# Patient Record
Sex: FEMALE | Race: WHITE | Employment: FULL TIME | URBAN - METROPOLITAN AREA
[De-identification: names, ages, dates, MRNs, and addresses within clinical notes are randomized per-mention and may not be internally consistent; named-entity substitution may affect disease eponyms.]

---

## 2017-03-22 ENCOUNTER — HOSPITAL ENCOUNTER (INPATIENT)
Age: 47
LOS: 7 days | Discharge: HOME | DRG: 761 | End: 2017-03-29
Attending: EMERGENCY MEDICINE | Admitting: INTERNAL MEDICINE
Payer: COMMERCIAL

## 2017-03-22 ENCOUNTER — APPOINTMENT (OUTPATIENT)
Dept: CT IMAGING | Age: 47
DRG: 761 | End: 2017-03-22
Attending: PHYSICIAN ASSISTANT
Payer: COMMERCIAL

## 2017-03-22 DIAGNOSIS — K52.9 ACUTE COLITIS: Primary | ICD-10-CM

## 2017-03-22 PROBLEM — R11.2 NAUSEA AND VOMITING: Status: ACTIVE | Noted: 2017-03-22

## 2017-03-22 LAB
ALBUMIN SERPL BCP-MCNC: 4.2 G/DL (ref 3.5–5)
ALBUMIN/GLOB SERPL: 0.9 {RATIO} (ref 1.1–2.2)
ALP SERPL-CCNC: 113 U/L (ref 45–117)
ALT SERPL-CCNC: 32 U/L (ref 12–78)
ANION GAP BLD CALC-SCNC: 13 MMOL/L (ref 5–15)
AST SERPL W P-5'-P-CCNC: 25 U/L (ref 15–37)
BASOPHILS # BLD AUTO: 0 K/UL (ref 0–0.1)
BASOPHILS # BLD: 0 % (ref 0–1)
BILIRUB SERPL-MCNC: 1.1 MG/DL (ref 0.2–1)
BUN SERPL-MCNC: 14 MG/DL (ref 6–20)
BUN/CREAT SERPL: 13 (ref 12–20)
CALCIUM SERPL-MCNC: 9.8 MG/DL (ref 8.5–10.1)
CHLORIDE SERPL-SCNC: 100 MMOL/L (ref 97–108)
CO2 SERPL-SCNC: 24 MMOL/L (ref 21–32)
CREAT SERPL-MCNC: 1.1 MG/DL (ref 0.55–1.02)
CRP SERPL-MCNC: 1.87 MG/DL
EOSINOPHIL # BLD: 0 K/UL (ref 0–0.4)
EOSINOPHIL NFR BLD: 0 % (ref 0–7)
ERYTHROCYTE [DISTWIDTH] IN BLOOD BY AUTOMATED COUNT: 12.3 % (ref 11.5–14.5)
GLOBULIN SER CALC-MCNC: 4.7 G/DL (ref 2–4)
GLUCOSE SERPL-MCNC: 99 MG/DL (ref 65–100)
HCT VFR BLD AUTO: 46 % (ref 35–47)
HGB BLD-MCNC: 15.6 G/DL (ref 11.5–16)
LIPASE SERPL-CCNC: 83 U/L (ref 73–393)
LYMPHOCYTES # BLD AUTO: 18 % (ref 12–49)
LYMPHOCYTES # BLD: 1.8 K/UL (ref 0.8–3.5)
MCH RBC QN AUTO: 30.4 PG (ref 26–34)
MCHC RBC AUTO-ENTMCNC: 33.9 G/DL (ref 30–36.5)
MCV RBC AUTO: 89.7 FL (ref 80–99)
MONOCYTES # BLD: 0.5 K/UL (ref 0–1)
MONOCYTES NFR BLD AUTO: 5 % (ref 5–13)
NEUTS SEG # BLD: 7.4 K/UL (ref 1.8–8)
NEUTS SEG NFR BLD AUTO: 77 % (ref 32–75)
PLATELET # BLD AUTO: 259 K/UL (ref 150–400)
POTASSIUM SERPL-SCNC: 3.6 MMOL/L (ref 3.5–5.1)
PROT SERPL-MCNC: 8.9 G/DL (ref 6.4–8.2)
RBC # BLD AUTO: 5.13 M/UL (ref 3.8–5.2)
SODIUM SERPL-SCNC: 137 MMOL/L (ref 136–145)
WBC # BLD AUTO: 9.7 K/UL (ref 3.6–11)

## 2017-03-22 PROCEDURE — 85025 COMPLETE CBC W/AUTO DIFF WBC: CPT | Performed by: PHYSICIAN ASSISTANT

## 2017-03-22 PROCEDURE — 71260 CT THORAX DX C+: CPT

## 2017-03-22 PROCEDURE — 74011250637 HC RX REV CODE- 250/637: Performed by: PHYSICIAN ASSISTANT

## 2017-03-22 PROCEDURE — 80053 COMPREHEN METABOLIC PANEL: CPT | Performed by: PHYSICIAN ASSISTANT

## 2017-03-22 PROCEDURE — 74011000250 HC RX REV CODE- 250: Performed by: INTERNAL MEDICINE

## 2017-03-22 PROCEDURE — 86140 C-REACTIVE PROTEIN: CPT | Performed by: PHYSICIAN ASSISTANT

## 2017-03-22 PROCEDURE — 74011000258 HC RX REV CODE- 258: Performed by: INTERNAL MEDICINE

## 2017-03-22 PROCEDURE — 36415 COLL VENOUS BLD VENIPUNCTURE: CPT | Performed by: PHYSICIAN ASSISTANT

## 2017-03-22 PROCEDURE — 96375 TX/PRO/DX INJ NEW DRUG ADDON: CPT

## 2017-03-22 PROCEDURE — 96365 THER/PROPH/DIAG IV INF INIT: CPT

## 2017-03-22 PROCEDURE — 74011250636 HC RX REV CODE- 250/636: Performed by: INTERNAL MEDICINE

## 2017-03-22 PROCEDURE — 81001 URINALYSIS AUTO W/SCOPE: CPT | Performed by: PHYSICIAN ASSISTANT

## 2017-03-22 PROCEDURE — 74011000258 HC RX REV CODE- 258: Performed by: PHYSICIAN ASSISTANT

## 2017-03-22 PROCEDURE — 74011636320 HC RX REV CODE- 636/320: Performed by: PHYSICIAN ASSISTANT

## 2017-03-22 PROCEDURE — 74011250636 HC RX REV CODE- 250/636: Performed by: PHYSICIAN ASSISTANT

## 2017-03-22 PROCEDURE — 65270000029 HC RM PRIVATE

## 2017-03-22 PROCEDURE — 83690 ASSAY OF LIPASE: CPT | Performed by: PHYSICIAN ASSISTANT

## 2017-03-22 PROCEDURE — 74011250637 HC RX REV CODE- 250/637: Performed by: INTERNAL MEDICINE

## 2017-03-22 PROCEDURE — 99283 EMERGENCY DEPT VISIT LOW MDM: CPT

## 2017-03-22 PROCEDURE — 74011636320 HC RX REV CODE- 636/320: Performed by: EMERGENCY MEDICINE

## 2017-03-22 RX ORDER — LAMOTRIGINE 100 MG/1
100 TABLET ORAL DAILY
Status: DISCONTINUED | OUTPATIENT
Start: 2017-03-23 | End: 2017-03-22

## 2017-03-22 RX ORDER — SODIUM CHLORIDE 0.9 % (FLUSH) 0.9 %
5-10 SYRINGE (ML) INJECTION AS NEEDED
Status: DISCONTINUED | OUTPATIENT
Start: 2017-03-22 | End: 2017-03-29 | Stop reason: HOSPADM

## 2017-03-22 RX ORDER — ENOXAPARIN SODIUM 100 MG/ML
40 INJECTION SUBCUTANEOUS EVERY 24 HOURS
Status: DISCONTINUED | OUTPATIENT
Start: 2017-03-22 | End: 2017-03-29 | Stop reason: HOSPADM

## 2017-03-22 RX ORDER — SODIUM CHLORIDE 9 MG/ML
150 INJECTION, SOLUTION INTRAVENOUS CONTINUOUS
Status: DISCONTINUED | OUTPATIENT
Start: 2017-03-22 | End: 2017-03-23

## 2017-03-22 RX ORDER — ALPRAZOLAM 0.5 MG/1
1 TABLET ORAL
Status: DISCONTINUED | OUTPATIENT
Start: 2017-03-22 | End: 2017-03-29 | Stop reason: HOSPADM

## 2017-03-22 RX ORDER — DIPHENHYDRAMINE HYDROCHLORIDE 50 MG/ML
12.5 INJECTION, SOLUTION INTRAMUSCULAR; INTRAVENOUS
Status: DISCONTINUED | OUTPATIENT
Start: 2017-03-22 | End: 2017-03-29 | Stop reason: HOSPADM

## 2017-03-22 RX ORDER — LEVOFLOXACIN 5 MG/ML
500 INJECTION, SOLUTION INTRAVENOUS EVERY 24 HOURS
Status: DISCONTINUED | OUTPATIENT
Start: 2017-03-22 | End: 2017-03-29 | Stop reason: HOSPADM

## 2017-03-22 RX ORDER — HYDROMORPHONE HYDROCHLORIDE 1 MG/ML
1 INJECTION, SOLUTION INTRAMUSCULAR; INTRAVENOUS; SUBCUTANEOUS
Status: COMPLETED | OUTPATIENT
Start: 2017-03-22 | End: 2017-03-22

## 2017-03-22 RX ORDER — LEVOFLOXACIN 5 MG/ML
750 INJECTION, SOLUTION INTRAVENOUS
Status: DISCONTINUED | OUTPATIENT
Start: 2017-03-22 | End: 2017-03-22

## 2017-03-22 RX ORDER — ESTRADIOL 1 MG/1
1 TABLET ORAL
Status: DISCONTINUED | OUTPATIENT
Start: 2017-03-22 | End: 2017-03-25

## 2017-03-22 RX ORDER — THERA TABS 400 MCG
1 TAB ORAL DAILY
Status: DISCONTINUED | OUTPATIENT
Start: 2017-03-23 | End: 2017-03-29 | Stop reason: HOSPADM

## 2017-03-22 RX ORDER — POTASSIUM CHLORIDE 1.5 G/1.77G
20 POWDER, FOR SOLUTION ORAL DAILY
Status: DISCONTINUED | OUTPATIENT
Start: 2017-03-23 | End: 2017-03-29 | Stop reason: HOSPADM

## 2017-03-22 RX ORDER — LAMOTRIGINE 100 MG/1
100 TABLET ORAL 2 TIMES DAILY
COMMUNITY
End: 2018-02-08

## 2017-03-22 RX ORDER — METRONIDAZOLE 500 MG/100ML
500 INJECTION, SOLUTION INTRAVENOUS
Status: DISCONTINUED | OUTPATIENT
Start: 2017-03-22 | End: 2017-03-22

## 2017-03-22 RX ORDER — ONDANSETRON 2 MG/ML
4 INJECTION INTRAMUSCULAR; INTRAVENOUS
Status: COMPLETED | OUTPATIENT
Start: 2017-03-22 | End: 2017-03-23

## 2017-03-22 RX ORDER — ACETAMINOPHEN 325 MG/1
650 TABLET ORAL
Status: DISCONTINUED | OUTPATIENT
Start: 2017-03-22 | End: 2017-03-29 | Stop reason: HOSPADM

## 2017-03-22 RX ORDER — HYDROMORPHONE HYDROCHLORIDE 1 MG/ML
1 INJECTION, SOLUTION INTRAMUSCULAR; INTRAVENOUS; SUBCUTANEOUS
Status: DISCONTINUED | OUTPATIENT
Start: 2017-03-22 | End: 2017-03-27

## 2017-03-22 RX ORDER — ONDANSETRON 4 MG/1
4 TABLET, ORALLY DISINTEGRATING ORAL
Status: COMPLETED | OUTPATIENT
Start: 2017-03-22 | End: 2017-03-22

## 2017-03-22 RX ORDER — SODIUM CHLORIDE 0.9 % (FLUSH) 0.9 %
5-10 SYRINGE (ML) INJECTION EVERY 8 HOURS
Status: DISCONTINUED | OUTPATIENT
Start: 2017-03-22 | End: 2017-03-29 | Stop reason: HOSPADM

## 2017-03-22 RX ORDER — METRONIDAZOLE 500 MG/100ML
500 INJECTION, SOLUTION INTRAVENOUS EVERY 8 HOURS
Status: DISCONTINUED | OUTPATIENT
Start: 2017-03-22 | End: 2017-03-29 | Stop reason: HOSPADM

## 2017-03-22 RX ORDER — LORAZEPAM 2 MG/ML
1 INJECTION INTRAMUSCULAR
Status: COMPLETED | OUTPATIENT
Start: 2017-03-22 | End: 2017-03-22

## 2017-03-22 RX ORDER — ALPRAZOLAM 0.5 MG/1
2 TABLET ORAL
Status: DISCONTINUED | OUTPATIENT
Start: 2017-03-22 | End: 2017-03-25

## 2017-03-22 RX ORDER — PRAVASTATIN SODIUM 40 MG/1
40 TABLET ORAL
COMMUNITY
End: 2022-09-26

## 2017-03-22 RX ORDER — PROCHLORPERAZINE EDISYLATE 5 MG/ML
5 INJECTION INTRAMUSCULAR; INTRAVENOUS ONCE
Status: DISCONTINUED | OUTPATIENT
Start: 2017-03-22 | End: 2017-03-22

## 2017-03-22 RX ORDER — PROCHLORPERAZINE EDISYLATE 5 MG/ML
5 INJECTION INTRAMUSCULAR; INTRAVENOUS
Status: DISCONTINUED | OUTPATIENT
Start: 2017-03-22 | End: 2017-03-22

## 2017-03-22 RX ORDER — LAMOTRIGINE 100 MG/1
100 TABLET ORAL DAILY
Status: DISCONTINUED | OUTPATIENT
Start: 2017-03-23 | End: 2017-03-25

## 2017-03-22 RX ORDER — POTASSIUM CHLORIDE 20MEQ/15ML
20 LIQUID (ML) ORAL DAILY
COMMUNITY
End: 2018-01-14

## 2017-03-22 RX ORDER — UREA 10 %
800 LOTION (ML) TOPICAL DAILY
COMMUNITY
End: 2018-02-08

## 2017-03-22 RX ORDER — POTASSIUM CHLORIDE 40 MEQ/15ML
1 SOLUTION ORAL DAILY
COMMUNITY
End: 2017-03-22

## 2017-03-22 RX ORDER — PROCHLORPERAZINE EDISYLATE 5 MG/ML
5 INJECTION INTRAMUSCULAR; INTRAVENOUS
Status: CANCELLED | OUTPATIENT
Start: 2017-03-22

## 2017-03-22 RX ORDER — HYDROMORPHONE HYDROCHLORIDE 1 MG/ML
1 INJECTION, SOLUTION INTRAMUSCULAR; INTRAVENOUS; SUBCUTANEOUS
Status: DISCONTINUED | OUTPATIENT
Start: 2017-03-22 | End: 2017-03-22

## 2017-03-22 RX ADMIN — ONDANSETRON 4 MG: 4 TABLET, ORALLY DISINTEGRATING ORAL at 15:04

## 2017-03-22 RX ADMIN — LEVOFLOXACIN 500 MG: 5 INJECTION, SOLUTION INTRAVENOUS at 22:34

## 2017-03-22 RX ADMIN — PROMETHAZINE HYDROCHLORIDE 12.5 MG: 25 INJECTION INTRAMUSCULAR; INTRAVENOUS at 20:35

## 2017-03-22 RX ADMIN — IOPAMIDOL 100 ML: 755 INJECTION, SOLUTION INTRAVENOUS at 14:57

## 2017-03-22 RX ADMIN — Medication 10 ML: at 22:35

## 2017-03-22 RX ADMIN — LORAZEPAM 1 MG: 2 INJECTION INTRAMUSCULAR; INTRAVENOUS at 14:42

## 2017-03-22 RX ADMIN — ESTRADIOL 1 MG: 1 TABLET ORAL at 23:03

## 2017-03-22 RX ADMIN — ALPRAZOLAM 2 MG: 0.5 TABLET ORAL at 23:02

## 2017-03-22 RX ADMIN — FAMOTIDINE 20 MG: 10 INJECTION, SOLUTION INTRAVENOUS at 22:34

## 2017-03-22 RX ADMIN — DIATRIZOATE MEGLUMINE AND DIATRIZOATE SODIUM 30 ML: 660; 100 LIQUID ORAL; RECTAL at 15:05

## 2017-03-22 RX ADMIN — PROMETHAZINE HYDROCHLORIDE 25 MG: 25 INJECTION INTRAMUSCULAR; INTRAVENOUS at 16:41

## 2017-03-22 RX ADMIN — SODIUM CHLORIDE 150 ML/HR: 900 INJECTION, SOLUTION INTRAVENOUS at 20:36

## 2017-03-22 RX ADMIN — ENOXAPARIN SODIUM 40 MG: 40 INJECTION SUBCUTANEOUS at 20:22

## 2017-03-22 RX ADMIN — HYDROMORPHONE HYDROCHLORIDE 1 MG: 1 INJECTION, SOLUTION INTRAMUSCULAR; INTRAVENOUS; SUBCUTANEOUS at 16:47

## 2017-03-22 RX ADMIN — METRONIDAZOLE 500 MG: 500 INJECTION, SOLUTION INTRAVENOUS at 20:21

## 2017-03-22 RX ADMIN — HYDROMORPHONE HYDROCHLORIDE 1 MG: 1 INJECTION, SOLUTION INTRAMUSCULAR; INTRAVENOUS; SUBCUTANEOUS at 21:15

## 2017-03-22 NOTE — IP AVS SNAPSHOT
Sin Mcfarland 
 
 
 [address] [facility] 
[phone] Patient: Rose Abad MRN: [id number] :1970 You are allergic to the following Allergen Reactions Sulfa (Sulfonamide Antibiotics) Hives Toradol (Ketorolac Tromethamine) Hives Morphine Hives Nausea and Vomiting Immunizations Administered for This Admission Name Date Influenza Vaccine (Quad) PF  Deferred () Recent Documentation Height Weight BMI OB Status Smoking Status 1.676 m 97.5 kg 34.7 kg/m2 Hysterectomy Never Smoker Emergency Contacts Name Discharge Info Relation Home Work Mobile Carlos Taylor CAREGIVER [3] Mother [14]   [phone] Sravan Pedersen DISCHARGE CAREGIVER [3] Son [22]   [phone] Bae Lee [4]   [phone] Russ Pedersen  Spouse [3] [phone] About your hospitalization You were admitted on:  2017 You last received care in the:  [facility]  MED SURG 2 You were discharged on:  2017 Unit phone number:  [phone] Why you were hospitalized Your primary diagnosis was:  Acute Colitis Your diagnoses also included:  Depression, Pud (Peptic Ulcer Disease), Anxiety, Nausea And Vomiting, Ulcerative Colitis (Hcc), Obesity Providers Seen During Your Hospitalizations Provider Role Specialty Primary office phone Kathy Chavez DO Attending Provider Emergency Medicine [phone] Sean Mccall MD Attending Provider Emergency Medicine [phone] Nola Lowry MD Attending Provider Internal Medicine [phone] Jonn Copeland MD Attending Provider Internal Medicine [phone] Toby Blackwell MD Attending Provider Internal Medicine [phone] Your Primary Care Physician (PCP) Primary Care Physician Office Phone Office Fax Chana Murguia [phone] Follow-up Information Follow up With Details Comments Contact Info Brittaney Kate MD In 1 week  [address] 
[phone] Current Discharge Medication List  
  
START taking these medications Dose & Instructions Dispensing Information Comments Morning Noon Evening Bedtime HYDROmorphone 2 mg tablet Commonly known as:  DILAUDID Your last dose was: Your next dose is:    
   
   
 Dose:  2-4 mg Take 1-2 Tabs by mouth every four (4) hours as needed for Pain. Max Daily Amount: 24 mg. Quantity:  60 Tab Refills:  0  
     
   
   
   
  
 promethazine 12.5 mg tablet Commonly known as:  PHENERGAN Your last dose was: Your next dose is:    
   
   
 Dose:  12.5 mg Take 1 Tab by mouth every six (6) hours as needed for Nausea. Quantity:  60 Tab Refills:  0 CONTINUE these medications which have NOT CHANGED Dose & Instructions Dispensing Information Comments Morning Noon Evening Bedtime * ALPRAZolam 2 mg tablet Commonly known as:  Volanda Pronto Your last dose was: Your next dose is:    
   
   
 Dose:  2 mg Take 2 mg by mouth nightly. Refills:  0  
     
   
   
   
  
 * ALPRAZolam 1 mg tablet Commonly known as:  Volanda Pronto Your last dose was: Your next dose is:    
   
   
 Dose:  1 mg Take 1 mg by mouth two (2) times daily as needed for Anxiety (at breakfast and lunch). Refills:  0  
     
   
   
   
  
 aspirin delayed-release 81 mg tablet Your last dose was: Your next dose is:    
   
   
 Dose:  81 mg Take 81 mg by mouth every evening. Refills:  0  
     
   
   
   
  
 calcium-vitamin D 500 mg(1,250mg) -200 unit per tablet Commonly known as:  OYSTER SHELL Your last dose was: Your next dose is:    
   
   
 Dose:  2 Tab Take 2 Tabs by mouth every evening. Refills:  0 dicyclomine 20 mg tablet Commonly known as:  BENTYL Your last dose was: Your next dose is:    
   
   
 Dose:  20 mg Take 20 mg by mouth every six (6) hours as needed. Refills:  0  
     
   
   
   
  
 estradiol 1 mg tablet Commonly known as:  ESTRACE Your last dose was: Your next dose is:    
   
   
 Dose:  1 mg Take 1 mg by mouth nightly. Refills:  0  
     
   
   
   
  
 folic acid 321 mcg tablet Your last dose was: Your next dose is:    
   
   
 Dose:  800 mcg Take 800 mcg by mouth daily. Refills:  0  
     
   
   
   
  
 lamoTRIgine 100 mg tablet Commonly known as: LaMICtal  
   
Your last dose was: Your next dose is:    
   
   
 Dose:  100 mg Take 100 mg by mouth daily. Refills:  0  
     
   
   
   
  
 ONE-A-DAY ESSENTIAL PO Your last dose was: Your next dose is:    
   
   
 Dose:  1 Tab Take 1 Tab by mouth daily. Refills:  0  
     
   
   
   
  
 potassium chloride 20 mEq/15 mL solution Commonly known as:  KAON 10% Your last dose was: Your next dose is:    
   
   
 Dose:  20 mEq Take 20 mEq by mouth daily. Refills:  0  
     
   
   
   
  
 pravastatin 20 mg tablet Commonly known as:  PRAVACHOL Your last dose was: Your next dose is:    
   
   
 Dose:  20 mg Take 20 mg by mouth nightly. Refills:  0 SUPER B COMPLEX PO Your last dose was: Your next dose is:    
   
   
 Dose:  1 Tab Take 1 Tab by mouth daily. Refills:  0  
     
   
   
   
  
 * Notice: This list has 2 medication(s) that are the same as other medications prescribed for you. Read the directions carefully, and ask your doctor or other care provider to review them with you. Where to Get Your Medications Information on where to get these meds will be given to you by the nurse or doctor. ! Ask your nurse or doctor about these medications HYDROmorphone 2 mg tablet  
 promethazine 12.5 mg tablet Discharge Instructions HOSPITALIST DISCHARGE INSTRUCTIONS 
NAME: Mercy Hamlin :  1970 MRN:  [id number] Date/Time:  3/29/2017 1:04 PM 
 
ADMIT DATE: 3/22/2017 DISCHARGE DATE: 3/29/2017 DISCHARGE DIAGNOSIS: 
Abdominal pain MEDICATIONS: 
· It is important that you take the medication exactly as they are prescribed. · Keep your medication in the bottles provided by the pharmacist and keep a list of the medication names, dosages, and times to be taken in your wallet. · Do not take other medications without consulting your doctor. Pain Management: per above medications What to do at [facility] Recommended diet:  GI lite diet Recommended activity: Activity as tolerated If you experience any of the following symptoms then please call your primary care physician or return to the emergency room if you cannot get hold of your doctor: 
Fever, chills, nausea, vomiting, diarrhea, change in mentation, falling, bleeding, shortness of breath Follow Up: Follow-up Information Follow up With Details Comments Contact Info Claudette Marte MD In 1 week  [address] 
[phone] Information obtained by : 
I understand that if any problems occur once I am at home I am to contact my physician. I understand and acknowledge receipt of the instructions indicated above. Physician's or R.N.'s Signature                                                                  Date/Time Patient or Representative Signature                                                          Date/Time Discharge Orders None [facility] [facility] Announcement We are excited to announce that we are making your provider's discharge notes available to you in [employer]. You will see these notes when they are completed and signed by the physician that discharged you from your recent hospital stay. If you have any questions or concerns about any information you see in [email or web address], please call the Health Information Department where you were seen or reach out to your Primary Care Provider for more information about your plan of care. Introducing [facility]! Dear Ernesto Anthony: Thank you for requesting a [email or web address] account. Our records indicate that you have previously registered for a [email or web address] account but its currently inactive. Please call our [email or web address] support line at [phone]. Additional Information If you have questions, please visit the Frequently Asked Questions section of the [email or web address] website at [email or web address] Remember, [email or web address] is NOT to be used for urgent needs. For medical emergencies, dial 911. Now available from your iPhone and Android! General Information Please provide this summary of care documentation to your next provider. Patient Signature:  ____________________________________________________________ Date:  ____________________________________________________________  
  
Felicia Gould Provider Signature:  ____________________________________________________________ Date:  ____________________________________________________________

## 2017-03-22 NOTE — ED NOTES
Pt using arms to drink oral contrast and use cell phone. Pt demanding Dilaudid and Phenergan be mixed and given, because \"its the only thing that works\". PA [facility] notified.

## 2017-03-22 NOTE — ED PROVIDER NOTES
HPI Comments: 62-year-old female history of cholecystectomy, ileocectomy, LISET, pan colitis, peptic ulcer disease, anxiety presents with severe 10 over 10 abdominal discomfort. Patient notes that she felt \"wheezy\" last night she has not eaten since that time the pain worsened during the course of the day became severe just prior to arrival. Patient denies vomiting or diarrhea she has felt quite nauseous. Abdominal discomfort is central to lower abdomen nonspecifically. No referral to the back. No urinary symptoms. Patient has had similar symptoms in the past and the setting of \"pan colitis\". It is unclear the patient says that she was diagnosed in the past with ulcerative colitis but this diagnosis is not definitive. Patient is a 51 y.o. female presenting with abdominal pain. Abdominal Pain           Past Medical History:   Diagnosis Date    Anxiety     Bowel obstruction ([address]     Fatty liver     Gall stones     GERD (gastroesophageal reflux disease)     Hematoma     abdomen on the right overy    Hernia of unspecified site of abdominal cavity without mention of obstruction or gangrene     Hx of thromboembolism of vein     right upper brachiel vein    Kidney stones     Obesity     REYNA?  PUD (peptic ulcer disease) 2016    stomach and colon ulcers?     Seizures ([address]     me dside effect? last sz 2013       Past Surgical History:   Procedure Laterality Date    ABDOMEN SURGERY PROC UNLISTED  4/2010    ventral hernia repair with biologic mesh    HX APPENDECTOMY  2009    HX ENDOSCOPY  2016    HX HERNIA REPAIR  9/2011    laparoscopic incisional hernia repair    HX HERNIA REPAIR  0607    umbilical hernia repair    HX HYSTERECTOMY  2009    partial hysterectomy (right ovary removed)    HX OOPHORECTOMY  2009    removed post op hematoma and right oopherectomy    HX OOPHORECTOMY  3/2016    mass removed and left oopherectomy    HX OTHER SURGICAL  2009    ileocectomy, bowel resection,     [place] COLONOSCOPY FLX DX W/COLLJ SPEC WHEN PFRMD  1/14/2011              Family History:   Problem Relation Age of Onset    Cancer Maternal Grandmother      colon ca       Social History     Social History    Marital status:      Spouse name: N/A    Number of children: N/A    Years of education: N/A     Occupational History    Not on file. Social History Main Topics    Smoking status: Never Smoker    Smokeless tobacco: Never Used    Alcohol use 0.6 oz/week     1 Glasses of wine per week      Comment: 1 glass every 2 weeks    Drug use: No    Sexual activity: Not on file     Other Topics Concern    Not on file     Social History Narrative         ALLERGIES: Sulfa (sulfonamide antibiotics); Toradol [ketorolac tromethamine]; and Morphine    Review of Systems   Gastrointestinal: Positive for abdominal pain. All other systems reviewed and are negative. Vitals:    03/22/17 1400   BP: 117/72   Pulse: (!) 102   Resp: 18   Temp: 97.4 °F (36.3 °C)   SpO2: 100%   Weight: 97.5 kg (215 lb)   Height: 5' 6\" (1.676 m)            Physical Exam   Constitutional: She is oriented to person, place, and time. HENT:   Head: Normocephalic and atraumatic. Cardiovascular: Normal rate and regular rhythm. Pulmonary/Chest: Effort normal and breath sounds normal.   Abdominal: Soft. She exhibits no distension. There is generalized tenderness. There is guarding. There is no rebound. Neurological: She is alert and oriented to person, place, and time. Skin: Skin is warm and dry. Nursing note and vitals reviewed. MDM  Number of Diagnoses or Management Options  Acute colitis:   Diagnosis management comments: Patient behaving in a bizarre manner. She is hyperventilating complaining severe pain similar to previous pain crises.     Patient vomiting on arrival - zofran - vomiting again    Symptoms well controlled with phenergan/ativan/analgesics    CT reviewed, discussed with patient home v admission, patient does not think that she will tolerate home given discomfort and still not tolerating po     ED Course       Procedures

## 2017-03-22 NOTE — PROGRESS NOTES
1910 Assumed care of pt. Pt. Resting in stretcher with call bell at bedside. Identified self as primary nurse. Answered questions and discussed plan of care at this time. Will continue to monitor.

## 2017-03-22 NOTE — H&P
[address]  [phone]    Admission History and Physical      NAME:              Delfina Chambers   :   1970   MRN:  [id number]     PCP:  Mehdi Dickerson MD     Date:     3/22/2017     Chief  Complaint: Nausea, vomiting and abdominal pain x 1 day    History Of Presenting Illness:       Ms. Kelyl Veras is a 51 y.o. female who is being admitted for acute colitis. Ms. Kelly Veras presented to our Emergency Department today complaining of a generalized aching and cramping abdominal pain. Associated with nausea and billous vomiting. She has had subjective fever and chills. No diarrhea. Pain was better after IV dilaudid. She says she has a hx of ulcerative colitis and has had multiple abdominal surgeries in the past. An abdominal CT scan done showed acute colitis. She will be admitted for further management. Allergies   Allergen Reactions    Sulfa (Sulfonamide Antibiotics) Hives    Toradol [Ketorolac Tromethamine] Hives    Morphine Hives       Prior to Admission medications    Medication Sig Start Date End Date Taking? Authorizing Provider   pravastatin (PRAVACHOL) 20 mg tablet Take 20 mg by mouth nightly. Yes Phys Other, MD   dicyclomine (BENTYL) 20 mg tablet Take 20 mg by mouth every six (6) hours as needed. Historical Provider   ferrous sulfate 325 mg (65 mg iron) tablet Take 65 mg by mouth Daily (before breakfast). Historical Provider   b-complex with vitamin c tablet Take 1 Tab by mouth daily. Historical Provider   folic acid 217 mcg tablet Take 400 mcg by mouth daily. Historical Provider   ALPRAZolam Geoffry Chill) 1 mg tablet Take 1 mg by mouth two (2) times daily as needed for Anxiety (at breakfast and lunch). Historical Provider   colestipol (COLESTID) 1 gram tablet Take 2 g by mouth two (2) times a day.     Historical Provider   ALPRAZolam Kostas Clemente) 2 mg tablet Take 2 mg by mouth nightly. Historical Provider   therapeutic multivitamin (THERAGRAN) tablet Take 1 Tab by mouth daily. Historical Provider   estradiol (ESTRACE) 1 mg tablet Take 1 mg by mouth daily. Historical Provider   aspirin delayed-release 81 mg tablet Take 81 mg by mouth every evening. Historical Provider   calcium-vitamin D (OYSTER SHELL) 500 mg(1,250mg) -200 unit per tablet Take 1 Tab by mouth every evening. Historical Provider       Past Medical History:   Diagnosis Date    Anxiety     Bowel obstruction ([address]     Fatty liver     Gall stones     GERD (gastroesophageal reflux disease)     Hematoma     abdomen on the right overy    Hernia of unspecified site of abdominal cavity without mention of obstruction or gangrene     Hx of thromboembolism of vein     right upper brachiel vein    Kidney stones     Obesity     REYNA?  PUD (peptic ulcer disease) 2016    stomach and colon ulcers?     Seizures ([address]     me dside effect? last sz 2013        Past Surgical History:   Procedure Laterality Date    ABDOMEN SURGERY PROC UNLISTED  4/2010    ventral hernia repair with biologic mesh    HX APPENDECTOMY  2009    HX ENDOSCOPY  2016    HX HERNIA REPAIR  9/2011    laparoscopic incisional hernia repair    HX HERNIA REPAIR  0984    umbilical hernia repair    HX HYSTERECTOMY  2009    partial hysterectomy (right ovary removed)    HX OOPHORECTOMY  2009    removed post op hematoma and right oopherectomy    HX OOPHORECTOMY  3/2016    mass removed and left oopherectomy    HX OTHER SURGICAL  2009    ileocectomy, bowel resection,     [place] COLONOSCOPY FLX DX W/COLLJ SPEC WHEN PFRMD  1/14/2011            Social History   Substance Use Topics    Smoking status: Never Smoker    Smokeless tobacco: Never Used    Alcohol use 0.6 oz/week     1 Glasses of wine per week      Comment: 1 glass every 2 weeks        Family History   Problem Relation Age of Onset    Cancer Maternal Grandmother      colon ca        Review of Systems:    Constitutional ROS: subjective fever but no chills, rigors or night sweats  Respiratory ROS: no cough, sputum, hemoptysis, dyspnea or pleuritic pain. Cardiovascular ROS: no chest pain, palpitations, orthopnea, PND or syncope  Endocrine ROS: no polydispsia, polyuria, heat or cold intolerance or major weight change. Gastrointestinal ROS: no dysphagia, diarrhea or any bleeding   Genito-Urinary ROS: no dysuria, frequency, hematuria, retention or flank pain  Musculoskeletal ROS: no joint pain, swelling or muscular tenderness  Neurological ROS: no headache, confusion, focal weakness or any other neurological symptoms  Psychiatric ROS: no depression, anxiety, mood swings  Dermatological ROS: no rash, pruritis, or urticaria    Examination:    Vital signs:    Visit Vitals    /72 (BP 1 Location: Left arm, BP Patient Position: Sitting)    Pulse (!) 102    Temp 97.4 °F (36.3 °C)    Resp 18    Ht 5' 6\" (1.676 m)    Wt 97.5 kg (215 lb)    SpO2 100%    BMI 34.7 kg/m2         Physical Examination:    General:  Weak and ill looking patient in no acute distress  Eyes: Pink conjunctivae, PERRLA with no discharge.   Ear, Nose & Throat: No ottorrhea, rhinorrhea and has dry mucous membranes  Respiratory:  No accessory muscle use, clear breath sounds without crackles or wheezes  Cardiovascular:  No JVD or murmurs, regular and normal S1, S2 without thrills, bruits or peripheral edema  GI & :  Soft abdomen, generalized abdominal discomfort, non-distended, normoactive bowel sounds with no palpable organomegaly  Musculoskeletal:  No cyanosis, clubbing, atrophy or deformities  Skin:  No rashes, bruising or ulcers   Neurological: Awake and alert, speech is clear, CNs 2-12 are grossly intact and otherwise non focal  Psychiatric:  Has a good insight and is oriented x 3  ________________________________________________________________________    Data Review:    Labs:    Recent Labs      03/22/17   1456   WBC  9.7   HGB  15.6   HCT  46.0   PLT  259     Recent Labs      03/22/17   1456   NA  137   K  3.6   CL  100   CO2  24   GLU  99   BUN  14   CREA  1.10*   CA  9.8   ALB  4.2   SGOT  25   ALT  32     No components found for: GLPOC  No results for input(s): PH, PCO2, PO2, HCO3, FIO2 in the last 72 hours. No results for input(s): INR in the last 72 hours. No lab exists for component: INREXT    Radiological Studies:      CT scan abdomen and pelvis - There is fluid-filled large bowel with air-fluid levels suggesting diarrhea/colitis. Recommend clinical correlation. There is interloop fluid in the mesentery of the small bowel inferiorly which  may related to the possible colitis. Small hernia associated with hernia repair mesh in the lower, anterior  abdominal wall which partially contains a short segment of small bowel. There is no evidence of obstruction. Incidental findings as above    I have reviewed old medical records available. Assessment & Impression:     Ms. Livier Pollack is a 51 y.o. female being evaluated for:     Principal Problem:    Acute colitis (3/22/2017)    Active Problems:    Depression (7/1/2010)      PUD (peptic ulcer disease) ()      Overview: stomach and colon ulcers? Anxiety ()      Nausea and vomiting (3/22/2017)         Plan of management:    Acute colitis (3/22/2017)/ hx Ulcerative colitis POA: admit to hospital as she now has vomiting and not able to keep much PO. IV hydration, IV anti-emetics. Bowel rest. Empiric IV Levofloxacin and Metronidazole. Consult Dr Miguel Graham    Depression (7/1/2010)/ Anxiety: resume PO Xanax as needed when able to tolerate PO     PUD (peptic ulcer disease): start IV Pepcid. Nausea and vomiting (3/22/2017): due to acute colitis. Hydrate. IV anti-emetics.  Monitor     Code Status:  Full    Surrogate decision maker: Family    Risk of deterioration: high      Total time spent for the care of the patient: [address] Plan discussed with: Patient, Family, Nursing Staff and ED physician    Discussed:  Code Status, Care Plan and D/C Planning    Prophylaxis:  Lovenox and H2B/PPI    Probable Disposition:  Home w/Family           ___________________________________________________    Attending Physician: Demetrice Ramírez MD

## 2017-03-22 NOTE — ROUTINE PROCESS
Bedside and Verbal shift change report given to Gage (oncoming nurse) by [facility] (offgoing nurse). Report included the following information SBAR, ED Summary and MAR.

## 2017-03-23 LAB
ALBUMIN SERPL BCP-MCNC: 3.1 G/DL (ref 3.5–5)
ALBUMIN/GLOB SERPL: 0.8 {RATIO} (ref 1.1–2.2)
ALP SERPL-CCNC: 84 U/L (ref 45–117)
ALT SERPL-CCNC: 26 U/L (ref 12–78)
ANION GAP BLD CALC-SCNC: 10 MMOL/L (ref 5–15)
AST SERPL W P-5'-P-CCNC: 24 U/L (ref 15–37)
BASOPHILS # BLD AUTO: 0 K/UL (ref 0–0.1)
BASOPHILS # BLD: 0 % (ref 0–1)
BILIRUB SERPL-MCNC: 0.6 MG/DL (ref 0.2–1)
BUN SERPL-MCNC: 12 MG/DL (ref 6–20)
BUN/CREAT SERPL: 14 (ref 12–20)
C DIFF TOX GENS STL QL NAA+PROBE: NEGATIVE
CALCIUM SERPL-MCNC: 7.9 MG/DL (ref 8.5–10.1)
CHLORIDE SERPL-SCNC: 104 MMOL/L (ref 97–108)
CO2 SERPL-SCNC: 25 MMOL/L (ref 21–32)
CREAT SERPL-MCNC: 0.87 MG/DL (ref 0.55–1.02)
EOSINOPHIL # BLD: 0.1 K/UL (ref 0–0.4)
EOSINOPHIL NFR BLD: 1 % (ref 0–7)
ERYTHROCYTE [DISTWIDTH] IN BLOOD BY AUTOMATED COUNT: 12.5 % (ref 11.5–14.5)
GLOBULIN SER CALC-MCNC: 3.8 G/DL (ref 2–4)
GLUCOSE SERPL-MCNC: 96 MG/DL (ref 65–100)
HCT VFR BLD AUTO: 37.8 % (ref 35–47)
HEMOCCULT STL QL: NEGATIVE
HGB BLD-MCNC: 12.5 G/DL (ref 11.5–16)
LYMPHOCYTES # BLD AUTO: 26 % (ref 12–49)
LYMPHOCYTES # BLD: 2.2 K/UL (ref 0.8–3.5)
MCH RBC QN AUTO: 30 PG (ref 26–34)
MCHC RBC AUTO-ENTMCNC: 33.1 G/DL (ref 30–36.5)
MCV RBC AUTO: 90.9 FL (ref 80–99)
MONOCYTES # BLD: 0.5 K/UL (ref 0–1)
MONOCYTES NFR BLD AUTO: 6 % (ref 5–13)
NEUTS SEG # BLD: 5.6 K/UL (ref 1.8–8)
NEUTS SEG NFR BLD AUTO: 67 % (ref 32–75)
PLATELET # BLD AUTO: 199 K/UL (ref 150–400)
POTASSIUM SERPL-SCNC: 3.3 MMOL/L (ref 3.5–5.1)
PROT SERPL-MCNC: 6.9 G/DL (ref 6.4–8.2)
RBC # BLD AUTO: 4.16 M/UL (ref 3.8–5.2)
SODIUM SERPL-SCNC: 139 MMOL/L (ref 136–145)
WBC # BLD AUTO: 8.4 K/UL (ref 3.6–11)

## 2017-03-23 PROCEDURE — 80053 COMPREHEN METABOLIC PANEL: CPT | Performed by: INTERNAL MEDICINE

## 2017-03-23 PROCEDURE — 74011250636 HC RX REV CODE- 250/636: Performed by: INTERNAL MEDICINE

## 2017-03-23 PROCEDURE — 74011250637 HC RX REV CODE- 250/637: Performed by: INTERNAL MEDICINE

## 2017-03-23 PROCEDURE — 74011250636 HC RX REV CODE- 250/636: Performed by: PHYSICIAN ASSISTANT

## 2017-03-23 PROCEDURE — 74011000250 HC RX REV CODE- 250: Performed by: INTERNAL MEDICINE

## 2017-03-23 PROCEDURE — 85025 COMPLETE CBC W/AUTO DIFF WBC: CPT | Performed by: INTERNAL MEDICINE

## 2017-03-23 PROCEDURE — 89055 LEUKOCYTE ASSESSMENT FECAL: CPT | Performed by: INTERNAL MEDICINE

## 2017-03-23 PROCEDURE — 65270000029 HC RM PRIVATE

## 2017-03-23 PROCEDURE — 87493 C DIFF AMPLIFIED PROBE: CPT | Performed by: INTERNAL MEDICINE

## 2017-03-23 PROCEDURE — 87045 FECES CULTURE AEROBIC BACT: CPT | Performed by: INTERNAL MEDICINE

## 2017-03-23 PROCEDURE — 82272 OCCULT BLD FECES 1-3 TESTS: CPT | Performed by: INTERNAL MEDICINE

## 2017-03-23 PROCEDURE — 36415 COLL VENOUS BLD VENIPUNCTURE: CPT | Performed by: INTERNAL MEDICINE

## 2017-03-23 PROCEDURE — 74011000258 HC RX REV CODE- 258: Performed by: INTERNAL MEDICINE

## 2017-03-23 RX ORDER — DICYCLOMINE HYDROCHLORIDE 20 MG/1
20 TABLET ORAL
Status: DISCONTINUED | OUTPATIENT
Start: 2017-03-23 | End: 2017-03-25

## 2017-03-23 RX ORDER — DEXTROSE, SODIUM CHLORIDE, AND POTASSIUM CHLORIDE 5; .45; .15 G/100ML; G/100ML; G/100ML
75 INJECTION INTRAVENOUS CONTINUOUS
Status: DISCONTINUED | OUTPATIENT
Start: 2017-03-23 | End: 2017-03-29 | Stop reason: HOSPADM

## 2017-03-23 RX ORDER — HYDROMORPHONE HYDROCHLORIDE 1 MG/ML
2 INJECTION, SOLUTION INTRAMUSCULAR; INTRAVENOUS; SUBCUTANEOUS ONCE
Status: COMPLETED | OUTPATIENT
Start: 2017-03-23 | End: 2017-03-24

## 2017-03-23 RX ORDER — POLYETHYLENE GLYCOL 3350 17 G/17G
17 POWDER, FOR SOLUTION ORAL
Status: COMPLETED | OUTPATIENT
Start: 2017-03-23 | End: 2017-03-23

## 2017-03-23 RX ADMIN — SODIUM CHLORIDE 150 ML/HR: 900 INJECTION, SOLUTION INTRAVENOUS at 05:52

## 2017-03-23 RX ADMIN — HYDROMORPHONE HYDROCHLORIDE 1 MG: 1 INJECTION, SOLUTION INTRAMUSCULAR; INTRAVENOUS; SUBCUTANEOUS at 11:26

## 2017-03-23 RX ADMIN — ALPRAZOLAM 1 MG: 0.5 TABLET ORAL at 06:24

## 2017-03-23 RX ADMIN — HYDROMORPHONE HYDROCHLORIDE 1 MG: 1 INJECTION, SOLUTION INTRAMUSCULAR; INTRAVENOUS; SUBCUTANEOUS at 17:28

## 2017-03-23 RX ADMIN — LAMOTRIGINE 100 MG: 100 TABLET ORAL at 09:10

## 2017-03-23 RX ADMIN — POLYETHYLENE GLYCOL 3350 17 G: 17 POWDER, FOR SOLUTION ORAL at 19:09

## 2017-03-23 RX ADMIN — DICYCLOMINE HYDROCHLORIDE 20 MG: 20 TABLET ORAL at 15:52

## 2017-03-23 RX ADMIN — ALPRAZOLAM 2 MG: 0.5 TABLET ORAL at 21:05

## 2017-03-23 RX ADMIN — METRONIDAZOLE 500 MG: 500 INJECTION, SOLUTION INTRAVENOUS at 18:21

## 2017-03-23 RX ADMIN — HYDROMORPHONE HYDROCHLORIDE 1 MG: 1 INJECTION, SOLUTION INTRAMUSCULAR; INTRAVENOUS; SUBCUTANEOUS at 01:33

## 2017-03-23 RX ADMIN — ALPRAZOLAM 1 MG: 0.5 TABLET ORAL at 12:40

## 2017-03-23 RX ADMIN — HYDROMORPHONE HYDROCHLORIDE 1 MG: 1 INJECTION, SOLUTION INTRAMUSCULAR; INTRAVENOUS; SUBCUTANEOUS at 06:25

## 2017-03-23 RX ADMIN — ESTRADIOL 1 MG: 1 TABLET ORAL at 21:05

## 2017-03-23 RX ADMIN — LEVOFLOXACIN 500 MG: 5 INJECTION, SOLUTION INTRAVENOUS at 19:09

## 2017-03-23 RX ADMIN — PROMETHAZINE HYDROCHLORIDE 12.5 MG: 25 INJECTION INTRAMUSCULAR; INTRAVENOUS at 07:26

## 2017-03-23 RX ADMIN — Medication 10 ML: at 01:34

## 2017-03-23 RX ADMIN — METRONIDAZOLE 500 MG: 500 INJECTION, SOLUTION INTRAVENOUS at 03:13

## 2017-03-23 RX ADMIN — POLYETHYLENE GLYCOL 3350 17 G: 17 POWDER, FOR SOLUTION ORAL at 18:25

## 2017-03-23 RX ADMIN — POLYETHYLENE GLYCOL 3350 17 G: 17 POWDER, FOR SOLUTION ORAL at 18:22

## 2017-03-23 RX ADMIN — ONDANSETRON 4 MG: 2 INJECTION INTRAMUSCULAR; INTRAVENOUS at 03:00

## 2017-03-23 RX ADMIN — METRONIDAZOLE 500 MG: 500 INJECTION, SOLUTION INTRAVENOUS at 11:00

## 2017-03-23 RX ADMIN — Medication 10 ML: at 21:06

## 2017-03-23 RX ADMIN — POTASSIUM CHLORIDE 20 MEQ: 1.5 POWDER, FOR SOLUTION ORAL at 09:10

## 2017-03-23 RX ADMIN — PROMETHAZINE HYDROCHLORIDE 12.5 MG: 25 INJECTION INTRAMUSCULAR; INTRAVENOUS at 17:28

## 2017-03-23 RX ADMIN — THERA TABS 1 TABLET: TAB at 09:10

## 2017-03-23 RX ADMIN — POLYETHYLENE GLYCOL 3350 17 G: 17 POWDER, FOR SOLUTION ORAL at 18:21

## 2017-03-23 RX ADMIN — FAMOTIDINE 20 MG: 10 INJECTION, SOLUTION INTRAVENOUS at 09:53

## 2017-03-23 RX ADMIN — ENOXAPARIN SODIUM 40 MG: 40 INJECTION SUBCUTANEOUS at 21:04

## 2017-03-23 RX ADMIN — DEXTROSE MONOHYDRATE, SODIUM CHLORIDE, AND POTASSIUM CHLORIDE 100 ML/HR: 50; 4.5; 1.49 INJECTION, SOLUTION INTRAVENOUS at 09:09

## 2017-03-23 RX ADMIN — FAMOTIDINE 20 MG: 10 INJECTION, SOLUTION INTRAVENOUS at 21:05

## 2017-03-23 NOTE — ROUTINE PROCESS
Primary Nurse Cam Aguilar RN and Alecia Burnett RN performed a dual skin assessment on this patient No impairment noted  Shaheed score is 21

## 2017-03-23 NOTE — PROGRESS NOTES
Bedside and Verbal shift change report given to Sorin Logan (oncoming nurse) by Elena Pettit RN (offgoing nurse). Report included the following information SBAR and Kardex.

## 2017-03-23 NOTE — ED NOTES
Pt medicated with Lovenox, phenergan, and flagyl as ordered. Normal started as ordered. Pt is alert and oriented x 4. No acute distress noted. Pt given a telephone as requested to update family to status.

## 2017-03-23 NOTE — ROUTINE PROCESS
TRANSFER - OUT REPORT:    Verbal report given to Samira RN(name) on [employer]  being transferred to  Medical (unit) for routine progression of care       Report consisted of patients Situation, Background, Assessment and   Recommendations(SBAR). Information from the following report(s) SBAR, ED Summary, [facility] and Recent Results was reviewed with the receiving nurse. Lines:   Peripheral IV 03/22/17 Right Forearm (Active)   Site Assessment Clean, dry, & intact 3/22/2017  2:50 PM   Phlebitis Assessment 0 3/22/2017  2:50 PM   Infiltration Assessment 0 3/22/2017  2:50 PM   Dressing Status Clean, dry, & intact 3/22/2017  2:50 PM   Dressing Type Tape;Transparent 3/22/2017  2:50 PM   Hub Color/Line Status Pink;Flushed;Patent 3/22/2017  2:50 PM        Opportunity for questions and clarification was provided.       Patient transported with:   [employer]

## 2017-03-23 NOTE — CONSULTS
[facility]. Jef Akins M.D.  [phone]                    GASTROENTEROLOGY CONSULTATION NOTE              NAME:  Chayito Nguyen   :   1970   MRN:   [id number]       Referring Physician:    Dr. Natasha Marcum Date:   3/23/2017 5:55 PM    Chief Complaint:    Abdominal pain     History of Present Illness:    Patient is a 51 y.o. who is well known to me, has done well since last in the office in 2016, on dicyclomine twice or three times a day, started earlier this week with headache and then mild crampy abdominal pain that got slightly more intense 2 night ago along with vomiting and abdominal distension, and then yesterday as she is moving out of her car, she got intense mid to lower abdominal pain. She reports she had an episode of large bilious vomiting 2 nights ago. She denies fever or chills and denies bowel movements until later today where she had 2 episodes of diarrhea. Denies any bleeding. Labs in the ER were within normal. CT showing fluid filled loops of bowel and herniated loop of small intestine without obstruction. PMH:  Past Medical History:   Diagnosis Date    Anxiety     Bowel obstruction (HCC)     Fatty liver     Gall stones     GERD (gastroesophageal reflux disease)     Hematoma     abdomen on the right overy    Hernia of unspecified site of abdominal cavity without mention of obstruction or gangrene     Hx of thromboembolism of vein     right upper brachiel vein    Kidney stones     Obesity     REYNA?  PUD (peptic ulcer disease) 2016    stomach and colon ulcers?     Seizures ([address]     me dside effect? last sz 2013       [facility]  Past Surgical History:   Procedure Laterality Date    ABDOMEN SURGERY PROC UNLISTED  2010    ventral hernia repair with biologic mesh    HX APPENDECTOMY  2009    HX ENDOSCOPY  2016    HX HERNIA REPAIR  2011    laparoscopic incisional hernia repair    HX HERNIA REPAIR  5418    umbilical hernia repair   Ernestine Prasad HX HYSTERECTOMY  2009    partial hysterectomy (right ovary removed)    HX OOPHORECTOMY  2009    removed post op hematoma and right oopherectomy    HX OOPHORECTOMY  3/2016    mass removed and left oopherectomy    HX OTHER SURGICAL  2009    ileocectomy, bowel resection,     [place] COLONOSCOPY FLX DX W/COLLJ SPEC WHEN PFRMD  1/14/2011            Allergies: Allergies   Allergen Reactions    Sulfa (Sulfonamide Antibiotics) Hives    Toradol [Ketorolac Tromethamine] Hives    Morphine Hives       Home Medications:  Prior to Admission Medications   Prescriptions Last Dose Informant Patient Reported? Taking? ALPRAZolam (XANAX) 1 mg tablet 3/21/2017 at AM Self Yes Yes   Sig: Take 1 mg by mouth two (2) times daily as needed for Anxiety (at breakfast and lunch). ALPRAZolam (XANAX) 2 mg tablet 3/21/2017 at HS Self Yes Yes   Sig: Take 2 mg by mouth nightly. FERROUS FUMARATE/VIT BCOMP,C (SUPER B COMPLEX PO) 3/21/2017 at AM Self Yes Yes   Sig: Take 1 Tab by mouth daily. MULTIVITAMIN (ONE-A-DAY ESSENTIAL PO) 3/21/2017 at AM Self Yes Yes   Sig: Take 1 Tab by mouth daily. aspirin delayed-release 81 mg tablet 3/15/2017 at PM Self Yes Yes   Sig: Take 81 mg by mouth every evening. calcium-vitamin D (OYSTER SHELL) 500 mg(1,250mg) -200 unit per tablet 3/21/2017 at PM Self Yes Yes   Sig: Take 2 Tabs by mouth every evening. dicyclomine (BENTYL) 20 mg tablet 3/21/2017 at HS Self Yes Yes   Sig: Take 20 mg by mouth every six (6) hours as needed. estradiol (ESTRACE) 1 mg tablet 3/21/2017 at HS Self Yes Yes   Sig: Take 1 mg by mouth nightly. folic acid 615 mcg tablet 3/21/2017 at AM Self Yes Yes   Sig: Take 800 mcg by mouth daily. lamoTRIgine (LAMICTAL) 100 mg tablet 3/21/2017 at AM Self Yes Yes   Sig: Take 100 mg by mouth daily. potassium chloride (KAON 10%) 20 mEq/15 mL solution 3/15/2017 at AM Self Yes Yes   Sig: Take 20 mEq by mouth daily.    pravastatin (PRAVACHOL) 20 mg tablet 3/21/2017 at HS Self Yes Yes   Sig: Take 20 mg by mouth nightly.       Facility-Administered Medications: None       Hospital Medications:  Current Facility-Administered Medications   Medication Dose Route Frequency    dextrose 5% - 0.45% NaCl with KCl 20 mEq/L infusion  100 mL/hr IntraVENous CONTINUOUS    dicyclomine (BENTYL) tablet 20 mg  20 mg Oral Q6H PRN    polyethylene glycol (MIRALAX) packet 17 g  17 g Oral Q30MIN    levoFLOXacin (LEVAQUIN) 500 mg in D5W IVPB  500 mg IntraVENous Q24H    metroNIDAZOLE (FLAGYL) IVPB premix 500 mg  500 mg IntraVENous Q8H    sodium chloride (NS) flush 5-10 mL  5-10 mL IntraVENous Q8H    sodium chloride (NS) flush 5-10 mL  5-10 mL IntraVENous PRN    acetaminophen (TYLENOL) tablet 650 mg  650 mg Oral Q4H PRN    diphenhydrAMINE (BENADRYL) injection 12.5 mg  12.5 mg IntraVENous Q4H PRN    enoxaparin (LOVENOX) injection 40 mg  40 mg SubCUTAneous Q24H    HYDROmorphone (PF) (DILAUDID) injection 1 mg  1 mg IntraVENous Q4H PRN    promethazine (PHENERGAN) 12.5 mg in 0.9% sodium chloride 50 mL IVPB  12.5 mg IntraVENous Q4H PRN    famotidine (PF) (PEPCID) 20 mg in sodium chloride 0.9 % 10 mL injection  20 mg IntraVENous Q12H    therapeutic multivitamin (THERAGRAN) tablet 1 Tab  1 Tab Oral DAILY    potassium chloride (KLOR-CON) packet 20 mEq  20 mEq Oral DAILY    ALPRAZolam (XANAX) tablet 1 mg  1 mg Oral BID PRN    ALPRAZolam (XANAX) tablet 2 mg  2 mg Oral QHS    lamoTRIgine (LaMICtal) tablet 100 mg  100 mg Oral DAILY    estradiol (ESTRACE) tablet 1 mg  1 mg Oral QHS    influenza vaccine 2016-17 (36mos+)(PF) (FLUZONE/FLUARIX/FLULAVAL QUAD) injection 0.5 mL  0.5 mL IntraMUSCular PRIOR TO DISCHARGE       Social History:  Social History   Substance Use Topics    Smoking status: Never Smoker    Smokeless tobacco: Never Used    Alcohol use 0.6 oz/week     1 Glasses of wine per week      Comment: 1 glass every 2 weeks       Family History:  Family History   Problem Relation Age of Onset    Cancer Maternal Grandmother      colon ca       Review of Systems:  Constitutional: negative fever, negative chills, negative weight loss  Eyes:   negative visual changes  ENT:   negative sore throat, tongue or lip swelling  Respiratory:  negative cough, negative dyspnea  Cards:  negative for chest pain, palpitations, lower extremity edema  GI:   See HPI  :  negative for frequency, dysuria  Integument:  negative for rash and pruritus  Heme:  negative for easy bruising and gum/nose bleeding  Musculoskel: negative for myalgias,  back pain and muscle weakness  Neuro: negative for headaches, dizziness, vertigo  Psych:  negative for feelings of anxiety, depression     Objective:   Patient Vitals for the past 8 hrs:   BP Temp Pulse Resp SpO2   03/23/17 1352 99/58 97.6 °F (36.4 °C) 70 20 99 %   03/23/17 1002 107/61 - 76 20 100 %             EXAM:     NEURO-alert, oriented x3, affect appropriate, no focal neurological deficits, moves all extremities well   HEENT-Head: Normocephalic, no lesions, without obvious abnormality. LUNGS-clear to auscultation bilaterally    COR-regular rate and rhythym     ABD- soft, tender mostly over lower abdomen, non distended, bowel sounds present     EXT-no edema    Skin - No rash     Data Review     Recent Labs      03/23/17   0150  03/22/17   1456   WBC  8.4  9.7   HGB  12.5  15.6   HCT  37.8  46.0   PLT  199  259     Recent Labs      03/23/17   0150  03/22/17   1456   NA  139  137   K  3.3*  3.6   CL  104  100   CO2  25  24   BUN  12  14   CREA  0.87  1.10*   GLU  96  99   CA  7.9*  9.8     Recent Labs      03/23/17   0150  03/22/17   1456   SGOT  24  25   AP  84  113   TP  6.9  8.9*   ALB  3.1*  4.2   GLOB  3.8  4.7*   LPSE   --   83     No results for input(s): INR, PTP, APTT in the last 72 hours.     No lab exists for component: INREXT    Patient Active Problem List   Diagnosis Code    Depression F32.9    Cholecystitis K81.9    Anemia D64.9    Pancolitis ([facility] [address] K51.00    Hx of thromboembolism of vein Z86.718    PUD (peptic ulcer disease) K27.9    Anxiety F41.9    Obesity E66.9    Fatty liver K76.0    Acute colitis K52.9    Nausea and vomiting R11.2       Assessment and Plan:  Acute onset of abdominal pain, distension, bilious vomiting and CT scan findings suggestive of partial small bowel obstruction, likely related to adhesions, and less likely acute issue with colitis. Discussed this with her at length, will proceed with colonoscopy tomorrow to rule out acute colitis. Continue with clear liquid diet, NPO after midnight. Thanks for allowing me to participate in the care of this patient.   Signed By: Yanet Snyder MD     3/23/2017  5:55 PM

## 2017-03-23 NOTE — ROUTINE PROCESS
Bedside and Verbal shift change report given to Caron Raphael RN (oncoming nurse) by Luciano Em RN (offgoing nurse). Report included the following information SBAR, Kardex, ED Summary, Intake/Output, MAR and Recent Results.

## 2017-03-23 NOTE — PROGRESS NOTES
TRANSFER - IN REPORT:    Verbal report received from Ivelisse Gross RN(name) on [employer]  being received from ED(unit) for routine progression of care      Report consisted of patients Situation, Background, Assessment and   Recommendations(SBAR). Information from the following report(s) SBAR and Kardex was reviewed with the receiving nurse. Opportunity for questions and clarification was provided. Assessment completed upon patients arrival to unit and care assumed.

## 2017-03-23 NOTE — PROGRESS NOTES
[address]ai Villanueva [address]  [address]  [phone]      Medical Progress Note      NAME: Nestor Munson   :  1970  MRM:  [id number]    Date/Time: 3/23/2017         Subjective:     Chief Complaint:  Patient was seen and examined by me. Chart reviewed. Still c/o diffused abd pain       Objective:       Vitals:       Last 24hrs VS reviewed since prior progress note.  Most recent are:    Visit Vitals    BP 99/58 (BP 1 Location: Left arm, BP Patient Position: At rest)    Pulse 70    Temp 97.6 °F (36.4 °C)    Resp 20    Ht 5' 6\" (1.676 m)    Wt 97.5 kg (215 lb)    SpO2 99%    BMI 34.7 kg/m2     SpO2 Readings from Last 6 Encounters:   17 99%   16 98%   16 99%   16 98%   16 97%   16 100%        No intake or output data in the 24 hours ending 17 1506     Exam:     Physical Exam:    Gen:  Well-developed, well-nourished, obese, mild distres  HEENT:  Pink conjunctivae, PERRL, hearing intact to voice, moist mucous membranes  Neck:  Supple, without masses, thyroid non-tender  Resp:  No accessory muscle use, clear breath sounds without wheezes rales or rhonchi  Card:  No murmurs, normal S1, S2 without thrills, bruits or peripheral edema  Abd:  Soft, lower abd pain, non-distended, normoactive bowel sounds are present, old surgical scars  Musc:  No cyanosis or clubbing  Skin:  No rashes  Neuro:  Cranial nerves 3-12 are grossly intact, follows commands appropriately  Psych:  Good insight, oriented to person, place and time, alert    Medications Reviewed: (see below)    Lab Data Reviewed: (see below)    ______________________________________________________________________    Medications:     Current Facility-Administered Medications   Medication Dose Route Frequency    dextrose 5% - 0.45% NaCl with KCl 20 mEq/L infusion  100 mL/hr IntraVENous CONTINUOUS    levoFLOXacin (LEVAQUIN) 500 mg in D5W IVPB  500 mg IntraVENous Q24H  metroNIDAZOLE (FLAGYL) IVPB premix 500 mg  500 mg IntraVENous Q8H    sodium chloride (NS) flush 5-10 mL  5-10 mL IntraVENous Q8H    sodium chloride (NS) flush 5-10 mL  5-10 mL IntraVENous PRN    acetaminophen (TYLENOL) tablet 650 mg  650 mg Oral Q4H PRN    diphenhydrAMINE (BENADRYL) injection 12.5 mg  12.5 mg IntraVENous Q4H PRN    enoxaparin (LOVENOX) injection 40 mg  40 mg SubCUTAneous Q24H    HYDROmorphone (PF) (DILAUDID) injection 1 mg  1 mg IntraVENous Q4H PRN    promethazine (PHENERGAN) 12.5 mg in 0.9% sodium chloride 50 mL IVPB  12.5 mg IntraVENous Q4H PRN    famotidine (PF) (PEPCID) 20 mg in sodium chloride 0.9 % 10 mL injection  20 mg IntraVENous Q12H    therapeutic multivitamin (THERAGRAN) tablet 1 Tab  1 Tab Oral DAILY    potassium chloride (KLOR-CON) packet 20 mEq  20 mEq Oral DAILY    ALPRAZolam (XANAX) tablet 1 mg  1 mg Oral BID PRN    ALPRAZolam (XANAX) tablet 2 mg  2 mg Oral QHS    lamoTRIgine (LaMICtal) tablet 100 mg  100 mg Oral DAILY    estradiol (ESTRACE) tablet 1 mg  1 mg Oral QHS    influenza vaccine 2016-17 (36mos+)(PF) (FLUZONE/FLUARIX/FLULAVAL QUAD) injection 0.5 mL  0.5 mL IntraMUSCular PRIOR TO DISCHARGE          Lab Review:     Recent Labs      03/23/17   0150  03/22/17   1456   WBC  8.4  9.7   HGB  12.5  15.6   HCT  37.8  46.0   PLT  199  259     Recent Labs      03/23/17   0150  03/22/17   1456   NA  139  137   K  3.3*  3.6   CL  104  100   CO2  25  24   GLU  96  99   BUN  12  14   CREA  0.87  1.10*   CA  7.9*  9.8   ALB  3.1*  4.2   TBILI  0.6  1.1*   SGOT  24  25   ALT  26  32     Lab Results   Component Value Date/Time    Glucose (POC) 94 05/17/2010 11:04 AM    Glucose (POC) 84 01/01/2010 06:35 PM    Glucose (POC) 120 12/30/2009 12:41 PM    Glucose (POC) 111 12/30/2009 05:20 AM    Glucose (POC) 133 12/30/2009 12:00 AM    Glucose (POC) 110 12/29/2009 06:33 PM    Glucose (POC) 128 12/29/2009 11:59 AM          Assessment / Plan:     Principal Problem:    50 yo hx of multiple abdominal surgeries, UC, depression, PUD, presented w/ abd pain, colitis    1) Abd pain/Acute colitis: seen on CT scan. Unclear if infectious vs inflammatory (UC flare) vs ischemic. No evidence of obstruction. Will check stool studies. Will cont NPO, IVF, IV pain meds, IV antiemetics. GI to eval    2) N/V: due to above.   Will monitor    3) PUD: cont IV H2 blockers    4) Depression/anxiety: use xanax prn    Total time spent with patient: 30 [address] [facility] discussed with: Patient, nursing    Discussed:  Care Plan    Prophylaxis:  Lovenox    Disposition:  Home w/Family           ___________________________________________________    Attending Physician: Chintan Gil MD

## 2017-03-23 NOTE — PROGRESS NOTES
Interdisciplinary team rounds were held 3/23/2017 with the following team members:Care Management, Nursing and Physical Therapy and the primary RN. Plan of care discussed. See clinical pathway and/or care plan for interventions and desired outcomes.     Discharge unknown at this time

## 2017-03-23 NOTE — PROGRESS NOTES
[facility]: MED RECONCILIATION    Comments/Recommendations: - Verified allergies as documented  - Ms. Pedersen reports adherence to the medications as prescribed, however due to nausea she has not been able to take any today. She otherwise uses a pillbox to aid adherence. - She reports that she needs to take her regimen as prescribed for her xanax to curb anxiety and lamotrigine for seizure control   - Advised her to take the Calcium-Vitamin D as 1 tab two times a day due to how much the body can absorb at a given time. - She denies any OTC medications other than those documented, but reported trying milk thistle before. Advised her to check before taking any OTCs due to drug interactions and adverse effects. Medications added:     · Lamotrigine 100mg PO daily  · Potassium Chloride liquid  · One-A-Day women's    Medications removed:    · Colestipol  · Ferrous Sulfate  · Theragran    Medications adjusted:    · Calcium-Vitamin D changed from 1 to 2 tabs PO every evening  · Folic acid dose changed from 400mcg to 800mcg PO daily  · Estrace changed to nightly    Information obtained from: Patient, RxQuery    Allergies: Sulfa (sulfonamide antibiotics); Toradol [ketorolac tromethamine]; and Morphine    Prior to Admission Medications:   Prior to Admission Medications   Prescriptions Last Dose Informant Patient Reported? Taking? ALPRAZolam (XANAX) 1 mg tablet 3/21/2017 at AM Self Yes Yes   Sig: Take 1 mg by mouth two (2) times daily as needed for Anxiety (at breakfast and lunch). ALPRAZolam (XANAX) 2 mg tablet 3/21/2017 at HS Self Yes Yes   Sig: Take 2 mg by mouth nightly. FERROUS FUMARATE/VIT BCOMP,C (SUPER B COMPLEX PO) 3/21/2017 at AM Self Yes Yes   Sig: Take 1 Tab by mouth daily. MULTIVITAMIN (ONE-A-DAY ESSENTIAL PO) 3/21/2017 at AM Self Yes Yes   Sig: Take 1 Tab by mouth daily. aspirin delayed-release 81 mg tablet 3/15/2017 at PM Self Yes Yes   Sig: Take 81 mg by mouth every evening.    calcium-vitamin D (OYSTER SHELL) 500 mg(1,250mg) -200 unit per tablet 3/21/2017 at PM Self Yes Yes   Sig: Take 2 Tabs by mouth every evening. dicyclomine (BENTYL) 20 mg tablet 3/21/2017 at HS Self Yes Yes   Sig: Take 20 mg by mouth every six (6) hours as needed. estradiol (ESTRACE) 1 mg tablet 3/21/2017 at HS Self Yes Yes   Sig: Take 1 mg by mouth nightly. folic acid 669 mcg tablet 3/21/2017 at AM Self Yes Yes   Sig: Take 800 mcg by mouth daily. lamoTRIgine (LAMICTAL) 100 mg tablet 3/21/2017 at AM Self Yes Yes   Sig: Take 100 mg by mouth daily. potassium chloride (KAON 10%) 20 mEq/15 mL solution 3/15/2017 at AM Self Yes Yes   Sig: Take 20 mEq by mouth daily. pravastatin (PRAVACHOL) 20 mg tablet 3/21/2017 at HS Self Yes Yes   Sig: Take 20 mg by mouth nightly.       Facility-Administered Medications: None       Thank you,  Michele Early, PharmD     Contact: [phone]

## 2017-03-24 ENCOUNTER — SURGERY (OUTPATIENT)
Age: 47
End: 2017-03-24

## 2017-03-24 ENCOUNTER — ANESTHESIA (OUTPATIENT)
Dept: ENDOSCOPY | Age: 47
DRG: 761 | End: 2017-03-24
Payer: COMMERCIAL

## 2017-03-24 ENCOUNTER — ANESTHESIA EVENT (OUTPATIENT)
Dept: ENDOSCOPY | Age: 47
DRG: 761 | End: 2017-03-24
Payer: COMMERCIAL

## 2017-03-24 LAB
ALBUMIN SERPL BCP-MCNC: 3 G/DL (ref 3.5–5)
ALBUMIN/GLOB SERPL: 0.9 {RATIO} (ref 1.1–2.2)
ALP SERPL-CCNC: 79 U/L (ref 45–117)
ALT SERPL-CCNC: 20 U/L (ref 12–78)
ANION GAP BLD CALC-SCNC: 10 MMOL/L (ref 5–15)
AST SERPL W P-5'-P-CCNC: 22 U/L (ref 15–37)
BILIRUB DIRECT SERPL-MCNC: <0.1 MG/DL (ref 0–0.2)
BILIRUB SERPL-MCNC: 0.3 MG/DL (ref 0.2–1)
BUN SERPL-MCNC: 7 MG/DL (ref 6–20)
BUN/CREAT SERPL: 8 (ref 12–20)
CALCIUM SERPL-MCNC: 8.5 MG/DL (ref 8.5–10.1)
CHLORIDE SERPL-SCNC: 107 MMOL/L (ref 97–108)
CO2 SERPL-SCNC: 22 MMOL/L (ref 21–32)
CREAT SERPL-MCNC: 0.9 MG/DL (ref 0.55–1.02)
ERYTHROCYTE [DISTWIDTH] IN BLOOD BY AUTOMATED COUNT: 12.6 % (ref 11.5–14.5)
GLOBULIN SER CALC-MCNC: 3.5 G/DL (ref 2–4)
GLUCOSE SERPL-MCNC: 84 MG/DL (ref 65–100)
HCT VFR BLD AUTO: 35.7 % (ref 35–47)
HGB BLD-MCNC: 11.5 G/DL (ref 11.5–16)
LIPASE SERPL-CCNC: 83 U/L (ref 73–393)
MAGNESIUM SERPL-MCNC: 1.9 MG/DL (ref 1.6–2.4)
MCH RBC QN AUTO: 30.4 PG (ref 26–34)
MCHC RBC AUTO-ENTMCNC: 32.2 G/DL (ref 30–36.5)
MCV RBC AUTO: 94.4 FL (ref 80–99)
PHOSPHATE SERPL-MCNC: 3.6 MG/DL (ref 2.6–4.7)
PLATELET # BLD AUTO: 149 K/UL (ref 150–400)
POTASSIUM SERPL-SCNC: 3.9 MMOL/L (ref 3.5–5.1)
PROT SERPL-MCNC: 6.5 G/DL (ref 6.4–8.2)
RBC # BLD AUTO: 3.78 M/UL (ref 3.8–5.2)
SODIUM SERPL-SCNC: 139 MMOL/L (ref 136–145)
WBC # BLD AUTO: 4.8 K/UL (ref 3.6–11)
WBC #/AREA STL HPF: NORMAL /HPF (ref 0–4)

## 2017-03-24 PROCEDURE — 74011250637 HC RX REV CODE- 250/637: Performed by: INTERNAL MEDICINE

## 2017-03-24 PROCEDURE — 80048 BASIC METABOLIC PNL TOTAL CA: CPT | Performed by: INTERNAL MEDICINE

## 2017-03-24 PROCEDURE — 0DBC8ZX EXCISION OF ILEOCECAL VALVE, VIA NATURAL OR ARTIFICIAL OPENING ENDOSCOPIC, DIAGNOSTIC: ICD-10-PCS | Performed by: INTERNAL MEDICINE

## 2017-03-24 PROCEDURE — 88305 TISSUE EXAM BY PATHOLOGIST: CPT | Performed by: INTERNAL MEDICINE

## 2017-03-24 PROCEDURE — 74011250636 HC RX REV CODE- 250/636: Performed by: INTERNAL MEDICINE

## 2017-03-24 PROCEDURE — 74011000258 HC RX REV CODE- 258

## 2017-03-24 PROCEDURE — 74011250636 HC RX REV CODE- 250/636

## 2017-03-24 PROCEDURE — 85027 COMPLETE CBC AUTOMATED: CPT | Performed by: INTERNAL MEDICINE

## 2017-03-24 PROCEDURE — 65270000029 HC RM PRIVATE

## 2017-03-24 PROCEDURE — 84100 ASSAY OF PHOSPHORUS: CPT | Performed by: INTERNAL MEDICINE

## 2017-03-24 PROCEDURE — 76060000031 HC ANESTHESIA FIRST 0.5 HR: Performed by: INTERNAL MEDICINE

## 2017-03-24 PROCEDURE — 80076 HEPATIC FUNCTION PANEL: CPT | Performed by: INTERNAL MEDICINE

## 2017-03-24 PROCEDURE — 77030009426 HC FCPS BIOP ENDOSC BSC -B: Performed by: INTERNAL MEDICINE

## 2017-03-24 PROCEDURE — 83735 ASSAY OF MAGNESIUM: CPT | Performed by: INTERNAL MEDICINE

## 2017-03-24 PROCEDURE — 36415 COLL VENOUS BLD VENIPUNCTURE: CPT | Performed by: INTERNAL MEDICINE

## 2017-03-24 PROCEDURE — 76040000019: Performed by: INTERNAL MEDICINE

## 2017-03-24 PROCEDURE — 74011000250 HC RX REV CODE- 250: Performed by: INTERNAL MEDICINE

## 2017-03-24 PROCEDURE — 74011000250 HC RX REV CODE- 250

## 2017-03-24 PROCEDURE — 83690 ASSAY OF LIPASE: CPT | Performed by: INTERNAL MEDICINE

## 2017-03-24 PROCEDURE — 74011000258 HC RX REV CODE- 258: Performed by: INTERNAL MEDICINE

## 2017-03-24 RX ORDER — SODIUM CHLORIDE 9 MG/ML
INJECTION, SOLUTION INTRAVENOUS
Status: DISCONTINUED | OUTPATIENT
Start: 2017-03-24 | End: 2017-03-24 | Stop reason: HOSPADM

## 2017-03-24 RX ORDER — LIDOCAINE HYDROCHLORIDE 20 MG/ML
INJECTION, SOLUTION EPIDURAL; INFILTRATION; INTRACAUDAL; PERINEURAL AS NEEDED
Status: DISCONTINUED | OUTPATIENT
Start: 2017-03-24 | End: 2017-03-24 | Stop reason: HOSPADM

## 2017-03-24 RX ORDER — NALOXONE HYDROCHLORIDE 0.4 MG/ML
0.4 INJECTION, SOLUTION INTRAMUSCULAR; INTRAVENOUS; SUBCUTANEOUS
Status: DISCONTINUED | OUTPATIENT
Start: 2017-03-24 | End: 2017-03-24 | Stop reason: HOSPADM

## 2017-03-24 RX ORDER — FLUMAZENIL 0.1 MG/ML
0.2 INJECTION INTRAVENOUS
Status: DISCONTINUED | OUTPATIENT
Start: 2017-03-24 | End: 2017-03-24 | Stop reason: HOSPADM

## 2017-03-24 RX ORDER — DEXTROMETHORPHAN/PSEUDOEPHED 2.5-7.5/.8
1.2 DROPS ORAL
Status: DISCONTINUED | OUTPATIENT
Start: 2017-03-24 | End: 2017-03-24 | Stop reason: HOSPADM

## 2017-03-24 RX ORDER — MIDAZOLAM HYDROCHLORIDE 1 MG/ML
.25-5 INJECTION, SOLUTION INTRAMUSCULAR; INTRAVENOUS
Status: DISCONTINUED | OUTPATIENT
Start: 2017-03-24 | End: 2017-03-24 | Stop reason: HOSPADM

## 2017-03-24 RX ORDER — EPINEPHRINE 0.1 MG/ML
1 INJECTION INTRACARDIAC; INTRAVENOUS
Status: DISCONTINUED | OUTPATIENT
Start: 2017-03-24 | End: 2017-03-24 | Stop reason: HOSPADM

## 2017-03-24 RX ORDER — ATROPINE SULFATE 0.1 MG/ML
0.4 INJECTION INTRAVENOUS
Status: DISCONTINUED | OUTPATIENT
Start: 2017-03-24 | End: 2017-03-24 | Stop reason: HOSPADM

## 2017-03-24 RX ORDER — PROPOFOL 10 MG/ML
INJECTION, EMULSION INTRAVENOUS AS NEEDED
Status: DISCONTINUED | OUTPATIENT
Start: 2017-03-24 | End: 2017-03-24 | Stop reason: HOSPADM

## 2017-03-24 RX ORDER — PROPOFOL 10 MG/ML
INJECTION, EMULSION INTRAVENOUS
Status: DISCONTINUED | OUTPATIENT
Start: 2017-03-24 | End: 2017-03-24 | Stop reason: HOSPADM

## 2017-03-24 RX ORDER — SODIUM CHLORIDE 9 MG/ML
50 INJECTION, SOLUTION INTRAVENOUS CONTINUOUS
Status: DISCONTINUED | OUTPATIENT
Start: 2017-03-24 | End: 2017-03-24

## 2017-03-24 RX ORDER — ALPRAZOLAM 0.5 MG/1
1 TABLET ORAL ONCE
Status: COMPLETED | OUTPATIENT
Start: 2017-03-24 | End: 2017-03-24

## 2017-03-24 RX ORDER — HYDROMORPHONE HYDROCHLORIDE 1 MG/ML
2 INJECTION, SOLUTION INTRAMUSCULAR; INTRAVENOUS; SUBCUTANEOUS ONCE
Status: COMPLETED | OUTPATIENT
Start: 2017-03-24 | End: 2017-03-25

## 2017-03-24 RX ADMIN — SODIUM CHLORIDE 50 ML/HR: 900 INJECTION, SOLUTION INTRAVENOUS at 11:01

## 2017-03-24 RX ADMIN — DICYCLOMINE HYDROCHLORIDE 20 MG: 20 TABLET ORAL at 12:34

## 2017-03-24 RX ADMIN — DICYCLOMINE HYDROCHLORIDE 20 MG: 20 TABLET ORAL at 06:16

## 2017-03-24 RX ADMIN — FAMOTIDINE 20 MG: 10 INJECTION, SOLUTION INTRAVENOUS at 20:35

## 2017-03-24 RX ADMIN — LEVOFLOXACIN 500 MG: 5 INJECTION, SOLUTION INTRAVENOUS at 18:30

## 2017-03-24 RX ADMIN — METRONIDAZOLE 500 MG: 500 INJECTION, SOLUTION INTRAVENOUS at 12:34

## 2017-03-24 RX ADMIN — SODIUM CHLORIDE: 9 INJECTION, SOLUTION INTRAVENOUS at 10:32

## 2017-03-24 RX ADMIN — LAMOTRIGINE 100 MG: 100 TABLET ORAL at 06:16

## 2017-03-24 RX ADMIN — PROMETHAZINE HYDROCHLORIDE 12.5 MG: 25 INJECTION INTRAMUSCULAR; INTRAVENOUS at 12:34

## 2017-03-24 RX ADMIN — DICYCLOMINE HYDROCHLORIDE 20 MG: 20 TABLET ORAL at 18:28

## 2017-03-24 RX ADMIN — HYDROMORPHONE HYDROCHLORIDE 1 MG: 1 INJECTION, SOLUTION INTRAMUSCULAR; INTRAVENOUS; SUBCUTANEOUS at 16:42

## 2017-03-24 RX ADMIN — FAMOTIDINE 20 MG: 10 INJECTION, SOLUTION INTRAVENOUS at 09:17

## 2017-03-24 RX ADMIN — HYDROMORPHONE HYDROCHLORIDE 1 MG: 1 INJECTION, SOLUTION INTRAMUSCULAR; INTRAVENOUS; SUBCUTANEOUS at 06:16

## 2017-03-24 RX ADMIN — PROPOFOL 100 MG: 10 INJECTION, EMULSION INTRAVENOUS at 11:12

## 2017-03-24 RX ADMIN — PROMETHAZINE HYDROCHLORIDE 12.5 MG: 25 INJECTION INTRAMUSCULAR; INTRAVENOUS at 00:12

## 2017-03-24 RX ADMIN — LIDOCAINE HYDROCHLORIDE 20 MG: 20 INJECTION, SOLUTION EPIDURAL; INFILTRATION; INTRACAUDAL; PERINEURAL at 11:12

## 2017-03-24 RX ADMIN — ALPRAZOLAM 1 MG: 0.5 TABLET ORAL at 06:16

## 2017-03-24 RX ADMIN — HYDROMORPHONE HYDROCHLORIDE 1 MG: 1 INJECTION, SOLUTION INTRAMUSCULAR; INTRAVENOUS; SUBCUTANEOUS at 20:35

## 2017-03-24 RX ADMIN — PROMETHAZINE HYDROCHLORIDE 12.5 MG: 25 INJECTION INTRAMUSCULAR; INTRAVENOUS at 18:28

## 2017-03-24 RX ADMIN — HYDROMORPHONE HYDROCHLORIDE 2 MG: 1 INJECTION, SOLUTION INTRAMUSCULAR; INTRAVENOUS; SUBCUTANEOUS at 00:18

## 2017-03-24 RX ADMIN — DIPHENHYDRAMINE HYDROCHLORIDE 12.5 MG: 50 INJECTION, SOLUTION INTRAMUSCULAR; INTRAVENOUS at 22:05

## 2017-03-24 RX ADMIN — ALPRAZOLAM 1 MG: 0.5 TABLET ORAL at 09:34

## 2017-03-24 RX ADMIN — PROPOFOL 125 MCG/KG/MIN: 10 INJECTION, EMULSION INTRAVENOUS at 11:12

## 2017-03-24 RX ADMIN — PROPOFOL 50 MG: 10 INJECTION, EMULSION INTRAVENOUS at 11:14

## 2017-03-24 RX ADMIN — METRONIDAZOLE 500 MG: 500 INJECTION, SOLUTION INTRAVENOUS at 18:30

## 2017-03-24 RX ADMIN — ALPRAZOLAM 1 MG: 0.5 TABLET ORAL at 00:17

## 2017-03-24 RX ADMIN — ENOXAPARIN SODIUM 40 MG: 40 INJECTION SUBCUTANEOUS at 20:35

## 2017-03-24 RX ADMIN — DICYCLOMINE HYDROCHLORIDE 20 MG: 20 TABLET ORAL at 00:17

## 2017-03-24 RX ADMIN — THERA TABS 1 TABLET: TAB at 14:24

## 2017-03-24 RX ADMIN — POTASSIUM CHLORIDE 20 MEQ: 1.5 POWDER, FOR SOLUTION ORAL at 14:24

## 2017-03-24 RX ADMIN — METRONIDAZOLE 500 MG: 500 INJECTION, SOLUTION INTRAVENOUS at 05:04

## 2017-03-24 RX ADMIN — DEXTROSE MONOHYDRATE, SODIUM CHLORIDE, AND POTASSIUM CHLORIDE 100 ML/HR: 50; 4.5; 1.49 INJECTION, SOLUTION INTRAVENOUS at 00:11

## 2017-03-24 NOTE — PROGRESS NOTES
This RN was asked to assist the patient's primary nurse with a patient complaint. This RN entered the patient's room at 0000 and introduced myself. Patient began to complain that it took 3 different nurses and over 2 hours to establish IV access that was lost. This made her pain medication delayed for over 2 hours. This RN listened to the patient's complaints and offered to take over her care. This RN administered the patient's due medications and the PCT obtained her vital signs. Patient refused an assessment from this RN stating \"just leave me alone so I can get at least 4 hours of uninterrupted sleep, the other nurse already did that\". Patient then demanded that no one enter her room for any reason until 5 am. This RN explained the hospital policy that we are required to make safety rounds at least every 2 hours, patient continued to demand that no one come into her room and a sign be put on her door. Patient also refused to take IV antibiotic that is due at 0300 until 0500. Dr. Jayashree Trevino notified of above and reports that he admitted the patient and is familiar with her attitude. Silvia Sequeira supervisor notified of above as well as Ceci Hilton in risk management. This RN is following hospital policy and will continue to check on patient every 2 hours. This RN will continue to monitor patient and offer care.

## 2017-03-24 NOTE — ANESTHESIA PREPROCEDURE EVALUATION
Anesthetic History   No history of anesthetic complications  PONV          Review of Systems / Medical History  Patient summary reviewed, nursing notes reviewed and pertinent labs reviewed    Pulmonary  Within defined limits                 Neuro/Psych   Within defined limits           Cardiovascular  Within defined limits                Exercise tolerance: >4 METS     GI/Hepatic/Renal  Within defined limits   GERD    Renal disease: stones  PUD     Endo/Other  Within defined limits      Obesity     Other Findings              Physical Exam    Airway  Mallampati: II    Neck ROM: normal range of motion   Mouth opening: Normal     Cardiovascular  Regular rate and rhythm,  S1 and S2 normal,  no murmur, click, rub, or gallop  Rhythm: regular  Rate: normal         Dental  No notable dental hx       Pulmonary  Breath sounds clear to auscultation               Abdominal  GI exam deferred       Other Findings            Anesthetic Plan    ASA: 2  Anesthesia type: MAC          Induction: Intravenous  Anesthetic plan and risks discussed with: Patient

## 2017-03-24 NOTE — PROGRESS NOTES
[place] Jeimy  1970  [id number]    Situation:  Verbal report received from: Guera Mays RN  Procedure: Procedure(s):  COLONOSCOPY    Background:    Preoperative diagnosis: R/O C Diff  Colitis  Postoperative diagnosis: hemmorrhoids    :  Dr. Duke Becerra  Assistant(s): Endoscopy RN-1: Amy Lou RN    Specimens:   ID Type Source Tests Collected by Time Destination   1 : random colon biopsy Jovanni Nice MD 3/24/2017 1126 Pathology     H. Pylori  no    Assessment:  Intra-procedure medications      Anesthesia gave intra-procedure sedation and medications, see anesthesia flow sheet yes    Intravenous fluids: NS@ KVO     Vital signs stable   yes    Abdominal assessment: round and soft   yes    Recommendation:  Discharge patient per MD order  yes.   Return to floor  outpatient  Family or Friend   None present  Permission to share finding with family or friend yes and n/a

## 2017-03-24 NOTE — PERIOP NOTES
Geovanna Hernandez  1970  [id number]    Situation:    Scheduled Procedure: Procedure(s):  COLONOSCOPY  Verbal report received from: Alicia Tristan RN  Preoperative diagnosis: R/O C Diff  Colitis    Background:    Procedure: Procedure(s):  COLONOSCOPY  Physician performing procedure; Dr. Deliah Frankel RN    NPO Status/Last PO Intake: 3/23 midnight    Pregnancy Test:No If yes, result: none    Is the patient taking Blood Thinners: NO If yes, list: n/a and last taken n/a  Is the patient diabetic:no       If yes, what was the last BS:  n/a  Time taken?  n/a  Anything given? no           Does the patient have a Pacemaker/Defibrillator in place?: no   Does the patient need antibiotics before/during/after procedure: no   If the patient is having a colon, How much prep was drank? all   What were the Colon prep results? liquid   Does the patient have SCD in place:no   Is patient on CONTACT precautions:yes        If yes, what kind of CONTACT precautions: enteric    Assessment:  Are the vital signs stable prior to patient coming to ENDO?  yes  Is the patient alert/oriented and able to sign consent for the procedures:yes  How does the patient's abdomen feel prior to coming to ENDO? Will assess upon patients arrival   Does the patient have a patient IV in place?  yes     Recommendation:  Family or Friend present no     Permission to share finding with Family or Friend n/a

## 2017-03-24 NOTE — PERIOP NOTES
TRANSFER - OUT REPORT:    Verbal report given to Danielle FRAUSTO on [facility]  being transferred to  for routine post - op       Report consisted of patients Situation, Background, Assessment and   Recommendations(SBAR). Information from the following report(s) Procedure Summary, Intake/Output, MAR and Recent Results was reviewed with the receiving nurse. Lines:   Peripheral IV 03/22/17 Right Forearm (Active)   Site Assessment Clean, dry, & intact 3/24/2017  9:10 AM   Phlebitis Assessment 0 3/24/2017  9:10 AM   Infiltration Assessment 0 3/24/2017  9:10 AM   Dressing Status Clean, dry, & intact 3/24/2017  9:10 AM   Dressing Type Tape;Transparent 3/24/2017  9:10 AM   Hub Color/Line Status Pink; Infusing; End cap changed; Flushed 3/24/2017  9:10 AM   Action Taken Open ports on tubing capped 3/23/2017  7:56 PM   Alcohol Cap Used Yes 3/24/2017  9:10 AM       Peripheral IV 03/24/17 Left Forearm (Active)   Site Assessment Clean, dry, & intact 3/24/2017 10:53 AM   Phlebitis Assessment 0 3/24/2017 10:53 AM   Infiltration Assessment 0 3/24/2017 10:53 AM   Dressing Status Clean, dry, & intact 3/24/2017 10:53 AM   Dressing Type Transparent;Tape 3/24/2017 10:53 AM   Hub Color/Line Status End cap changed;Blue;Flushed; Infusing 3/24/2017 10:53 AM   Action Taken Open ports on tubing capped 3/24/2017 10:53 AM   Alcohol Cap Used Yes 3/24/2017 10:53 AM        Opportunity for questions and clarification was provided. Patient transported with:  Transport Tech and chart.

## 2017-03-24 NOTE — ANESTHESIA POSTPROCEDURE EVALUATION
Post-Anesthesia Evaluation and Assessment    Patient: Tatiana Ward MRN: [id number]  SSN: [id number]    YOB: 1970  Age: 51 y.o. Sex: female       Cardiovascular Function/Vital Signs  Visit Vitals    BP 93/60    Pulse 67    Temp 36.8 °C (98.2 °F)    Resp 14    Ht 5' 6\" (1.676 m)    Wt 97.5 kg (215 lb)    SpO2 100%    BMI 34.7 kg/m2       Patient is status post MAC anesthesia for Procedure(s):  COLONOSCOPY. Nausea/Vomiting: None    Postoperative hydration reviewed and adequate. Pain:  Pain Scale 1: Numeric (0 - 10) (03/24/17 1049)  Pain Intensity 1: 10 (03/24/17 1049)   Managed    Neurological Status: At baseline    Mental Status and Level of Consciousness: Arousable    Pulmonary Status:   O2 Device: Room air (03/24/17 1049)   Adequate oxygenation and airway patent    Complications related to anesthesia: None    Post-anesthesia assessment completed.  No concerns    Signed By: Flo Johnson MD     March 24, 2017

## 2017-03-24 NOTE — PROGRESS NOTES
Lai Villanueva [facility]  [address] [place] [address]  [phone]      Medical Progress Note      NAME: Lory Ly   :  1970  MRM:  [id number]    Date/Time: 3/24/2017         Subjective:     Chief Complaint:  Patient was seen and examined by me. Chart reviewed. S/p colonoscopy. Still with right sided abd pain       Objective:       Vitals:       Last 24hrs VS reviewed since prior progress note.  Most recent are:    Visit Vitals    /60 (BP 1 Location: Right arm, BP Patient Position: At rest)    Pulse 73    Temp 98 °F (36.7 °C)    Resp 15    Ht 5' 6\" (1.676 m)    Wt 97.5 kg (215 lb)    SpO2 100%    BMI 34.7 kg/m2     SpO2 Readings from Last 6 Encounters:   17 100%   16 98%   16 99%   16 98%   16 97%   16 100%            Intake/Output Summary (Last 24 hours) at 17 1415  Last data filed at 17 1125   Gross per 24 hour   Intake              790 ml   Output                0 ml   Net              790 ml        Exam:     Physical Exam:    Gen:  Well-developed, well-nourished, obese,  NAD  HEENT:  Pink conjunctivae, PERRL, hearing intact to voice, moist mucous membranes  Neck:  Supple, without masses, thyroid non-tender  Resp:  No accessory muscle use, clear breath sounds without wheezes rales or rhonchi  Card:  No murmurs, normal S1, S2 without thrills, bruits or peripheral edema  Abd:  Soft, lower abd pain, non-distended, normoactive bowel sounds are present, old surgical scars  Musc:  No cyanosis or clubbing  Skin:  No rashes  Neuro:  Cranial nerves 3-12 are grossly intact, follows commands appropriately  Psych:  Good insight, oriented to person, place and time, alert    Medications Reviewed: (see below)    Lab Data Reviewed: (see below)    ______________________________________________________________________    Medications:     Current Facility-Administered Medications   Medication Dose Route Frequency    dextrose 5% - 0.45% NaCl with KCl 20 mEq/L infusion  75 mL/hr IntraVENous CONTINUOUS    dicyclomine (BENTYL) tablet 20 mg  20 mg Oral Q6H PRN    levoFLOXacin (LEVAQUIN) 500 mg in D5W IVPB  500 mg IntraVENous Q24H    metroNIDAZOLE (FLAGYL) IVPB premix 500 mg  500 mg IntraVENous Q8H    sodium chloride (NS) flush 5-10 mL  5-10 mL IntraVENous Q8H    sodium chloride (NS) flush 5-10 mL  5-10 mL IntraVENous PRN    acetaminophen (TYLENOL) tablet 650 mg  650 mg Oral Q4H PRN    diphenhydrAMINE (BENADRYL) injection 12.5 mg  12.5 mg IntraVENous Q4H PRN    enoxaparin (LOVENOX) injection 40 mg  40 mg SubCUTAneous Q24H    HYDROmorphone (PF) (DILAUDID) injection 1 mg  1 mg IntraVENous Q4H PRN    promethazine (PHENERGAN) 12.5 mg in 0.9% sodium chloride 50 mL IVPB  12.5 mg IntraVENous Q4H PRN    famotidine (PF) (PEPCID) 20 mg in sodium chloride 0.9 % 10 mL injection  20 mg IntraVENous Q12H    therapeutic multivitamin (THERAGRAN) tablet 1 Tab  1 Tab Oral DAILY    potassium chloride (KLOR-CON) packet 20 mEq  20 mEq Oral DAILY    ALPRAZolam (XANAX) tablet 1 mg  1 mg Oral BID PRN    ALPRAZolam (XANAX) tablet 2 mg  2 mg Oral QHS    lamoTRIgine (LaMICtal) tablet 100 mg  100 mg Oral DAILY    estradiol (ESTRACE) tablet 1 mg  1 mg Oral QHS    influenza vaccine 2016-17 (36mos+)(PF) (FLUZONE/FLUARIX/FLULAVAL QUAD) injection 0.5 mL  0.5 mL IntraMUSCular PRIOR TO DISCHARGE          Lab Review:     Recent Labs      03/24/17   0555  03/23/17   0150  03/22/17   1456   WBC  4.8  8.4  9.7   HGB  11.5  12.5  15.6   HCT  35.7  37.8  46.0   PLT  149*  199  259     Recent Labs      03/24/17   0555  03/23/17   0150  03/22/17   1456   NA  139  139  137   K  3.9  3.3*  3.6   CL  107  104  100   CO2  22  25  24   GLU  84  96  99   BUN  7  12  14   CREA  0.90  0.87  1.10*   CA  8.5  7.9*  9.8   MG  1.9   --    --    PHOS  3.6   --    --    ALB  3.0*  3.1*  4.2   TBILI  0.3  0.6  1.1*   SGOT  22  24  25   ALT  20  26  32     Lab Results Component Value Date/Time    Glucose (POC) 94 05/17/2010 11:04 AM    Glucose (POC) 84 01/01/2010 06:35 PM    Glucose (POC) 120 12/30/2009 12:41 PM    Glucose (POC) 111 12/30/2009 05:20 AM    Glucose (POC) 133 12/30/2009 12:00 AM    Glucose (POC) 110 12/29/2009 06:33 PM    Glucose (POC) 128 12/29/2009 11:59 AM          Assessment / Plan:     Principal Problem:    52 yo hx of multiple abdominal surgeries, UC, depression, PUD, presented w/ abd pain, colitis    1) Abd pain: unclear etiology. Colitis seen on CT, but negative on colonoscopy. Likely due to hx of multiple abdominal surgeries, adhesions, internal hernia. Will consult gen surg to eval    2) Acute colitis: seen on CT scan, but no evidence of colitis on colonoscopy. Also no evidence of obstruction. Stool studies neg so far. GI is following. Will cont IVF, IV pain meds, IV antiemetics. Advance diet as tolerated    3) N/V: due to above. Now resolved.   Will monitor    4) PUD: cont IV H2 blockers    5) Depression/anxiety: use xanax prn    Total time spent with patient: 30 0430 [place] discussed with: Patient, nursing    Discussed:  Care Plan    Prophylaxis:  Lovenox    Disposition:  Home w/Family           ___________________________________________________    Attending Physician: Jumana Byrnes MD

## 2017-03-24 NOTE — ROUTINE PROCESS
Bedside and Verbal shift change report given to Amy Salgado RN (oncoming nurse) by Janett Smith RN (offgoing nurse). Report included the following information SBAR, Kardex, ED Summary, Intake/Output, MAR and Recent Results.

## 2017-03-24 NOTE — CONSULTS
Surgery Consult    Subjective:      Reina Pearl is a 51 y.o. female with complicated PMH including hysterectomy and R oophorectomy, ileocecectomy, multiple ventral hernia repairs, left oophorectomy 2/2 mass, and cholecystectomy,  And colitis who presented to the ED with c/o abdominal pain on 3/22. Pt states that about 5 days ago she noted some mild abdominal discomfort but had sudden severe increase in pain on date of admission accompanied by vomiting. In ED labs were essentially unremarkable. CT showed fluid filled colon suggestive of diarrhea/colitis and interloop fluid in the mesentery as well as small ventral hernia without obstruction. She was admitted and treated with IVF and ABX. She underwent colonoscopy by Dr. Pricilla Guerrero which was unremarkable. Pt continues to c/o RLQ abdominal pain and reports diarrhea and fecal incontinence. 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  PUD (peptic ulcer disease) 2016    stomach and colon ulcers?     Seizures ([address]     me dside effect? last sz 2013     Past Surgical History:   Procedure Laterality Date    ABDOMEN SURGERY PROC UNLISTED  4/2010    ventral hernia repair with biologic mesh    HX APPENDECTOMY  2009    HX ENDOSCOPY  2016    HX HERNIA REPAIR  9/2011    laparoscopic incisional hernia repair    HX HERNIA REPAIR  2871    umbilical hernia repair    HX HYSTERECTOMY  2009    partial hysterectomy (right ovary removed)    HX OOPHORECTOMY  2009    removed post op hematoma and right oopherectomy    HX OOPHORECTOMY  3/2016    mass removed and left oopherectomy    HX OTHER SURGICAL  2009    ileocectomy, bowel resection,     CO COLONOSCOPY FLX DX W/COLLJ SPEC WHEN PFRMD  1/14/2011           Family History   Problem Relation Age of Onset    Cancer Maternal Grandmother      colon ca     Social History     Social History    Marital status:      Spouse name: N/A    Number of children: N/A    Years of education: N/A     Social History Main Topics    Smoking status: Never Smoker    Smokeless tobacco: Never Used    Alcohol use 0.6 oz/week     1 Glasses of wine per week      Comment: 1 glass every 2 weeks    Drug use: No    Sexual activity: Not Asked     Other Topics Concern    None     Social History Narrative      Current Facility-Administered Medications   Medication Dose Route Frequency    dextrose 5% - 0.45% NaCl with KCl 20 mEq/L infusion  75 mL/hr IntraVENous CONTINUOUS    dicyclomine (BENTYL) tablet 20 mg  20 mg Oral Q6H PRN    levoFLOXacin (LEVAQUIN) 500 mg in D5W IVPB  500 mg IntraVENous Q24H    metroNIDAZOLE (FLAGYL) IVPB premix 500 mg  500 mg IntraVENous Q8H    sodium chloride (NS) flush 5-10 mL  5-10 mL IntraVENous Q8H    sodium chloride (NS) flush 5-10 mL  5-10 mL IntraVENous PRN    acetaminophen (TYLENOL) tablet 650 mg  650 mg Oral Q4H PRN    diphenhydrAMINE (BENADRYL) injection 12.5 mg  12.5 mg IntraVENous Q4H PRN    enoxaparin (LOVENOX) injection 40 mg  40 mg SubCUTAneous Q24H    HYDROmorphone (PF) (DILAUDID) injection 1 mg  1 mg IntraVENous Q4H PRN    promethazine (PHENERGAN) 12.5 mg in 0.9% sodium chloride 50 mL IVPB  12.5 mg IntraVENous Q4H PRN    famotidine (PF) (PEPCID) 20 mg in sodium chloride 0.9 % 10 mL injection  20 mg IntraVENous Q12H    therapeutic multivitamin (THERAGRAN) tablet 1 Tab  1 Tab Oral DAILY    potassium chloride (KLOR-CON) packet 20 mEq  20 mEq Oral DAILY    ALPRAZolam (XANAX) tablet 1 mg  1 mg Oral BID PRN    ALPRAZolam (XANAX) tablet 2 mg  2 mg Oral QHS    lamoTRIgine (LaMICtal) tablet 100 mg  100 mg Oral DAILY    estradiol (ESTRACE) tablet 1 mg  1 mg Oral QHS    influenza vaccine  (36mos+)(PF) (FLUZONE/FLUARIX/FLULAVAL QUAD) injection 0.5 mL  0.5 mL IntraMUSCular PRIOR TO DISCHARGE      Allergies   Allergen Reactions    Sulfa (Sulfonamide Antibiotics) Hives    Toradol [Ketorolac Tromethamine] Hives    Morphine Hives and Nausea and Vomiting       Review of Systems:REVIEW OF SYSTEMS:     []     Unable to obtain  ROS due to  []    mental status change  []    sedated   []    intubated   []    Total of 12 systems reviewed as follows:    Constitutional: neg for fevers, chills  Eyes: negative for blurry vision  Ears, nose, mouth, throat, and face: negative for sore throat  Respiratory: negative for SOB  Cardiovascular: negative for CP  Gastrointestinal: positive for nausea, vomiting and abdominal pain  Genitourinary: negative for dysuria  Integument/breast: neg for skin rash  Hematologic/lymphatic: neg for bruising  Musculoskeletal: negative for muscle aches  Neurological: no dizziness or h/a    Objective:      Patient Vitals for the past 8 hrs:   BP Temp Pulse Resp SpO2 Height Weight   17 1419 106/57 98.3 °F (36.8 °C) 68 16 100 % - -   17 1232 109/60 98 °F (36.7 °C) 73 15 100 % - -   17 1152 97/65 - 77 15 100 % - -   17 1147 96/69 - 79 18 99 % - -   17 1142 90/56 - 80 19 99 % - -   17 1137 (!) 80/43 - 76 17 99 % - -   17 1132 (!) 81/40 98.1 °F (36.7 °C) 76 17 99 % - -   17 1049 93/60 98.2 °F (36.8 °C) 67 14 100 % 5' 6\" (1.676 m) 97.5 kg (215 lb)   17 1005 98/59 98.2 °F (36.8 °C) 71 18 100 % - -       Temp (24hrs), Av °F (36.7 °C), Min:97.8 °F (36.6 °C), Max:98.3 °F (36.8 °C)      Physical Exam:  General:  Alert, cooperative, no distress, obese   Eyes:  Conjunctivae/corneas clear. Nose: Nares normal. Septum midline. Mouth/Throat: Lips, mucosa, and tongue normal.    Neck: Supple, symmetrical, trachea midline   Lungs:   Clear to auscultation bilaterally.    Heart:  Regular rate and rhythm   Abdomen: Soft, non-distended, RLQ and suprapubic tenderness, no guarding or peritonitis. No palpable hernia    Extremities: Extremities normal, atraumatic, no cyanosis or edema. Skin: Skin color, texture, turgor normal. No rashes or lesions   Neuro: Alert, oriented, speech clear, mildly anxious     Labs: Recent Labs      03/24/17   0555   WBC  4.8   HGB  11.5   HCT  35.7   PLT  149*     Recent Labs      03/24/17   0555   NA  139   K  3.9   CL  107   CO2  22   GLU  84   BUN  7   CREA  0.90   CA  8.5   MG  1.9   PHOS  3.6   ALB  3.0*   TBILI  0.3   SGOT  22   ALT  20     No results for input(s): INR in the last 72 hours. No lab exists for component: INREXT      Assessment and Plan:     Abdominal pain    Pt continues to have abdominal pain. She is having bowel function, labs are unremarkable, she is tolerating clear liquids. CT reviewed and there is small ventral hernia which contains a loop of bowel without obstruction. Pain could be related to adhesive disease, which has been noted on previous surgeries, but in the absence of obstruction I doubt we would recommend surgical intervention at this point. Advance diet as tolerated. Will review CT with Dr. Chevy Ferrari. Thank you for the consultation. Attending evaluation to follow.        Signed By: MIKE Quinn     March 24, 2017

## 2017-03-24 NOTE — PROGRESS NOTES
Bedside and Verbal shift change report given to Anastasiya Zimmer RN (oncoming nurse) by Benigno Carrera RN (offgoing nurse). Report included the following information SBAR and Kardex.

## 2017-03-24 NOTE — PROCEDURES
Estela Arshad M.D.  [phone]            3/24/2017          Colonoscopy Operative Report  Rose Abad  :  1970  [facility] Medical Record Number:  [id number]      Indications:    Abdominal pain, generalized, Diarrhea     :  Bakari Christian MD    Referring Provider: Rosey Aldana MD    Sedation:  MAC anesthesia    Pre-Procedural Exam:      Airway: clear,  No airway problems anticipated  Heart: RRR, without gallops or rubs  Lungs: clear bilaterally without wheezes, crackles, or rhonchi  Abdomen: soft, nontender, nondistended, bowel sounds present  Mental Status: awake, alert and oriented to person, place and time     Procedure Details:  After informed consent was obtained with all risks and benefits of procedure explained and preoperative exam completed, the patient was taken to the endoscopy suite and placed in the left lateral decubitus position. Upon sequential sedation as per above, a digital rectal exam was performed. The Olympus videocolonoscope  was inserted in the rectum and carefully advanced to the surgical anastomosis . The quality of preparation was fair. The colonoscope was slowly withdrawn with careful inspection and evaluation between folds. Retroflexion in the rectum was performed. Findings:   Terminal Ileum: normal  Cecum: surgically absent  Ascending Colon: normal  - residual semi-solid stools  Transverse Colon: normal - residual semi-solid stools  Descending Colon: normal - residual semi-solid stools  Sigmoid: normal  Rectum: no mucosal lesion appreciated  Grade 1 internal hemorrhoid(s); Interventions:  none    Specimen Removed:  Random colon biopsies    Complications: None. EBL:  None.     Impression:  Mucosa within normal throughout the colon, no evidence of colitis or stricture at the ileo-colonic anastomosis                         Small internal hemorrhoids    Recommendations:  - Resume clear liquid diet and advance to full liquid as tolerated  - Follow-up on pathology results  - Consider surgical consultation regarding adhesive disease if symptoms are persistent    Sonido Aguilera MD  3/24/2017  11:27 AM

## 2017-03-24 NOTE — PERIOP NOTES
Patient tolerated procedure without problems. Abdomen soft and patient arousable and voices no complaints Report received from CRNA, see anesthesia note. Patient transported to endoscopy recovery area.

## 2017-03-25 LAB
ANION GAP BLD CALC-SCNC: 5 MMOL/L (ref 5–15)
BACTERIA SPEC CULT: NORMAL
BUN SERPL-MCNC: 4 MG/DL (ref 6–20)
BUN/CREAT SERPL: 4 (ref 12–20)
C JEJUNI+C COLI AG STL QL: NEGATIVE
CALCIUM SERPL-MCNC: 8.7 MG/DL (ref 8.5–10.1)
CHLORIDE SERPL-SCNC: 107 MMOL/L (ref 97–108)
CO2 SERPL-SCNC: 28 MMOL/L (ref 21–32)
CREAT SERPL-MCNC: 0.97 MG/DL (ref 0.55–1.02)
E COLI SXT1+2 STL IA: NEGATIVE
ERYTHROCYTE [DISTWIDTH] IN BLOOD BY AUTOMATED COUNT: 12.4 % (ref 11.5–14.5)
GLUCOSE SERPL-MCNC: 99 MG/DL (ref 65–100)
HCT VFR BLD AUTO: 34.7 % (ref 35–47)
HGB BLD-MCNC: 10.6 G/DL (ref 11.5–16)
MAGNESIUM SERPL-MCNC: 1.8 MG/DL (ref 1.6–2.4)
MCH RBC QN AUTO: 29 PG (ref 26–34)
MCHC RBC AUTO-ENTMCNC: 30.5 G/DL (ref 30–36.5)
MCV RBC AUTO: 94.8 FL (ref 80–99)
PHOSPHATE SERPL-MCNC: 4 MG/DL (ref 2.6–4.7)
PLATELET # BLD AUTO: 174 K/UL (ref 150–400)
POTASSIUM SERPL-SCNC: 3.8 MMOL/L (ref 3.5–5.1)
RBC # BLD AUTO: 3.66 M/UL (ref 3.8–5.2)
SERVICE CMNT-IMP: NORMAL
SODIUM SERPL-SCNC: 140 MMOL/L (ref 136–145)
WBC # BLD AUTO: 6.1 K/UL (ref 3.6–11)

## 2017-03-25 PROCEDURE — 84100 ASSAY OF PHOSPHORUS: CPT | Performed by: INTERNAL MEDICINE

## 2017-03-25 PROCEDURE — 74011250637 HC RX REV CODE- 250/637: Performed by: INTERNAL MEDICINE

## 2017-03-25 PROCEDURE — 74011000258 HC RX REV CODE- 258: Performed by: INTERNAL MEDICINE

## 2017-03-25 PROCEDURE — 85027 COMPLETE CBC AUTOMATED: CPT | Performed by: INTERNAL MEDICINE

## 2017-03-25 PROCEDURE — 74011250636 HC RX REV CODE- 250/636: Performed by: INTERNAL MEDICINE

## 2017-03-25 PROCEDURE — 36415 COLL VENOUS BLD VENIPUNCTURE: CPT | Performed by: INTERNAL MEDICINE

## 2017-03-25 PROCEDURE — 65270000029 HC RM PRIVATE

## 2017-03-25 PROCEDURE — 83735 ASSAY OF MAGNESIUM: CPT | Performed by: INTERNAL MEDICINE

## 2017-03-25 PROCEDURE — 74011000250 HC RX REV CODE- 250: Performed by: INTERNAL MEDICINE

## 2017-03-25 PROCEDURE — 80048 BASIC METABOLIC PNL TOTAL CA: CPT | Performed by: INTERNAL MEDICINE

## 2017-03-25 RX ORDER — LAMOTRIGINE 100 MG/1
100 TABLET ORAL DAILY
Status: DISCONTINUED | OUTPATIENT
Start: 2017-03-25 | End: 2017-03-29 | Stop reason: HOSPADM

## 2017-03-25 RX ORDER — ALPRAZOLAM 0.5 MG/1
1 TABLET ORAL DAILY
Status: DISCONTINUED | OUTPATIENT
Start: 2017-03-25 | End: 2017-03-29 | Stop reason: HOSPADM

## 2017-03-25 RX ORDER — HYDROMORPHONE HYDROCHLORIDE 1 MG/ML
2 INJECTION, SOLUTION INTRAMUSCULAR; INTRAVENOUS; SUBCUTANEOUS
Status: DISCONTINUED | OUTPATIENT
Start: 2017-03-25 | End: 2017-03-29 | Stop reason: HOSPADM

## 2017-03-25 RX ORDER — ALPRAZOLAM 0.5 MG/1
1 TABLET ORAL DAILY
Status: DISCONTINUED | OUTPATIENT
Start: 2017-03-25 | End: 2017-03-26

## 2017-03-25 RX ORDER — DICYCLOMINE HYDROCHLORIDE 10 MG/1
20 CAPSULE ORAL
Status: COMPLETED | OUTPATIENT
Start: 2017-03-25 | End: 2017-03-25

## 2017-03-25 RX ORDER — ESTRADIOL 1 MG/1
1 TABLET ORAL
Status: DISCONTINUED | OUTPATIENT
Start: 2017-03-25 | End: 2017-03-29 | Stop reason: HOSPADM

## 2017-03-25 RX ORDER — ALPRAZOLAM 0.5 MG/1
2 TABLET ORAL
Status: DISCONTINUED | OUTPATIENT
Start: 2017-03-25 | End: 2017-03-29 | Stop reason: HOSPADM

## 2017-03-25 RX ORDER — DICYCLOMINE HYDROCHLORIDE 20 MG/1
20 TABLET ORAL 3 TIMES DAILY
Status: DISCONTINUED | OUTPATIENT
Start: 2017-03-25 | End: 2017-03-29 | Stop reason: HOSPADM

## 2017-03-25 RX ADMIN — METRONIDAZOLE 500 MG: 500 INJECTION, SOLUTION INTRAVENOUS at 03:36

## 2017-03-25 RX ADMIN — ENOXAPARIN SODIUM 40 MG: 40 INJECTION SUBCUTANEOUS at 21:33

## 2017-03-25 RX ADMIN — FAMOTIDINE 20 MG: 10 INJECTION, SOLUTION INTRAVENOUS at 21:42

## 2017-03-25 RX ADMIN — ALPRAZOLAM 1 MG: 0.5 TABLET ORAL at 11:50

## 2017-03-25 RX ADMIN — LAMOTRIGINE 100 MG: 100 TABLET ORAL at 09:07

## 2017-03-25 RX ADMIN — Medication 10 ML: at 09:09

## 2017-03-25 RX ADMIN — ALPRAZOLAM 2 MG: 0.5 TABLET ORAL at 00:33

## 2017-03-25 RX ADMIN — PROMETHAZINE HYDROCHLORIDE 12.5 MG: 25 INJECTION INTRAMUSCULAR; INTRAVENOUS at 21:32

## 2017-03-25 RX ADMIN — METRONIDAZOLE 500 MG: 500 INJECTION, SOLUTION INTRAVENOUS at 21:38

## 2017-03-25 RX ADMIN — DICYCLOMINE HYDROCHLORIDE 20 MG: 20 TABLET ORAL at 00:45

## 2017-03-25 RX ADMIN — POTASSIUM CHLORIDE 20 MEQ: 1.5 POWDER, FOR SOLUTION ORAL at 09:07

## 2017-03-25 RX ADMIN — Medication 10 ML: at 21:40

## 2017-03-25 RX ADMIN — FAMOTIDINE 20 MG: 10 INJECTION, SOLUTION INTRAVENOUS at 09:07

## 2017-03-25 RX ADMIN — HYDROMORPHONE HYDROCHLORIDE 1 MG: 1 INJECTION, SOLUTION INTRAMUSCULAR; INTRAVENOUS; SUBCUTANEOUS at 17:35

## 2017-03-25 RX ADMIN — ALPRAZOLAM 2 MG: 0.5 TABLET ORAL at 21:33

## 2017-03-25 RX ADMIN — THERA TABS 1 TABLET: TAB at 09:07

## 2017-03-25 RX ADMIN — DICYCLOMINE HYDROCHLORIDE 20 MG: 10 CAPSULE ORAL at 09:11

## 2017-03-25 RX ADMIN — PROMETHAZINE HYDROCHLORIDE 12.5 MG: 25 INJECTION INTRAMUSCULAR; INTRAVENOUS at 10:29

## 2017-03-25 RX ADMIN — ESTRADIOL 1 MG: 1 TABLET ORAL at 00:33

## 2017-03-25 RX ADMIN — LEVOFLOXACIN 500 MG: 5 INJECTION, SOLUTION INTRAVENOUS at 22:29

## 2017-03-25 RX ADMIN — HYDROMORPHONE HYDROCHLORIDE 2 MG: 1 INJECTION, SOLUTION INTRAMUSCULAR; INTRAVENOUS; SUBCUTANEOUS at 00:34

## 2017-03-25 RX ADMIN — DICYCLOMINE HYDROCHLORIDE 20 MG: 20 TABLET ORAL at 21:32

## 2017-03-25 RX ADMIN — HYDROMORPHONE HYDROCHLORIDE 2 MG: 1 INJECTION, SOLUTION INTRAMUSCULAR; INTRAVENOUS; SUBCUTANEOUS at 21:42

## 2017-03-25 RX ADMIN — HYDROMORPHONE HYDROCHLORIDE 1 MG: 1 INJECTION, SOLUTION INTRAMUSCULAR; INTRAVENOUS; SUBCUTANEOUS at 09:07

## 2017-03-25 RX ADMIN — ESTRADIOL 1 MG: 1 TABLET ORAL at 21:32

## 2017-03-25 RX ADMIN — METRONIDAZOLE 500 MG: 500 INJECTION, SOLUTION INTRAVENOUS at 11:50

## 2017-03-25 RX ADMIN — Medication 10 ML: at 15:00

## 2017-03-25 RX ADMIN — DEXTROSE MONOHYDRATE, SODIUM CHLORIDE, AND POTASSIUM CHLORIDE 75 ML/HR: 50; 4.5; 1.49 INJECTION, SOLUTION INTRAVENOUS at 17:35

## 2017-03-25 RX ADMIN — HYDROMORPHONE HYDROCHLORIDE 1 MG: 1 INJECTION, SOLUTION INTRAMUSCULAR; INTRAVENOUS; SUBCUTANEOUS at 04:42

## 2017-03-25 RX ADMIN — DICYCLOMINE HYDROCHLORIDE 20 MG: 20 TABLET ORAL at 11:49

## 2017-03-25 RX ADMIN — PROMETHAZINE HYDROCHLORIDE 12.5 MG: 25 INJECTION INTRAMUSCULAR; INTRAVENOUS at 00:33

## 2017-03-25 RX ADMIN — ALPRAZOLAM 1 MG: 0.5 TABLET ORAL at 09:10

## 2017-03-25 RX ADMIN — HYDROMORPHONE HYDROCHLORIDE 1 MG: 1 INJECTION, SOLUTION INTRAMUSCULAR; INTRAVENOUS; SUBCUTANEOUS at 11:51

## 2017-03-25 NOTE — PROGRESS NOTES
Bedside and Verbal shift change report given to LISBET Gaspar (oncoming nurse) by Antonio Gifford RN (offgoing nurse). Report included the following information SBAR and Kardex.

## 2017-03-25 NOTE — PROGRESS NOTES
[facility]ai [facility]an[facility]va [facility]  [address] [facility], [place], [place], [id number] [place] [address]  [phone]      Medical Progress Note      NAME: Brenda Rosenthal   :  1970  MRM:  [id number]    Date/Time: 3/25/2017  9:37 AM         Subjective:     Chief Complaint:  Pain: abdomen, RLQ, intermittent, moderate, better with pain meds    ROS:  (bold if positive, if negative)                        Tolerating PT  Tolerating Diet          Objective:       Vitals:          Last 24hrs VS reviewed since prior progress note.  Most recent are:    Visit Vitals    /62 (BP 1 Location: Right arm, BP Patient Position: At rest)    Pulse 76    Temp 96.7 °F (35.9 °C)    Resp 16    Ht 5' 6\" (1.676 m)    Wt 97.5 kg (215 lb)    SpO2 98%    BMI 34.7 kg/m2     SpO2 Readings from Last 6 Encounters:   17 98%   16 98%   16 99%   16 98%   16 97%   16 100%            Intake/Output Summary (Last 24 hours) at 17 0152  Last data filed at 17 0531   Gross per 24 hour   Intake           1747.5 ml   Output                0 ml   Net           1747.5 ml          Exam:     Physical Exam:    Gen:  Well-developed, obese, in no acute distress  HEENT:  Pink conjunctivae, PERRL, hearing intact to voice, moist mucous membranes  Neck:  Supple, without masses, thyroid non-tender  Resp:  No accessory muscle use, clear breath sounds without wheezes rales or rhonchi  Card:  No murmurs, normal S1, S2 without thrills, bruits or peripheral edema  Abd:  Soft, tender in RLQ, non-distended, normoactive bowel sounds are present, no palpable organomegaly and no detectable hernias  Lymph:  No cervical or inguinal adenopathy  Musc:  No cyanosis or clubbing  Skin:  No rashes or ulcers, skin turgor is good  Neuro:  Cranial nerves are grossly intact, no focal motor weakness, follows commands appropriately  Psych:  Good insight, oriented to person, place and time, alert    Medications Reviewed: (see below)    Lab Data Reviewed: (see below)    ______________________________________________________________________    Medications:     Current Facility-Administered Medications   Medication Dose Route Frequency    ALPRAZolam (XANAX) tablet 1 mg  1 mg Oral DAILY    ALPRAZolam (XANAX) tablet 1 mg  1 mg Oral DAILY    lamoTRIgine (LaMICtal) tablet 100 mg  100 mg Oral DAILY    dicyclomine (BENTYL) tablet 20 mg  20 mg Oral TID    estradiol (ESTRACE) tablet 1 mg  1 mg Oral QHS    ALPRAZolam (XANAX) tablet 2 mg  2 mg Oral QHS    HYDROmorphone (PF) (DILAUDID) injection 2 mg  2 mg IntraVENous QHS    dextrose 5% - 0.45% NaCl with KCl 20 mEq/L infusion  75 mL/hr IntraVENous CONTINUOUS    levoFLOXacin (LEVAQUIN) 500 mg in D5W IVPB  500 mg IntraVENous Q24H    metroNIDAZOLE (FLAGYL) IVPB premix 500 mg  500 mg IntraVENous Q8H    sodium chloride (NS) flush 5-10 mL  5-10 mL IntraVENous Q8H    sodium chloride (NS) flush 5-10 mL  5-10 mL IntraVENous PRN    acetaminophen (TYLENOL) tablet 650 mg  650 mg Oral Q4H PRN    diphenhydrAMINE (BENADRYL) injection 12.5 mg  12.5 mg IntraVENous Q4H PRN    enoxaparin (LOVENOX) injection 40 mg  40 mg SubCUTAneous Q24H    HYDROmorphone (PF) (DILAUDID) injection 1 mg  1 mg IntraVENous Q4H PRN    promethazine (PHENERGAN) 12.5 mg in 0.9% sodium chloride 50 mL IVPB  12.5 mg IntraVENous Q4H PRN    famotidine (PF) (PEPCID) 20 mg in sodium chloride 0.9 % 10 mL injection  20 mg IntraVENous Q12H    therapeutic multivitamin (THERAGRAN) tablet 1 Tab  1 Tab Oral DAILY    potassium chloride (KLOR-CON) packet 20 mEq  20 mEq Oral DAILY    ALPRAZolam (XANAX) tablet 1 mg  1 mg Oral BID PRN    influenza vaccine 2016-17 (36mos+)(PF) (FLUZONE/FLUARIX/FLULAVAL QUAD) injection 0.5 mL  0.5 mL IntraMUSCular PRIOR TO DISCHARGE            Lab Review:     Recent Labs      03/25/17   0354  03/24/17   0555  03/23/17   0150   WBC  6.1  4.8  8.4   HGB  10.6*  11.5  12.5   HCT  34.7*  35.7  37.8   PLT  174  149*  199 Recent Labs      03/25/17   0354  03/24/17   0555  03/23/17   0150  03/22/17   1456   NA  140  139  139  137   K  3.8  3.9  3.3*  3.6   CL  107  107  104  100   CO2  28  22  25  24   GLU  99  84  96  99   BUN  4*  7  12  14   CREA  0.97  0.90  0.87  1.10*   CA  8.7  8.5  7.9*  9.8   MG  1.8  1.9   --    --    PHOS  4.0  3.6   --    --    ALB   --   3.0*  3.1*  4.2   TBILI   --   0.3  0.6  1.1*   SGOT   --   22  24  25   ALT   --   20  26  32     Lab Results   Component Value Date/Time    Glucose (POC) 94 05/17/2010 11:04 AM    Glucose (POC) 84 01/01/2010 06:35 PM    Glucose (POC) 120 12/30/2009 12:41 PM    Glucose (POC) 111 12/30/2009 05:20 AM    Glucose (POC) 133 12/30/2009 12:00 AM    Glucose (POC) 110 12/29/2009 06:33 PM    Glucose (POC) 128 12/29/2009 11:59 AM     No results for input(s): PH, PCO2, PO2, HCO3, FIO2 in the last 72 hours. No results for input(s): INR in the last 72 hours. No lab exists for component: Jef Parry  Lab Results   Component Value Date/Time    Specimen Description: URINE 11/29/2013 11:30 AM    Specimen Description: URINE 04/28/2010 01:10 PM    Specimen Description: ABDOMEN 01/01/2010 09:30 PM     Lab Results   Component Value Date/Time    Culture result:  03/23/2017 11:00 AM     NO ROUTINE ENTERIC PATHOGENS ISOLATED INCLUDING SALMONELLA, SHIGELLA, YERSINIA, VIBRIO OR SHIGA TOXIN PRODUCING E. COLI  SO FAR      Culture result: NO GROWTH 6 DAYS 07/31/2016 04:00 PM    Culture result:  07/23/2016 07:17 PM     NO ROUTINE ENTERIC PATHOGENS ISOLATED INCLUDING SALMONELLA, SHIGELLA, YERSINIA, VIBRIO OR SHIGA TOXIN PRODUCING E. COLI            Assessment:     Principal Problem:    Acute colitis (3/22/2017)    Active Problems:    Depression (7/1/2010)      Ulcerative colitis ([address] (7/25/2016)      PUD (peptic ulcer disease) ()      Overview: stomach and colon ulcers? Anxiety ()      Obesity ()      Overview: REYNA?       Nausea and vomiting (3/22/2017)           Plan:     Principal Problem:    Acute colitis (3/22/2017)/Ulcerative colitis ([address] (7/25/2016)   - continue antibiotics and supportive care   - current pain seems to be related to chronic adhesions   - Surgery considering intervention with LISET, patient is pushing to have this happen today if possible    Active Problems:    Depression (7/1/2010)/Anxiety ()   - continue psych meds, I have scheduled them, to the best of my ability, to her specific requests      PUD (peptic ulcer disease) ()   - continue Pepcid      Obesity ()   - counseled on weight loss and exercise      Nausea and vomiting (3/22/2017)   - continue current meds for symptom control      Total time spent with patient: 75 minutes                  Care Plan discussed with: Patient, Nursing Staff and >50% of time spent in counseling and coordination of care, had long discussion with patient about all of her medications, the timing of administration, her current condition, prognosis, possible interventions and the timing of such    Discussed:  Code Status, Care Plan and D/C Planning    Prophylaxis:  Lovenox    Disposition:  Home w/Family           ___________________________________________________    Attending Physician: Carloz Marte MD

## 2017-03-26 PROCEDURE — 74011250637 HC RX REV CODE- 250/637: Performed by: INTERNAL MEDICINE

## 2017-03-26 PROCEDURE — 74011250636 HC RX REV CODE- 250/636: Performed by: INTERNAL MEDICINE

## 2017-03-26 PROCEDURE — 65270000029 HC RM PRIVATE

## 2017-03-26 PROCEDURE — 74011000258 HC RX REV CODE- 258: Performed by: INTERNAL MEDICINE

## 2017-03-26 PROCEDURE — 74011000250 HC RX REV CODE- 250: Performed by: INTERNAL MEDICINE

## 2017-03-26 RX ORDER — ALPRAZOLAM 0.5 MG/1
1 TABLET ORAL DAILY
Status: DISCONTINUED | OUTPATIENT
Start: 2017-03-26 | End: 2017-03-29 | Stop reason: HOSPADM

## 2017-03-26 RX ADMIN — Medication 10 ML: at 14:58

## 2017-03-26 RX ADMIN — LAMOTRIGINE 100 MG: 100 TABLET ORAL at 05:47

## 2017-03-26 RX ADMIN — HYDROMORPHONE HYDROCHLORIDE 1 MG: 1 INJECTION, SOLUTION INTRAMUSCULAR; INTRAVENOUS; SUBCUTANEOUS at 07:34

## 2017-03-26 RX ADMIN — PROMETHAZINE HYDROCHLORIDE 12.5 MG: 25 INJECTION INTRAMUSCULAR; INTRAVENOUS at 11:59

## 2017-03-26 RX ADMIN — PROMETHAZINE HYDROCHLORIDE 12.5 MG: 25 INJECTION INTRAMUSCULAR; INTRAVENOUS at 07:34

## 2017-03-26 RX ADMIN — ALPRAZOLAM 2 MG: 0.5 TABLET ORAL at 21:35

## 2017-03-26 RX ADMIN — ALPRAZOLAM 1 MG: 0.5 TABLET ORAL at 11:58

## 2017-03-26 RX ADMIN — LEVOFLOXACIN 500 MG: 5 INJECTION, SOLUTION INTRAVENOUS at 21:34

## 2017-03-26 RX ADMIN — ENOXAPARIN SODIUM 40 MG: 40 INJECTION SUBCUTANEOUS at 21:34

## 2017-03-26 RX ADMIN — FAMOTIDINE 20 MG: 10 INJECTION, SOLUTION INTRAVENOUS at 09:10

## 2017-03-26 RX ADMIN — METRONIDAZOLE 500 MG: 500 INJECTION, SOLUTION INTRAVENOUS at 11:57

## 2017-03-26 RX ADMIN — DIPHENHYDRAMINE HYDROCHLORIDE 12.5 MG: 50 INJECTION, SOLUTION INTRAMUSCULAR; INTRAVENOUS at 01:54

## 2017-03-26 RX ADMIN — DEXTROSE MONOHYDRATE, SODIUM CHLORIDE, AND POTASSIUM CHLORIDE 75 ML/HR: 50; 4.5; 1.49 INJECTION, SOLUTION INTRAVENOUS at 14:57

## 2017-03-26 RX ADMIN — ESTRADIOL 1 MG: 1 TABLET ORAL at 21:35

## 2017-03-26 RX ADMIN — METRONIDAZOLE 500 MG: 500 INJECTION, SOLUTION INTRAVENOUS at 03:01

## 2017-03-26 RX ADMIN — DICYCLOMINE HYDROCHLORIDE 20 MG: 20 TABLET ORAL at 21:35

## 2017-03-26 RX ADMIN — HYDROMORPHONE HYDROCHLORIDE 2 MG: 1 INJECTION, SOLUTION INTRAMUSCULAR; INTRAVENOUS; SUBCUTANEOUS at 20:38

## 2017-03-26 RX ADMIN — PROMETHAZINE HYDROCHLORIDE 12.5 MG: 25 INJECTION INTRAMUSCULAR; INTRAVENOUS at 20:34

## 2017-03-26 RX ADMIN — HYDROMORPHONE HYDROCHLORIDE 1 MG: 1 INJECTION, SOLUTION INTRAMUSCULAR; INTRAVENOUS; SUBCUTANEOUS at 01:57

## 2017-03-26 RX ADMIN — ALPRAZOLAM 1 MG: 0.5 TABLET ORAL at 05:48

## 2017-03-26 RX ADMIN — FAMOTIDINE 20 MG: 10 INJECTION, SOLUTION INTRAVENOUS at 21:34

## 2017-03-26 RX ADMIN — DICYCLOMINE HYDROCHLORIDE 20 MG: 20 TABLET ORAL at 11:58

## 2017-03-26 RX ADMIN — THERA TABS 1 TABLET: TAB at 09:10

## 2017-03-26 RX ADMIN — METRONIDAZOLE 500 MG: 500 INJECTION, SOLUTION INTRAVENOUS at 18:50

## 2017-03-26 RX ADMIN — HYDROMORPHONE HYDROCHLORIDE 1 MG: 1 INJECTION, SOLUTION INTRAMUSCULAR; INTRAVENOUS; SUBCUTANEOUS at 11:59

## 2017-03-26 RX ADMIN — ALPRAZOLAM 1 MG: 0.5 TABLET ORAL at 09:10

## 2017-03-26 RX ADMIN — POTASSIUM CHLORIDE 20 MEQ: 1.5 POWDER, FOR SOLUTION ORAL at 09:09

## 2017-03-26 RX ADMIN — HYDROMORPHONE HYDROCHLORIDE 1 MG: 1 INJECTION, SOLUTION INTRAMUSCULAR; INTRAVENOUS; SUBCUTANEOUS at 16:11

## 2017-03-26 RX ADMIN — Medication 10 ML: at 05:48

## 2017-03-26 RX ADMIN — Medication 10 ML: at 21:36

## 2017-03-26 RX ADMIN — PROMETHAZINE HYDROCHLORIDE 12.5 MG: 25 INJECTION INTRAMUSCULAR; INTRAVENOUS at 16:12

## 2017-03-26 RX ADMIN — DICYCLOMINE HYDROCHLORIDE 20 MG: 20 TABLET ORAL at 05:47

## 2017-03-26 NOTE — PROGRESS NOTES
Problem: Pain  Goal: *Control of Pain  Outcome: Not Progressing Towards Goal  Talked about pain management regimen. Patient not too eager to give it a try. Goal: *PALLIATIVE CARE: Alleviation of Pain  Outcome: Not Progressing Towards Goal  Nothing seems to be alleviating her pain.

## 2017-03-26 NOTE — PROGRESS NOTES
Assessment / Plan:     A:  Continued right abdominal pain. She is tolerating portions of her regular diet, but she describes \"stabbing/cutting/crampy\" right abdomen pain ~15 minutes after eating. Symptoms are persistent and \"violent\" without antispasmodics prior to eating. I spent 45 minutes today reviewing history and pertinent clinical lab findings with Ms. Shayy Siddiqui. P:    1) Will order MRE for tomorrow, looking for possible inflammatory bowel disease. If not possible, will discuss other simaging modalities with radiology in AM.    2) Agree with Dr. Christiano Vallejo about d/c antibiotics. Corey Franklin MD  [facility]  Office:  [phone]        General Surgery Daily Progress Note      Patient: Rebeca Huitron MRN: [id number]  SSN: [id number]    YOB: 1970  Age: 51 y.o. Sex: female      Admit Date: 3/22/2017 for abdominal pain, diarrhea    Subjective:   Patient was walking around the room and cleaning when I visited her today. Patient's symptoms of right sided, mid-abdominal pain are unchanged. Her left side feels fine. She is concerned about losing her job without proper documentation of her admission. She also doesn't understand why surgery cant be done on this admission to fix her lower abdominal hernia. She is tolerating between 20-50% of regular meals, and reports diarrhea and gas.      Current Facility-Administered Medications   Medication Dose Route Frequency    ALPRAZolam (XANAX) tablet 1 mg  1 mg Oral DAILY    ALPRAZolam (XANAX) tablet 1 mg  1 mg Oral DAILY    lamoTRIgine (LaMICtal) tablet 100 mg  100 mg Oral DAILY    dicyclomine (BENTYL) tablet 20 mg  20 mg Oral TID    estradiol (ESTRACE) tablet 1 mg  1 mg Oral QHS    ALPRAZolam (XANAX) tablet 2 mg  2 mg Oral QHS    HYDROmorphone (PF) (DILAUDID) injection 2 mg  2 mg IntraVENous QHS    dextrose 5% - 0.45% NaCl with KCl 20 mEq/L infusion  75 mL/hr IntraVENous CONTINUOUS    levoFLOXacin (LEVAQUIN) 500 mg in D5W IVPB  500 mg IntraVENous Q24H    metroNIDAZOLE (FLAGYL) IVPB premix 500 mg  500 mg IntraVENous Q8H    sodium chloride (NS) flush 5-10 mL  5-10 mL IntraVENous Q8H    sodium chloride (NS) flush 5-10 mL  5-10 mL IntraVENous PRN    acetaminophen (TYLENOL) tablet 650 mg  650 mg Oral Q4H PRN    diphenhydrAMINE (BENADRYL) injection 12.5 mg  12.5 mg IntraVENous Q4H PRN    enoxaparin (LOVENOX) injection 40 mg  40 mg SubCUTAneous Q24H    HYDROmorphone (PF) (DILAUDID) injection 1 mg  1 mg IntraVENous Q4H PRN    promethazine (PHENERGAN) 12.5 mg in 0.9% sodium chloride 50 mL IVPB  12.5 mg IntraVENous Q4H PRN    famotidine (PF) (PEPCID) 20 mg in sodium chloride 0.9 % 10 mL injection  20 mg IntraVENous Q12H    therapeutic multivitamin (THERAGRAN) tablet 1 Tab  1 Tab Oral DAILY    potassium chloride (KLOR-CON) packet 20 mEq  20 mEq Oral DAILY    ALPRAZolam (XANAX) tablet 1 mg  1 mg Oral BID PRN    influenza vaccine 2016-17 (36mos+)(PF) (FLUZONE/FLUARIX/FLULAVAL QUAD) injection 0.5 mL  0.5 mL IntraMUSCular PRIOR TO DISCHARGE        Objective:   03/26 0701 - 03/26 1900  In: 4312.5 [P.O.:240; I.V.:4072.5]  Out: -   03/24 1901 - 03/26 0700  In: 1547.5 [P.O.:720; I.V.:827.5]  Out: -   No data found. Physical Exam:  General: Alert, cooperative, no distress, appears stated age. Neck:  Supple, symmetrical, trachea midline. Lungs: Normal rate, inspiratory and expiratory effort. Abdomen: Soft, tender on right side. No peritonitis. Bowel sounds normal. No masses,  No organomegaly. No lower abdominal or superpubic pain. Extremities: Extremities normal, atraumatic, no cyanosis or edema.   Skin:  Skin color, texture, turgor normal. No rashes or lesions    Labs:   Recent Labs      03/25/17   0354   WBC  6.1   HGB  10.6*   HCT  34.7*   PLT  174     Recent Labs      03/25/17   0354  03/24/17   0555   NA  140  139   K  3.8  3.9   CL  107  107   CO2  28  22   GLU  99  84   BUN  4*  7   CREA  0.97  0.90 CA  8.7  8.5   MG  1.8  1.9   PHOS  4.0  3.6   ALB   --   3.0*   TBILI   --   0.3   SGOT   --   22   ALT   --   20       Principal Problem:    Acute colitis (3/22/2017)    Active Problems:    Depression (7/1/2010)      [facility] ([facility] (7/25/2016)      PUD (peptic ulcer disease) ()      Overview: stomach and colon ulcers? Anxiety ()      Obesity ()      Overview: REYNA?       Nausea and vomiting (3/22/2017)

## 2017-03-26 NOTE — PROGRESS NOTES
I spent two hours reviewing pertinent lab information, imaging and discussing the hospital stay with Ms. Sara Llanes today. There is no evidence of acute small bowel obstruction. At this juncture I do not recommend diagnostic laparoscopy and adhesiolysis for her pain. She is concerned that the incisional, lower abdominal hernia could cause the pain she is having. I have personally reviewed the radiology images from CT abdomen and pelvis in 2012, 2016 and 2017, and all of the radiology reports from the interpreting radiologist.  There is evidence of an ~3 cm suprapubic midline incisional hernia with a persistent loop of antimesenteric small bowel in the defect. This defect has been reported in each CT scan she has had since 2011. It appears to be a hernia recurrence at the inferior edge of a laparoscopic incisional hernia repair from 9/14/2011. The small bowel loop inside the aforementioned defect has the same orientation to the inferior edge of the permanent mesh and metal tacks, does not appear threatened, and it appears to be the same loop of herniated small bowel in each study. I reviewed the official radiology reports,  showed her the available images and gave my interpretation of the images from October 25th, 2012, July 23, 2016, and March 22, 2017. I also showed her son and a family friend that she requested to stay in the room. I talked to Dr. Martin Stephens by telephone. Colonoscopy last Friday revealed no evidence of colitis   He suspects a component of small bowel inflammation or adhesive disease, which makes sense. I then discussed her symptoms, vitals and lab results with Dr. Marie Kitchen (her admitting physician). Without true evidence of colitis (or patient report of symptom improvement), he thinks antibiotics are not indicated and I agree. As far as a diagnosis, he is also unsure of her diagnosis. She does not report history of inflammatory bowel disease.   Her symptoms and admission CT scan suggest further investigation with a study that can give high contrast resolution of the small bowel. I think it would help distinguish if there is inflammatory or stricturing disease of the small bowel that can account for the haziness and interloop fluid seen on March 22nd CT scan.

## 2017-03-26 NOTE — PROGRESS NOTES
Bedside and Verbal shift change report given to dexter Jacobs Rn (oncoming nurse) by  Brenda Rutledge (offgoing nurse). Report included the following information SBAR, Kardex, Intake/Output, MAR, Accordion, Recent Results and Med Rec Status.

## 2017-03-26 NOTE — PROGRESS NOTES
Bedside shift change report given to Ani Ho RN (oncoming nurse) by Sara Friedman RN (offgoing nurse). Report included the following information SBAR, Kardex and MAR.

## 2017-03-26 NOTE — ROUTINE PROCESS
Bedside and Verbal shift change report given to Cricket George RN (oncoming nurse) by Christopher Rubinstein (offgoing nurse). Report included the following information SBAR, Kardex, Intake/Output, MAR, Accordion, Recent Results and Med Rec Status.

## 2017-03-26 NOTE — PROGRESS NOTES
Patient stated that she has had the other nurses change her medication times. I informed her that that was not possible as nurses do not change medicines.

## 2017-03-26 NOTE — PROGRESS NOTES
RN was in Patient's room to administer her morning medications namely Xanax, Lamictal and Bentyl, at about 0548, after which the patient went to the bathroom. PCT was in the room with RN. Patient claimed she had not received those medications and was requesting for them all over again at about 0615. RN explained to patient that she already received those meds just before the patient was unhooked from the IV to go to the bathroom. Patient stated she was half asleep, and did not remember that those meds were given to her already. She also insists that she may have a surgery today and would therefore not order breakfast. The patient wants RN to call the surgeon and arrange for him to come in and see her. RN explained that the patient's attending physician would normally come around for rounds every morning and would  make referrals as may be necessary. No further complains so far. Will continue to monitor.

## 2017-03-26 NOTE — PROGRESS NOTES
At this time patient stated that I discussed with her, during my previous rounding, that she was having surgery and when was the surgeon coming by to visit her. I informed her that I did not have that discussion with her and verified with Nanda Sood RN that that discussion did not take place.

## 2017-03-26 NOTE — PROGRESS NOTES
Bedside shift change report given to Kymberly Johnson RN (oncoming nurse) by Candy Farias (offgoing nurse). Report included the following information SBAR, Kardex and MAR.

## 2017-03-26 NOTE — ROUTINE PROCESS
Chart entered for the following reason(s):  Reason chart accessed ü All that apply   Bed placement criteria, acuity or order review    Patient/Family Complaint    Code Blue/Rapid Response    Follow up for patient/family event, concern or complaint x   Information for further documentation    Patient disposition information for bed planning/throughput/staffing    In error

## 2017-03-26 NOTE — PROGRESS NOTES
Discussed with patient her PRN pain and nausea medicine and how PRN meds are given. I informed her that I would return at approximately 11:30 am with those meds and her scheduled meds due at that time. Igor Almaraz RN at bedside during this conversation.

## 2017-03-26 NOTE — PROGRESS NOTES
[facility] [address], [place], [address] [facility]  [phone]      Medical Progress Note      NAME: Louisa Rivera   :  1970  MRM:  [id number]    Date/Time: 3/26/2017  9:59 AM          Subjective:     Chief Complaint:  Pain: abdomen, RLQ, intermittent, moderate, better with pain meds. Ate dinner last night without pain but did have some diarrhea afterwards    ROS:  (bold if positive, if negative)       Diarrhea                 Tolerating Diet          Objective:       Vitals:          Last 24hrs VS reviewed since prior progress note.  Most recent are:    Visit Vitals    /64 (BP 1 Location: Right arm, BP Patient Position: At rest)    Pulse 68    Temp 97.8 °F (36.6 °C)    Resp 16    Ht 5' 6\" (1.676 m)    Wt 97.5 kg (215 lb)    SpO2 98%    BMI 34.7 kg/m2     SpO2 Readings from Last 6 Encounters:   17 98%   16 98%   16 99%   16 98%   16 97%   16 100%            Intake/Output Summary (Last 24 hours) at 17 0959  Last data filed at 17 7733   Gross per 24 hour   Intake           4072.5 ml   Output                0 ml   Net           4072.5 ml          Exam:     Physical Exam:    Gen:  Well-developed, obese, in no acute distress  HEENT:  Pink conjunctivae, PERRL, hearing intact to voice, moist mucous membranes  Neck:  Supple, without masses, thyroid non-tender  Resp:  No accessory muscle use, clear breath sounds without wheezes rales or rhonchi  Card:  No murmurs, normal S1, S2 without thrills, bruits or peripheral edema  Abd:  Soft, tender in RLQ, non-distended, normoactive bowel sounds are present, no palpable organomegaly and no detectable hernias  Lymph:  No cervical or inguinal adenopathy  Musc:  No cyanosis or clubbing  Skin:  No rashes or ulcers, skin turgor is good  Neuro:  Cranial nerves are grossly intact, no focal motor weakness, follows commands appropriately  Psych:  Good insight, oriented to person, place and time, alert    Medications Reviewed: (see below)    Lab Data Reviewed: (see below)    ______________________________________________________________________    Medications:     Current Facility-Administered Medications   Medication Dose Route Frequency    ALPRAZolam (XANAX) tablet 1 mg  1 mg Oral DAILY    ALPRAZolam (XANAX) tablet 1 mg  1 mg Oral DAILY    lamoTRIgine (LaMICtal) tablet 100 mg  100 mg Oral DAILY    dicyclomine (BENTYL) tablet 20 mg  20 mg Oral TID    estradiol (ESTRACE) tablet 1 mg  1 mg Oral QHS    ALPRAZolam (XANAX) tablet 2 mg  2 mg Oral QHS    HYDROmorphone (PF) (DILAUDID) injection 2 mg  2 mg IntraVENous QHS    dextrose 5% - 0.45% NaCl with KCl 20 mEq/L infusion  75 mL/hr IntraVENous CONTINUOUS    levoFLOXacin (LEVAQUIN) 500 mg in D5W IVPB  500 mg IntraVENous Q24H    metroNIDAZOLE (FLAGYL) IVPB premix 500 mg  500 mg IntraVENous Q8H    sodium chloride (NS) flush 5-10 mL  5-10 mL IntraVENous Q8H    sodium chloride (NS) flush 5-10 mL  5-10 mL IntraVENous PRN    acetaminophen (TYLENOL) tablet 650 mg  650 mg Oral Q4H PRN    diphenhydrAMINE (BENADRYL) injection 12.5 mg  12.5 mg IntraVENous Q4H PRN    enoxaparin (LOVENOX) injection 40 mg  40 mg SubCUTAneous Q24H    HYDROmorphone (PF) (DILAUDID) injection 1 mg  1 mg IntraVENous Q4H PRN    promethazine (PHENERGAN) 12.5 mg in 0.9% sodium chloride 50 mL IVPB  12.5 mg IntraVENous Q4H PRN    famotidine (PF) (PEPCID) 20 mg in sodium chloride 0.9 % 10 mL injection  20 mg IntraVENous Q12H    therapeutic multivitamin (THERAGRAN) tablet 1 Tab  1 Tab Oral DAILY    potassium chloride (KLOR-CON) packet 20 mEq  20 mEq Oral DAILY    ALPRAZolam (XANAX) tablet 1 mg  1 mg Oral BID PRN    influenza vaccine 2016-17 (36mos+)(PF) (FLUZONE/FLUARIX/FLULAVAL QUAD) injection 0.5 mL  0.5 mL IntraMUSCular PRIOR TO DISCHARGE            Lab Review:     Recent Labs      03/25/17   0354  03/24/17   0555   WBC  6.1  4.8   HGB  10.6*  11.5   HCT  34.7* 35.7   PLT  174  149*     Recent Labs      03/25/17   0354  03/24/17   0555   NA  140  139   K  3.8  3.9   CL  107  107   CO2  28  22   GLU  99  84   BUN  4*  7   CREA  0.97  0.90   CA  8.7  8.5   MG  1.8  1.9   PHOS  4.0  3.6   ALB   --   3.0*   TBILI   --   0.3   SGOT   --   22   ALT   --   20     Lab Results   Component Value Date/Time    Glucose (POC) 94 05/17/2010 11:04 AM    Glucose (POC) 84 01/01/2010 06:35 PM    Glucose (POC) 120 12/30/2009 12:41 PM    Glucose (POC) 111 12/30/2009 05:20 AM    Glucose (POC) 133 12/30/2009 12:00 AM    Glucose (POC) 110 12/29/2009 06:33 PM    Glucose (POC) 128 12/29/2009 11:59 AM     No results for input(s): PH, PCO2, PO2, HCO3, FIO2 in the last 72 hours. No results for input(s): INR in the last 72 hours. No lab exists for component: [facility]  Lab Results   Component Value Date/Time    Specimen Description: URINE 11/29/2013 11:30 AM    Specimen Description: URINE 04/28/2010 01:10 PM    Specimen Description: ABDOMEN 01/01/2010 09:30 PM     Lab Results   Component Value Date/Time    Culture result:  03/23/2017 11:00 AM     NO ROUTINE ENTERIC PATHOGENS ISOLATED INCLUDING SALMONELLA, SHIGELLA, YERSINIA, VIBRIO OR SHIGA TOXIN PRODUCING E. COLI    Culture result: NO GROWTH 6 DAYS 07/31/2016 04:00 PM    Culture result:  07/23/2016 07:17 PM     NO ROUTINE ENTERIC PATHOGENS ISOLATED INCLUDING SALMONELLA, SHIGELLA, YERSINIA, VIBRIO OR SHIGA TOXIN PRODUCING E. COLI            Assessment:     Principal Problem:    Acute colitis (3/22/2017)    Active Problems:    Depression (7/1/2010)      Ulcerative colitis ([address] (7/25/2016)      PUD (peptic ulcer disease) ()      Overview: stomach and colon ulcers? Anxiety ()      Obesity ()      Overview: REYNA?       Nausea and vomiting (3/22/2017)           Plan:     Principal Problem:    Acute colitis (3/22/2017)/Ulcerative colitis ([address] (7/25/2016)   - stool culture negative, can stop antibiotics if Surgery agrees   - Surgery evaluating for possible hernia repair per patient, I can't find a recent note outlining this plan    - tolerating diet, bowel function seems intact    Active Problems:    Depression (7/1/2010)/Anxiety ()   - continue psych meds, I have scheduled them, to the best of my ability, to her specific requests      PUD (peptic ulcer disease) ()   - continue Pepcid      Obesity ()   - counseled on weight loss and exercise      Nausea and vomiting (3/22/2017)   - continue current meds for symptom control      Total time spent with patient: 45 minutes                  Care Plan discussed with: Patient, Nursing Staff and >50% of time spent in counseling and coordination of care, again had long discussion with patient about her medications, her current condition, prognosis, possible interventions, etc.      Discussed:  Code Status, Care Plan and D/C Planning    Prophylaxis:  Lovenox    Disposition:  Home w/Family           ___________________________________________________    Attending Physician: Adrian Mckeon MD

## 2017-03-26 NOTE — PROGRESS NOTES
Bedside shift change report given to Juanito Ruelas RN (oncoming nurse) by Sara Friedman RN (offgoing nurse). Report included the following information SBAR, Kardex and MAR.

## 2017-03-26 NOTE — PROGRESS NOTES
Patient asked me for a list of her upcoming scheduled medicines which I typed out for her on a word document. She then proceeded to ball it up stating to me that it was all wrong.

## 2017-03-26 NOTE — PROGRESS NOTES
RN was in patient's room to introduce herself and carry out patient assessment for the shift. Patient was sitting up in bed having dinner, per day shift nurse, patient requested a dose of Bentyl and stated she was going to take her own medication if we do not give it to her when she wants it. This RN asked the patient if she had taken her own Bentyl as she told the day shift nurse, Patient stated, \"I have not taken it yet, but I am still planning to. \" This RN explained to patient that she would be exposing herself to the risk of overdosing, if she medicates herself in addition to the hospital medications. Patient stated, \" I know what I am supposed to take and what times I am supposed to take them but you all do not want to do things right. \" RN  reminded patient that Bentyl is scheduled for 2130, and that she (the patient) gave a list of medications to this RN, and the  the previous night, stating what medicines she wanted at what times, and that she indicated in that list, that she wants her Bentyl at exactly 2130 as prescribed and scheduled by her physician. Patient raised her voice and asked RN t to leave her room. RN excused herself out of the room, and told patient she would be back later.

## 2017-03-27 PROCEDURE — 74011250636 HC RX REV CODE- 250/636: Performed by: INTERNAL MEDICINE

## 2017-03-27 PROCEDURE — 74011000258 HC RX REV CODE- 258: Performed by: INTERNAL MEDICINE

## 2017-03-27 PROCEDURE — 74011250637 HC RX REV CODE- 250/637: Performed by: INTERNAL MEDICINE

## 2017-03-27 PROCEDURE — 74011000250 HC RX REV CODE- 250: Performed by: INTERNAL MEDICINE

## 2017-03-27 PROCEDURE — 65270000029 HC RM PRIVATE

## 2017-03-27 RX ORDER — HYDROMORPHONE HYDROCHLORIDE 1 MG/ML
2 INJECTION, SOLUTION INTRAMUSCULAR; INTRAVENOUS; SUBCUTANEOUS
Status: DISCONTINUED | OUTPATIENT
Start: 2017-03-27 | End: 2017-03-29 | Stop reason: HOSPADM

## 2017-03-27 RX ORDER — HYDROMORPHONE HYDROCHLORIDE 1 MG/ML
1 INJECTION, SOLUTION INTRAMUSCULAR; INTRAVENOUS; SUBCUTANEOUS
Status: DISPENSED | OUTPATIENT
Start: 2017-03-27 | End: 2017-03-27

## 2017-03-27 RX ORDER — OXYCODONE HYDROCHLORIDE 5 MG/1
20 TABLET ORAL
Status: DISCONTINUED | OUTPATIENT
Start: 2017-03-27 | End: 2017-03-29 | Stop reason: HOSPADM

## 2017-03-27 RX ADMIN — DICYCLOMINE HYDROCHLORIDE 20 MG: 20 TABLET ORAL at 05:41

## 2017-03-27 RX ADMIN — Medication 10 ML: at 05:42

## 2017-03-27 RX ADMIN — FAMOTIDINE 20 MG: 10 INJECTION, SOLUTION INTRAVENOUS at 09:18

## 2017-03-27 RX ADMIN — POTASSIUM CHLORIDE 20 MEQ: 1.5 POWDER, FOR SOLUTION ORAL at 09:17

## 2017-03-27 RX ADMIN — ALPRAZOLAM 1 MG: 0.5 TABLET ORAL at 09:18

## 2017-03-27 RX ADMIN — HYDROMORPHONE HYDROCHLORIDE 2 MG: 1 INJECTION, SOLUTION INTRAMUSCULAR; INTRAVENOUS; SUBCUTANEOUS at 22:34

## 2017-03-27 RX ADMIN — ALPRAZOLAM 2 MG: 0.5 TABLET ORAL at 22:32

## 2017-03-27 RX ADMIN — DEXTROSE MONOHYDRATE, SODIUM CHLORIDE, AND POTASSIUM CHLORIDE 75 ML/HR: 50; 4.5; 1.49 INJECTION, SOLUTION INTRAVENOUS at 05:41

## 2017-03-27 RX ADMIN — METRONIDAZOLE 500 MG: 500 INJECTION, SOLUTION INTRAVENOUS at 03:16

## 2017-03-27 RX ADMIN — ENOXAPARIN SODIUM 40 MG: 40 INJECTION SUBCUTANEOUS at 22:36

## 2017-03-27 RX ADMIN — Medication 10 ML: at 14:00

## 2017-03-27 RX ADMIN — LAMOTRIGINE 100 MG: 100 TABLET ORAL at 05:41

## 2017-03-27 RX ADMIN — DIPHENHYDRAMINE HYDROCHLORIDE 12.5 MG: 50 INJECTION, SOLUTION INTRAMUSCULAR; INTRAVENOUS at 06:38

## 2017-03-27 RX ADMIN — HYDROMORPHONE HYDROCHLORIDE 2 MG: 1 INJECTION, SOLUTION INTRAMUSCULAR; INTRAVENOUS; SUBCUTANEOUS at 18:58

## 2017-03-27 RX ADMIN — ALPRAZOLAM 1 MG: 0.5 TABLET ORAL at 05:42

## 2017-03-27 RX ADMIN — ESTRADIOL 1 MG: 1 TABLET ORAL at 22:33

## 2017-03-27 RX ADMIN — OXYCODONE HYDROCHLORIDE 20 MG: 5 TABLET ORAL at 13:43

## 2017-03-27 RX ADMIN — Medication 10 ML: at 22:25

## 2017-03-27 RX ADMIN — DICYCLOMINE HYDROCHLORIDE 20 MG: 20 TABLET ORAL at 22:33

## 2017-03-27 RX ADMIN — HYDROMORPHONE HYDROCHLORIDE 1 MG: 1 INJECTION, SOLUTION INTRAMUSCULAR; INTRAVENOUS; SUBCUTANEOUS at 09:18

## 2017-03-27 RX ADMIN — HYDROMORPHONE HYDROCHLORIDE 1 MG: 1 INJECTION, SOLUTION INTRAMUSCULAR; INTRAVENOUS; SUBCUTANEOUS at 06:38

## 2017-03-27 RX ADMIN — METRONIDAZOLE 500 MG: 500 INJECTION, SOLUTION INTRAVENOUS at 22:25

## 2017-03-27 RX ADMIN — METRONIDAZOLE 500 MG: 500 INJECTION, SOLUTION INTRAVENOUS at 11:06

## 2017-03-27 RX ADMIN — FAMOTIDINE 20 MG: 10 INJECTION, SOLUTION INTRAVENOUS at 22:28

## 2017-03-27 RX ADMIN — THERA TABS 1 TABLET: TAB at 09:17

## 2017-03-27 RX ADMIN — PROMETHAZINE HYDROCHLORIDE 12.5 MG: 25 INJECTION INTRAMUSCULAR; INTRAVENOUS at 14:54

## 2017-03-27 RX ADMIN — DICYCLOMINE HYDROCHLORIDE 20 MG: 20 TABLET ORAL at 11:05

## 2017-03-27 RX ADMIN — ALPRAZOLAM 1 MG: 0.5 TABLET ORAL at 11:06

## 2017-03-27 NOTE — PROGRESS NOTES
Patient requested for her PRN 1mg Dilaudid. RN reminded patient that  her scheduled 2mg dose is due at this time, and that she can actually have it now. Patient said to the RN with a raised voice,  \"you don't understand anything, I think you do not understand English well enough to know the difference between 1mg and 2mg, I want the 1mg now, and then the 2mg at 2130 to help me sleep. \" RN called the patient's attention to the fact that she is yelling, patient said, \"yes, because you don't understand anything, ever since I came to this hospital, none of you nurses have actually done things the way it is supposed to be. \" RN explained that both doses cannot be given less than one hour apart, patient agreed to have the two mg dose at this time, and the 1mg dose later as needed.

## 2017-03-27 NOTE — PROGRESS NOTES
Bedside and Verbal shift change report given to Aleksandr Alvarado RN (oncoming nurse) by Annel Oreilly RN (offgoing nurse). Report included the following information SBAR, Kardex, Procedure Summary, Intake/Output and MAR.       0800- Patient in room tearful crying about any and everything ex: I cant open this. .. My phone call did not go through. .. Re wrote a note for a doctor 3 to 4 times because it was not neat enough. Bedside and Verbal shift change report given to Cornelious Felty, RN (oncoming nurse) by Aleksandr Alvarado RN (offgoing nurse). Report included the following information SBAR, Kardex, Procedure Summary, Intake/Output and MAR.

## 2017-03-27 NOTE — PROGRESS NOTES
Lai Rich [facility] [address]  [address], [place], [address] [facility]  [phone]      Medical Progress Note      NAME: Jillian Hale   :  1970  MRM:  [id number]    Date/Time: 3/27/2017  9:57 AM           Subjective:     Chief Complaint:  Pain: abdomen, RLQ, intermittent, severe, better with pain meds. Ate dinner last night without pain but did have some diarrhea afterwards    ROS:  (bold if positive, if negative)       Diarrhea                 Tolerating Diet          Objective:       Vitals:          Last 24hrs VS reviewed since prior progress note.  Most recent are:    Visit Vitals    /66 (BP 1 Location: Right arm, BP Patient Position: At rest)    Pulse 82    Temp 96.2 °F (35.7 °C)    Resp 16    Ht 5' 6\" (1.676 m)    Wt 97.5 kg (215 lb)    SpO2 98%    BMI 34.7 kg/m2     SpO2 Readings from Last 6 Encounters:   17 98%   16 98%   16 99%   16 98%   16 97%   16 100%            Intake/Output Summary (Last 24 hours) at 17 0957  Last data filed at 17 1457   Gross per 24 hour   Intake              240 ml   Output                0 ml   Net              240 ml          Exam:     Physical Exam:    Gen:  Well-developed, obese, in no acute distress  HEENT:  Pink conjunctivae, PERRL, hearing intact to voice, moist mucous membranes  Neck:  Supple, without masses, thyroid non-tender  Resp:  No accessory muscle use, clear breath sounds without wheezes rales or rhonchi  Card:  No murmurs, normal S1, S2 without thrills, bruits or peripheral edema  Abd:  Soft, tender in RLQ, non-distended, normoactive bowel sounds are present, no palpable organomegaly and no detectable hernias  Lymph:  No cervical or inguinal adenopathy  Musc:  No cyanosis or clubbing  Skin:  No rashes or ulcers, skin turgor is good  Neuro:  Cranial nerves are grossly intact, no focal motor weakness, follows commands appropriately  Psych:  Good insight, oriented to person, place and time, alert    Medications Reviewed: (see below)    Lab Data Reviewed: (see below)    ______________________________________________________________________    Medications:     Current Facility-Administered Medications   Medication Dose Route Frequency    HYDROmorphone (PF) (DILAUDID) injection 1 mg  1 mg IntraVENous NOW    HYDROmorphone (PF) (DILAUDID) injection 2 mg  2 mg IntraVENous Q4H PRN    oxyCODONE IR (ROXICODONE) tablet 20 mg  20 mg Oral Q4H PRN    ALPRAZolam (XANAX) tablet 1 mg  1 mg Oral DAILY    ALPRAZolam (XANAX) tablet 1 mg  1 mg Oral DAILY    lamoTRIgine (LaMICtal) tablet 100 mg  100 mg Oral DAILY    dicyclomine (BENTYL) tablet 20 mg  20 mg Oral TID    estradiol (ESTRACE) tablet 1 mg  1 mg Oral QHS    ALPRAZolam (XANAX) tablet 2 mg  2 mg Oral QHS    HYDROmorphone (PF) (DILAUDID) injection 2 mg  2 mg IntraVENous QHS    dextrose 5% - 0.45% NaCl with KCl 20 mEq/L infusion  75 mL/hr IntraVENous CONTINUOUS    levoFLOXacin (LEVAQUIN) 500 mg in D5W IVPB  500 mg IntraVENous Q24H    metroNIDAZOLE (FLAGYL) IVPB premix 500 mg  500 mg IntraVENous Q8H    sodium chloride (NS) flush 5-10 mL  5-10 mL IntraVENous Q8H    sodium chloride (NS) flush 5-10 mL  5-10 mL IntraVENous PRN    acetaminophen (TYLENOL) tablet 650 mg  650 mg Oral Q4H PRN    diphenhydrAMINE (BENADRYL) injection 12.5 mg  12.5 mg IntraVENous Q4H PRN    enoxaparin (LOVENOX) injection 40 mg  40 mg SubCUTAneous Q24H    promethazine (PHENERGAN) 12.5 mg in 0.9% sodium chloride 50 mL IVPB  12.5 mg IntraVENous Q4H PRN    famotidine (PF) (PEPCID) 20 mg in sodium chloride 0.9 % 10 mL injection  20 mg IntraVENous Q12H    therapeutic multivitamin (THERAGRAN) tablet 1 Tab  1 Tab Oral DAILY    potassium chloride (KLOR-CON) packet 20 mEq  20 mEq Oral DAILY    ALPRAZolam (XANAX) tablet 1 mg  1 mg Oral BID PRN    influenza vaccine 2016-17 (36mos+)(PF) (FLUZONE/FLUARIX/FLULAVAL QUAD) injection 0.5 mL  0.5 mL IntraMUSCular PRIOR TO DISCHARGE            Lab Review:     Recent Labs      03/25/17   0354   WBC  6.1   HGB  10.6*   HCT  34.7*   PLT  174     Recent Labs      03/25/17   0354   NA  140   K  3.8   CL  107   CO2  28   GLU  99   BUN  4*   CREA  0.97   CA  8.7   MG  1.8   PHOS  4.0     Lab Results   Component Value Date/Time    Glucose (POC) 94 05/17/2010 11:04 AM    Glucose (POC) 84 01/01/2010 06:35 PM    Glucose (POC) 120 12/30/2009 12:41 PM    Glucose (POC) 111 12/30/2009 05:20 AM    Glucose (POC) 133 12/30/2009 12:00 AM    Glucose (POC) 110 12/29/2009 06:33 PM    Glucose (POC) 128 12/29/2009 11:59 AM     No results for input(s): PH, PCO2, PO2, HCO3, FIO2 in the last 72 hours. No results for input(s): INR in the last 72 hours. No lab exists for component: Juanito Champagne  Lab Results   Component Value Date/Time    Specimen Description: URINE 11/29/2013 11:30 AM    Specimen Description: URINE 04/28/2010 01:10 PM    Specimen Description: ABDOMEN 01/01/2010 09:30 PM     Lab Results   Component Value Date/Time    Culture result:  03/23/2017 11:00 AM     NO ROUTINE ENTERIC PATHOGENS ISOLATED INCLUDING SALMONELLA, SHIGELLA, YERSINIA, VIBRIO OR SHIGA TOXIN PRODUCING E. COLI    Culture result: NO GROWTH 6 DAYS 07/31/2016 04:00 PM    Culture result:  07/23/2016 07:17 PM     NO ROUTINE ENTERIC PATHOGENS ISOLATED INCLUDING SALMONELLA, SHIGELLA, YERSINIA, VIBRIO OR SHIGA TOXIN PRODUCING E. COLI            Assessment:     Principal Problem:    Acute colitis (3/22/2017)    Active Problems:    Depression (7/1/2010)      Ulcerative colitis ([address] (7/25/2016)      PUD (peptic ulcer disease) ()      Overview: stomach and colon ulcers? Anxiety ()      Obesity ()      Overview: REYNA?       Nausea and vomiting (3/22/2017)           Plan:     Principal Problem:    Acute colitis (3/22/2017)/Ulcerative colitis ([address] (7/25/2016)   - stool culture negative, can stop antibiotics if Surgery agrees   - Surgery evaluating for possible hernia repair per patient, I can't find a recent note outlining this plan    - tolerating diet, bowel function seems intact    Active Problems:    Depression (7/1/2010)/Anxiety ()   - continue psych meds, I have scheduled them, to the best of my ability, to her specific requests      PUD (peptic ulcer disease) ()   - continue Pepcid      Obesity ()   - counseled on weight loss and exercise      Nausea and vomiting (3/22/2017)   - continue current meds for symptom control      Total time spent with patient: 35 minutes                  Care Plan discussed with: Patient, Nursing Staff and >50% of time spent in counseling and coordination of care, again had long and involved discussion with patient about her medications, especially her pain control, her current condition, plans for work up including MRI, prognosis, possible interventions, etc.      Discussed:  Code Status, Care Plan and D/C Planning    Prophylaxis:  Lovenox    Disposition:  Home w/Family           ___________________________________________________    Attending Physician: Errol Garcia MD

## 2017-03-27 NOTE — PROGRESS NOTES
NUTRITION   RD Screen      Diet: Regular  Body mass index is 34.7 kg/(m^2). Skin:intact  PO Intake: low  Patient Vitals for the past 100 hrs:   % Diet Eaten   03/26/17 1457 20 %   03/26/17 1034 0 %       Estimated Daily Nutrition Requirements:  Kcals: 8784           Protein:  78-98g/day              Fluid:   1850mL    Pt screened for nutrition risk d/t LOS. Pt is tolerating po diet. Pt has lower than normal intakes at this time but appetite should improve and pt will consume adequate po intake to meet estimated nutrition needs. Nutrition Diagnosis: n/a  Nutrition Intervention: n/a  Goal: n/a  Monitoring and Evaluation: n/a    No nutrition risk identified at this time.    RD will continue to monitor and will f/u for further nutrition assessment and intervention as appropriate    Education & Discharge Needs:   [] Nutrition related discharge needs addressed:     [] Supplements (on d/c instruction &/or coupons provided)  [] Education    [x]No nutrition related discharge needs at this time     Cultural, Roman Catholic and ethnic food preferences identified    [x]None   [] Yes     Rachelle Ag   [phone] Pager

## 2017-03-27 NOTE — INTERDISCIPLINARY ROUNDS
Interdisciplinary team rounds were held 3/27/2017 with the following team members:Care Management, Nursing, Nutrition, Physical Therapy, Physician, Clinical Coordinator and Unit Nurse Manager. Plan of care discussed. See clinical pathway and/or care plan for interventions and desired outcomes.     Anticipated discharge: pending

## 2017-03-27 NOTE — ROUTINE PROCESS
Chart entered for the following reason(s):  Reason chart accessed ü All that apply   Bed placement criteria, acuity or order review x   Patient/Family Complaint    Code Blue/Rapid Response    Follow up for patient/family event, concern or complaint x   Information for further documentation    Patient disposition information for bed planning/throughput/staffing    In error

## 2017-03-27 NOTE — PROGRESS NOTES
Spiritual Care Assessment/Progress Notes    Lory Ly [id number]  [id number]    1970  51 y.o.  female    Patient Telephone Number: [phone] (home)   Zoroastrian Affiliation: No preference   Language: English   Extended Emergency Contact Information  Primary Emergency Contact: [address]  Mobile Phone: [phone]  Relation: Mother  Secondary Emergency Contact: [address] [facility]   Mobile Phone: [phone]  Relation: Son   Patient Active Problem List    Diagnosis Date Noted    Acute colitis 03/22/2017    Nausea and vomiting 03/22/2017    Anemia 07/25/2016    Ulcerative [facility] ([facility] 07/25/2016    Hx of thromboembolism of vein     PUD (peptic ulcer disease)     Anxiety     Obesity     Fatty liver     Cholecystitis 07/16/2016    Depression 07/01/2010        Date: 3/27/2017       Level of Zoroastrian/Spiritual Activity:  []         Involved in nino tradition/spiritual practice    []         Not involved in nino tradition/spiritual practice  [x]         Spiritually oriented    []         Claims no spiritual orientation    []         seeking spiritual identity  []         Feels alienated from Baptism practice/tradition  []         Feels angry about Baptism practice/tradition  [x]         Spirituality/Baptism tradition is a resource for coping at this time.   []         Not able to assess due to medical condition    Services Provided Today:  []         crisis intervention    []         reading Scriptures  [x]         spiritual assessment    []         prayer  [x]         empathic listening/emotional support  []         rites and rituals (cite in comments)  [x]         life review     []         Baptism support  []         theological development   []         advocacy  []         ethical dialog     [x]         blessing  []         bereavement support    []         support to family  []         anticipatory grief support   []         help with AMD  []         spiritual guidance    []         meditation      Spiritual Care Needs  []         Emotional Support  [x]         Spiritual/Zoroastrianism Care  []         Loss/Adjustment  []         Advocacy/Referral                /Ethics  []         No needs expressed at               this time  []         Other: (note in               comments)  [address]  []         Follow up visits with               pt/family  []         Provide materials  []         Schedule sacraments  []         Contact Community               Clergy  [x]         Follow up as needed  []         Other: (note in               comments)     Comments:  is visiting per staff referral with pt in room 525.  provided a listening presence allowing pt to process her complex pyscho-social dynamics related to family, her spouse, friends, vocation, and her children that add significant stress. Pt is Advent. Pt does use nino in positive ways for coping, through prayer and attempts to trust God through her distress. Family and friends do not appear to currently add support, but rather seem to complicate her coping at the moment.  provided active listening, empathic support, and assurance of  availability and support. Spiritual care will continue to follow as able and as needed.      Francisco Dunlap M.Div, M.S, Kane Maher4 available at [facility]([phone])

## 2017-03-27 NOTE — PROGRESS NOTES
RN in Patient's room to administer scheduled meds namely Xanax, bentyl, estrace, pepcid, and lovenox.

## 2017-03-27 NOTE — PROGRESS NOTES
RN (Laurent Springer) informed MRI is down, service is here. ETA on system back up is unknown at this time.

## 2017-03-27 NOTE — PROGRESS NOTES
[address] [facility]. [facility], [facility] [address]  [phone]              GI PROGRESS NOTE    NAME: Arturo Winston   :  1970   MRN:  [id number]     CC:f/u abdominal pain    Subjective:   Trying to eat a pancake. Intermittent RLQ pain. No pain last night with dinner. Son at bedside. Objective:     VITALS:   Last 24hrs VS reviewed since prior progress note. Most recent are:  Visit Vitals    /71 (BP 1 Location: Right arm, BP Patient Position: At rest)    Pulse 79    Temp 97.6 °F (36.4 °C)    Resp 16    Ht 5' 6\" (1.676 m)    Wt 97.5 kg (215 lb)    SpO2 100%    BMI 34.7 kg/m2       Intake & Output  701 - 1900  In: 7397 [I.V.:2255]  Out: -    190 -  0700  In: 4312.5 [P.O.:240; I.V.:4072.5]  Out: -     ROS: Neg for fever, chest pain, SOB. PHYSICAL EXAM:  General: Cooperative, no acute distress    Neurologic:  Alert and oriented X 3. HEENT: PERRLA, EOMI. Lungs:  CTA Bilaterally. No Wheezing  Heart:  s1 s2, Regular  rhythm,  No murmur   Abdomen: Soft, Non distended, Non tender. Bowel sounds normal. No guarding or rebound. No hepatosplenomegaly. Extremities: No edema  Psych:   Good insight. Not anxious nor agitated. Lab Data Reviewed:   Recent Labs      17   0354   WBC  6.1   HGB  10.6*   MCV  94.8   PLT  174   NA  140   K  3.8   CREA  0.97   BUN  4*         ________________________________________________________________________       Assessment/Plan:   Abdominal pain- reviewed negative colon bx with patient. For MRE but problem with the machine so rescheduled for tomorrow. Tolerating diet today.    PUD- on Pepcid  Nausea/vomiting- none today      Signed By: Reji Pereira NP     3/27/2017  2:49 PM

## 2017-03-27 NOTE — ROUTINE PROCESS
Bedside and Verbal shift change report given to Chencho Tracy RN (oncoming nurse) by Orly Pate (offgoing nurse). Report included the following information SBAR, Kardex, Intake/Output, MAR, Accordion, Recent Results and Med Rec Status.

## 2017-03-28 ENCOUNTER — APPOINTMENT (OUTPATIENT)
Dept: MRI IMAGING | Age: 47
DRG: 761 | End: 2017-03-28
Attending: SURGERY
Payer: COMMERCIAL

## 2017-03-28 LAB
APPEARANCE UR: ABNORMAL
BACTERIA URNS QL MICRO: ABNORMAL /HPF
BILIRUB UR QL: NEGATIVE
COLOR UR: ABNORMAL
EPITH CASTS URNS QL MICRO: ABNORMAL /LPF
GLUCOSE UR STRIP.AUTO-MCNC: NEGATIVE MG/DL
HGB UR QL STRIP: NEGATIVE
KETONES UR QL STRIP.AUTO: 40 MG/DL
LEUKOCYTE ESTERASE UR QL STRIP.AUTO: NEGATIVE
NITRITE UR QL STRIP.AUTO: NEGATIVE
PH UR STRIP: 7.5 [PH] (ref 5–8)
PROT UR STRIP-MCNC: ABNORMAL MG/DL
RBC #/AREA URNS HPF: ABNORMAL /HPF (ref 0–5)
SP GR UR REFRACTOMETRY: 1.03 (ref 1–1.03)
UROBILINOGEN UR QL STRIP.AUTO: 0.2 EU/DL (ref 0.2–1)
WBC URNS QL MICRO: ABNORMAL /HPF (ref 0–4)

## 2017-03-28 PROCEDURE — A9585 GADOBUTROL INJECTION: HCPCS | Performed by: RADIOLOGY

## 2017-03-28 PROCEDURE — 74011250636 HC RX REV CODE- 250/636: Performed by: INTERNAL MEDICINE

## 2017-03-28 PROCEDURE — 74011000258 HC RX REV CODE- 258: Performed by: INTERNAL MEDICINE

## 2017-03-28 PROCEDURE — 74183 MRI ABD W/O CNTR FLWD CNTR: CPT

## 2017-03-28 PROCEDURE — 74011250637 HC RX REV CODE- 250/637: Performed by: INTERNAL MEDICINE

## 2017-03-28 PROCEDURE — 74011250636 HC RX REV CODE- 250/636: Performed by: RADIOLOGY

## 2017-03-28 PROCEDURE — 74011000250 HC RX REV CODE- 250: Performed by: INTERNAL MEDICINE

## 2017-03-28 PROCEDURE — 74011000250 HC RX REV CODE- 250

## 2017-03-28 PROCEDURE — 74011250636 HC RX REV CODE- 250/636

## 2017-03-28 PROCEDURE — 65270000029 HC RM PRIVATE

## 2017-03-28 PROCEDURE — L3710 EO ELAS W/METAL JNTS PRE OTS: HCPCS

## 2017-03-28 RX ORDER — WATER FOR INJECTION,STERILE
VIAL (ML) INJECTION
Status: COMPLETED
Start: 2017-03-28 | End: 2017-03-28

## 2017-03-28 RX ADMIN — DICYCLOMINE HYDROCHLORIDE 20 MG: 20 TABLET ORAL at 09:06

## 2017-03-28 RX ADMIN — METRONIDAZOLE 500 MG: 500 INJECTION, SOLUTION INTRAVENOUS at 15:20

## 2017-03-28 RX ADMIN — ALPRAZOLAM 1 MG: 0.5 TABLET ORAL at 09:06

## 2017-03-28 RX ADMIN — ALPRAZOLAM 1 MG: 0.5 TABLET ORAL at 13:19

## 2017-03-28 RX ADMIN — THERA TABS 1 TABLET: TAB at 09:05

## 2017-03-28 RX ADMIN — DICYCLOMINE HYDROCHLORIDE 20 MG: 20 TABLET ORAL at 04:55

## 2017-03-28 RX ADMIN — GADOBUTROL 9 ML: 604.72 INJECTION INTRAVENOUS at 16:31

## 2017-03-28 RX ADMIN — ALPRAZOLAM 2 MG: 0.5 TABLET ORAL at 22:05

## 2017-03-28 RX ADMIN — ALPRAZOLAM 1 MG: 0.5 TABLET ORAL at 04:55

## 2017-03-28 RX ADMIN — HYDROMORPHONE HYDROCHLORIDE 2 MG: 1 INJECTION, SOLUTION INTRAMUSCULAR; INTRAVENOUS; SUBCUTANEOUS at 22:06

## 2017-03-28 RX ADMIN — OXYCODONE HYDROCHLORIDE 20 MG: 5 TABLET ORAL at 12:14

## 2017-03-28 RX ADMIN — ENOXAPARIN SODIUM 40 MG: 40 INJECTION SUBCUTANEOUS at 22:02

## 2017-03-28 RX ADMIN — GLUCAGON HYDROCHLORIDE 1 MG: 1 INJECTION, POWDER, FOR SOLUTION INTRAMUSCULAR; INTRAVENOUS; SUBCUTANEOUS at 16:06

## 2017-03-28 RX ADMIN — LEVOFLOXACIN 500 MG: 5 INJECTION, SOLUTION INTRAVENOUS at 04:57

## 2017-03-28 RX ADMIN — WATER 1 ML: 1 INJECTION INTRAMUSCULAR; INTRAVENOUS; SUBCUTANEOUS at 16:07

## 2017-03-28 RX ADMIN — HYDROMORPHONE HYDROCHLORIDE 2 MG: 1 INJECTION, SOLUTION INTRAMUSCULAR; INTRAVENOUS; SUBCUTANEOUS at 04:36

## 2017-03-28 RX ADMIN — POTASSIUM CHLORIDE 20 MEQ: 1.5 POWDER, FOR SOLUTION ORAL at 09:05

## 2017-03-28 RX ADMIN — PROMETHAZINE HYDROCHLORIDE 12.5 MG: 25 INJECTION INTRAMUSCULAR; INTRAVENOUS at 04:55

## 2017-03-28 RX ADMIN — METRONIDAZOLE 500 MG: 500 INJECTION, SOLUTION INTRAVENOUS at 23:58

## 2017-03-28 RX ADMIN — ACETAMINOPHEN 650 MG: 325 TABLET ORAL at 04:36

## 2017-03-28 RX ADMIN — FAMOTIDINE 20 MG: 10 INJECTION, SOLUTION INTRAVENOUS at 09:05

## 2017-03-28 RX ADMIN — DEXTROSE MONOHYDRATE, SODIUM CHLORIDE, AND POTASSIUM CHLORIDE 75 ML/HR: 50; 4.5; 1.49 INJECTION, SOLUTION INTRAVENOUS at 11:24

## 2017-03-28 RX ADMIN — ESTRADIOL 1 MG: 1 TABLET ORAL at 22:05

## 2017-03-28 RX ADMIN — Medication 10 ML: at 22:04

## 2017-03-28 RX ADMIN — DICYCLOMINE HYDROCHLORIDE 20 MG: 20 TABLET ORAL at 22:05

## 2017-03-28 RX ADMIN — HYDROMORPHONE HYDROCHLORIDE 2 MG: 1 INJECTION, SOLUTION INTRAMUSCULAR; INTRAVENOUS; SUBCUTANEOUS at 09:17

## 2017-03-28 RX ADMIN — PROMETHAZINE HYDROCHLORIDE 12.5 MG: 25 INJECTION INTRAMUSCULAR; INTRAVENOUS at 13:19

## 2017-03-28 RX ADMIN — LAMOTRIGINE 100 MG: 100 TABLET ORAL at 04:55

## 2017-03-28 RX ADMIN — HYDROMORPHONE HYDROCHLORIDE 2 MG: 1 INJECTION, SOLUTION INTRAMUSCULAR; INTRAVENOUS; SUBCUTANEOUS at 19:07

## 2017-03-28 RX ADMIN — HYDROMORPHONE HYDROCHLORIDE 2 MG: 1 INJECTION, SOLUTION INTRAMUSCULAR; INTRAVENOUS; SUBCUTANEOUS at 13:35

## 2017-03-28 RX ADMIN — METRONIDAZOLE 500 MG: 500 INJECTION, SOLUTION INTRAVENOUS at 11:15

## 2017-03-28 RX ADMIN — FAMOTIDINE 20 MG: 10 INJECTION, SOLUTION INTRAVENOUS at 21:00

## 2017-03-28 NOTE — PROGRESS NOTES
Bedside and Verbal shift change report given to Deepali Venegas (oncoming nurse) by Rene Villalba (offgoing nurse). Report included the following information SBAR, Kardex, Intake/Output, MAR and Recent Results.

## 2017-03-28 NOTE — INTERDISCIPLINARY ROUNDS
Interdisciplinary team rounds were held 3/28/2017 with the following team members:Care Management, Nursing, Nutrition, Physical Therapy    Plan of care discussed. See clinical pathway and/or care plan for interventions and desired outcomes.     Anticipated discharge tomorrow

## 2017-03-28 NOTE — PROGRESS NOTES
[address]  [address] [facility], [address] [place] [address]  [phone]      Medical Progress Note      NAME: Casie Whitfield   :  1970  MRM:  [id number]    Date/Time: 3/28/2017  10:42 AM           Subjective:     Chief Complaint:  Pain: abdomen, RLQ, intermittent, severe, better with pain meds. Still eating and having diarrhea, still with pain, states she is no better than at admission    ROS:  (bold if positive, if negative)       Diarrhea                 Tolerating Diet          Objective:       Vitals:          Last 24hrs VS reviewed since prior progress note.  Most recent are:    Visit Vitals    /65 (BP 1 Location: Left arm, BP Patient Position: At rest)    Pulse 97    Temp 98.9 °F (37.2 °C)    Resp 18    Ht 5' 6\" (1.676 m)    Wt 97.5 kg (215 lb)    SpO2 99%    BMI 34.7 kg/m2     SpO2 Readings from Last 6 Encounters:   17 99%   16 98%   16 99%   16 98%   16 97%   16 100%            Intake/Output Summary (Last 24 hours) at 17 1042  Last data filed at 17 1344   Gross per 24 hour   Intake              240 ml   Output                0 ml   Net              240 ml          Exam:     Physical Exam:    Gen:  Well-developed, obese, in no acute distress  HEENT:  Pink conjunctivae, PERRL, hearing intact to voice, moist mucous membranes  Neck:  Supple, without masses, thyroid non-tender  Resp:  No accessory muscle use, clear breath sounds without wheezes rales or rhonchi  Card:  No murmurs, normal S1, S2 without thrills, bruits or peripheral edema  Abd:  Soft, tender in RLQ, non-distended, normoactive bowel sounds are present, no palpable organomegaly and no detectable hernias  Lymph:  No cervical or inguinal adenopathy  Musc:  No cyanosis or clubbing  Skin:  No rashes or ulcers, skin turgor is good  Neuro:  Cranial nerves are grossly intact, no focal motor weakness, follows commands appropriately  Psych:  Good insight, oriented to person, place and time, alert    Medications Reviewed: (see below)    Lab Data Reviewed: (see below)    ______________________________________________________________________    Medications:     Current Facility-Administered Medications   Medication Dose Route Frequency    HYDROmorphone (PF) (DILAUDID) injection 2 mg  2 mg IntraVENous Q4H PRN    oxyCODONE IR (ROXICODONE) tablet 20 mg  20 mg Oral Q4H PRN    ALPRAZolam (XANAX) tablet 1 mg  1 mg Oral DAILY    ALPRAZolam (XANAX) tablet 1 mg  1 mg Oral DAILY    lamoTRIgine (LaMICtal) tablet 100 mg  100 mg Oral DAILY    dicyclomine (BENTYL) tablet 20 mg  20 mg Oral TID    estradiol (ESTRACE) tablet 1 mg  1 mg Oral QHS    ALPRAZolam (XANAX) tablet 2 mg  2 mg Oral QHS    HYDROmorphone (PF) (DILAUDID) injection 2 mg  2 mg IntraVENous QHS    dextrose 5% - 0.45% NaCl with KCl 20 mEq/L infusion  75 mL/hr IntraVENous CONTINUOUS    levoFLOXacin (LEVAQUIN) 500 mg in D5W IVPB  500 mg IntraVENous Q24H    metroNIDAZOLE (FLAGYL) IVPB premix 500 mg  500 mg IntraVENous Q8H    sodium chloride (NS) flush 5-10 mL  5-10 mL IntraVENous Q8H    sodium chloride (NS) flush 5-10 mL  5-10 mL IntraVENous PRN    acetaminophen (TYLENOL) tablet 650 mg  650 mg Oral Q4H PRN    diphenhydrAMINE (BENADRYL) injection 12.5 mg  12.5 mg IntraVENous Q4H PRN    enoxaparin (LOVENOX) injection 40 mg  40 mg SubCUTAneous Q24H    promethazine (PHENERGAN) 12.5 mg in 0.9% sodium chloride 50 mL IVPB  12.5 mg IntraVENous Q4H PRN    famotidine (PF) (PEPCID) 20 mg in sodium chloride 0.9 % 10 mL injection  20 mg IntraVENous Q12H    therapeutic multivitamin (THERAGRAN) tablet 1 Tab  1 Tab Oral DAILY    potassium chloride (KLOR-CON) packet 20 mEq  20 mEq Oral DAILY    ALPRAZolam (XANAX) tablet 1 mg  1 mg Oral BID PRN    influenza vaccine 2016-17 (36mos+)(PF) (FLUZONE/FLUARIX/FLULAVAL QUAD) injection 0.5 mL  0.5 mL IntraMUSCular PRIOR TO DISCHARGE            Lab Review:     No results for input(s): WBC, HGB, HCT, PLT, HGBEXT, HCTEXT, PLTEXT, HGBEXT, HCTEXT, PLTEXT in the last 72 hours. No results for input(s): NA, K, CL, CO2, GLU, BUN, CREA, CA, MG, PHOS, ALB, TBIL, TBILI, SGOT, ALT, INR in the last 72 hours. No lab exists for component: INREXT, INREXT  Lab Results   Component Value Date/Time    Glucose (POC) 94 05/17/2010 11:04 AM    Glucose (POC) 84 01/01/2010 06:35 PM    Glucose (POC) 120 12/30/2009 12:41 PM    Glucose (POC) 111 12/30/2009 05:20 AM    Glucose (POC) 133 12/30/2009 12:00 AM    Glucose (POC) 110 12/29/2009 06:33 PM    Glucose (POC) 128 12/29/2009 11:59 AM     No results for input(s): PH, PCO2, PO2, HCO3, FIO2 in the last 72 hours. No results for input(s): INR in the last 72 hours. No lab exists for component: Kit Just  Lab Results   Component Value Date/Time    Specimen Description: URINE 11/29/2013 11:30 AM    Specimen Description: URINE 04/28/2010 01:10 PM    Specimen Description: ABDOMEN 01/01/2010 09:30 PM     Lab Results   Component Value Date/Time    Culture result:  03/23/2017 11:00 AM     NO ROUTINE ENTERIC PATHOGENS ISOLATED INCLUDING SALMONELLA, SHIGELLA, YERSINIA, VIBRIO OR SHIGA TOXIN PRODUCING E. COLI    Culture result: NO GROWTH 6 DAYS 07/31/2016 04:00 PM    Culture result:  07/23/2016 07:17 PM     NO ROUTINE ENTERIC PATHOGENS ISOLATED INCLUDING SALMONELLA, SHIGELLA, YERSINIA, VIBRIO OR SHIGA TOXIN PRODUCING E. COLI            Assessment:     Principal Problem:    Acute colitis (3/22/2017)    Active Problems:    Depression (7/1/2010)      Ulcerative colitis ([facility] (7/25/2016)      PUD (peptic ulcer disease) ()      Overview: stomach and colon ulcers? Anxiety ()      Obesity ()      Overview: REYNA?       Nausea and vomiting (3/22/2017)           Plan:     Principal Problem:    Acute colitis (3/22/2017)/[facility] ([facility] (7/25/2016)   - stool culture negative, can stop antibiotics if GI agrees   - GI feels pain is due to adhesions, Surgery is not convinced that intervention will help and I certainly see their concern   - for MRE today    Active Problems:    Depression (7/1/2010)/Anxiety ()   - continue psych meds, I have scheduled them, to the best of my ability, to her specific requests      PUD (peptic ulcer disease) ()   - continue Pepcid      Obesity ()   - counseled on weight loss and exercise      Nausea and vomiting (3/22/2017)   - continue current meds for symptom control      Total time spent with patient: 25 minutes                  Care Plan discussed with: Patient, Care Manager and Nursing Staff      Discussed:  Code Status, Care Plan and D/C Planning    Prophylaxis:  Lovenox    Disposition:  Home w/Family           ___________________________________________________    Attending Physician: Dominik Stein MD

## 2017-03-28 NOTE — PROGRESS NOTES
3/28/2017 1:36 PM SW met with this pt while nurse Uri Mcgee was in the room. Discussed pt's discharge plan and transportation. The conversation was lengthy as the pt is very verbose. Pt has confirmed her address and states that she is in [address]. Pt independent in the community. Pt is followed by Dr. Iglesia Posadas for primary care. Pt gets her scripts at [facility]. Plan is for this pt to return to her home at discharge. This pt states that her friend Ron Arellano or another friend will transport her home. SW gave her an application for [employer] financial assistance. No new needs.    Caprice Citidayana, BSW

## 2017-03-28 NOTE — PROGRESS NOTES
General Surgery Daily Progress Note    Patient: Jerie Lefort MRN: [id number]  SSN: [id number]    YOB: 1970  Age: 51 y.o. Sex: female      Admit Date: 3/22/2017    Subjective:   Reports pain is persistent and unchanged in the RLQ. Last BM was last night and loose. No vomiting. She spends a substantial amount of time during my visit talking in detail about various situational stresses at home.      Current Facility-Administered Medications   Medication Dose Route Frequency    HYDROmorphone (PF) (DILAUDID) injection 2 mg  2 mg IntraVENous Q4H PRN    oxyCODONE IR (ROXICODONE) tablet 20 mg  20 mg Oral Q4H PRN    ALPRAZolam (XANAX) tablet 1 mg  1 mg Oral DAILY    ALPRAZolam (XANAX) tablet 1 mg  1 mg Oral DAILY    lamoTRIgine (LaMICtal) tablet 100 mg  100 mg Oral DAILY    dicyclomine (BENTYL) tablet 20 mg  20 mg Oral TID    estradiol (ESTRACE) tablet 1 mg  1 mg Oral QHS    ALPRAZolam (XANAX) tablet 2 mg  2 mg Oral QHS    HYDROmorphone (PF) (DILAUDID) injection 2 mg  2 mg IntraVENous QHS    dextrose 5% - 0.45% NaCl with KCl 20 mEq/L infusion  75 mL/hr IntraVENous CONTINUOUS    levoFLOXacin (LEVAQUIN) 500 mg in D5W IVPB  500 mg IntraVENous Q24H    metroNIDAZOLE (FLAGYL) IVPB premix 500 mg  500 mg IntraVENous Q8H    sodium chloride (NS) flush 5-10 mL  5-10 mL IntraVENous Q8H    sodium chloride (NS) flush 5-10 mL  5-10 mL IntraVENous PRN    acetaminophen (TYLENOL) tablet 650 mg  650 mg Oral Q4H PRN    diphenhydrAMINE (BENADRYL) injection 12.5 mg  12.5 mg IntraVENous Q4H PRN    enoxaparin (LOVENOX) injection 40 mg  40 mg SubCUTAneous Q24H    promethazine (PHENERGAN) 12.5 mg in 0.9% sodium chloride 50 mL IVPB  12.5 mg IntraVENous Q4H PRN    famotidine (PF) (PEPCID) 20 mg in sodium chloride 0.9 % 10 mL injection  20 mg IntraVENous Q12H    therapeutic multivitamin (THERAGRAN) tablet 1 Tab  1 Tab Oral DAILY    potassium chloride (KLOR-CON) packet 20 mEq  20 mEq Oral DAILY    ALPRAZolam Maria G Begum) tablet 1 mg  1 mg Oral BID PRN    influenza vaccine 2016-17 (36mos+)(PF) (FLUZONE/FLUARIX/FLULAVAL QUAD) injection 0.5 mL  0.5 mL IntraMUSCular PRIOR TO DISCHARGE        Objective:      03/26 1901 - 03/28 0700  In: 2495 [P.O.:240; I.V.:2255]  Out: -   Patient Vitals for the past 8 hrs:   BP Temp Pulse Resp SpO2   03/28/17 1033 114/65 98.9 °F (37.2 °C) 97 18 99 %   03/28/17 0536 120/56 98.2 °F (36.8 °C) 82 16 96 %       Physical Exam:  General: Alert, cooperative, NAD, patient observed getting into and moving around in bed without distress. Lungs: Unlabored  Heart:  Regular rate and rhythm  Abdomen: Soft, RLQ tender to light palpation but no guarding, mass, rebound, peritonitis. Extremities: Warm, moves all, no edema  Skin:  Warm and dry, no rash    Labs: No results for input(s): WBC, HGB, HCT, PLT, HGBEXT, HCTEXT, PLTEXT in the last 72 hours. No results for input(s): NA, K, CL, CO2, GLU, BUN, CREA, CA, MG, PHOS, ALB, TBIL, TBILI, SGOT, ALT in the last 72 hours. Assessment / Plan:   · Abdominal pain  · Abdominal pain continues. No evidence of obstruction, perforation, infection. Awaiting MR enterography. No plan for surgery at this point.

## 2017-03-28 NOTE — PROGRESS NOTES
Bedside and Verbal shift change report given to Jass Paiz RN (oncoming nurse) by Brennen Minaya RN (offgoing nurse). Report included the following information SBAR, Kardex, Procedure Summary, Intake/Output and MAR. Security called to lock up purse because patient stated that she had cashed a check before coming to the hospital. Patient will be out of the room for a period of time for MRI. Patient drank 3 bottles of contrast for MRI. When talking to patient there is no conversation if you disagree or clarify information with her she gets upset tearful and goes of about why the answer is right. No one question has been simply or completely answered when talking to patient. Son and two friends can to see patient this evening, stating that patient is out there a little but behavior is very bizarre at this time. Wanting Dr. Justice Nicholas to be aware of this conversation. Per Dr. Justice Nicholas, he has not change medications and has not added anything and that she refused psychiatric consult. When asked why she refused to talk to the psychiatric, she stated she did not refused. Also with medications two staff members go into the room at a time to verify the conversation because she denies that medications where given or offered and misinterpret conversations. Have concerns with patient going back home with son patient changed the locks to the home so the son can not go and get cloths for school. One of the patient's friend stated that through a text message that she text you wait until I get home. From behaviors and how the patient interacts with the son not sure if home environment is safe at this time. Notified case management of concerns. Bedside and Verbal shift change report given to Brennen Minaya RN (oncoming nurse) by Jass Paiz RN (offgoing nurse). Report included the following information SBAR, Kardex, Procedure Summary, Intake/Output and MAR.

## 2017-03-29 VITALS
SYSTOLIC BLOOD PRESSURE: 100 MMHG | WEIGHT: 215 LBS | RESPIRATION RATE: 18 BRPM | HEART RATE: 100 BPM | OXYGEN SATURATION: 97 % | HEIGHT: 66 IN | BODY MASS INDEX: 34.55 KG/M2 | TEMPERATURE: 97.7 F | DIASTOLIC BLOOD PRESSURE: 51 MMHG

## 2017-03-29 LAB
ALBUMIN SERPL BCP-MCNC: 3.1 G/DL (ref 3.5–5)
ALBUMIN/GLOB SERPL: 0.8 {RATIO} (ref 1.1–2.2)
ALP SERPL-CCNC: 77 U/L (ref 45–117)
ALT SERPL-CCNC: 49 U/L (ref 12–78)
ANION GAP BLD CALC-SCNC: 6 MMOL/L (ref 5–15)
AST SERPL W P-5'-P-CCNC: 67 U/L (ref 15–37)
BILIRUB SERPL-MCNC: 0.3 MG/DL (ref 0.2–1)
BUN SERPL-MCNC: 2 MG/DL (ref 6–20)
BUN/CREAT SERPL: 2 (ref 12–20)
CALCIUM SERPL-MCNC: 8.6 MG/DL (ref 8.5–10.1)
CHLORIDE SERPL-SCNC: 104 MMOL/L (ref 97–108)
CO2 SERPL-SCNC: 30 MMOL/L (ref 21–32)
CREAT SERPL-MCNC: 0.99 MG/DL (ref 0.55–1.02)
ERYTHROCYTE [DISTWIDTH] IN BLOOD BY AUTOMATED COUNT: 12.6 % (ref 11.5–14.5)
GLOBULIN SER CALC-MCNC: 3.7 G/DL (ref 2–4)
GLUCOSE SERPL-MCNC: 94 MG/DL (ref 65–100)
HCT VFR BLD AUTO: 36.9 % (ref 35–47)
HGB BLD-MCNC: 11.6 G/DL (ref 11.5–16)
MAGNESIUM SERPL-MCNC: 1.7 MG/DL (ref 1.6–2.4)
MCH RBC QN AUTO: 29.4 PG (ref 26–34)
MCHC RBC AUTO-ENTMCNC: 31.4 G/DL (ref 30–36.5)
MCV RBC AUTO: 93.4 FL (ref 80–99)
PHOSPHATE SERPL-MCNC: 3.9 MG/DL (ref 2.6–4.7)
PLATELET # BLD AUTO: 176 K/UL (ref 150–400)
POTASSIUM SERPL-SCNC: 3.3 MMOL/L (ref 3.5–5.1)
PROT SERPL-MCNC: 6.8 G/DL (ref 6.4–8.2)
RBC # BLD AUTO: 3.95 M/UL (ref 3.8–5.2)
SODIUM SERPL-SCNC: 140 MMOL/L (ref 136–145)
WBC # BLD AUTO: 4.8 K/UL (ref 3.6–11)

## 2017-03-29 PROCEDURE — 74011250637 HC RX REV CODE- 250/637: Performed by: INTERNAL MEDICINE

## 2017-03-29 PROCEDURE — 74011000250 HC RX REV CODE- 250: Performed by: INTERNAL MEDICINE

## 2017-03-29 PROCEDURE — 83735 ASSAY OF MAGNESIUM: CPT | Performed by: INTERNAL MEDICINE

## 2017-03-29 PROCEDURE — 85027 COMPLETE CBC AUTOMATED: CPT | Performed by: INTERNAL MEDICINE

## 2017-03-29 PROCEDURE — 74011250636 HC RX REV CODE- 250/636: Performed by: INTERNAL MEDICINE

## 2017-03-29 PROCEDURE — 84100 ASSAY OF PHOSPHORUS: CPT | Performed by: INTERNAL MEDICINE

## 2017-03-29 PROCEDURE — 80053 COMPREHEN METABOLIC PANEL: CPT | Performed by: INTERNAL MEDICINE

## 2017-03-29 PROCEDURE — 36415 COLL VENOUS BLD VENIPUNCTURE: CPT | Performed by: INTERNAL MEDICINE

## 2017-03-29 RX ORDER — PROMETHAZINE HYDROCHLORIDE 12.5 MG/1
12.5 TABLET ORAL
Qty: 60 TAB | Refills: 0 | Status: SHIPPED | OUTPATIENT
Start: 2017-03-29 | End: 2018-02-08

## 2017-03-29 RX ORDER — HYDROMORPHONE HYDROCHLORIDE 2 MG/1
2-4 TABLET ORAL
Qty: 60 TAB | Refills: 0 | Status: SHIPPED | OUTPATIENT
Start: 2017-03-29 | End: 2018-01-14

## 2017-03-29 RX ORDER — POTASSIUM CHLORIDE 750 MG/1
40 TABLET, FILM COATED, EXTENDED RELEASE ORAL EVERY 4 HOURS
Status: COMPLETED | OUTPATIENT
Start: 2017-03-29 | End: 2017-03-29

## 2017-03-29 RX ADMIN — THERA TABS 1 TABLET: TAB at 08:54

## 2017-03-29 RX ADMIN — METRONIDAZOLE 500 MG: 500 INJECTION, SOLUTION INTRAVENOUS at 08:53

## 2017-03-29 RX ADMIN — LEVOFLOXACIN 500 MG: 5 INJECTION, SOLUTION INTRAVENOUS at 06:13

## 2017-03-29 RX ADMIN — POTASSIUM CHLORIDE 40 MEQ: 750 TABLET, FILM COATED, EXTENDED RELEASE ORAL at 13:37

## 2017-03-29 RX ADMIN — Medication 10 ML: at 13:38

## 2017-03-29 RX ADMIN — DICYCLOMINE HYDROCHLORIDE 20 MG: 20 TABLET ORAL at 06:28

## 2017-03-29 RX ADMIN — ALPRAZOLAM 1 MG: 0.5 TABLET ORAL at 11:23

## 2017-03-29 RX ADMIN — FAMOTIDINE 20 MG: 10 INJECTION, SOLUTION INTRAVENOUS at 08:54

## 2017-03-29 RX ADMIN — Medication 10 ML: at 06:13

## 2017-03-29 RX ADMIN — HYDROMORPHONE HYDROCHLORIDE 2 MG: 1 INJECTION, SOLUTION INTRAMUSCULAR; INTRAVENOUS; SUBCUTANEOUS at 11:23

## 2017-03-29 RX ADMIN — POTASSIUM CHLORIDE 40 MEQ: 750 TABLET, FILM COATED, EXTENDED RELEASE ORAL at 11:23

## 2017-03-29 RX ADMIN — HYDROMORPHONE HYDROCHLORIDE 2 MG: 1 INJECTION, SOLUTION INTRAMUSCULAR; INTRAVENOUS; SUBCUTANEOUS at 07:37

## 2017-03-29 RX ADMIN — POTASSIUM CHLORIDE 20 MEQ: 1.5 POWDER, FOR SOLUTION ORAL at 08:54

## 2017-03-29 RX ADMIN — DEXTROSE MONOHYDRATE, SODIUM CHLORIDE, AND POTASSIUM CHLORIDE 75 ML/HR: 50; 4.5; 1.49 INJECTION, SOLUTION INTRAVENOUS at 06:19

## 2017-03-29 RX ADMIN — LAMOTRIGINE 100 MG: 100 TABLET ORAL at 06:28

## 2017-03-29 RX ADMIN — ALPRAZOLAM 1 MG: 0.5 TABLET ORAL at 06:29

## 2017-03-29 RX ADMIN — DICYCLOMINE HYDROCHLORIDE 20 MG: 20 TABLET ORAL at 11:36

## 2017-03-29 NOTE — INTERDISCIPLINARY ROUNDS
Interdisciplinary team rounds were held 3/27/2017 with the following team members:Care Management, Nursing, Nutrition, Physical Therapy, Clinical Coordinator and Unit Nurse Manager. Plan of care discussed. See clinical pathway and/or care plan for interventions and desired outcomes.     Anticipated discharge:

## 2017-03-29 NOTE — PROGRESS NOTES
Lai Villanueva kris [place] [address]  [address] [facility], [place], [id number] [place] [address]  [phone]      Medical Progress Note      NAME: Vera Mireles   :  1970  MRM:  [id number]    Date/Time: 3/29/2017  10:08 AM           Subjective:     Chief Complaint:  Pain: abdomen, RLQ, intermittent, severe at times, better with pain meds. Still eating and having diarrhea, still with pain, states she is no better than at admission    ROS:  (bold if positive, if negative)       Diarrhea                 Tolerating Diet          Objective:       Vitals:          Last 24hrs VS reviewed since prior progress note.  Most recent are:    Visit Vitals    /59 (BP 1 Location: Left arm, BP Patient Position: At rest;Sitting)    Pulse 87    Temp 97 °F (36.1 °C)    Resp 18    Ht 5' 6\" (1.676 m)    Wt 97.5 kg (215 lb)    SpO2 97%    BMI 34.7 kg/m2     SpO2 Readings from Last 6 Encounters:   17 97%   16 98%   16 99%   16 98%   16 97%   16 100%            Intake/Output Summary (Last 24 hours) at 17 1008  Last data filed at 17 1936   Gross per 24 hour   Intake              300 ml   Output                0 ml   Net              300 ml          Exam:     Physical Exam:    Gen:  Well-developed, obese, in no acute distress  HEENT:  Pink conjunctivae, PERRL, hearing intact to voice, moist mucous membranes  Neck:  Supple, without masses, thyroid non-tender  Resp:  No accessory muscle use, clear breath sounds without wheezes rales or rhonchi  Card:  No murmurs, normal S1, S2 without thrills, bruits or peripheral edema  Abd:  Soft, tender in RLQ, non-distended, normoactive bowel sounds are present, no palpable organomegaly and no detectable hernias  Lymph:  No cervical or inguinal adenopathy  Musc:  No cyanosis or clubbing  Skin:  No rashes or ulcers, skin turgor is good  Neuro:  Cranial nerves are grossly intact, no focal motor weakness, follows commands appropriately  Psych: Good insight, oriented to person, place and time, alert    Medications Reviewed: (see below)    Lab Data Reviewed: (see below)    ______________________________________________________________________    Medications:     Current Facility-Administered Medications   Medication Dose Route Frequency    potassium chloride SR (KLOR-CON 10) tablet 40 mEq  40 mEq Oral Q4H    HYDROmorphone (PF) (DILAUDID) injection 2 mg  2 mg IntraVENous Q4H PRN    oxyCODONE IR (ROXICODONE) tablet 20 mg  20 mg Oral Q4H PRN    ALPRAZolam (XANAX) tablet 1 mg  1 mg Oral DAILY    ALPRAZolam (XANAX) tablet 1 mg  1 mg Oral DAILY    lamoTRIgine (LaMICtal) tablet 100 mg  100 mg Oral DAILY    dicyclomine (BENTYL) tablet 20 mg  20 mg Oral TID    estradiol (ESTRACE) tablet 1 mg  1 mg Oral QHS    ALPRAZolam (XANAX) tablet 2 mg  2 mg Oral QHS    HYDROmorphone (PF) (DILAUDID) injection 2 mg  2 mg IntraVENous QHS    dextrose 5% - 0.45% NaCl with KCl 20 mEq/L infusion  75 mL/hr IntraVENous CONTINUOUS    levoFLOXacin (LEVAQUIN) 500 mg in D5W IVPB  500 mg IntraVENous Q24H    metroNIDAZOLE (FLAGYL) IVPB premix 500 mg  500 mg IntraVENous Q8H    sodium chloride (NS) flush 5-10 mL  5-10 mL IntraVENous Q8H    sodium chloride (NS) flush 5-10 mL  5-10 mL IntraVENous PRN    acetaminophen (TYLENOL) tablet 650 mg  650 mg Oral Q4H PRN    diphenhydrAMINE (BENADRYL) injection 12.5 mg  12.5 mg IntraVENous Q4H PRN    enoxaparin (LOVENOX) injection 40 mg  40 mg SubCUTAneous Q24H    promethazine (PHENERGAN) 12.5 mg in 0.9% sodium chloride 50 mL IVPB  12.5 mg IntraVENous Q4H PRN    famotidine (PF) (PEPCID) 20 mg in sodium chloride 0.9 % 10 mL injection  20 mg IntraVENous Q12H    therapeutic multivitamin (THERAGRAN) tablet 1 Tab  1 Tab Oral DAILY    potassium chloride (KLOR-CON) packet 20 mEq  20 mEq Oral DAILY    ALPRAZolam (XANAX) tablet 1 mg  1 mg Oral BID PRN    influenza vaccine 2016-17 (36mos+)(PF) (FLUZONE/FLUARIX/FLULAVAL QUAD) injection 0.5 mL 0.5 mL IntraMUSCular PRIOR TO DISCHARGE            Lab Review:     Recent Labs      03/29/17   0724   WBC  4.8   HGB  11.6   HCT  36.9   PLT  176     Recent Labs      03/29/17   0724   NA  140   K  3.3*   CL  104   CO2  30   GLU  94   BUN  2*   CREA  0.99   CA  8.6   MG  1.7   PHOS  3.9   ALB  3.1*   TBILI  0.3   SGOT  67*   ALT  49     Lab Results   Component Value Date/Time    Glucose (POC) 94 05/17/2010 11:04 AM    Glucose (POC) 84 01/01/2010 06:35 PM    Glucose (POC) 120 12/30/2009 12:41 PM    Glucose (POC) 111 12/30/2009 05:20 AM    Glucose (POC) 133 12/30/2009 12:00 AM    Glucose (POC) 110 12/29/2009 06:33 PM    Glucose (POC) 128 12/29/2009 11:59 AM     No results for input(s): PH, PCO2, PO2, HCO3, FIO2 in the last 72 hours. No results for input(s): INR in the last 72 hours. No lab exists for component: Jack Gomez  Lab Results   Component Value Date/Time    Specimen Description: URINE 11/29/2013 11:30 AM    Specimen Description: URINE 04/28/2010 01:10 PM    Specimen Description: ABDOMEN 01/01/2010 09:30 PM     Lab Results   Component Value Date/Time    Culture result:  03/23/2017 11:00 AM     NO ROUTINE ENTERIC PATHOGENS ISOLATED INCLUDING SALMONELLA, SHIGELLA, YERSINIA, VIBRIO OR SHIGA TOXIN PRODUCING E. COLI    Culture result: NO GROWTH 6 DAYS 07/31/2016 04:00 PM    Culture result:  07/23/2016 07:17 PM     NO ROUTINE ENTERIC PATHOGENS ISOLATED INCLUDING SALMONELLA, SHIGELLA, YERSINIA, VIBRIO OR SHIGA TOXIN PRODUCING E. COLI            Assessment:     Principal Problem:    Acute colitis (3/22/2017)    Active Problems:    Depression (7/1/2010)      Ulcerative colitis ([address] (7/25/2016)      PUD (peptic ulcer disease) ()      Overview: stomach and colon ulcers? Anxiety ()      Obesity ()      Overview: REYNA?       Nausea and vomiting (3/22/2017)           Plan:     Principal Problem:    Acute colitis (3/22/2017)/Ulcerative colitis ([address] (7/25/2016)   - stool culture negative, can stop antibiotics if GI agrees   - GI feels pain is due to adhesions, Surgery is not convinced that intervention will help and I certainly see their concern   - had MRE yesterday, waiting on interpretation    Active Problems:    Depression (7/1/2010)/Anxiety ()   - continue psych meds, I have scheduled them, to the best of my ability, to her specific requests   - her family/friends were concerned last night about her behavior   - she does seem somewhat manic to me, however, she refuses to speak with Psychiatry      PUD (peptic ulcer disease) ()   - continue Pepcid      Obesity ()   - counseled on weight loss and exercise      Nausea and vomiting (3/22/2017)   - continue current meds for symptom control      Total time spent with patient: 25 minutes                  Care Plan discussed with: Patient, Care Manager and Nursing Staff      Discussed:  Code Status, Care Plan and D/C Planning    Prophylaxis:  Lovenox    Disposition:  Home w/Family           ___________________________________________________    Attending Physician: Koffi Champagne MD

## 2017-03-29 NOTE — PROGRESS NOTES
Bedside and Verbal shift change report given to Anna Deinse (oncoming nurse) by Smiley Anthony (offgoing nurse). Report included the following information SBAR, Kardex, Intake/Output, MAR and Recent Results.

## 2017-03-29 NOTE — DISCHARGE INSTRUCTIONS
HOSPITALIST DISCHARGE INSTRUCTIONS  NAME: Nina Calvillo   :  1970   MRN:  [id number]     Date/Time:  3/29/2017 1:04 PM    ADMIT DATE: 3/22/2017     DISCHARGE DATE: 3/29/2017     DISCHARGE DIAGNOSIS:  Abdominal pain    MEDICATIONS:  · It is important that you take the medication exactly as they are prescribed. · Keep your medication in the bottles provided by the pharmacist and keep a list of the medication names, dosages, and times to be taken in your wallet. · Do not take other medications without consulting your doctor. Pain Management: per above medications    What to do at Home    Recommended diet:  GI lite diet    Recommended activity: Activity as tolerated    If you experience any of the following symptoms then please call your primary care physician or return to the emergency room if you cannot get hold of your doctor:  Fever, chills, nausea, vomiting, diarrhea, change in mentation, falling, bleeding, shortness of breath    Follow Up: Follow-up Information     Follow up With Details Comments [address] [facility] MD Khanh In 1 week  [address] [place] [address]  [phone]              Information obtained by :  I understand that if any problems occur once I am at home I am to contact my physician. I understand and acknowledge receipt of the instructions indicated above.                                                                                                                                            Physician's or R.N.'s Signature                                                                  Date/Time                                                                                                                                              Patient or Representative Signature                                                          Date/Time

## 2017-03-29 NOTE — PROGRESS NOTES
Dear Dr. Warden Ng : The insurance company has denied the inpatient status of one of your patients. Now we need you to [facility] and justify your clinical decision. Only you are allowed to do this peer to peer review. You will need to do the following:   Within next FIVE days   Please call [address] [place] [address] at  [phone], then follow the prompt for medical management and then choose peer to peer review. Talk to their staff and setup a peer to peer review with their doctor at a mutually convenient time   Prepare your argument to defend your decision on the basis of criteria I have provided for you - see below - next to [facility] (criteria)   Complete review with him/her   Send an email reply to me with   Name of doctor you did the peer to peer   Outcome   Date you did the review   If you need help, you are more than welcome to talk to me anytime, call me on my cell number listed below     Pt Name:  Cookie Simpson   MR#  [id number]   CSN#   [id number]   Admit date  3/22/2017  2:07 PM   Discharge date     Discharging doctor      Admitting doctor    Principal diagnosis  Acute colitis        Insurance  Payor: Nani Diaz / Plan: Deepak Downing / Product Type: HMO /      [place] Single    51 y.o. pt hospitalized to [facility] with complaints of pain in abdomen, CT abdomen showing signs of colitis/diarrhea. She was seen by surgery team and no surgery recommended. MRI 03/28/17 enteropathy result is peding. Her hemodynamics are stable and labs are essentially normal.   But she continues to have diarrhea and abdominal pain is not better.        Dilaudid 2mg IV q4H PRN   Type User Pharmacy    Wed Mar 29, 2017 1054 Dispense Mirza, [employer] [facility] [id number] ADS   Wed Mar 29, 2017 0734 Dispense Mirza, [employer] [facility] [id number] ADS   Tue Mar 28, 2017 1905 Dispense Mirza, A[employer] [facility] [id number] ADS   Tue Mar 28, 2017 1333 Dispense Mirza, [employer] [facility] [id number] ADS Tue Mar 28, 2017 0914 Dispense Mirza, Ads [employer] [id number] ADS   Tue Mar 28, 2017 0431 Dispense Mirza, [employer] [facility] [id number] ADS   Mon Mar 27, 2017 1851 Dispense Mirza, [employer] [facility] [id number] ADS   Mon Mar 27, 2017 DWAINE Man [address] [facility] [id number] ADS      Dilaudid IV 2mg QHS     Tue Mar 28, 2017 2050 Dispense Mirza, [employer] [facility] [id number] ADS     Mon Mar 27, 2017 2134 Dispense Mirza, [employer] [facility] [id number] ADS     Sun Mar 26, 2017 2032 Dispense Mirza, [employer] [facility] [id number] ADS     Sat Mar 25, 2017 2116 Dispense Mirza, [employer] [facility] [id number] ADS     Sat Mar 25, 2017 2467 Verify Amado Davis, [facility] [id number] ADS             Roxicodone  20mg tab [facility] PRN    Tue Mar 28, 2017 1212 Dispense Mirza, [employer] [facility] [id number] ADS     Mon Mar 27, 2017 1333 Dispense Mirza, [employer] [facility] [id number] ADS     Mon Mar 27, 2017 DWAINE Man [address]  Jessica [place] [facility] [id number] ADS            [place]    Abdominal Pain, Undiagnosed ORG: M-05 (ISC)  Clinical Indications for Admission to Inpatient Care  Severe pain requiring acute inpatient management as indicated by 1 or more of the following  :  -Continuous or frequent (eg, every 2 to 4 hours) parenteral analgesics required[A]  -Necessity (ie, alternative approaches not effective) for analgesic regimen that can only be performed or initiated in inpatient setting       Decision Requested Dr. Tommy Rey    Additional comments         Sincerely   Jarvis Hill MD MPH FACP   Physician Adviser, Eitan Torres, [facility]   Cell: [phone]

## 2017-03-29 NOTE — DISCHARGE SUMMARY
Physician Discharge Summary     Patient ID:  Armida Lora  [id number]  51 y.o.  1970    Admit date: 3/22/2017    Discharge date: 3/29/2017    Admission Diagnoses: Acute colitis  R/O C Diff Colitis    Discharge Diagnoses:  Principal Diagnosis Acute colitis                                            Principal Problem:    Acute colitis (3/22/2017)    Active Problems:    Depression (7/1/2010)      Ulcerative colitis ([facility] (7/25/2016)      PUD (peptic ulcer disease) ()      Overview: stomach and colon ulcers? Anxiety ()      Obesity ()      Overview: REYNA? Nausea and vomiting (3/22/2017)         Resolved Problems:  Problem List as of 3/29/2017  Date Reviewed: 3/25/2017          Codes Class Noted - Resolved    * (Principal)Acute colitis ICD-10-CM: K52.9  ICD-9-CM: 558.9  3/22/2017 - Present        Nausea and vomiting ICD-10-CM: R11.2  ICD-9-CM: 787.01  3/22/2017 - Present        Anemia ICD-10-CM: D64.9  ICD-9-CM: 285.9  7/25/2016 - Present        Ulcerative colitis ([facility] ICD-10-CM: K51.90  ICD-9-CM: 556.9  7/25/2016 - Present        Hx of thromboembolism of vein ICD-10-CM: Z86.718  ICD-9-CM: V12.51  Unknown - Present    Overview Signed 7/25/2016  3:50 AM by Lesley Mcneal MD     right upper brachiel vein             PUD (peptic ulcer disease) (Chronic) ICD-10-CM: K27.9  ICD-9-CM: 533.90  Unknown - Present    Overview Signed 7/25/2016  3:50 AM by Lesley Mcneal MD     stomach and colon ulcers? Anxiety (Chronic) ICD-10-CM: F41.9  ICD-9-CM: 300.00  Unknown - Present        Obesity (Chronic) ICD-10-CM: E66.9  ICD-9-CM: 278.00  Unknown - Present    Overview Signed 7/25/2016  3:50 AM by Lesley Mcneal MD     REYNA?              Fatty liver ICD-10-CM: K76.0  ICD-9-CM: 571.8  Unknown - Present        Cholecystitis ICD-10-CM: K81.9  ICD-9-CM: 575.10  7/16/2016 - Present        Depression (Chronic) ICD-10-CM: F32.9  ICD-9-CM: 069  7/1/2010 - Present        RESOLVED: DVT (deep venous thrombosis) [facility] ICD-10-CM: I82.409  ICD-9-CM: 453.40  7/1/2010 - 7/25/2016    Overview Signed 7/1/2010  8:44 AM by Cornelius Martin     right lower extremity (RLE)                     Hospital Course:   Ms. Shayy Siddiqui was admitted to the Hospitalist Service on the 5th floor for treatment of abdominal pain. Initial CT was consistent with colitis. She was treated with IV antibiotics. She was evaluated by GI and underwent colonoscopy and biopsy which showed only normal mucosa. General Surgery was consulted for adhesions and intra-abdominal mesh related hernia as possible sources of her pain. MRE was done and revealed no evidence of stricture, obstruction or inflammatory bowel disease. She was initially on a liquid diet but was advanced to solids which she tolerated prior to discharge    She was discharged home on 3/29/2017 in improved condition. PCP: Chelsea Helm MD    Consults: GI and General Surgery    Discharge Exam:  See my Progress Note from today. Disposition: home    Patient Instructions:   Current Discharge Medication List         Activity: Activity as tolerated  Diet: GI lite diet  Wound Care: None needed    Follow-up Information     None          35 minutes were spend on this discharge.     Signed:  Naida Taylor MD  3/29/2017  12:57 PM

## 2017-04-21 ENCOUNTER — HOSPITAL ENCOUNTER (OUTPATIENT)
Dept: ULTRASOUND IMAGING | Age: 47
Discharge: HOME | End: 2017-04-21
Attending: FAMILY MEDICINE
Payer: COMMERCIAL

## 2017-04-21 ENCOUNTER — HOSPITAL ENCOUNTER (OUTPATIENT)
Dept: MAMMOGRAPHY | Age: 47
Discharge: HOME | End: 2017-04-21
Attending: FAMILY MEDICINE
Payer: COMMERCIAL

## 2017-04-21 DIAGNOSIS — N60.11 DIFFUSE CYSTIC MASTOPATHY OF BREAST, RIGHT: ICD-10-CM

## 2017-04-21 PROCEDURE — 76641 ULTRASOUND BREAST COMPLETE: CPT

## 2017-04-21 PROCEDURE — 77066 DX MAMMO INCL CAD BI: CPT

## 2017-10-09 ENCOUNTER — HOSPITAL ENCOUNTER (OUTPATIENT)
Dept: CT IMAGING | Age: 47
Discharge: HOME | End: 2017-10-09
Attending: INTERNAL MEDICINE
Payer: COMMERCIAL

## 2017-10-09 DIAGNOSIS — Z86.718 PERSONAL HISTORY OF VENOUS THROMBOSIS AND EMBOLISM: ICD-10-CM

## 2017-10-09 DIAGNOSIS — Z86.718 HX OF VENOUS THROMBOSIS AND EMBOLISM: ICD-10-CM

## 2017-10-09 PROCEDURE — 71260 CT THORAX DX C+: CPT

## 2017-10-09 PROCEDURE — 74160 CT ABDOMEN W/CONTRAST: CPT

## 2017-10-09 PROCEDURE — 74011636320 HC RX REV CODE- 636/320: Performed by: RADIOLOGY

## 2017-10-09 RX ADMIN — IOPAMIDOL 94 ML: 755 INJECTION, SOLUTION INTRAVENOUS at 16:23

## 2018-01-14 ENCOUNTER — HOSPITAL ENCOUNTER (EMERGENCY)
Age: 48
Discharge: HOME | End: 2018-01-14
Attending: EMERGENCY MEDICINE
Payer: COMMERCIAL

## 2018-01-14 VITALS
HEART RATE: 88 BPM | BODY MASS INDEX: 42.38 KG/M2 | RESPIRATION RATE: 16 BRPM | HEIGHT: 67 IN | WEIGHT: 270 LBS | TEMPERATURE: 97.2 F | SYSTOLIC BLOOD PRESSURE: 99 MMHG | OXYGEN SATURATION: 100 % | DIASTOLIC BLOOD PRESSURE: 51 MMHG

## 2018-01-14 DIAGNOSIS — E83.51 HYPOCALCEMIA: ICD-10-CM

## 2018-01-14 DIAGNOSIS — F41.9 ANXIETY: Chronic | ICD-10-CM

## 2018-01-14 DIAGNOSIS — R60.0 EDEMA OF BOTH LEGS: Primary | ICD-10-CM

## 2018-01-14 LAB
ANION GAP BLD CALC-SCNC: 15 MMOL/L (ref 5–15)
BUN BLD-MCNC: 12 MG/DL (ref 9–20)
CA-I BLD-MCNC: 1.1 MMOL/L (ref 1.12–1.32)
CHLORIDE BLD-SCNC: 103 MMOL/L (ref 98–107)
CO2 BLD-SCNC: 27 MMOL/L (ref 21–32)
CREAT BLD-MCNC: 0.9 MG/DL (ref 0.6–1.3)
GLUCOSE BLD-MCNC: 93 MG/DL (ref 65–100)
HCT VFR BLD CALC: 33 % (ref 35–47)
HGB BLD-MCNC: 11.2 GM/DL (ref 11.5–16)
POTASSIUM BLD-SCNC: 3.7 MMOL/L (ref 3.5–5.1)
SERVICE CMNT-IMP: ABNORMAL
SODIUM BLD-SCNC: 141 MMOL/L (ref 136–145)

## 2018-01-14 PROCEDURE — 99283 EMERGENCY DEPT VISIT LOW MDM: CPT

## 2018-01-14 PROCEDURE — 80047 BASIC METABLC PNL IONIZED CA: CPT

## 2018-01-14 RX ORDER — FUROSEMIDE 20 MG/1
20 TABLET ORAL DAILY
Qty: 7 TAB | Refills: 0 | Status: SHIPPED | OUTPATIENT
Start: 2018-01-14 | End: 2018-01-21

## 2018-01-14 RX ORDER — FERROUS SULFATE, DRIED 160(50) MG
2 TABLET, EXTENDED RELEASE ORAL 2 TIMES DAILY WITH MEALS
Qty: 60 TAB | Refills: 0 | Status: SHIPPED | OUTPATIENT
Start: 2018-01-14 | End: 2019-04-13

## 2018-01-14 RX ORDER — CYCLOBENZAPRINE HCL 10 MG
10 TABLET ORAL
COMMUNITY
End: 2020-02-20

## 2018-01-14 RX ORDER — POTASSIUM CHLORIDE 20MEQ/15ML
40 LIQUID (ML) ORAL DAILY
Qty: 480 ML | Refills: 0 | Status: SHIPPED | OUTPATIENT
Start: 2018-01-14 | End: 2019-04-13

## 2018-01-14 RX ORDER — OXYCODONE AND ACETAMINOPHEN 10; 325 MG/1; MG/1
1 TABLET ORAL
COMMUNITY
End: 2018-02-08

## 2018-01-14 NOTE — ED NOTES
Pt discharged by provider. Pt escorted off the unit with a w/c and in NAD. Pt states she will be waiting in the lobby for her ride to arrive.

## 2018-01-14 NOTE — LETTER
[address] 
[facility] EMERGENCY DEPT 
[address] [facility] [address] [id number] [phone] Work/School Note Date: 1/14/2018 To Whom It May concern: 
 
Hattie Betancourt was seen and treated today in the emergency room by the following provider(s): 
Attending Provider: Lucrecia Brittle, MD.   
 
Hattie Betancourt may return to work on 1/18/2018, she needs to be home with legs elevated as much as possible during the next 3 days.  
 
Sincerely, 
 
 
 
 
Lucrecia Brittle, MD
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
## 2018-01-14 NOTE — DISCHARGE INSTRUCTIONS
Leg and Ankle Edema: Care Instructions  Your Care Instructions  Swelling in the legs, ankles, and feet is called edema. It is common after you sit or stand for a while. Long plane flights or car rides often cause swelling in the legs and feet. You may also have swelling if you have to stand for long periods of time at your job. Problems with the veins in the legs (varicose veins) and changes in hormones can also cause swelling. Sometimes the swelling in the ankles and feet is caused by a more serious problem, such as heart failure, infection, blood clots, or liver or kidney disease. Follow-up care is a key part of your treatment and safety. Be sure to make and go to all appointments, and call your doctor if you are having problems. It's also a good idea to know your test results and keep a list of the medicines you take. How can you care for yourself at home? · If your doctor gave you medicine, take it as prescribed. Call your doctor if you think you are having a problem with your medicine. · Whenever you are resting, raise your legs up. Try to keep the swollen area higher than the level of your heart. · Take breaks from standing or sitting in one position. ¨ Walk around to increase the blood flow in your lower legs. ¨ Move your feet and ankles often while you stand, or tighten and relax your leg muscles. · Wear support stockings. Put them on in the morning, before swelling gets worse. · Eat a balanced diet. Lose weight if you need to. · Limit the amount of salt (sodium) in your diet. Salt holds fluid in the body and may increase swelling. When should you call for help? Call 911 anytime you think you may need emergency care. For example, call if:  ? · You have symptoms of a blood clot in your lung (called a pulmonary embolism). These may include:  ¨ Sudden chest pain. ¨ Trouble breathing. ¨ Coughing up blood.    ?Call your doctor now or seek immediate medical care if:  ? · You have signs of a blood clot, such as:  ¨ Pain in your calf, back of the knee, thigh, or groin. ¨ Redness and swelling in your leg or groin. ? · You have symptoms of infection, such as:  ¨ Increased pain, swelling, warmth, or redness. ¨ Red streaks or pus. ¨ A fever. ? Watch closely for changes in your health, and be sure to contact your doctor if:  ? · Your swelling is getting worse. ? · You have new or worsening pain in your legs. ? · You do not get better as expected. Where can you learn more? Go to http://geronimo-jostin.info/. Enter C141 in the search box to learn more about \"Leg and Ankle Edema: Care Instructions. \"  Current as of: March 20, 2017  Content Version: 11.4  © 3062-1778 [employer]. Care instructions adapted under license by [employer] (which disclaims liability or warranty for this information). If you have questions about a medical condition or this instruction, always ask your healthcare professional. [place] [address] any warranty or liability for your use of this information.

## 2018-02-08 ENCOUNTER — OFFICE VISIT (OUTPATIENT)
Dept: CARDIOLOGY CLINIC | Age: 48
End: 2018-02-08

## 2018-02-08 VITALS
DIASTOLIC BLOOD PRESSURE: 60 MMHG | WEIGHT: 267.6 LBS | HEART RATE: 91 BPM | HEIGHT: 67 IN | OXYGEN SATURATION: 97 % | SYSTOLIC BLOOD PRESSURE: 100 MMHG | BODY MASS INDEX: 42 KG/M2 | RESPIRATION RATE: 16 BRPM

## 2018-02-08 DIAGNOSIS — E66.01 OBESITY, MORBID (HCC): ICD-10-CM

## 2018-02-08 DIAGNOSIS — I87.2 CHRONIC VENOUS INSUFFICIENCY: Primary | ICD-10-CM

## 2018-02-08 DIAGNOSIS — F41.9 ANXIETY: Chronic | ICD-10-CM

## 2018-02-08 DIAGNOSIS — M79.89 LEG SWELLING: ICD-10-CM

## 2018-02-08 DIAGNOSIS — K51.919 ULCERATIVE COLITIS WITH COMPLICATION, UNSPECIFIED LOCATION (HCC): ICD-10-CM

## 2018-02-08 DIAGNOSIS — R06.02 SHORTNESS OF BREATH: ICD-10-CM

## 2018-02-08 RX ORDER — FUROSEMIDE 40 MG/1
TABLET ORAL
Qty: 30 TAB | Refills: 0 | Status: SHIPPED | OUTPATIENT
Start: 2018-02-08 | End: 2018-02-09 | Stop reason: SDUPTHER

## 2018-02-08 RX ORDER — FUROSEMIDE 40 MG/1
TABLET ORAL
Refills: 2 | COMMUNITY
Start: 2018-01-17 | End: 2018-02-08 | Stop reason: SDUPTHER
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
## 2018-02-08 NOTE — PROGRESS NOTES
Per  VO to discontinue all medication not taken.      Echo and bilateral venous dopplers for LE     Refill for Lasix sent to pharmacy    Verbal order per Dr. Truman Villalobos

## 2018-02-08 NOTE — MR AVS SNAPSHOT
[address] [facility] [address] [facility] 
[phone] Patient: Lourdes Bryant MRN: [id number] :1970 Visit Information Date & Time Provider Department Dept. Phone Encounter #  
 2018  3:20 PM Mellissa Coppola MD [facility] [phone] [id number] Upcoming Health Maintenance Date Due DTaP/Tdap/Td series (1 - Tdap) 1990 PAP AKA CERVICAL CYTOLOGY 4/3/2012 Influenza Age 5 to Adult 2017 Allergies as of 2018  Review Complete On: 2018 By: Will Quintana Severity Noted Reaction Type Reactions Sulfa (Sulfonamide Antibiotics)  2010    Hives Toradol [Ketorolac Tromethamine]  2011   Intolerance Hives Morphine Low 2016    Hives, Nausea and Vomiting Current Immunizations  Never Reviewed No immunizations on file. Not reviewed this visit You Were Diagnosed With   
  
 Codes Comments Leg swelling    -  Primary ICD-10-CM: M79.89 ICD-9-CM: 729.81 Vitals BP Pulse Resp Height(growth percentile) Weight(growth percentile) SpO2  
 100/60 (BP 1 Location: Left arm, BP Patient Position: Sitting) 91 16 5' 7\" (1.702 m) 267 lb 9.6 oz (121.4 kg) 97% BMI OB Status Smoking Status 41.91 kg/m2 Hysterectomy Never Smoker BMI and BSA Data Body Mass Index Body Surface Area 41.91 kg/m 2 2.4 m 2 Preferred Pharmacy Pharmacy Name Phone [id number] [place], [phone]) [phone] Your Updated Medication List  
  
   
This list is accurate as of: 18  4:53 PM.  Always use your most recent med list.  
  
  
  
  
 * ALPRAZolam 2 mg tablet Commonly known as:  Rannie Manual Take 2 mg by mouth nightly. * ALPRAZolam 1 mg tablet Commonly known as:  Rannie Manual Take 1 mg by mouth two (2) times daily as needed for Anxiety (at breakfast and lunch). aspirin delayed-release 81 mg tablet Take 81 mg by mouth every evening. calcium-vitamin D 500 mg(1,250mg) -200 unit per tablet Commonly known as:  OYSTER SHELL Take 2 Tabs by mouth two (2) times daily (with meals). Indications: hypocalcemia  
  
 cyclobenzaprine 10 mg tablet Commonly known as:  FLEXERIL Take 10 mg by mouth three (3) times daily as needed for Muscle Spasm(s). dicyclomine 20 mg tablet Commonly known as:  BENTYL Take 20 mg by mouth every six (6) hours as needed. estradiol 1 mg tablet Commonly known as:  ESTRACE Take 20 mg by mouth nightly. folic acid 735 mcg tablet Take 800 mcg by mouth daily. furosemide 40 mg tablet Commonly known as:  LASIX TAKE 1 TABLET BY MOUTH DAILY  
  
 lamoTRIgine 100 mg tablet Commonly known as: LaMICtal  
Take 100 mg by mouth two (2) times a day. ONE-A-DAY ESSENTIAL PO Take 1 Tab by mouth daily. PERCOCET  mg per tablet Generic drug:  oxyCODONE-acetaminophen Take 1 Tab by mouth every four (4) hours as needed for Pain.  
  
 potassium chloride 20 mEq/15 mL solution Commonly known as:  KAON 10% Take 30 mL by mouth daily. Indications: hypokalemia prevention  
  
 pravastatin 20 mg tablet Commonly known as:  PRAVACHOL Take 40 mg by mouth nightly. promethazine 12.5 mg tablet Commonly known as:  PHENERGAN Take 1 Tab by mouth every six (6) hours as needed for Nausea. SUPER B COMPLEX PO Take 1 Tab by mouth daily. * Notice: This list has 2 medication(s) that are the same as other medications prescribed for you. Read the directions carefully, and ask your doctor or other care provider to review them with you. We Performed the Following AMB POC EKG ROUTINE W/ 12 LEADS, INTER & REP [14153 CPT(R)] To-Do List   
 02/08/2018 ECHO:  2D ECHO COMPLETE ADULT (TTE) W OR WO CONTR   
  
 02/08/2018 Imaging:  DUPLEX LOWER EXT VENOUS LEFT   
  
 02/08/2018 Imaging:  DUPLEX LOWER EXT VENOUS RIGHT Patient Instructions Please schedule an Echo and dopplers for both legs. These can be scheduled here, at the [facility] location or at the hospital.  
 
 
  
Introducing [facility]! Dear Renee Low: Thank you for requesting a [employer] account. Our records indicate that you already have an active [employer] account. You can access your account anytime at [email or web address]. [email or web address]/[email or web address] Did you know that you can access your hospital and ER discharge instructions at any time in [employer]? You can also review all of your test results from your hospital stay or ER visit. Additional Information If you have questions, please visit the Frequently Asked Questions section of the [employer] website at [email or web address] Remember, [employer] is NOT to be used for urgent needs. For medical emergencies, dial 911. Now available from your iPhone and Android! Please provide this summary of care documentation to your next provider. Your primary care clinician is listed as Chaparrita Connor. If you have any questions after today's visit, please call [phone].

## 2018-02-08 NOTE — PATIENT INSTRUCTIONS
Please schedule an Echo and dopplers for both legs.  These can be scheduled here, at the [facility] location or at the hospital.

## 2018-02-09 ENCOUNTER — TELEPHONE (OUTPATIENT)
Dept: CARDIOLOGY CLINIC | Age: 48
End: 2018-02-09

## 2018-02-09 RX ORDER — FUROSEMIDE 40 MG/1
TABLET ORAL
Qty: 14 TAB | Refills: 0 | Status: SHIPPED | OUTPATIENT
Start: 2018-02-09 | End: 2019-04-13

## 2018-02-09 NOTE — TELEPHONE ENCOUNTER
Patient called in stating that she needs a stronger dosage of the lasix. She stated that she can double up her dosage until she can see the Dr again. She stated that she requested this when she put in her refill. Thanks!   Phone [phone]  [employer]
Verified patient with two types of identifiers. Patient stated that pharmacy wouldn't fill Lasix prescription related to insurance.      Sent in 7 day Lasix 40 mg, take 2 tablets daily  Verbal order per Dr. Denisha Yang
no

## 2018-02-13 ENCOUNTER — TELEPHONE (OUTPATIENT)
Dept: CARDIOLOGY CLINIC | Age: 48
End: 2018-02-13

## 2018-02-13 DIAGNOSIS — R06.02 SOB (SHORTNESS OF BREATH): Primary | ICD-10-CM

## 2018-02-13 NOTE — TELEPHONE ENCOUNTER
Reached out to patient to perform Lymphedema education- the importance of elevation, low NA diet. Also wanted to notify the patient that she has lab work that needs to be drawn in one week. Left generic voicemail for return call.

## 2018-02-13 NOTE — TELEPHONE ENCOUNTER
Attempted to reach patient by telephone. A message was left for return call. Upon return call patient to be informed that Dr. Gerardo De Santiago would like for her to get some lab work complete. Lab slips are currently printed and left up front for patient to  at her convenience or can be mailed to her.

## 2018-02-13 NOTE — TELEPHONE ENCOUNTER
BMP and BNP ordered  Verbal order per Dr. Luis E Velasquez    Lab slips printed out and left at  for patient to

## 2018-02-13 NOTE — TELEPHONE ENCOUNTER
----- Message from Donis Nuñez MD sent at 2/12/2018 12:26 AM EST -----  Needs BMP and BNP in one week

## 2018-02-13 NOTE — TELEPHONE ENCOUNTER
Verified patient with two types of identifiers. Informed patient of need for blood work to be completed in a week. Patient has testing scheduled at [address] [place] [address] tomorrow. Called [address] [place] [address] office to have lab slips printed for patient to . Patient requested refill on liquid potassium.  Will ask MD.

## 2018-02-14 ENCOUNTER — CLINICAL SUPPORT (OUTPATIENT)
Dept: CARDIOLOGY CLINIC | Age: 48
End: 2018-02-14

## 2018-02-14 DIAGNOSIS — R60.0 BILATERAL LEG EDEMA: Primary | ICD-10-CM

## 2018-02-14 DIAGNOSIS — M79.89 LEG SWELLING: ICD-10-CM

## 2018-02-14 DIAGNOSIS — R65.10 SYSTEMIC INFLAMMATORY RESPONSE SYNDROME (HCC): Primary | ICD-10-CM

## 2018-02-14 NOTE — PROGRESS NOTES
Cardiology NN note- asked to assist Ms. Pedersen. She briefly explained PMH and current need for CRP to be added to her cardiology lab work ordered by Dr. Zac Pelletier.    I called over to Dr. Adrian Mccall office- they wanted to add the CRP, total.  Placed order in CC under Dr. Adrian Mccall name- will send results to him. Will update provider. Provided both slips to her to get labs done. She will come back to the office if [facility] does not accept her Oxagen. She may need [facility]- which we can print those from our [address] [place] [address] system as well.

## 2018-02-14 NOTE — PROCEDURES
[facility]  *** FINAL REPORT ***    Name: Ralph Nobles  MRN: [id number]       Outpatient  : 31 Dec 1970  HIS Order #: [id number]  [address] [facility] Visit #: [id number]  Date: 2018    TYPE OF TEST: Peripheral Venous Testing    REASON FOR TEST  Limb swelling    Right Leg:-  Deep venous thrombosis:           No  Superficial venous thrombosis:    No  Deep venous insufficiency:        No  Superficial venous insufficiency: No    Left Leg:-  Deep venous thrombosis:           No  Superficial venous thrombosis:    No  Deep venous insufficiency:        No  Superficial venous insufficiency: No      INTERPRETATION/FINDINGS  PROCEDURE:  BILATERAL LE VENOUS DUPLEX. Evaluation of lower extremity veins with ultrasound (B-mode imaging,  pulsed Doppler, color Doppler). Includes the common femoral, deep  femoral, femoral, popliteal, posterior tibial, peroneal, and great  saphenous veins. FINDINGS:  Jinx Lung scale and color flow duplex images of the veins in  both lower extremities demonstrate normal compressibility, spontaneous   and augmented flow profiles, and absence of filling defects  throughout the deep and superficial veins in both lower extremities. NOTE: Prominent lymph nodes are noted left groin. CONCLUSION:  Bilateral lower extremity venous duplex negative for deep   venous thrombosis or thrombophlebitis. ADDITIONAL COMMENTS    I have personally reviewed the data relevant to the interpretation of  this  study.     TECHNOLOGIST: KRISTINE Steele  Signed: 2018 04:34 PM    PHYSICIAN: Ruby Quinteros MD, [facility]  Signed: 02/15/2018 06:06 PM

## 2018-02-15 ENCOUNTER — TELEPHONE (OUTPATIENT)
Dept: CARDIOLOGY CLINIC | Age: 48
End: 2018-02-15

## 2018-02-15 DIAGNOSIS — M79.89 LEG SWELLING: Primary | ICD-10-CM

## 2018-02-15 LAB
BNP SERPL-MCNC: <2.5 PG/ML (ref 0–100)
BUN SERPL-MCNC: 19 MG/DL (ref 6–24)
BUN/CREAT SERPL: 18 (ref 9–23)
CALCIUM SERPL-MCNC: 9.3 MG/DL (ref 8.7–10.2)
CHLORIDE SERPL-SCNC: 93 MMOL/L (ref 96–106)
CO2 SERPL-SCNC: 30 MMOL/L (ref 18–29)
CREAT SERPL-MCNC: 1.05 MG/DL (ref 0.57–1)
CRP SERPL-MCNC: 13.3 MG/L (ref 0–4.9)
GFR SERPLBLD CREATININE-BSD FMLA CKD-EPI: 63 ML/MIN/1.73
GFR SERPLBLD CREATININE-BSD FMLA CKD-EPI: 73 ML/MIN/1.73
GLUCOSE SERPL-MCNC: 81 MG/DL (ref 65–99)
POTASSIUM SERPL-SCNC: 3.6 MMOL/L (ref 3.5–5.2)
SODIUM SERPL-SCNC: 139 MMOL/L (ref 134–144)

## 2018-02-15 NOTE — PROGRESS NOTES
Let her know her labs and echo are normal  The fluid is not from the heart, the BNP is very low indicating no stretch and the LVEF is normal  This is lymphedema, this is very hard problem to treat, its elevation of legs, low salt, weight loss, and lymphedema clinic should see her again to make wraps, a Lymphpress may also be helpful  Diuretics generally dont help and may dry out her kidneys, no more diuretic Rx from [facility], can fu with PCP   TK can we see if she can get a the Irving Roxanna can we confirm her lymphedema clninc appt  Return to PCP  Cliff Thurston MD   One test pending is the DVT study , if normal then same plan, if abnormal then treat DVT  thanks

## 2018-02-15 NOTE — TELEPHONE ENCOUNTER
Please call patient regarding her recent lab work and testing. She can be reached at  [phone].  Thank you

## 2018-02-16 ENCOUNTER — TELEPHONE (OUTPATIENT)
Dept: CARDIOLOGY CLINIC | Age: 48
End: 2018-02-16

## 2018-02-16 ENCOUNTER — PATIENT OUTREACH (OUTPATIENT)
Dept: CARDIOLOGY CLINIC | Age: 48
End: 2018-02-16

## 2018-02-16 NOTE — TELEPHONE ENCOUNTER
Spoke with Marita Brandon at lymphedema clinic in regards to referral. Stated that patient had already been contacted to schedule an appointment. Waiting for patient to return call to schedule.

## 2018-02-16 NOTE — LETTER
2/16/2018 5:18 PM 
 
To Whom It May concern: In regards to :  
 
Ms. Harvey Seip [address] [facility] [address] [place] [id number] Please be advised that Ms. Joe Casillas has a diagnosis of lymphadema - which is a chronic condition. She has been counseled to elevate her legs, as possible - so would benefit from using a stool at work for leg elevation; It is also recommended that she get up and walk at regular intervals - for circulatory support. She is under physician care for management of this condition. Thank you. Tacho Smith RN , CHFN, [facility] 
[facility] [phone] Ashley Lew, cardiologist -  
[facility] [phone].

## 2018-02-16 NOTE — TELEPHONE ENCOUNTER
Savanah Second, nurse navigator returned call to patient.     Referral for lymphedema clinic entered  Verbal order per Dr. Mary Anne Biggs

## 2018-02-16 NOTE — PROGRESS NOTES
Nurse navigator note - cardiology  Pt called in - somewhat tangential - about her messages received and what she is supposed to do. Cardiology note - Dr. Abbie Early  Let her know her labs and echo are normal - conveyed to pt  The fluid is not from the heart, the BNP is very low indicating no stretch and the LVEF is normal  This is lymphedema, this is very hard problem to treat, its elevation of legs, low salt, weight loss, and lymphedema clinic should see her again to make wraps, a Lymphpress may also be helpful- discussed with pt  Diuretics generally dont help and may dry out her kidneys, no more diuretic Rx from [facility], can fu with PCP   TK can we see if she can get a the Lymphpress- starting application today 2/28/36  Luis Enrique Santiago can we confirm her lymphedema clninc appt  Return to PCP  Caesar Mota MD  - pt is calling pcp today -   Results of CRP 13.3 given to pt per her request.    Vascular scan was negative for DVT. Ms. Alireza Michael said she works 2 jobs - [employer] and [employer] - she wants a letter to both employers that she needs to put her feet up on a stool, and that she needs to walk around at intervals d/t Lymphadema - which is treatable but not curable. She will need to attend clinic appts for this - when she starts lymphadema clinic tx. She needs early am or late in the evening. Plan: NN to draft letter - with md approval   Lymphapress application started - for process. [address] clinic referral - office nurse. Pt calling pcp today   Pt will follow up with Dr. Nico Zimmer - heme onc. Pt has been advised to follow up with pcp henceforth.     Paula Yousif, RN , [place] , [facility]   [address]  [phone]

## 2018-02-20 ENCOUNTER — HOSPITAL ENCOUNTER (OUTPATIENT)
Dept: PHYSICAL THERAPY | Age: 48
Discharge: HOME | End: 2018-02-20
Payer: COMMERCIAL

## 2018-02-20 PROCEDURE — 97162 PT EVAL MOD COMPLEX 30 MIN: CPT

## 2018-02-20 PROCEDURE — 97110 THERAPEUTIC EXERCISES: CPT

## 2018-02-20 PROCEDURE — 97140 MANUAL THERAPY 1/> REGIONS: CPT
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
## 2018-02-21 ENCOUNTER — TELEPHONE (OUTPATIENT)
Dept: CARDIOLOGY CLINIC | Age: 48
End: 2018-02-21

## 2018-02-21 NOTE — TELEPHONE ENCOUNTER
Faxed [employer] press prescription form to [employer] at fax number [phone]. Fax confirmation received.       Included: [employer]String press script, LOV note, Duplex lower results

## 2018-02-26 ENCOUNTER — PATIENT OUTREACH (OUTPATIENT)
Dept: CARDIOLOGY CLINIC | Age: 48
End: 2018-02-26

## 2018-02-26 NOTE — PROGRESS NOTES
Nurse navigator note - cardiology  IN response to the below message -  Jenny Brooke with [facility] Lymphedema Specialist called and wanted to discuss using the flexi touch made by [facility]. The specialist stated that this would be a better fit for the patient. Thanks! Phone [phone]  Evita Diaz    Call and LM for Alessandro Pay - with the go ahead for this therapy - verbal order left per Dr. Cruz Lester - and NN contact information/ should they need further.     John Esqueda RN , Marzena Ramos, [facility]   [address]  [phone]

## 2018-02-27 ENCOUNTER — HOSPITAL ENCOUNTER (OUTPATIENT)
Dept: PHYSICAL THERAPY | Age: 48
Discharge: HOME | End: 2018-02-27
Payer: COMMERCIAL

## 2018-02-27 VITALS — BODY MASS INDEX: 44.1 KG/M2 | HEIGHT: 66 IN | WEIGHT: 274.4 LBS

## 2018-02-27 PROCEDURE — 97140 MANUAL THERAPY 1/> REGIONS: CPT

## 2018-02-27 NOTE — PROGRESS NOTES
[facility]  Physical Therapy Lymphedema Clinic  [address] [facility], [address] [place] [address], [facility] 22.    LYmphedema Therapy  Visit:2    [x]                  Daily note               []                 30 day/10th visit progress note    NAME: Diego Gay  DATE: 2/27/2018    GOALS  Short term goals  Time frame: 3/20/18  1. Patient will demonstrate knowledge of signs/symptoms of infections/cellulitis and be independent in skin care to prevent cellulitis. Continued education on skin care and infection/cellulitis prevention. 2.  Patient will demonstrate independence in lymphedema home program of therapeutic exercises to improve circulation and decongest limb to improve ADLs. Patient has the handouts for active range of motion remedial exercises. 3.  Patient will tolerate multi-layer bandages (MLB) and show measureable decrease in limb volume to allow ordering of home compression system (daytime, nighttime garments and pump as needed). Patient began MLB to the Thigh today in the clinic for the L LE. Patient's mother present today to begin learning the process of MLB and will have hands on training next visit. Patient will need to focus on maintaining L LE MLB to the thigh and doff/don upper portion of bandage as needed as it is expected to slide down as she loses volumes. Patient will need to decongest her B LEs before being measured for compression garments. Will also benefit from a pump trial in the clinic on an upcoming appointment. Long term goals  Time frame: 5/17/18  1. Pt will show improvement in Lymphedema Life Impact Scale by decreasing impairment percentage to under 70% and thus allow improvement in patient's quality of life. 2.  Patient will be independent with don/doff of compression system and use in order to prevent reaccumulation of fluid at discharge. Patient struggling with donning thigh high and requesting assist today in the clinic.  Patient will also need assistance with compression bandaging. 3.  Pt will be independent in self-MLD and show stable limb volumes showing decongestion and pt. ready for transition to independent restorative phase of lymphedema therapy. Deferred measuring today as focus of treatment was on MLB. Will assess volumes of the L LE next visit to see effectiveness of MLB if she is able to maintain the bandage. SUBJECTIVE REPORT: Patient arrived today with all the items requested from last visit to begin MLB. Patient aware that we will begin a bandage list today and that she will need to pay for her supplies on next visit, once we establish all items she will need for MLB. Patient reports that she is having difficulty with donning the thigh high garment issued her last visit so assisted her with this today. Patient's mother present for treatment to begin learning CDT principles with focus on MLB today. Patient's mother reports that she has arthritis in her hands and that she will be available to assist, but that it may be difficult for her. Patient continues to have many questions/concerns about CDT in general and needs increased time for explanation. Discussed today that we will focus on MLB first as progress to other aspects of CDT as we go. Pain: Patient reports that she has pain and limitations from her swelling and history of abdominal surgeries. Gait:   [x]  Independent gait without any assistive device for community distances  ADLs: Modified Independent for most, but will need to have her family assist her with the MLB/compression. Treatment Response: Patient arrived today with her mother for continued education and the beginning of MLB to the L LE to the upper thigh. Patient had to stop three different times during treatment to void as she stated that she takes a diuretic.    [x]   Patient reviewed packet received at evaluation  []   Patient completed home program as prescribed  []   Patient not fully compliant with home program to date  []   Patient waiting on compression garment arrival  Function: Patient works 7 days a week. [x]   Patient requiring assistance with MLB and compression at this time. []   ADLs are requiring less assistance   []   Patient able to return to work/leisure activities  Lymphedema Life Impact Scale: Deferred today. Weight:  274.4 lbs  TREATMENT AND OBJECTIVE DATA SUMMARY:   Patient/Family Education:      Educated in skin care: [x]   Skin care products  [x]   Hygiene  [x]  Prevention of cellulitis  []   Wound care     Educated in exercise: [x]   Walking program  []   Flor ball routine  []   Stick routine  [x]   ROM routine     Instructed in self MLD: Deferred full instruction in MLD today as patient needs to focus on MLB today and required increased time with this process. Instructed in don/doff of compression system:   [x]   Multi layer bandage (MLB) donning principles and wear precautions  []   Day garments  []   Night garments     Therapeutic Activity 0 minutes   Treatment time: 0  Functional Mobility: Independent   Fall risk: Low     Therapeutic Exercise/Procedure 0 minutes   Treatment time: 0  Flor ball exercise program: Deferred   Free exercises/ROM: Encouraged frequent short walks at work and to work on the active range of motion routine as best as she can in the home setting. Home program: Patient to perform daily to BID:  [x]   Skin care  [x]   Deep abdominal breathing  [x]   Exercise routine  [x]   Walking program  [x]   Rest in supine   [x]   Compression bandage  []   Compression garments  []   Vasopneumatic device  []   Wound care  []   Self MLD  [x]   Bring supplies to each therapy visit  [x]   Purchase necessary supplies (started   []   Weight loss program  []   Follow up with       Rationale: Exercise will increase the lymph angiomotoricity and tissue pressure of the skin and thus decrease swelling.      Modalities 0 minutes   Treatment time: 0  Vasopneumatic pump: Deferred. Will benefit from a pump trial.     Manual Lymphatic Drainage (MLD) 95 minutes   Treatment time:7:55am-9:30am  Area to decongest:    [] UE          []  Right     []  Left      [] Trunk      [x] LE             [x]  Right     [x]  Left      [x] Trunk         Sequence used and effectiveness: [] Secondary/Primary sequence for upper extremity with / without trunk involvement    [] Secondary sequence for lower extremities with trunk involvement     [] Modifications were made to manual lymph drainage sequence to exclude cervical techniques secondary to patient's age    [] Self MLD sequence/techniques/hand strokes (Deferred today secondary to time involved with bandaging.)   Skin/wound care/debridement: Intact   Upper/Lower extremity compression: Applied multi-layer compression bandaging to: Below knee []  Thigh high  [x]   Upper Extremity []    Right []   Left [x]    Bilateral []    The following multi-layer bandages were applied:  [x]  Tricofix            []  Silver Stockinette             []  Tg soft  []  Comperm    []  Transelast     Padding layer:  [x]  Cellona         []  Fleece    Foam:  [x] 10cm roll  [x] 12cm roll  [] 15cm roll (may need to add next visit)  [x] Gray foam (for upper thigh)  [] Komprex  [] Komprex 2      Short stretch bandages:   [] 4cm  [x] 6cm  [] 8cm  [x] 10 cm (2)  [x] 12cm (3)  Educated patient in multi-layer bandaging donning principles and precautions. Thigh bandage doffed and donned after she attempted to put on compression style shape wear over bandage for upper thigh abdominal support and it was too tight and uncomfortable for patient. Reminded patient that she will need to doff and don thigh piece several times a day as needed secondary to as she reduces in volume it will slide down her thigh and need to be re-bandaged.    Patient aware to remove MLB if she is having and of the warning signs: shortness of breath, heart palpations or numbness/tingling/discoloration in the toes.   Explained to patient and her mother that MLB works by the layers of padding and bandage not how tight it is donned. Will have her mother perform hands on MLB next visit. Patient requested assistance donning the Juzo Thigh high issued last visit. Patient has obtained donning gloves, but she stated she struggles with donning. Once patient decongesting she will need custom flat knit thigh length garments, a night time custom foam system and also would benefit from compression shorts to be worn with thigh highs for full compression of the legs and lower trunk. Kinesiotaping: Deferred   Girth/Volume measurement: Reviewed results of volumetric measurements taken on evaluation. TOTAL TREATMENT 95 mins     Circumferential Limb Measurements:  Deferred focus was on MLB today. ASSESSMENT:   Treatment effectiveness and tolerance: Patient able to have her first MLB to the thigh on the L LE today. Patient continues to be very anxious and requires increased redirection at times to focus on task. Patient will need to focus on MLB and mother having hands on training next visit to make sure that she can provide adequate bandage for patient in the home setting. Will have to progress as able towards other aspects of CDT as we go and time available as patient requires increased time for explanation/education and will need to proceed slowly and conservatively in order for her to not become overwhelmed. Patient is very concerned about missing work for appointments so will need to address scheduling issues as we go. Progress toward goals: See goal section of note for details.       PLAN OF CARE:   Changes to the plan of care: Began MLB to the Thigh today on the L LE   Frequency: [x]  2 times a week  []  Weekly  []  Biweekly  [x]  Monthly   Other: Began bandage list.        Santiago Garcia, PTA, CLT

## 2018-03-02 ENCOUNTER — PATIENT OUTREACH (OUTPATIENT)
Dept: CARDIOLOGY CLINIC | Age: 48
End: 2018-03-02

## 2018-03-02 ENCOUNTER — HOSPITAL ENCOUNTER (OUTPATIENT)
Dept: PHYSICAL THERAPY | Age: 48
Discharge: HOME | End: 2018-03-02
Payer: COMMERCIAL

## 2018-03-02 PROCEDURE — 97016 VASOPNEUMATIC DEVICE THERAPY: CPT

## 2018-03-02 PROCEDURE — 97140 MANUAL THERAPY 1/> REGIONS: CPT

## 2018-03-02 NOTE — PROGRESS NOTES
Nurse navigator note - cardiology  Incoming call re: needing a form for ADA /accommodation form - for adaptation for her work -   She goes to the [facility] clinic for [facility] weeks /2 sessions a week -     She has jobs at [employer] - is doing ok there with standing and leg wraps  She also works at [employer] - with sitting - she needs to get up and move around -     She states she has had 7 abdominal surgeries - and lymph node removal -   Pt is requesting assistance with form -   Reasonable accommodation request - to be filled out by patient    Physician to fill out -   Accommodation request response response - medical inquiry -   NN asked about her pcp doing this - pcp said that Dr. Karl Carlos was the prescriber -   And we have supportive notes. Plan:  Pt will fax the forms to us to fill out -   After filling out - will scan and send to pt - @   email - [email or web address]    Advised that we will do this in a timely fashion. She said she is working overtime at [employer] to get her job done. The focus is her getting up and moving around - at frequent intervals. She is also asking for a handicap placquard - having to walk 3-4 blocks for parking. Ms. 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     Claudia Santo RN , Marzena Ramos, [facility]   [address]  [phone]

## 2018-03-02 NOTE — PROGRESS NOTES
[facility]  Physical Therapy Lymphedema Clinic  [address] [facility], [address].    LYmphedema Therapy  Visit:3    [x]                  Daily note               []                 30 day/10th visit progress note    NAME: Denise Michael  DATE: 3/2/2018  GOALS  Short term goals  Time frame: 3/20/18  1. Patient will demonstrate knowledge of signs/symptoms of infections/cellulitis and be independent in skin care to prevent cellulitis. Continued education on skin care and infection/cellulitis prevention. 2.  Patient will demonstrate independence in lymphedema home program of therapeutic exercises to improve circulation and decongest limb to improve ADLs. Patient has the handouts for active range of motion remedial exercises. 3.  Patient will tolerate multi-layer bandages (MLB) and show measureable decrease in limb volume to allow ordering of home compression system (daytime, nighttime garments and pump as needed). Patient arrived without MLB in place because she was struggling in the home with assist of her mom to don the MLB adequately. Patient's mother attended visit again today and was able to learn the below knee portion all hands on and also gave tips for how to improve donning in the home. Patient will need to focus on maintaining L LE MLB to the thigh and doff/don upper portion of bandage as needed as it is expected to slide down as she loses volumes. Patient is concerned about missing time from work for treatments and also finances. Will need to proceed with measuring and ordering for garments on next visit if able as this does not appear to be good timing for her to spend increased time on the decongesting phase of treatment. Long term goals  Time frame: 5/17/18  1. Pt will show improvement in Lymphedema Life Impact Scale by decreasing impairment percentage to under 70% and thus allow improvement in patient's quality of life.   2. Patient will be independent with don/doff of compression system and use in order to prevent reaccumulation of fluid at discharge. Patient struggling with donning thigh high and requesting assist today in the clinic. Patient will also need assistance with compression bandaging. Patient's mother is willing to assist her as needed. 3.  Pt will be independent in self-MLD and show stable limb volumes showing decongestion and pt. ready for transition to independent restorative phase of lymphedema therapy. Deferred as patient was not able to stay in MLB until her visit today. SUBJECTIVE REPORT: Patient arrived today late to her appointment. She brought her mother with her to continue to learn how to assist with MLB. Patient reports that she felt her swelling was down yesterday when she was resting and did not have to work because she was having issues with a pinched nerve in her back. Patient continued to need increased redirection to stay on task at times. Patient concerned about finances and the cost of garments, pump etc. Discussed that she will need to have custom garments that insurance will cover at 80% and she will be responsible for 20%. Patient in the clinic after her appointment that ended at 12:45pm until 2:30pm trying to fill out a form she reports she needed to fax to her physician. Pain: Patient reports that she has pain and limitations from her swelling and history of abdominal surgeries. Gait:   [x]  Independent gait without any assistive device for community distances  ADLs: Modified Independent for most, but will need to have her family assist her with the MLB/compression.   Treatment Response: Patient arrived with out MLB on today,but did bring in all supplies ready   [x]   Patient reviewed packet received at evaluation  []   Patient completed home program as prescribed  [x]   Patient not fully compliant with home program to date (unable to maintain MLB and did not remove Juzo thigh high at night as recommended and it is not safe for night time use)  []   Patient waiting on compression garment arrival  Function: Patient works 7 days a week. [x]   Patient requiring assistance with MLB and compression at this time. []   ADLs are requiring less assistance   []   Patient able to return to work/leisure activities  Lymphedema Life Impact Scale: Deferred today. Weight: 268.0 lbs down from 274.4 lbs last visit  TREATMENT AND OBJECTIVE DATA SUMMARY:   Patient/Family Education:      Educated in skin care: [x]   Skin care products  [x]   Hygiene  [x]  Prevention of cellulitis  []   Wound care     Educated in exercise: [x]   Walking program  []   Flor ball routine  []   Stick routine  [x]   ROM routine     Instructed in self MLD: Deferred full instruction in MLD today as patient needs to focus on MLB today and pump trial and required increased time for all process. Instructed in don/doff of compression system:   [x]   Multi layer bandage (MLB) donning principles and wear precautions  [x]   Day garments (has Juzo Thigh high for R LE) Will need to be measured for flat knit custom garments)   [x]   Night garments (will measure for evelyn boxer todd and AD leg pieces)     Therapeutic Activity 0 minutes   Treatment time: 0  Functional Mobility: Independent   Fall risk: Low     Therapeutic Exercise/Procedure 0 minutes   Treatment time: 0  Flor ball exercise program: Deferred   Free exercises/ROM: Encouraged frequent short walks at work and to work on the active range of motion routine as best as she can in the home setting.     Home program: Patient to perform daily to BID:  [x]   Skin care  [x]   Deep abdominal breathing  [x]   Exercise routine  [x]   Walking program  [x]   Rest in supine   [x]   Compression bandage (patient needs to purchase supplies as she has not done so to date)   [x]   Compression garments (has Juzo Thigh high for R LE, but has not been able to demonstrate independence with donning in the clinic)  [x]   Vasopneumatic device (trial today)  []   Wound care  []   Self MLD  [x]   Bring supplies to each therapy visit  [x]   Purchase necessary supplies (started needs to pay for bandages)  []   Weight loss program (gave recommended nutrition recommendations hand out today. []   Follow up with   Rationale: Exercise will increase the lymph angiomotoricity and tissue pressure of the skin and thus decrease swelling. Modalities 20 minutes   Treatment time: 11:30am-11:50am  Vasopneumatic pump: Patient had a trial on the [place] today with poor results. Patient's measurements are in the located in the note under girth/volume measurements. Patient was not comfortable in the products and with extenders needed to make the product work for her she felt that it was not effective. Patient will need to have a trunk component to a vaso-pneumatic to manage her swelling in trunk and upper thigh as this is the area of greatest concern and with her significant medical history of abdominal surgeries she will benefit from the truncal component in the home setting for long term use. Manual Lymphatic Drainage (MLD) 70 minutes   Treatment time:11:15am-11:30am and 6:48am-14:44pm  Area to decongest:   [x] LE             [x]  Right     [x]  Left      [x] Trunk   Sequence used and effectiveness: [] Secondary/Primary sequence for upper extremity with / without trunk involvement    [] Secondary sequence for lower extremities with trunk involvement     [] Modifications were made to manual lymph drainage sequence to exclude cervical techniques secondary to patient's age    [] Self MLD sequence/techniques/hand strokes (Deferred today secondary to time involved with bandaging and pump trial. Patient has been educated on the basics of skin care)   Skin/wound care/debridement: Intact   Upper/Lower extremity compression: Applied multi-layer compression bandaging to:   Below knee []  Thigh high  [x]   Upper Extremity []    Right []   Left [x]    Bilateral []    The following multi-layer bandages were applied:  [x]  Tricofix            []  Silver Stockinette             []  Tg soft  [x]  Comperm (over the lower bandage)    []  Transelast     Padding layer:  [x]  Cellona         []  Fleece    Foam:  [x] 10cm roll  [x] 12cm roll  [] 15cm roll (may need to add next visit)  [x] Gray foam (for upper thigh)  [] Komprex  [] Komprex 2      Short stretch bandages:   [] 4cm  [x] 6cm  [] 8cm  [x] 10 cm (2)  [x] 12cm (3)  Educated patient in multi-layer bandaging donning principles and precautions. Reminded patient that she will need to doff and don thigh piece several times a day as needed, because as she reduces in volume it will slide down her thigh and need to be re-bandaged. Patient aware to remove MLB if she is having and of the warning signs: shortness of breath, heart palpations or numbness/tingling/discoloration in the toes. Explained to patient and her mother that MLB works by the layers of padding and bandage not how tight it is donned. Patient's mother able to perform hands on practice with MLB today. Patient requested assistance donning the Candy Labzo Thigh high issued last visit. Patient has obtained donning gloves, but she stated she struggles with donning. Once patient decongesting she will need custom flat knit thigh length garments, a night time custom foam system and also would benefit from compression shorts to be worn with thigh highs for full compression of the legs and lower trunk. Kinesiotaping: Deferred   Girth/Volume measurement: See below.      TOTAL TREATMENT 90 mins     Circumferential Limb Measurements:  Affected Side: L LE pre and post Entre Pump   Left (cm) Right (cm)   5th Tuberosity 24.4  23.2        Ankle 25.2  25.5        Calf 43  43.2        Mid Knee 49.2  48.0        Thigh 77.2  77.2            ASSESSMENT:   Treatment effectiveness and tolerance: Patient struggling to manage MLB to the thigh on the L LE with the assist of her mother. Today mother had hands on training and was able to demonstrate that she could adequately don an MLB and has handouts to follow. Patient continues to be very anxious and overwhelmed with CDT and understanding the entire process. Once MLB is improving in the home we progress as able towards other aspects of CDT as we go and time available as patient requires increased time for explanation/education on all topics. Patient will need to progress to garments soon as she reports that she is having difficulty with getting approval from her work to leave for her appointments. Progress toward goals: See goal section of note for details.       PLAN OF CARE:   Changes to the plan of care: Continue MLB to the Thigh today on the L LE   Frequency: [x]  2 times a week  []  Weekly  []  Biweekly  []  Monthly   Other:        Santiago VALLEJO Patch, PTA, CLT

## 2018-03-05 ENCOUNTER — PATIENT OUTREACH (OUTPATIENT)
Dept: CARDIOLOGY CLINIC | Age: 48
End: 2018-03-05

## 2018-03-05 ENCOUNTER — TELEPHONE (OUTPATIENT)
Dept: CARDIOLOGY CLINIC | Age: 48
End: 2018-03-05

## 2018-03-05 ENCOUNTER — HOSPITAL ENCOUNTER (OUTPATIENT)
Dept: PHYSICAL THERAPY | Age: 48
Discharge: HOME | End: 2018-03-05
Payer: COMMERCIAL

## 2018-03-05 PROCEDURE — 97140 MANUAL THERAPY 1/> REGIONS: CPT

## 2018-03-05 PROCEDURE — 97110 THERAPEUTIC EXERCISES: CPT

## 2018-03-05 NOTE — TELEPHONE ENCOUNTER
Faxed Prescription for  Pneumatic Compression Device to [facility]. at fax number [phone]. Fax confirmation received.

## 2018-03-05 NOTE — PROGRESS NOTES
Nurse navigator note - cardiology  INcoming call from Ms. Pedersen - she is requesting paperwork about her [facility] tx and course for her employer. She has been provided with a letter from the office - re: need for leg elevation on step stool at work -  Provisions allowing her to get up and walk around hourly for 2-3 minutes - circulatory benefit. She is currently receiving tx at the [facility] clinic at [address].    Ms. Sybil Carrillo has faxed paperwork re: the limitations of this condition on her work ability and her health and tx plan -   NN call to [facility] clinic - request call from therapist - re: getting this paperwork done in the best way by the correct provider.   Dr. Hung Reid is away this week -     Jeevan Zimmer RN , [facility], [address] [place] [address] [address] [facility]  [phone]

## 2018-03-06 NOTE — PROGRESS NOTES
[facility]  Physical Therapy Lymphedema Clinic  [facility], [address] [place] [address], [facility].    LYmphedema Therapy  Visit:4    [x]                  Daily note               []                 30 day/10th visit progress note    NAME: Esteban Ferreira  DATE: 3/5/2018  GOALS  Short term goals  Time frame: 3/20/18  1. Patient will demonstrate knowledge of signs/symptoms of infections/cellulitis and be independent in skin care to prevent cellulitis. Continued education on skin care and infection/cellulitis prevention. Patient reports that she had a skin reaction to the Tricofix (not visible today), so switched to Silver liner today. 2.  Patient will demonstrate independence in lymphedema home program of therapeutic exercises to improve circulation and decongest limb to improve ADLs. Patient has the handouts for active range of motion remedial exercises. Patient able demonstrate range of motion activities in the clinic, but formal performance of the exercise routines have been difficult to complete as patient is requiring increased time to learn some of the more basic CDT concepts. 3.  Patient will tolerate multi-layer bandages (MLB) and show measureable decrease in limb volume to allow ordering of home compression system (daytime, nighttime garments and pump as needed). Patient arrived without MLB in place because she was struggling in the home with assist of her mom to don the MLB adequately. Patient's mother tried to assist patient as instructed by therapist, but patient directed the bandage application instead of the mother and what patient arrived in was not sufficient. Patient was bandaged from the ankle up with out foot being included and minimal padding. Patient is growing frustrated with MLB trying to work and maintain the bandage.  Patient also reports leaving in an apartment on the 3rd level of the complex and that the MLB does not stay in place well for her.  Patient is concerned about missing time from work for treatments and also finances. Proceeded  with measuring and ordering for garments today as this does not appear to be good timing for her to spend increased time on the decongesting phase of treatment and manage MLB at this time. Patient's swelling and body shape will require her to have custom garments for day and night. Patient was issued a ready made circular knit thigh high and it does not work well for patient and is causing bunching at the knee. Patient was measured for custom Elvarex thigh highs, Custom Elvarex Biker style shorts to wear with thigh highs, and also Custom Malachi Boxer [place] and AD garments. Patient's information is also being processed for a vaso-pneumatic pump. Patient is eager to get these items as soon as possible and is very fixated on the vaso-pneumatic pump at this time. There is some concern as patient is concerned about finances if she will be willing to proceed with ordering her items. The lymphedema clinic is only able to recommend garments and she will need to speak with the vendor [facility] [email or web address]  that works with her insurance to have details on the her portion of the garments. Long term goals  Time frame: 5/17/18  1. Pt will show improvement in Lymphedema Life Impact Scale by decreasing impairment percentage to under 70% and thus allow improvement in patient's quality of life. 2.  Patient will be independent with don/doff of compression system and use in order to prevent reaccumulation of fluid at discharge. Patient struggling with donning thigh high and requesting assist today in the clinic. Noted on arrival today that the thigh high was bunched with excess fabric and the knee crease. Educated patient on improving donning techniques. Patient will also need assistance with compression bandaging. Patient's mother is willing to assist her as needed.    3.  Pt will be independent in self-MLD and show stable limb volumes showing decongestion and pt. ready for transition to independent restorative phase of lymphedema therapy. Patient measured for custom day and night products today as MLB is difficult for patient to manage and be active and work 7 days a week. Patient understands that she needs to stay in [facility] until her garments arrive for best results. SUBJECTIVE REPORT: Patient arrived today in tears stating that she feels that she is going to lose her job because of having to have treatment for her lymphedema. \"I am going to lose my job because of this. \" Patient needs redirection to stay on task. Patient's mother able to attend her visit today to go over improving MLB application in the home setting. Patient will need to get this crucial aspect of CDT down before we progress to more topics as she is having difficulty feeling overwhelmed by all of the information and we are trying to focus on one main aspect at a time. Next visit will focus on MLD and try to progress exercise routine for home. Pain: Patient reports that she has pain and limitations from her swelling and history of abdominal surgeries. Gait:   [x]  Independent gait without any assistive device for community distances  ADLs: Modified Independent for most, but will need to have her family assist her with the MLB/compression. Treatment Response: Patient arrived with out MLB on today,but did bring in all supplies ready   [x]   Patient reviewed packet received at evaluation  []   Patient completed home program as prescribed  []   Patient not fully compliant with home program to date   []   Patient measured for custom compression garments today  Function: Patient works 7 days a week. [x]   Patient requiring assistance with MLB and compression at this time. []   ADLs are requiring less assistance   []   Patient able to return to work/leisure activities  Lymphedema Life Impact Scale: Deferred today.    Weight:deferred today  TREATMENT AND OBJECTIVE DATA SUMMARY:   Patient/Family Education:      Educated in skin care: [x]   Skin care products  [x]   Hygiene  [x]  Prevention of cellulitis  []   Wound care     Educated in exercise: [x]   Walking program  []   Flor ball routine  []   Stick routine  [x]   ROM routine     Instructed in self MLD: Deferred full instruction in MLD today as patient needs to focus on MLB today and custom garment measuring today and required increased time for all process. Instructed in don/doff of compression system:   [x]   Multi layer bandage (MLB) donning principles and wear precautions   [x]   Day garments (has Juzo Thigh high for R LE) Measured for flat knit custom garments for thigh highs and custom biker short Elvarex for both)   [x]   Night garments ( measured for Jovi boxer Edie Glance and AD leg pieces)     Therapeutic Activity 0 minutes   Treatment time: 0  Functional Mobility: Independent   Fall risk: Low     Therapeutic Exercise/Procedure 37 minutes   Treatment time: 1:12pm-1:49pm  Flor ball exercise program: Deferred   Free exercises/ROM: Encouraged frequent short walks at work and to work on the active range of motion routine as best as she can in the home setting. Patient able to demonstrate active range of motion through out her treatment today during measuring and MLB application.     Home program: Patient to perform daily to BID:  [x]   Skin care  [x]   Deep abdominal breathing  [x]   Exercise routine  [x]   Walking program  [x]   Rest in supine   [x]   Compression bandage (patient needs to purchase supplies as she has not done so to date)   [x]   Compression garments (has Juzo Thigh high for R LE, but has not been able to demonstrate independence with donning in the clinic as she requests assistance)  [x]   Vasopneumatic device (patient and a trial of the [place] last visit without good results so information submitted for patient to try to get a Flexitouch for home use/will trial Flexitouch in the clinic if able next visit)  []   Wound care  []   Self MLD  [x]   Bring supplies to each therapy visit  [x]   Purchase necessary supplies (started needs to pay for bandages)  []   Weight loss program (gave recommended nutrition recommendations hand out last visit)  []   Follow up with   Rationale: Exercise will increase the lymph angiomotoricity and tissue pressure of the skin and thus decrease swelling. Modalities 0 minutes   Treatment time: 0  Vasopneumatic pump: Patient had a trial on the [place] last visit with poor results. Patient was not comfortable in the products and with extenders needed to make the product work for her she felt that it was not effective. Patient will need to have a trunk component to a vaso-pneumatic to manage her swelling in trunk and upper thigh as this is the area of greatest concern and with her significant medical history of abdominal surgeries she will benefit from the truncal component in the home setting for long term use. Will trial a Flexitouch in the clinic as time allows, but her information was sent to [facility] for processing for a Flexitouch in the home setting. Manual Lymphatic Drainage (MLD) 97 minutes   Treatment time:11:35am-13:12pm  Area to decongest:   [x] LE             [x]  Right     [x]  Left      [x] Trunk   Sequence used and effectiveness: [] Secondary/Primary sequence for upper extremity with / without trunk involvement    [] Secondary sequence for lower extremities with trunk involvement     [] Modifications were made to manual lymph drainage sequence to exclude cervical techniques secondary to patient's age    [] Self MLD sequence/techniques/hand strokes (Focused so far on deep abdominal breathing and MLD principles with skin care. Will issue the Self MLD packet next visit.)   Skin/wound care/debridement: Intact   Upper/Lower extremity compression: Applied multi-layer compression bandaging to:   Below knee []  Thigh high  [x]   Upper Extremity []    Right []   Left [x] Bilateral []    The following multi-layer bandages were applied:  []  Tricofix            [x]  Silver Stockinette             []  Tg soft  []  Comperm     []  Transelast     Padding layer:  [x]  Cellona         []  Fleece    Foam:  [x] 10cm roll  [x] 12cm roll  [] 15cm roll (may need to add next visit)  [] Jinx Lung foam (patient has but does not like to use it )  [] Komprex  [] Komprex 2      Short stretch bandages:   [] 4cm  [x] 6cm  [] 8cm  [x] 10 cm (2)  [x] 12cm (3)  Educated patient in multi-layer bandaging donning principles and precautions. Reminded patient that she will need to doff and don thigh piece several times a day as needed, because as she reduces in volume it will slide down her thigh and need to be re-bandaged. Patient aware to remove MLB if she is having and of the warning signs: shortness of breath, heart palpations or numbness/tingling/discoloration in the toes. Explained to patient and her mother that MLB works by the layers of padding and bandage not how tight it is donned. Patient's mother able to perform hands on practice with MLB last visit and did well, but patient directed the MLB and not taking advice of her mother that was donning the bandage and today's MLB that she arrived in was incorrect and was missing majority of the padding and bandages and the foot was not bandaged with the MLB starting at the ankle resulting in increased foot swelling. Patient requested assistance donning the Juzo Thigh high as she struggles to get the garment to sit correctly on her leg as it is a ready made product. Patient has obtained donning gloves, but she stated she feels she is improving with donning in the home. Patient is struggling with managing MLB and working, so today she was measured for custom flat knit thigh length garments, custom biker short and a night time custom foam system [employer] with AD garments.  Patient is very concerned about finances so vendor will be aware to discuss order with patient prior to having products made as there will be no confusion. Kinesiotaping: Deferred   Girth/Volume measurement: Patient was measured for custom day and night time garments. TOTAL TREATMENT 145 mins (Patient here for a total of 145 minutes, only able to bill for 134 minutes as she exceeded her appointment time and allowable billable time.)           ASSESSMENT:   Treatment effectiveness and tolerance: Patient struggling to manage MLB to the thigh on the L LE with the assist of her mother. Patient continues to be very anxious and overwhelmed with CDT and understanding the entire process of how to treat her lymphedema. Patient was measured for custom garments today for day and night as she is not able to manage the MLB well and will need to proceed with her custom garments as soon as possible to make it easier for her to complete her home program and work 7 days a week. Patient aware that order will be sent for the recommended garments and if she has concerns about her financial responsibility she will have to discuss with vendor. Focus would be on day time garments and night time garments can always be ordered later if she is not able to purchase them at this time. Patient to follow up later this week. Progress toward goals: See goal section of note for details. PLAN OF CARE:   Changes to the plan of care: Continue MLB to the Thigh on the L LE as tolerated and use Juzo Thigh high on the R LE during the day.     Frequency: [x]  2 times a week  []  Weekly  []  Biweekly  []  Monthly   Other:        Santiago C Patch, PTA, CLT

## 2018-03-09 ENCOUNTER — HOSPITAL ENCOUNTER (OUTPATIENT)
Dept: PHYSICAL THERAPY | Age: 48
Discharge: HOME | End: 2018-03-09
Payer: COMMERCIAL

## 2018-03-09 PROCEDURE — 97016 VASOPNEUMATIC DEVICE THERAPY: CPT

## 2018-03-09 PROCEDURE — 97140 MANUAL THERAPY 1/> REGIONS: CPT

## 2018-03-09 NOTE — PROGRESS NOTES
[facility]  [address] [facility], [address] [place] [address] 22.    LYmphedema Therapy  Visit:5     [x]                  Daily note               []                 30 day/10th visit progress note    NAME: Aurelia Beal  DATE: 3/5/2018  GOALS  Short term goals  Time frame: 3/20/18  1. Patient will demonstrate knowledge of signs/symptoms of infections/cellulitis and be independent in skin care to prevent cellulitis. Continued education on skin care and infection/cellulitis prevention. 2.  Patient will demonstrate independence in lymphedema home program of therapeutic exercises to improve circulation and decongest limb to improve ADLs. Patient has the handouts for active range of motion remedial exercises. Patient able demonstrate range of motion activities in the clinic, but formal performance of the exercise routines have been difficult to complete as patient is requiring increased time to learn some of the more basic CDT concepts. 3.  Patient will tolerate multi-layer bandages (MLB) and show measureable decrease in limb volume to allow ordering of home compression system (daytime, nighttime garments and pump as needed). Patient arrived again without MLB in place because of needing to wash and dry her supplies. Last visit patient was measured and order was placed for garments. Patient's swelling and body shape will require her to have custom garments for day and night. Patient was issued a ready made circular knit thigh high and it does not work well for patient and is causing bunching at the knee that she has to constantly adjust. Patient was measured for custom Elvarex thigh highs, Custom Elvarex Biker style shorts to wear with thigh highs, and also Custom [employer] and AD garments.  Patient's information is also being processed for a vaso-pneumatic pump through [employer] There is some concern as patient is concerned about finances if she will be willing to proceed with ordering her items. The lymphedema clinic is only able to recommend garments and she will need to speak with the vendor Kya Chavez that works with her insurance to have details on the her portion of the garments. Long term goals  Time frame: 5/17/18  1. Pt will show improvement in Lymphedema Life Impact Scale by decreasing impairment percentage to under 70% and thus allow improvement in patient's quality of life. 2.  Patient will be independent with don/doff of compression system and use in order to prevent reaccumulation of fluid at discharge. Patient continues to need assistance with MLB and donning the Thigh high in the clinic. 3.  Pt will be independent in self-MLD and show stable limb volumes showing decongestion and pt. ready for transition to independent restorative phase of lymphedema therapy. Patient measured for custom day and night products last visit as MLB is difficult for patient to manage and be active and work 7 days a week. Patient understands that she needs to stay in [facility] until her garments arrive for best results, but arrived again today without compression in place. Planned to measured volumes, but this did not occur because patient was not in compression. SUBJECTIVE REPORT: Patient arrived today stating that she received a call that her pump could ship, but wanted to find out her co-pay. Prior to treatment starting patient was on call to try to get this information. Patient reports that she did not wear her compression in today because she needed to wash it and she wanted to take a shower as this has been difficult with trying to maintain MLB daily. Patient does mention some confusion about her Lasix medication so she was encouraged to discuss this with her physician. Pain: Patient reports that she has pain and limitations from her swelling and history of abdominal surgeries.    Gait:   [x]  Independent gait without any assistive device for community distances  ADLs: Modified Independent for most, but will need to have her family assist her with the MLB/compression. Treatment Response: Patient arrived with out MLB on today,but did bring in all supplies ready   [x]   Patient reviewed packet received at evaluation  []   Patient completed home program as prescribed  []   Patient not fully compliant with home program to date   [x]   Patient measured for custom compression garments last visit. Function: Patient works 7 days a week. [x]   Patient requiring assistance with MLB and compression at this time. []   ADLs are requiring less assistance   []   Patient able to return to work/leisure activities  Lymphedema Life Impact Scale: Deferred today. Weight:deferred today  TREATMENT AND OBJECTIVE DATA SUMMARY:   Patient/Family Education:      Educated in skin care: [x]   Skin care products  [x]   Hygiene  [x]  Prevention of cellulitis  []   Wound care     Educated in exercise: [x]   Walking program  []   Flor ball routine  []   Stick routine  [x]   ROM routine     Instructed in self MLD: Patient was given Self MLD packet today to begin performing in the home. (excluded axillary, IA, cervical techniques at this time)     Instructed in don/doff of compression system:   [x]   Multi layer bandage (MLB) donning principles and wear precautions   [x]   Day garments (has Juzo Thigh high for R LE) Measured for flat knit custom garments for thigh highs and custom biker short Elvarex for both)   [x]   Night garments ( measured for Malachi boxer todd and AD leg pieces)     Therapeutic Activity 0 minutes   Treatment time: 0  Functional Mobility: Independent   Fall risk: Low     Therapeutic Exercise/Procedure 0 minutes   Treatment time: 0  Flor ball exercise program: Deferred   Free exercises/ROM: Encouraged frequent short walks at work and to work on the active range of motion routine as best as she can in the home setting.  Patient able to demonstrate active range of motion through out her treatment today during MLB/compression application and with donning and doffing pump sleeves. Home program: Patient to perform daily to BID:  [x]   Skin care  [x]   Deep abdominal breathing  [x]   Exercise routine  [x]   Walking program  [x]   Rest in supine   [x]   Compression bandage   [x]   Compression garments (has Juzo Thigh high for R LE, but has not been able to demonstrate independence with donning in the clinic as she requests assistance due to the fit)  [x]   Vasopneumatic device (patient and a trial of the Dueñas pump in the past without good results so information submitted for patient to try to get a Flexitouch for home use. Patient able to try the Flexitouch today in the clinic.)  []   Wound care  [x]   Self MLD  [x]   Bring supplies to each therapy visit  [x]   Purchase necessary supplies (patient purchased supplies today)  [x]   Weight loss program (gave recommended nutrition recommendations hand out to follow)  []   Follow up with   Rationale: Exercise will increase the lymph angiomotoricity and tissue pressure of the skin and thus decrease swelling. Modalities 30 minutes   Treatment time: 12:00pm-12:30pm  Vasopneumatic pump: Patient able to trial a Flexitouch pump today for the trunk and L LE in the clinic. Patient has been approved for a pump for home use and is concerned with her copay so she will work with  her insurance company on this aspect. Manual Lymphatic Drainage (MLD) 85 minutes   Treatment time:11:05am-1:00pm  Area to decongest:   [x] LE             [x]  Right     [x]  Left      [x] Trunk   Sequence used and effectiveness:   [x] Secondary sequence for lower extremities with trunk involvement (excluded axillary, IA, cervical techniques at this time)    [x] Self MLD sequence/techniques/hand strokes   Issued packet today and went over the entire packet with patient prior to her getting a Flexitouch pump trial today. Skin/wound care/debridement: Intact   Upper/Lower extremity compression: Applied multi-layer compression bandaging to: Below knee []  Thigh high  [x]   Upper Extremity []    Right []   Left [x]    Bilateral []    The following multi-layer bandages were applied:  []  Tricofix            [x]  Silver Stockinette             []  Tg soft  []  Comperm     []  Transelast     Padding layer:  [x]  Cellona         []  Fleece    Foam:  [x] 10cm roll  [x] 12cm roll  [] 15cm roll   [] Gray foam (patient has but does not like to use it )  [] Komprex  [] Komprex 2      Short stretch bandages:   [] 4cm  [x] 6cm  [] 8cm  [x] 10 cm (2)  [x] 12cm (3)  Educated patient in multi-layer bandaging donning principles and precautions. Reminded patient that she will need to doff and don thigh piece several times a day as needed, because as she reduces in volume it will slide down her thigh and need to be re-bandaged. Patient aware to remove MLB if she is having and of the warning signs: shortness of breath, heart palpations or numbness/tingling/discoloration in the toes. Explained to patient and her mother that MLB works by the layers of padding and bandage not how tight it is donned. Patient's mother able to perform hands on practice with MLB on previous visits. Mother not able to attend this visit. Patient requested assistance donning the Juzo Thigh high as she struggles to get the garment to sit correctly on her leg as it is a ready made product. Patient has obtained donning gloves, but she stated she feels she is improving with donning in the home. Patient is struggling with managing MLB and Ready made thigh high while working 7 days a week. Measured for custom flat knit thigh length garments, custom biker short and a night time custom foam system Malachi Beebe with AD garments last visit.  Patient is very concerned about finances so vendor will be aware to discuss order with patient prior to having products made as there will be no confusion. Kinesiotaping: Deferred   Girth/Volume measurement: Deferred as patient did not wear compression into the clinic today. TOTAL TREATMENT 115 mins        ASSESSMENT:   Treatment effectiveness and tolerance: Patient presented with less anxiety and was able to progress to learning about MLD and Self MLD during today's session. Patient is eager to get into her custom garments as she is struggling to manage MLB to the thigh and work 7 days a week. Patient does continue to have some concerns about finances so she was encouraged to communicate with garments vendors to make sure that she is aware of her costs up front to avoid confusion. Patient to return next week for follow up. Progress toward goals: See goal section of note for details. PLAN OF CARE:   Changes to the plan of care: Continue MLB to the Thigh on the L LE as tolerated and use Juzo Thigh high on the R LE during the day. Added Self MLD. Frequency: [x]  2 times a week (will decrease frequency when appropriate)  []  Weekly  []  Biweekly  []  Monthly   Other: Patient paid for her bandage supplies today that she received from [employer].         Santiago Garcia, PTA, CLT

## 2018-03-12 ENCOUNTER — HOSPITAL ENCOUNTER (OUTPATIENT)
Dept: PHYSICAL THERAPY | Age: 48
Discharge: HOME | End: 2018-03-12
Payer: COMMERCIAL

## 2018-03-12 ENCOUNTER — TELEPHONE (OUTPATIENT)
Dept: CARDIOLOGY CLINIC | Age: 48
End: 2018-03-12

## 2018-03-12 VITALS — WEIGHT: 275.2 LBS | BODY MASS INDEX: 44.23 KG/M2 | HEIGHT: 66 IN

## 2018-03-12 PROCEDURE — 97110 THERAPEUTIC EXERCISES: CPT

## 2018-03-12 PROCEDURE — 97140 MANUAL THERAPY 1/> REGIONS: CPT

## 2018-03-12 NOTE — PROGRESS NOTES
[facility]  Physical Therapy Lymphedema Clinic  [address] [facility], [address] [place] [address].    LYmphedema Therapy  Visit:6    [x]                  Daily note               []                 30 day/10th visit progress note    NAME: Bertha Esteves  DATE: 3/5/2018  GOALS  Short term goals  Time frame: 3/20/18  1. Patient will demonstrate knowledge of signs/symptoms of infections/cellulitis and be independent in skin care to prevent cellulitis. Continued education on skin care and infection/cellulitis prevention. 2.  Patient will demonstrate independence in lymphedema home program of therapeutic exercises to improve circulation and decongest limb to improve ADLs. Patient has the handouts for active range of motion remedial exercises. Patient demonstrated independence with basic remedial home exercise routine today performed in sitting, but patient would prefer to perform in supine. She followed the handout to perform each activity. Patient has to be careful with position to not overstrain her weak abdominal muscles. 3.  Patient will tolerate multi-layer bandages (MLB) and show measureable decrease in limb volume to allow ordering of home compression system (daytime, nighttime garments and pump as needed). Patient arrived again without MLB in place and she reports this is due to difficulties with left thigh bandage suspension. Modifications were made today to address the issue. Right thigh limb volume has decreased by 611.04ml and left thigh limb volume has decreased by 1346.17ml since 2/21/18. Patient was measured for custom compression garments last week.   Patient was previously issued a ready made circular knit thigh high and it does not work well for patient and is causing bunching at the knee that she has to constantly adjust. Patient has been measured for custom Elvarex thigh highs, Custom Elvarex Biker style shorts to wear with thigh highs, and also Custom Malachi [employer]er [employer] and AD garments. Patient's information is also being processed for a vaso-pneumatic pump through [employer]. There is some concern as patient is concerned about finances if she will be willing to proceed with ordering her items. The lymphedema clinic is only able to recommend garments and she will need to speak with the vendor [address] [place] [address] that works with her insurance to have details on the her portion of payment for the garments. Long term goals  Time frame: 5/17/18  1. Pt will show improvement in Lymphedema Life Impact Scale by decreasing impairment percentage to under 70% and thus allow improvement in patient's quality of life. 2.  Patient will be independent with don/doff of compression system and use in order to prevent reaccumulation of fluid at discharge. Patient continues to need assistance with MLB and donning the Thigh high in the clinic. 3.  Pt will be independent in self-MLD and show stable limb volumes showing decongestion and pt. ready for transition to independent restorative phase of lymphedema therapy. Reassessed bilateral lower extremity limb volumes today with significant decreases in volume noted bilaterally. Right lower extremity limb volume has decreased by 611.04ml since evaluation 2/21/18 and left lower extremity limb volume has decreased by 1,346. 17ml since 2/21/18. Patient has been measured for custom compression garments and she has been educated in a modified home manual lymph drainage program.            SUBJECTIVE REPORT: I am at a crossroads today. I am struggling with the bandaging because the thigh keeps falling down and my mother just isn't able to wrap as well as you guys do. I am not using the bandaging as much as we would like me too because the thigh doesn't stay up and I am having to use the stockings you gave me. I am also really worried about the bill coming in for my copays.   I really liked the Flexitouch and I noticed a difference in my legs after using it last week. I think that is really going to help me at home. Pain: Patient reports that she has pain and limitations from her swelling and history of abdominal surgeries. Gait:   [x]  Independent gait without any assistive device for community distances  ADLs: Modified Independent for most, but needs to have her family assist her with the MLB/compression. Treatment Response: Patient arrived without MLB on today,but did bring in all supplies and was fully ready to participate in treatment. She reports repeated issues with thigh portion of bandage staying up on her leg after wrapping. She continues to express concern about the expense of her compression garments and of her treatments. [x]   Patient reviewed packet received at evaluation  []   Patient completed home program as prescribed  []   Patient not fully compliant with home program to date   [x]   Patient measured for custom compression garments last visit. Function: Patient works 7 days a week. [x]   Patient requiring assistance with MLB and compression at this time. []   ADLs are requiring less assistance   []   Patient able to return to work/leisure activities  Lymphedema Life Impact Scale: Deferred today. Weight:275.2lbs  TREATMENT AND OBJECTIVE DATA SUMMARY:   Patient/Family Education:      Educated in skin care: [x]   Skin care products  [x]   Hygiene  [x]  Prevention of cellulitis  []   Wound care     Educated in exercise: [x]   Walking program  []   Flor ball routine  []   Stick routine  [x]   ROM routine reviewed with patient and provided her with a written copy     Instructed in self MLD: Reviewed Self MLD packet today and instructed to begin performing in the home daily. (excluded axillary, IA, cervical techniques at this time)     Instructed in don/doff of compression system:   [x]   Multi layer bandage (MLB) donning principles and wear precautions.   Educated patient in modifications options to bandaging to help improve suspension. Patient is unable to tolerate bandaging without cellona padding due to skin sensitivities which would help with the slipping. Explained the benefits of shapewear with bandaging and incorporated wearing today. [x]   Day garments (has Juzo Thigh high for R LE) Measured for flat knit custom garments for thigh highs and custom biker short Elvarex for both) last week. [x]   Night garments ( measured for Malachi boxer todd and AD leg pieces) last week. Therapeutic Activity 0 minutes   Treatment time: 0  Functional Mobility: Independent   Fall risk: Low     Therapeutic Exercise/Procedure 15  minutes   Treatment time:  2335-8571  [employer] exercise program: Deferred   Free exercises/ROM:  Reviewed remedial lymphedema home exercise program with 5 repetitions of each exercise. Patient was provided with a new handout of the home program and following the program she was able to perform each exercise bilaterally and independently in sitting. She reports she would prefer to do the exercises in supine due to weak abdominal musculature and she was informed that that would be acceptable. Removed the resisted hip flexion from the program due to concerns about decreased abdominal stability. Home program: Patient to perform daily to BID:  [x]   Skin care  [x]   Deep abdominal breathing  [x]   Exercise routine  [x]   Walking program  [x]   Rest in supine   [x]   Compression bandage   [x]   Compression garments (has Juzo Thigh high for R LE, but has not been able to demonstrate independence with donning in the clinic as she requests assistance due to the fit)  [x]   Vasopneumatic device (patient completed a trial of the Dueñas pump in the past without good results so information was submitted for patient to obtain and advanced pneumatic device (a Flexitouch) for home use.  Patient able to try the Flexitouch last week in the clinic.)  []   Wound care  [x]   Self MLD  [x]   Bring supplies to each therapy visit  [x]   Purchase necessary supplies (patient purchased supplies today)  [x]   Weight loss program (gave recommended nutrition recommendations hand out to follow)  []   Follow up with   Rationale: Exercise will increase the lymph angiomotoricity and tissue pressure of the skin and thus decrease swelling. Modalities 0 minutes   Treatment time: N/A  Vasopneumatic pump: Patient able to trial a Flexitouch pump today for the trunk and L LE in the clinic. This patient would benefit from an advanced pneumatic device to better address truncal swelling as well as lower extremity swelling. Patient has been approved for a pump for home use, but she is concerned with her copay so she will work with  her insurance company on this aspect. Manual Lymphatic Drainage (MLD) 95 minutes   Treatment time:1045-1145am, 1200-1235pm  Area to decongest:   [x] LE             [x]  Right     [x]  Left      [x] Trunk   Sequence used and effectiveness:   [x] Secondary sequence for lower extremities with trunk involvement (excluded axillary, IA, cervical techniques at this time)    [x] Self MLD sequence/techniques/hand strokes   Issued packet today and reviewed the entire packet with patient today. Discussed with patient that she must perform a minimum of 5 repetitions of each stroke but if time permits she can perform more than the recommended 7 repetitions per her questioning. She redemonstrated techniques with minimal cues. Skin/wound care/debridement: Intact. Performed skin care with remedy lotion to left lower extremity prior to application of multi layer compression bandaging. Upper/Lower extremity compression: Applied multi-layer compression bandaging to:   Below knee []  Thigh high  [x]   Upper Extremity []    Right []   Left [x]    Bilateral []    The following multi-layer bandages were applied:  []  Tricofix            [x]  Silver Stockinette             []  Tg soft  []  Comperm     []  Transelast     Padding layer:  [x]  Cellona         []  Fleece    Foam:  [x] 10cm roll- to knee  [x] 12cm roll- for knee and thigh  [] 15cm roll   [] Gray foam (patient has but does not like to use it )  [] Komprex  [] Komprex 2      Short stretch bandages:   [] 4cm  [x] 6cm-lower leg  [] 8cm  [x] 10 cm (2)-lower leg  [x] 12cm (3) thigh  Educated patient in multi-layer bandaging donning principles and precautions. .   Patient aware to remove MLB if she is having and of the warning signs: shortness of breath, heart palpations or numbness/tingling/discoloration in the toes. Patient is struggling with managing MLB and Ready made thigh high while working 7 days a week. She is reporting issues with left thigh suspension with reports it slips as soon as she returns to work from her appointment. Patient has increased skin sensitivity and cannot wrap without the cellona padding on her thigh. She brought in her shapewear and she and the therapist donned it over top of her bandaging and right thigh. Patient required assistance with donning, but reports increased comfort with bandaging and with abdominal support with the product in place. Therapist assisted pateint with donning of right thigh high today. Custom compression garment order is in progress. Patient is very concerned about finances so vendor has is aware to discuss order with patient prior to having products made so there will be no confusion. Kinesiotaping: Deferred   Girth/Volume measurement: Reassessed bilateral lower extremities. Right lower extremity limb volume is 16,090.71ml versus 16,701.75ml on 2/21/18. This is a decrease of 611.04 ml. Left lower extremity limb volume is 15,741.15ml versus 17,097. 32ml on 2/21/18. This is a volume decrease of 1346.17ml. TOTAL TREATMENT 110 mins        ASSESSMENT:   Treatment effectiveness and tolerance: Patient presented with less anxiety and her self manual lymph drainage and exercise programs were reviewed today. Reassessment of circumferences yields significant decreases in limb volumes bilaterally. Patient's custom compression garment orders are in progress and Flexitouch is anticipated to be delivered soon. Patient is having difficulty maintaining left thigh suspension so incorporated and donned her shapewear today over her bandaging and right thigh stocking. Patient to report on Thursday if this change helped with bandaging suspension and if so we will continue to incorporate it into her bandaging routine. Progress toward goals: See goal section of note for details. PLAN OF CARE:   Changes to the plan of care: Continue MLB to the Thigh on the left lower extremity as tolerated and use Juzo Thigh high on the right lower extremity during the day. Patient to perform daily self manual lymph drainage and remedial exercises.     Frequency: [x]  2 times a week (will decrease frequency when appropriate)  []  Weekly  []  Biweekly  []  Monthly   Other:       MANNY Hilton, ETHEL

## 2018-03-12 NOTE — TELEPHONE ENCOUNTER
Ean Reynolds with [facility] called in regards to this pt having an appointment with them this morning between 10 am & 11 am. She said her office faxed over papers on Friday for Dr. Jenny Mcpherson to sign and send back, she mentioned that she knew he was out of the office last week but she's following up. She said she was faxing over pt's evaluation, order & letter of medical necessity to [phone]. If there are any issues please give her a call back @ [phone]. Thanks!   Nayeli Garay

## 2018-03-13 ENCOUNTER — TELEPHONE (OUTPATIENT)
Dept: CARDIOLOGY CLINIC | Age: 48
End: 2018-03-13

## 2018-03-13 NOTE — TELEPHONE ENCOUNTER
Faxed letter of medical necessity to [facility] at fax number [phone]. Fax confirmation received.       RE: Compression Therapy

## 2018-03-15 ENCOUNTER — APPOINTMENT (OUTPATIENT)
Dept: PHYSICAL THERAPY | Age: 48
End: 2018-03-15
Payer: COMMERCIAL

## 2018-03-19 ENCOUNTER — HOSPITAL ENCOUNTER (OUTPATIENT)
Dept: PHYSICAL THERAPY | Age: 48
Discharge: HOME | End: 2018-03-19
Payer: COMMERCIAL

## 2018-03-19 NOTE — PROGRESS NOTES
[facility]  Physical Therapy Lymphedema Clinic  [address] [facility], [address] [place] [address], [facility] 22.    Lymphedema Therapy      3/19/2018:  Pavan Paul was not seen on this date for physical therapy for the following reasons:    [x]      Patient called to cancel the visit for the following reasons: Patient called and left a message yesterday that she would be cancelling her appointment today in the lymphedema clinic because she did not want to be bandaged today. Patient stating that she wanted to be able to use her vaso-pneumatic pump so she would prefer to due this and follow up for her next appointment to be assessed at that time. Patient had cancelled her visit last Thursday for the same reason. Patient scheduled to come in later this week for follow up. Patient has a Custom garment order that was sent out for day and night time products. Patient to follow up later this week. []      Patient missed the visit and did not call to cancel.     Santiago Garcia, PTA, CLT

## 2018-03-20 NOTE — PROGRESS NOTES
[facility]  Physical Therapy Lymphedema Clinic  [address] [facility], [address] [place] [address], [facility] 22.    Lymphedema Therapy      3/20/2018:  Hattie Betancourt was not seen on this date for physical therapy for the following reasons:    []    Patient called and left a message last night  in regards to what the \"agenda\" for her next visit would be as she was concerned with her medical bills from treatment so far. Patient has cancelled 2 visits in a row with out complaints and is scheduled to come in on Thursday. Patient's call was returned and patient stated that she received her vaso-pneumatic pump last week and since then everytime she uses it she is nauseated or vomits. Patient reports that she elevates her head on pillows when using the pump, but continues to have this issue. Patient currently using her pump twice a day alternating legs. Discussed with patient that this is not typical and that she should discontinue pump use until she comes into the clinic for follow up on Thursday. [facility] [employer] was alerted after phone call to be alerted as well. Patient insistent on using the pump despite having these symptoms and therapy recommendations. Encouraged her to not use the pump to see if these symptoms will resolve. Patient plans to come to her appointment on Thursday for follow.      Santiago Garcia, PTA, CLT

## 2018-03-21 NOTE — PROGRESS NOTES
[facility]  Physical Therapy Lymphedema Clinic  [address] [facility], [address] [place] [address], [facility] 22.    Lymphedema Therapy      3/21/2018:  Christianna Carrel      Patient called on 3/20/18 with a phone conversation from 3:50-4:10. This therapist spoke to patient at length with patient reporting experiencing vomiting at 4:00 am on Friday 3/16/18 stating she \"woke up with vomit in her mouth. \"  She reports additional nausea and vomiting after this episode and she attributes it to implementing her use of the Flexitouch which she began to use and was trained in on Thursday 3/15/18. 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 She reports that she has had repeated episodes of nausea and vomiting after Flexitouch use since then. Patient states to therapist that she talked to the [employer] , Jeramy Martinez on Friday and that she was advised by Charlie siu and [employer] that this is a normal response or side effect to using the Flexitouch. Patient was advised by therapist that we are unable to write her a note that excuses her from work as that falls outside of our practice guidelines and that would need to be addressed by a physician. Patient also informed that she had not reported these adverse symptoms to us until she contacted the department Tuesday morning 3/20/18 and spoke with ELMIRA Garcia. Additionally, patient requested that her appointment schedule be written on individual schedule sheets by week rather than all appointments on one care as she believes that her HR department does not believe that the appointments written on the back of the card were written by the therapy staff.   Patient also requests that her training appointment for [employer] on Thursday 3/15/18 be documented on the schedule card. Therapist stated that this could be done for her and the task was completed by this therapist on 3/20/18 with plan for patient to  the schedule cards on 3/21/18. Confirmed scheduled lymphedema appointment with patient for Thursday 3/22/18.         3/21/18  Patient once again left a message with another staff member that states she needs a note from therapy stating that she can return to work and that her vomiting symptoms are not from a contagious condition. She states the note should say that she contacted [employer] [facility] and they said nausea and vomiting after Flexitouch use is normal.  She states that she will be by later this morning at 11:45 to  the paperwork.     CHRIS RodarteT, MISSY-JULIANA

## 2018-03-21 NOTE — PROGRESS NOTES
[address] [place] [address]  Physical Therapy Lymphedema Clinic  [address] [facility], [address], [facility] 22.    Lymphedema Therapy      3/21/2018:  Alex Coast      Therapist spoke with      MANNY Lobo, ETHEL

## 2018-03-22 ENCOUNTER — HOSPITAL ENCOUNTER (OUTPATIENT)
Dept: PHYSICAL THERAPY | Age: 48
Discharge: HOME | End: 2018-03-22
Payer: COMMERCIAL

## 2018-03-22 VITALS — WEIGHT: 278.2 LBS | BODY MASS INDEX: 44.71 KG/M2 | HEIGHT: 66 IN

## 2018-03-22 PROCEDURE — 97110 THERAPEUTIC EXERCISES: CPT

## 2018-03-22 PROCEDURE — 97140 MANUAL THERAPY 1/> REGIONS: CPT

## 2018-03-22 NOTE — PROGRESS NOTES
[facility]  Physical Therapy Lymphedema Clinic  [address] [facility], [address] [place] [address] 22.    LYmphedema Therapy  Visit:7    []                  Daily note               [x]                 30 day/10th visit progress note     NAME: Kalina Pelletier  DATE: 3/22/2018  GOALS  Short term goals  Time frame: 3/20/18  1. Patient will demonstrate knowledge of signs/symptoms of infections/cellulitis and be independent in skin care to prevent cellulitis. Patient has been educated on skin care and infection/cellulitis prevention. Patient currently with no skin issues and understands recommendations for skin care. Goal Met 3/20/18. 2.  Patient will demonstrate independence in lymphedema home program of therapeutic exercises to improve circulation and decongest limb to improve ADLs. Patient has the handouts for active range of motion remedial exercises. Patient demonstrated independence with basic remedial home exercise routine performed in sitting, but patient would prefer to perform in supine. She followed the handout to perform each activity. Patient has to be careful with position to not overstrain her weak abdominal muscles. Patient admits to not performing exercises recently due not feeling well. Will extend this to long term goal.    3.  Patient will tolerate multi-layer bandages (MLB) and show measureable decrease in limb volume to allow ordering of home compression system (daytime, nighttime garments and pump as needed). Patient arrived again without MLB in place and she reports her mother has not been able to bandage her due to medical issues of her own. Patient does report that she is wearing her ready made Juzo thigh highs. Patient has received her vaso-pneumatic pump, but is having issues with the pump and [facility] has been alerted to assist with trying to resolve this.  Patient also has been measured for day and night time garments and they have not arrived to date. Patient's mother stated today that a package arrived today and that it was most likely her daytime garments. All items have been ordered and she has received them or in the process of receiving them. Goal Met  3/20/18. Long term goals  Time frame: 5/17/18   1. Pt will show improvement in Lymphedema Life Impact Scale by decreasing impairment percentage to under 70% and thus allow improvement in patient's quality of life. Patient scored a 46 on the LLIS today with a percent impairment of 68%. Goal Met 3/20/18. 2.  Patient will be independent with don/doff of compression system and use in order to prevent reaccumulation of fluid at discharge. Patient continues to need assistance with MLB and donning the Thigh high in the clinic, so will need education on donning her products when they arrive. 3.  Pt will be independent in self-MLD and show stable limb volumes showing decongestion and pt. ready for transition to independent restorative phase of lymphedema therapy. Patient reports that she has not been performing Self MLD regularly. Will need to continue to review this on upcoming visits. Full volumes taken today to reveal that patient has gained 20 ml on the L LE and 416 ml on the R LE since volumes taken on  3/12/18. Patient has lost a total of 1323 ml on the L LE and 195 ml on the R LE since evaluation on 2/21/18. SUBJECTIVE REPORT: Patient has been in contact with the clinic multiple times since last visit by phone. See previous notes for details. Patient arrived today stating that she does not feel well and she is feeling down about her situation with her health and her job. Patient is very difficult to redirect at times and requires increased time for treatment. Patient arrived stating that she felt her weight was up \"12 lbs\". Patient's weight was up, but only 3 lbs.  Patient also reports that her leg swelling is worse when they are relatively stable considering she arrived to her appointment without compression on. Patient's reports and objective findings do not always coincide. Patient tearful today at times. Patient's mother came to part of her appointment to have continued education and to be able to assist patient has needed. Pain: Patient reports that she has pain and limitations from her swelling and history of abdominal surgeries. Gait:   [x]  Independent gait without any assistive device for community distances  ADLs: Modified Independent for most, but needs to have her family assist her with the MLB/compression. Treatment Response: Patient arrived without MLB or thigh high compression on today. Patient continues to need to be redirected frequently in treatment as she focuses on topics that are not critical to her treatment at times, causing her visits to be longer in length. [x]   Patient reviewed packet received at evaluation  []   Patient completed home program as prescribed  [x]   Patient not fully compliant with home program to date (not staying in compression as needed/not following recommendations to stop using vasopneumatic pump when discussed over the phone on Tuesday when she was reporting unusual symptoms.)  []   Patient measured for custom compression garments last visit. Function: Patient works 7 days a week. [x]   Patient requiring assistance with MLB and compression at this time. []   ADLs are requiring less assistance   []   Patient able to return to work/leisure activities  Lymphedema Life Impact Scale: Patient score today was 46 with a percent impairment 68%.   Weight: 278.2 lbs up 3 lbs from last visit weight of  275.2 lbs  TREATMENT AND OBJECTIVE DATA SUMMARY:   Patient/Family Education:      Educated in skin care: [x]   Skin care products  [x]   Hygiene  [x]  Prevention of cellulitis  []   Wound care     Educated in exercise: [x]   Walking program  []   Flor ball routine  []   Stick routine  [x]   ROM routine reviewed with patient and provided her with a written copy     Instructed in self MLD: Reviewed Self MLD packet and instructed to begin performing in the home daily. (excluded axillary, IA, cervical techniques at this time)     Instructed in don/doff of compression system:   [x]   Multi layer bandage (MLB) donning principles and wear precautions. Educated patient in modifications options to bandaging to help improve suspension. Patient is unable to tolerate bandaging without cellona padding due to skin sensitivities which would help with the slipping. Explained the benefits of shapewear with bandaging , but patient did not want to don shape wear today as she felt everything is more swollen in her abdomen since beginning use of her pump in the home. [x]   Day garments (has Juzo Thigh high for R LE) Measured for flat knit custom garments for thigh highs and custom biker short Elvarex for both) Will assess when they arrive. [x]   Night garments ( measured for Malachi boxer todd and AD leg pieces) Will assess when they arrive. Therapeutic Activity 0 minutes   Treatment time: 0  Functional Mobility: Independent   Fall risk: Low     Therapeutic Exercise/Procedure 10  minutes   Treatment time: 12:35pm-12:45pm  Flor ball exercise program: Deferred   Free exercises/ROM: Reviewed remedial lymphedema home exercise program with 5 repetitions of each exercise. Patient was provided with a new handout of the home program and following the program she was able to perform each exercise bilaterally and independently in sitting. She reports she would prefer to do the exercises in supine due to weak abdominal musculature and she was informed that that would be acceptable. Removed the resisted hip flexion from the program due to concerns about decreased abdominal stability. Patient also able to show active range of motion during measuring, skin care, bandaging and donning thigh high.     Home program: Patient to perform daily to BID:  [x]   Skin care  [x] Deep abdominal breathing  [x]   Exercise routine  [x]   Walking program  [x]   Rest in supine   [x]   Compression bandage   [x]   Compression garments (has Lillyzo Thigh high for R LE, but has not been able to demonstrate independence with donning in the clinic as she requests assistance due to the fit)  [x]   Vasopneumatic device (Patient received her Flexitouch for home use, but is reporting unusual symptoms so advised to discontinue use until [facility] can assist with a remedy for the problem)  []   Wound care  [x]   Self MLD  [x]   Bring supplies to each therapy visit  []   Purchase necessary supplies  [x]   Weight loss program (gave recommended nutrition recommendations hand out to follow)  []   Follow up with   Rationale: Exercise will increase the lymph angiomotoricity and tissue pressure of the skin and thus decrease swelling. Modalities 0 minutes   Treatment time: N/A  Vasopneumatic pump: Patient has a Flexitouch pump for home use. This is her first visit back to the clinic since she was set up with the pump in the home. On Tuesday patient reported by phone that every time she uses the pump she has nausea and vomiting afterwards. She does not seem to be consistent with her reports and information changes each time this issue is discussed. After more questioning it appears that the trunk portion of her garment is not adequately pumping her trunk/abdomen. Patient used a trunk/thigh extender garment and a calf/leg piece in the clinic and had good results. [facility] was alerted to the concerns on Tuesday for their clinical support staff to assist. Today they were contacted by the clinic to make them aware of patient's concern that her trunk garment is not effective and that she feels that it is too small and the extender added to it is not clearing her abdomen. This may be why she is having reports of unusual nausea.    Will be in contact to resolve issue for patient, but again recommended that patient not use her pump until issue can be resolved. Manual Lymphatic Drainage (MLD)  95 minutes   Treatment time: 11:00am-12:35pm  Area to decongest:   [x] LE             [x]  Right     [x]  Left      [x] Trunk   Sequence used and effectiveness:   [x] Secondary sequence for lower extremities with trunk involvement (excluded axillary, IA, cervical techniques at this time)    [x] Self MLD sequence/techniques/hand strokes   Issued packet and reviewed with patient. Skin/wound care/debridement: Intact. Performed skin care with remedy lotion to left lower extremity prior to application of multi layer compression bandaging. Upper/Lower extremity compression: Applied multi-layer compression bandaging to: Below knee []  Thigh high  [x]   Upper Extremity []    Right []   Left [x]    Bilateral []    The following multi-layer bandages were applied:  []  Tricofix            [x]  Silver Stockinette             []  Tg soft  []  Comperm     []  Transelast     Padding layer:  [x]  Cellona         []  Fleece    Foam:  [x] 10cm roll- to knee  [x] 12cm roll- for knee and thigh  [] 15cm roll   [] Gray foam (patient has but does not like to use it )  [] Komprex  [] Komprex 2      Short stretch bandages:   [] 4cm  [x] 6cm-lower leg  [] 8cm  [x] 10 cm (2)-lower leg  [x] 12cm (3) thigh  Educated patient in multi-layer bandaging donning principles and precautions. .   Patient aware to remove MLB if she is having any of the warning signs: shortness of breath, heart palpations or numbness/tingling/discoloration in the toes. Patient is struggling with managing MLB and Ready made thigh high while working 7 days a week. Patient declined using shapewear for assisting to hold MLB in place. Custom compression garment order is in progress and mother reports that package arrived today so we anticipate that it is her day time compression.     Kinesiotaping: Deferred   Girth/Volume measurement: Full volumes taken today to reveal that patient has gained 20 ml on the L LE and 416 ml on the R LE since volumes taken on  3/12/18. Patient has lost a total of 1323 ml on the L LE and 195 ml on the R LE since evaluation on 2/21/18. TOTAL TREATMENT 105 mins        ASSESSMENT:   Treatment effectiveness and tolerance: Patient continues to require increased time for treatment as she becomes focused on one topic and it is difficult to have her move on to the next task. Patient is also inconsistent with her reports and what we are finding clinically. Patient is having issues with her Flexitouch vaso-pneumatic pump so was asked to hold use until [facility] can get back to us on trying different garment configuration vs going back out to her house to observe the issue in person. Patient should have her custom garments for day wear next visit, so the focus will be on getting her to be independent with don/doff of items. Patient reminded that she needs to focus on all aspects of her CDT (Compression, lymph drainage, exercises and skin care) to see the results. Currently she has not been able to show that she is performing all 4 consistently. Expect that once her garments are fitted and assessed she will be discharged to the restorative phase of care. Progress toward goals: See goal section of note for details. STG 1+3 met and LTG 1 met. Will extend STG 2. PLAN OF CARE:   Changes to the plan of care: Patient to hold use of vaso-pneumatic pump until [employer] can assist with retraining. Patient to continue home program and work on consistency. Fit for compression garments upon delivery. Frequency: [x]  2 times a week (will decrease frequency when appropriate)  []  Weekly  []  Biweekly  []  Monthly   Other: [employer] contacted in regards to issues with her Flexitouch vaso-pneumatic pump.       Santiago Garcia, PTA,CLT  Jeanmarie Garcia MSPT, CLTKATHY

## 2018-03-26 ENCOUNTER — HOSPITAL ENCOUNTER (OUTPATIENT)
Dept: PHYSICAL THERAPY | Age: 48
Discharge: HOME | End: 2018-03-26
Payer: COMMERCIAL

## 2018-03-26 PROCEDURE — 97140 MANUAL THERAPY 1/> REGIONS: CPT

## 2018-03-26 PROCEDURE — 97110 THERAPEUTIC EXERCISES: CPT

## 2018-03-26 NOTE — PROGRESS NOTES
[facility]  Physical Therapy Lymphedema Clinic  [address], [address], [place]  21.    LYmphedema Therapy  Visit:8    [x]                  Daily note               []                 30 day/10th visit progress note     NAME: Milagros Longo  DATE: 3/26/2018  GOALS  Short term goals  Time frame: 3/20/18  1. Patient will demonstrate knowledge of signs/symptoms of infections/cellulitis and be independent in skin care to prevent cellulitis. Patient has been educated on skin care and infection/cellulitis prevention. Patient currently with no skin issues and understands recommendations for skin care. Goal Met 3/20/18. 2.  Patient will demonstrate independence in lymphedema home program of therapeutic exercises to improve circulation and decongest limb to improve ADLs. Patient has the handouts for active range of motion remedial exercises. Patient demonstrated independence with basic remedial home exercise routine performed in sitting, but patient would prefer to perform in supine. She followed the handout to perform each activity. Patient has to be careful with position to not overstrain her weak abdominal muscles. Patient admits to not performing exercises recently due not feeling well. Able to educate her in Hope Routine today. Will extend this to long term goal.    3.  Patient will tolerate multi-layer bandages (MLB) and show measureable decrease in limb volume to allow ordering of home compression system (daytime, nighttime garments and pump as needed). Patient arrived again without MLB in place and she reports her mother has not been able to bandage her due to medical issues of her own. Patient does report that she is wearing her ready made Juzo thigh highs. Patient has received her vaso-pneumatic pump, but is having issues with the pump and [employer] has been alerted to assist with trying to resolve this.  Patient also has been measured for day and night time garments and they have not arrived to date. Patient's mother stated today that a package arrived today and that it was most likely her daytime garments. All items have been ordered and she has received them or in the process of receiving them. Goal Met  3/20/18. Long term goals  Time frame: 5/17/18   1. Pt will show improvement in Lymphedema Life Impact Scale by decreasing impairment percentage to under 70% and thus allow improvement in patient's quality of life. Patient scored a 46 on the LLIS today with a percent impairment of 68%. Goal Met 3/20/18. 2.  Patient will be independent with don/doff of compression system and use in order to prevent reaccumulation of fluid at discharge. Patient able to demonstrate that she could don/doff products with increased time in the clinic. Patient will need to continue to work with the products on her own and see how she will do in the home setting. 3.  Pt will be independent in self-MLD and show stable limb volumes showing decongestion and pt. ready for transition to independent restorative phase of lymphedema therapy. Patient reports that she has not been performing Self MLD regularly. Will need to continue to review this on upcoming visits. Today's treatment focused on her new compression day time garments and exercise. Will need to reassess volumes after she has worn her products to see how they are performing. SUBJECTIVE REPORT: Patient reports that her replacement Flexitouch garments came to the home and another [employer]  came to her home and assisted her with set up for 3 hours. Patient reports that the new garments for the Flexitouch work much better and are more effective. Patient reports that she did go to [employer] on Saturday after working all day standing without compression on as she was very swollen.  Patient stated that she could tell that her swelling was worse and went after work as her physician at [employer] was back from vacation and he was able to assess her. Patient arrived today with her Elvarex Custom products to be fitted. Patient continues to be very anxious about her job and missing time from work for appointments, which she stays focused on during her visits and has to be redirected. Pain: Patient reports that she has pain and limitations from her swelling and history of abdominal surgeries. Gait:   [x]  Independent gait without any assistive device for community distances  ADLs: Modified Independent for donning her day time products today. Treatment Response: Patient able to arrive today   [x]   Patient reviewed packet received at evaluation  []   Patient completed home program as prescribed  [x]   Patient not fully compliant with home program to date (not staying in compression as needed or performing full home program as recommended)  [x]   Patient fitted for custom compression garments today. Function: Patient works 7 days a week. [x]   Patient able to don/doff compression garments with modified independence today  []   ADLs are requiring less assistance   []   Patient able to return to work/leisure activities  Lymphedema Life Impact Scale: Deferred  Weight: 281.6 lbs up from last visit weight of 278.2 lbs   TREATMENT AND OBJECTIVE DATA SUMMARY:   Patient/Family Education:      Educated in skin care: [x]   Skin care products  [x]   Hygiene  [x]  Prevention of cellulitis  []   Wound care     Educated in exercise: [x]   Walking program  [x]   Flor ball routine  []   Stick routine  [x]   ROM routine      Instructed in self MLD: Reviewed Self MLD packet and instructed to begin performing in the home daily. (excluded axillary, IA, cervical techniques at this time)     Instructed in don/doff of compression system:   [x]   Multi layer bandage (MLB) donning principles and wear precautions. Patient has not been consistent with bandaging as requested.      [x]   Day garments (received Elvarex day time products and they were fitted today. Initial fit was good, but may need adjustments to the length of the biker short)    [x]   Night garments (measured for Malachi boxer todd and AD leg pieces) Will assess when they arrive. Vendor asking for pictures to be sent to them to have for completion of production of the products that was done today. Therapeutic Activity 0 minutes   Treatment time: 0  Functional Mobility: Independent   Fall risk: Low     Therapeutic Exercise/Procedure 15 minutes   Treatment time: 12:20pm-12:35pm  Flor ball exercise program: Performed with assist to position the ball in the supine position. Patient able to perform all x 5 reps. Pillow placed under knee as needed for support. Free exercises/ROM: Patient prefers to do the exercises in supine due to weak abdominal musculature and she was informed that that would be acceptable. Removed the resisted hip flexion from the program due to concerns about decreased abdominal stability. Home program: Patient to perform daily to BID:  [x]   Skin care  [x]   Deep abdominal breathing  [x]   Exercise routine  [x]   Walking program  [x]   Rest in supine   [x]   Compression bandage   [x]   Compression garments (New custom Elvarex products arrived and were fitted today)  [x]   Vasopneumatic device (Patient received her Flexitouch for home use and had issues. This weekend new garments were sent to her and she had a 3 hour retraining. Patient now without complaints.)  []   Wound care  [x]   Self MLD  [x]   Bring supplies to each therapy visit  []   Purchase necessary supplies  [x]   Weight loss program (gave recommended nutrition recommendations hand out to follow)  []   Follow up with   Rationale: Exercise will increase the lymph angiomotoricity and tissue pressure of the skin and thus decrease swelling.      Modalities 0 minutes   Treatment time: N/A  Vasopneumatic pump: Patient had new garments for her Flexitouch sent to her home as requested last visit and she was able to have a [employer]  come out for 3 hours to assist with set up. Patient reports that she is working with the pump in the evenings performing  R LE and L LE back to back. Clinic recommendation was one leg in the am and 12 hours or greater she could pump the opposite extremity in the pm.  Discussed monitoring her symptoms on the pump, as patient has some unrealistic expectations of the use and purpose of the pump. Will continue to provide her with education. Manual Lymphatic Drainage (MLD)  75 minutes   Treatment time: 27:29FW-14:92NC  Area to decongest:   [x] LE             [x]  Right     [x]  Left      [x] Trunk   Sequence used and effectiveness:   [x] Secondary sequence for lower extremities with trunk involvement (excluded axillary, IA, cervical techniques at this time)    [x] Self MLD sequence/techniques/hand strokes   Issued packet and reviewed with patient. Skin/wound care/debridement: Intact. Upper/Lower extremity compression: Educated patient/patient's mother in multi-layer bandaging donning principles and precautions, but there has not been consistency in use of the MLB to date. Patient also reports that she did not wear compression on Saturday to her job where she stands all day. Compliance with compression continues to be an issue. Custom compression garments arrived and patient brought them in for fitting today. The thigh highs fit well initially and patient able to don independently. Patient's custom biker shorts also fit well, but the length may need to be increased. Will reassess fit at her next visit on Thursday. Kinesiotaping: Deferred   Girth/Volume measurement: Deferred secondary to garment fitting and patient had been out of compression all weekend. TOTAL TREATMENT 90 mins        ASSESSMENT:   Treatment effectiveness and tolerance: Patient able to be fitted with her day time compression today.  Patient aware that she will need to work with the products and return on Thursday so that we can assess the fit and make any changes. Patient's night time products are in production and they were only waiting on pictures so they can better construct the garments for patient. Progress toward goals: See goal section of note for details. STG 1+3 met and LTG 1 met. Will extend STG 2. PLAN OF CARE:   Changes to the plan of care: Patient to begin use of her new day time custom garments and work on her home program as instructed.     Frequency: [x]  2 times a week (will decrease frequency when appropriate)  []  Weekly  []  Biweekly  []  Monthly   Other:      Santiago VALLEJO Patch, PTA,CLT

## 2018-03-30 VITALS — WEIGHT: 281.6 LBS | HEIGHT: 66 IN | BODY MASS INDEX: 45.26 KG/M2

## 2018-04-02 ENCOUNTER — HOSPITAL ENCOUNTER (OUTPATIENT)
Dept: PHYSICAL THERAPY | Age: 48
Discharge: HOME | End: 2018-04-02
Payer: COMMERCIAL

## 2018-04-02 PROCEDURE — 97140 MANUAL THERAPY 1/> REGIONS: CPT

## 2018-04-02 NOTE — PROGRESS NOTES
[facility]  Physical Therapy Lymphedema Clinic  [address] [facility], [address] [place] [address], [facility] [address].    LYmphedema Therapy  Visit:9    [x]                  Daily note               []                 30 day/10th visit progress note     NAME: Priscilla Encinas  DATE: 3/26/2018  GOALS  Short term goals  Time frame: 3/20/18  1. Patient will demonstrate knowledge of signs/symptoms of infections/cellulitis and be independent in skin care to prevent cellulitis. Patient has been educated on skin care and infection/cellulitis prevention. Patient currently with no skin issues and understands recommendations for skin care. Goal Met 3/20/18. 3.  Patient will tolerate multi-layer bandages (MLB) and show measureable decrease in limb volume to allow ordering of home compression system (daytime, nighttime garments and pump as needed). Patient arrived again without MLB in place and she reports her mother has not been able to bandage her due to medical issues of her own. Patient does report that she is wearing her ready made Juzo thigh highs. Patient has received her vaso-pneumatic pump, but is having issues with the pump and [employer] has been alerted to assist with trying to resolve this. Patient also has been measured for day and night time garments and they have not arrived to date. Patient's mother stated today that a package arrived today and that it was most likely her daytime garments. All items have been ordered and she has received them or in the process of receiving them. Goal Met  3/20/18. 2.  Patient will demonstrate independence in lymphedema home program of therapeutic exercises to improve circulation and decongest limb to improve ADLs. Patient has the handouts for active range of motion remedial exercises. Patient demonstrated independence with basic remedial home exercise routine performed in sitting, but patient would prefer to perform in supine.  She followed the handout to perform each activity. Patient has to be careful with position to not overstrain her weak abdominal muscles. Able to educate her in Hope Routine last visit. Will extend this to long term goal to be met by 5/17/18. Long term goals  Time frame: 5/17/18   1. Pt will show improvement in Lymphedema Life Impact Scale by decreasing impairment percentage to under 70% and thus allow improvement in patient's quality of life. Patient scored a 46 on the LLIS today with a percent impairment of 68%. Goal Met 3/20/18. 2.  Patient will be independent with don/doff of compression system and use in order to prevent reaccumulation of fluid at discharge. Patient able to demonstrate that she could don/doff products with increased time in the clinic. Patient improved with ease of donning, but will need to see how she is able to don/doff the Custom Malachi Adan items next visit. 3.  Pt will be independent in self-MLD and show stable limb volumes showing decongestion and pt. ready for transition to independent restorative phase of lymphedema therapy. Patient reports that she has not been performing Self MLD regularly because she has the [address]. Full volumes taken today reveal that patient has increased 568 ml on the L LE and decreased 964 ml since volumes were taken on 3/22/18. Patient has lost a total of 758 ml on the L LE and 1158 ml on the R LE since evaluation on 2/21/18. Patient unfortunately has not been consistent with MLB or wearing compression to improve further with decongesting at this point in her treatments. SUBJECTIVE REPORT: Patient since last visit had been diagnosed with cellulitis per patient secondary to a crack in her left heel. Patient has been in communication with the clinic via phone multiple times and it was recommended that she continue to wear her compression garments daily and to avoid using her Flexitouch pump while she is taking antibiotic.  Patient arrived today without compression on as instructed. Patient also reports using the pump as her MD at [facility] told her to. Patient reports diarrhea and continued nausea and vomiting with use of the pump directly after. Patient reports that her MD told her to try not using the pump to see what would happen and the diarrhea and vomiting stopped. Patient reports that she has returned to use of the pump and plans to go home and pump today, despite having these side effects. Patient denies any concerns with pump use despite her reports of continued GI issues. Patient also reports that her compression is sliding down and that she feels that the garments need adjustments. Measurements taken today to readjust the garments to improve fit. Patient reports that she is not sleeping well and requiring to take anxiety medication to sleep and is concerned that she will not be able to sleep in her night time foam systems. Pain: Patient reports that she has pain and limitations from her swelling and history of abdominal surgeries. Gait:   [x]  Independent gait without any assistive device for community distances  ADLs: Modified Independent for donning her day time products today. Treatment Response: Patient able to arrive today   []   Patient reviewed packet received at evaluation  []   Patient completed home program as prescribed  [x]   Patient not fully compliant with home program to date (not staying in compression as needed or performing full home program as recommended)  [x]   Patient had measurements taken today to adjust custom compression garments to improve fit. Function: Patient works 7 days a week typically. She reports working less secondary to her cellulitis. [x]   Patient able to don/doff compression garments with modified independence today  []   ADLs are requiring less assistance   []   Patient able to return to work/leisure activities  Lymphedema Life Impact Scale: Deferred  Weight: Deferred today due to time. Patient encouraged to weigh daily in the home on her scale at the same time every day and record in on her home program log that was issued today. TREATMENT AND OBJECTIVE DATA SUMMARY:   Patient/Family Education:      Educated in skin care: [x]   Skin care products  [x]   Hygiene  [x]  Prevention of cellulitis  []   Wound care     Educated in exercise: [x]   Walking program  [x]   Flor ball routine  []   Stick routine  [x]   ROM routine      Instructed in self MLD: Reviewed Self MLD packet and instructed to begin performing in the home daily. (excluded axillary, IA, cervical techniques at this time)     Instructed in don/doff of compression system:   [x]   Multi layer bandage (MLB) donning principles and wear precautions. Patient has not been consistent with bandaging as requested. [x]   Day garments (Elvarex day time products Initial fit was good, but measured today for needed  adjustments to the length of the biker short and also reassessed circumferences and lengths on the thigh highs)    [x]   Night garments (Patient received her Malachi boxer todd and AD leg pieces and brought them into the clinic today. Patient reports that she was not sure how she would tolerate the garments  and wanting to return them. Discussed with patient that these were a custom garment and that she was shown the material and that we would have her work with these products next visit as they will be needed to complete her compression systems to manage her swelling.)     Therapeutic Activity 0 minutes   Treatment time: 0  Functional Mobility: Independent   Fall risk: Low     Therapeutic Exercise/Procedure  0 minutes   Treatment time: 0  Flor ball exercise program: Deferred   Free exercises/ROM: Patient prefers to do the exercises in supine due to weak abdominal musculature and she was informed that that would be acceptable.  Removed the resisted hip flexion from the program due to concerns about decreased abdominal stability. Patient reports that she has been performing the exercises in the home. Home program: Patient to perform daily to BID:  [x]   Skin care  [x]   Deep abdominal breathing  [x]   Exercise routine  [x]   Walking program  [x]   Rest in supine   [x]   Compression bandage   [x]   Compression garments (wear Elvarex daily until her remakes arrive.)  [x]   Vasopneumatic device   []   Wound care  [x]   Self MLD  [x]   Bring supplies to each therapy visit  []   Purchase necessary supplies  [x]   Weight loss program (gave recommended nutrition recommendations hand out to follow)  [x]   Fill out daily home program log to assist with compliance with all aspects of her compression garments. Rationale: Exercise will increase the lymph angiomotoricity and tissue pressure of the skin and thus decrease swelling. Modalities 0 minutes   Treatment time: N/A  Vasopneumatic pump: Patient has a Flexitouch pump for home use. Patient does have continued issues with how she feels after using the pump nausea and diarrhea, but states that she continues to use the pump. Clinic had asked patient to discontinue use of the pump while on antibiotic, but she continued to do so. Manual Lymphatic Drainage (MLD) 80 minutes   Treatment time: 51:[id number]:47CQ  Area to decongest:   [x] LE             [x]  Right     [x]  Left      [x] Trunk   Sequence used and effectiveness:   [x] Secondary sequence for lower extremities with trunk involvement (excluded axillary, IA, cervical techniques at this time)    [x] Self MLD sequence/techniques/hand strokes   Issued packet and reviewed with patient. Skin/wound care/debridement: Intact. Patient noted to have some dry skin on her heels and a split on the left heel, but nothing covering the area or redness present. Patient reports that she saw her primary physician at [facility] and he prescribed an antibiotic.     Upper/Lower extremity compression: Educated patient/patient's mother in multi-layer bandaging donning principles and precautions, but there has not been consistency in use of the MLB to date. Patient's mother is out of town for 2 weeks per patient so she will not be able to assist her. Patient presents as very anxious about this. Patient arrived today without compression. Compliance with compression continues to be an issue. Patient has been using her new Elvarex garments but they are sliding down as the biker short needs some adjustments in the length. Patient also reduced on the R LE and needs to have some adjustments there as well. Patient missed her last appointment so today the remake measurements will be sent to vendor to remake the garments with the recommendations. Patient also receive her Custom Malachi Boxer [place] and 2 AD garments. Patient reports that she did not want to open them and she wanted to return them. This is not an option for custom made garments. Garments prior to being ordered for patient were discussed at length on many occasions and even had offered for patient to wait on ordering the night time garments if she was concerned about finances until another time. Patient also discussed her options with the vendor prior to the order being processed. Secondary to timing today the garments were not fitted to her today, but will need to be done next visit. Kinesiotaping: Deferred   Girth/Volume measurement: Full volumes taken today reveal that patient has increased 568 ml on the L LE and decreased 964 ml since volumes were taken on 3/22/18. Patient has lost a total of 758 ml on the L LE and 1158 ml on the R LE since evaluation on 2/21/18. TOTAL TREATMENT 80 mins        ASSESSMENT:   Treatment effectiveness and tolerance: Patient has been very inconsistent in wearing her compression and with what she wants to accomplish with her treatments in the clinic. Patient will need to focus on trying on her Malachi Adan custom items next visit.   Will work on finalizing the compression garment fittings and remakes and prepare her for discharge. Progress toward goals: See goal section of note for details. STG 1+3 met and LTG 1 met. Will extend STG 2. PLAN OF CARE:   Changes to the plan of care: Patient to continue use of her day time custom garments and work on her home program as instructed and return on Thursday for follow up. Frequency: [x]  2 times a week (will decrease frequency when appropriate)  []  Weekly  []  Biweekly  []  Monthly   Other: Information will be sent to [address] [place] [address] for remakes of the Elvarex products.       Santiago Garcia, PTA,CLT

## 2018-04-05 ENCOUNTER — HOSPITAL ENCOUNTER (OUTPATIENT)
Dept: PHYSICAL THERAPY | Age: 48
Discharge: HOME | End: 2018-04-05
Payer: COMMERCIAL

## 2018-04-05 PROCEDURE — 97110 THERAPEUTIC EXERCISES: CPT

## 2018-04-05 PROCEDURE — 97140 MANUAL THERAPY 1/> REGIONS: CPT

## 2018-04-05 NOTE — PROGRESS NOTES
[facility]  Physical Therapy Lymphedema Clinic  [address] [facility], [address] [place] [address], [facility] 22.    LYmphedema Therapy  Visit:10    [x]                  Daily note               []                 30 day/10th visit progress note     NAME: Irasema Suazo  DATE: 4/5/2018  GOALS  Short term goals  Time frame: 3/20/18  1. Patient will demonstrate knowledge of signs/symptoms of infections/cellulitis and be independent in skin care to prevent cellulitis. Patient has been educated on skin care and infection/cellulitis prevention. Patient currently with no skin issues and understands recommendations for skin care. Goal Met 3/20/18. 3.  Patient will tolerate multi-layer bandages (MLB) and show measureable decrease in limb volume to allow ordering of home compression system (daytime, nighttime garments and pump as needed). Patient arrived again without MLB in place and she reports her mother has not been able to bandage her due to medical issues of her own. Patient does report that she is wearing her ready made Juzo thigh highs. Patient has received her vaso-pneumatic pump, but is having issues with the pump and [employer] has been alerted to assist with trying to resolve this. Patient also has been measured for day and night time garments and they have not arrived to date. Patient's mother stated today that a package arrived today and that it was most likely her daytime garments. All items have been ordered and she has received them or in the process of receiving them. Goal Met  3/20/18. 2.  Patient will demonstrate independence in lymphedema home program of therapeutic exercises to improve circulation and decongest limb to improve ADLs. Patient has the handouts for active range of motion remedial exercises. Patient demonstrated independence with basic remedial home exercise routine performed in sitting, but patient would prefer to perform in supine.  She followed the handout to perform each activity. Patient has to be careful with position to not overstrain her weak abdominal muscles. Able to educate her in Hope Routine last visit. Will extend this to long term goal to be met by 5/17/18. Long term goals  Time frame: 5/17/18   1. Pt will show improvement in Lymphedema Life Impact Scale by decreasing impairment percentage to under 70% and thus allow improvement in patient's quality of life. Patient scored a 46 on the LLIS today with a percent impairment of 68%. Goal Met 3/20/18. 2.  Patient will be independent with don/doff of compression system and use in order to prevent reaccumulation of fluid at discharge. Patient able to demonstrate that she could don/doff products with increased time in the clinic. Patient able to don/doff the [facility] boxer Collene Plenty with increased time. Patient struggled with the AD Malachi pieces, but will need to continue to work on improving technique with Awilda. AD Malachi pieces are too long in the foot portion, so they will need to be sent back for alteration. 3.  Pt will be independent in self-MLD and show stable limb volumes showing decongestion and pt. ready for transition to independent restorative phase of lymphedema therapy. Will reassess volumes after she has worked with her products more consistently. SUBJECTIVE REPORT: Patient arrived today to work with the fitting of her Malachi Adan products. Patient concerned at first about how she will don and doff the products, but after working with the products felt more confident with them. Patient will need to have alterations of the lengths of the foot portion of both Malachi Paks. Information and pictures were sent to [address] [place] [address] to begin this process. Patient reports that she may not have medical coverage soon, so her next appointment will be next week.  Hopeful that we can get all garments corrected and fitted for her as soon as possible as she reports concerns if she does not have medical coverage. Pain: 0/10   Gait:   [x]  Independent gait without any assistive device for community distances  ADLs: Modified Independent for donning her day time products today. Treatment Response: Patient able to arrive today   []   Patient reviewed packet received at evaluation  [x]   Patient completed home program as prescribed wearing compression and not using her pump as recommended. []   Patient not fully compliant with home program to date   [x]   Patient had measurements taken today to adjust custom compression garments to improve fit. Function: Patient works 7 days a week typically. She reports working less secondary to her cellulitis. [x]   Patient able to don/doff compression garments with modified independence today  []   ADLs are requiring less assistance   []   Patient able to return to work/leisure activities  Lymphedema Life Impact Scale: Deferred  Weight:279.0 lbs  TREATMENT AND OBJECTIVE DATA SUMMARY:   Patient/Family Education:      Educated in skin care: [x]   Skin care products  [x]   Hygiene  [x]  Prevention of cellulitis  []   Wound care     Educated in exercise: [x]   Walking program  [x]   Flor ball routine  []   Stick routine  [x]   ROM routine      Instructed in self MLD: Reviewed Self MLD packet and instructed to begin performing in the home daily. (excluded axillary, IA, cervical techniques at this time)     Instructed in don/doff of compression system:   [x]   Multi layer bandage (MLB) donning principles and wear precautions. Patient has not been consistent with bandaging as requested. [x]   Day garments (Elvarex day time products Initial fit was good, but measured last visit for needed adjustments to the length of the biker short and also reassessed circumferences and lengths on the thigh highs, awaiting the remake)    [x]   Night garments (Patient able to try Malachi boxer todd and AD leg pieces on today.  They were  Brought into the clinic last visit, but not fitted as patient was overwhelmed)     Therapeutic Activity 0 minutes   Treatment time: 0  Functional Mobility: Independent   Fall risk: Low     Therapeutic Exercise/Procedure 15 minutes   Treatment time: 12:15pm-12:30pm  Folr ball exercise program: Deferred   Free exercises/ROM: Patient prefers to do the exercises in supine due to weak abdominal musculature and she was informed that that would be acceptable. Removed the resisted hip flexion from the program due to concerns about decreased abdominal stability. Patient reports that she has been performing the exercises in the home. Able to demonstrate good range of motion today with doffing and donning her products. Home program: Patient to perform daily to BID:  [x]   Skin care  [x]   Deep abdominal breathing  [x]   Exercise routine  [x]   Walking program  [x]   Rest in supine   [x]   Compression bandage   [x]   Compression garments (wear Elvarex daily until her remakes arrive.) Patient continues to utilize the ready made thigh highs that are not fitting well. [x]   Vasopneumatic device   []   Wound care  [x]   Self MLD  [x]   Bring supplies to each therapy visit  []   Purchase necessary supplies  [x]   Weight loss program (gave recommended nutrition recommendations hand out to follow)  [x]   Fill out daily home program log to assist with compliance with all aspects of her compression garments. Rationale: Exercise will increase the lymph angiomotoricity and tissue pressure of the skin and thus decrease swelling. Modalities 0 minutes   Treatment time: N/A  Vasopneumatic pump: Patient has a Flexitouch pump for home use. Patient does have continued issues with how she feels after using the pump nausea and diarrhea, but states that she continues to use the pump last visit despite clinic asking patient to discontinue use of the pump while on antibiotic, but she continued to do so.  Today she reports that she has not been using the pump and one point of the conversation and then later on she reports that she will be using the pump later today. Manual Lymphatic Drainage (MLD) 70 minutes   Treatment time: 52:93WO-60:30ZT  Area to decongest:   [x] LE             [x]  Right     [x]  Left      [x] Trunk   Sequence used and effectiveness:   [x] Secondary sequence for lower extremities with trunk involvement (excluded axillary, IA, cervical techniques at this time)    [x] Self MLD sequence/techniques/hand strokes   Issued packet and reviewed with patient. Skin/wound care/debridement: Intact. Patient noted to have some dry skin on her heels and a split on the left heel, but nothing covering the area or redness present. Patient reports that she saw her primary physician at [facility] and he prescribed an antibiotic. Patient reports that she is still taking antibiotics. Upper/Lower extremity compression: Educated patient/patient's mother in multi-layer bandaging donning principles and precautions, but there has not been consistency in use of the MLB to date. Patient arrived today with her ready made thigh highs on and the custom biker short. The fit of the ready made product is poor, but she continues to use them despite recommendation to utilize her custom products. Patient has been using her new Elvarex garments some, but they are sliding down as the biker short needs some adjustments in the length. Patient also reduced on the R LE and needs to have some adjustments there as well. Patient had missed her appointment on 3/29/18 soremake measurements were sent to vendor on her last visit 4/2/18 with the recommendations. Patient also received her Custom Malachi Boxer [place] and 2 AD garments. Patient reports that she did not want to open them and she wanted to return them. This is not an option for custom made garments. Garments prior to being ordered for patient were discussed at length on many occasions and even had offered for patient to wait on ordering the night time garments if she was concerned about finances until another time, but she proceeded with the order. Patient also discussed her options with the vendor prior to the order being processed. Today she was agreeable to try on the garments. The  [employer] fit well and the outer jacket also fit well. The AD garments both were too long in the foot portion and will need alterations. The vendor will be contacted for RA # and given recommendations and will be sent pictures of the issues so that it can be resolved. Patient educated on the wear and care of the Malachi Adan products. Kinesiotaping: Deferred   Girth/Volume measurement: Deferred (focus on Malachi Adan fitting and education)     TOTAL TREATMENT 85 mins        ASSESSMENT:   Treatment effectiveness and tolerance: Patient able to be fitted with her Malachi Adan products today. Garments did have issues with length in the AD garments so they will need to be altered. Patient also awaiting remake of her custom Elvarex products. Once all of her products have been fitted and patient will be discharged as she reports that she is concerned about finances and medical coverage of her visits. Will try to get all items in as soon as possible, but with all products being custom more time may be involved to resolve her issues with the garments. Progress toward goals: See goal section of note for details. STG 1+3 met and LTG 1 met. Will extend STG 2. PLAN OF CARE:   Changes to the plan of care: Patient to continue use of her day time custom garments and work on her home program as instructed and return next week. Frequency: []  2 times a week   [x]  Weekly  []  Biweekly  []  Monthly   Other: Information will be sent to [address] [place] [address] for alteration of the AD garments. Patient awaiting her remakes of the Custom Elvarex products.       Santiago Garcia, PTA,CLT

## 2018-04-12 ENCOUNTER — HOSPITAL ENCOUNTER (OUTPATIENT)
Dept: PHYSICAL THERAPY | Age: 48
Discharge: HOME | End: 2018-04-12
Payer: COMMERCIAL

## 2018-04-12 NOTE — PROGRESS NOTES
[facility]  Physical Therapy Lymphedema Clinic  [address] [facility], [address] [place] [address], [facility] 22.    Lymphedema Therapy      4/12/2018:  Isabelle Babcock was not seen on this date for physical therapy for the following reasons:    [x]      Patient called to cancel the visit for the following reasons: Remake of the garments have not arrived and patient called to cancel her appointment. Will need to reschedule when she has her products. []      Patient missed the visit and did not call to cancel.     Santiago Garcia, PTA, CLT

## 2018-04-19 ENCOUNTER — PATIENT OUTREACH (OUTPATIENT)
Dept: CARDIOLOGY CLINIC | Age: 48
End: 2018-04-19

## 2018-04-19 NOTE — PROGRESS NOTES
Nurse navigator note - cardiology  Incoming call from pt - about status -   She has had several cellulitis infections - and has had to go on short term disability -   She has no FMLA from [facility] [employer] b/c she has been there less than 6 months -   She needs STD monies to help pay for [employer] has faxed documents to pcp _ Dr. Violette Mace and to Dr. Lorrie Flores - faxed to [phone]. The garments are expensive - [employer] paid 80$ - $2,500 / [facility] 2x/week - awaiting [place] garmets - fitting for lymphadema -   She has Flexitouch - 2x/day 70 minutes twice a day -     Ms. Pedersen talks tangentially - from onset of phone call. She has been seen at [facility] for testing and bloodwork/ she called to TRIXIE Amaro office for assistance/ and letter was initiated here -     Nai Feliciano needs short term disability form filled out -  She has seen pcp 15 times and he has done tx for the infections.     She is asking about her insurance requesting information on the treatment plan -   She subsequently called back - stating they just needed information from April 15 - on - and that is out of the window of treatment for our office -    We saw her for vascular imaging and for referral to lymphadema clinic -   Bud Leon RN , [facility], [address] [place] [address] [address] [facility]  [phone]

## 2018-04-30 ENCOUNTER — HOSPITAL ENCOUNTER (OUTPATIENT)
Dept: PHYSICAL THERAPY | Age: 48
Discharge: HOME | End: 2018-04-30
Payer: COMMERCIAL

## 2018-04-30 NOTE — PROGRESS NOTES
[facility]  Physical Therapy Lymphedema Clinic  [address] [facility], [address], [address]    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 She states that yesterday her physician arranged for her to be seen by a Podiatrist at [address] [place] [address]. That Podiatrist was able to work her in today to address the toenail infection. She states that all of her family is in [place] and they are unavailable to assist her as she is dealing with the infection. Patient states she will call this afternoon with an update on her status and to reschedule her appointment for fitting for her compression garments. []      Patient missed the visit and did not call to cancel.      Wen Dunbar, MSPT, ETHEL

## 2018-05-07 ENCOUNTER — PATIENT OUTREACH (OUTPATIENT)
Dept: CARDIOLOGY CLINIC | Age: 48
End: 2018-05-07

## 2018-05-07 ENCOUNTER — HOSPITAL ENCOUNTER (OUTPATIENT)
Dept: PHYSICAL THERAPY | Age: 48
Discharge: HOME | End: 2018-05-07
Payer: COMMERCIAL

## 2018-05-07 VITALS — BODY MASS INDEX: 45.83 KG/M2 | HEIGHT: 66 IN | WEIGHT: 285.2 LBS

## 2018-05-07 PROCEDURE — 97140 MANUAL THERAPY 1/> REGIONS: CPT

## 2018-05-07 NOTE — PROGRESS NOTES
Cardiology NN note:  Received phone call from Ms. Pedersen- she is asking of the \"records are ready to be picked up\". She states that she had spoken with fellow NN, Anne Friedman about getting the cardiology records including testing. She states that she has been approved for her Disability so she does not need the paperwork to be completed previously dropped off for Dr. Maria Guadalupe Lynch to fill out. Relayed to provider and his nurse. I spoke with provider- he does not have the folder with him at work today- he asks me to print off what she needs to have. I called and left VM for her to return call. I will get the copies ready for her to  Wednesday, when she says she is in the area of our [facility] office. Ms. Trevor Esparza arrived to the office in person- phone calls placed to:   [employer] billing- obtained emailed list of billing codes, etc. That she needed. Provided her with the records she need from cardiology. [employer]- was able to talk with office but they would not be able to get her all the information she needed until her health insurance has paid. Provided her the phone number to follow up.

## 2018-05-07 NOTE — PROGRESS NOTES
[facility]  Physical Therapy Lymphedema Clinic  [address], [address], [place]  21.    LYmphedema Therapy  Visit:11    []                  Daily note               [x]                 30 day/10th visit progress note     NAME: Ave Bosworth  DATE: 4/5/2018  GOALS  Short term goals  Time frame: 3/20/18  1. Patient will demonstrate knowledge of signs/symptoms of infections/cellulitis and be independent in skin care to prevent cellulitis. Patient has been educated on skin care and infection/cellulitis prevention. Patient currently with no skin issues and understands recommendations for skin care. Goal Met 3/20/18. 3.  Patient will tolerate multi-layer bandages (MLB) and show measureable decrease in limb volume to allow ordering of home compression system (daytime, nighttime garments and pump as needed). Patient arrived again without MLB in place and she reports her mother has not been able to bandage her due to medical issues of her own. Patient does report that she is wearing her ready made Juzo thigh highs. Patient has received her vaso-pneumatic pump, but is having issues with the pump and [employer] has been alerted to assist with trying to resolve this. Patient also has been measured for day and night time garments and they have not arrived to date. Patient's mother stated today that a package arrived today and that it was most likely her daytime garments. All items have been ordered and she has received them or in the process of receiving them. Goal Met  3/20/18. 2.  Patient will demonstrate independence in lymphedema home program of therapeutic exercises to improve circulation and decongest limb to improve ADLs. Patient has the handouts for active range of motion remedial exercises. Patient demonstrated independence with basic remedial home exercise routine performed in sitting, but patient would prefer to perform in supine.  She followed the handout to perform each activity. Patient has to be careful with position to not overstrain her weak abdominal muscles. Able to educate her in Hope Routine last visit. Will revisit this goal in 3 months when patient returns for reassessment as she is currently having left toe issues that is impacting her activity and mobility. Extend goal to 8/2/18. Long term goals  Time frame: 5/17/18   1. Pt will show improvement in Lymphedema Life Impact Scale by decreasing impairment percentage to under 70% and thus allow improvement in patient's quality of life. Patient scored a 46 on the LLIS today with a percent impairment of 68%. Goal Met 3/20/18. 2.  Patient will be independent with don/doff of compression system and use in order to prevent reaccumulation of fluid at discharge. Patient able to demonstrate that she could don/doff products with increased time in the clinic. Patient able to don/doff the [facility] boxer Marielle Nicely with increased time previously. Patient donned the altered AD Malachi pieces with Awilda independent. Patient arrived with her compression garments properly donned and in place and she demonstrated donning the clinic. Goal met 5/7/18. 3.  Pt will be independent in self-MLD and show stable limb volumes showing decongestion and pt. ready for transition to independent restorative phase of lymphedema therapy. Left lower extremity limb volume has decreased by 1,404ml since 4/2/18 and by 2,162ml since evaluation on 2/21/18. The right lower extremity limb volume has increased by 162ml since 4/2/18 and decreased by 997ml since 2/21/18. Patient is currently unable to utilize all of her home compression products due to left first toe complications but she will implement use of her products over the next few weeks. Will extend this goal to 8/2/18. SUBJECTIVE REPORT:  I had surgery to remove the toenail on my first toe last Wednesday.   I am having to soak it in vinegar multiple times a day and then cover it. It bleeds every time I do it. I see the doctor on Thursday. I am out on disability until the end of the month and I am focusing on taking care of this leg, using my Flexitouch and elevating legs, and walking. I wore the new stocking on my left leg before this toe problem and it fit well. I am unable to wear compression on the left leg right now because of the toe surgery and they do not want me putting any compression on it yet. I am on antibiotic for the toe as well and they do not want me to use the Flexitouch either. I have probably worn the stockings 10 times no probably 5 times since I got them. I've washed them every time I've worn them. I have not used the night garments at all. I have gotten my remade and my original garments mixed up for the legs- can you help me figure them out? I wore them in today except for on the left leg to save time so you can see them. I am on doxycycline for 2 more days. Pain: 4/10 numeric scale currently at left first toe. Pain increases by the end of the day. Gait:   [x]  Independent gait without any assistive device for community distances  ADLs: Modified Independent for donning her day time products and right lower leg Malachi. Patient is caring for her left first toe independently at home. Treatment Response: Patient able to arrive today with daytime Jobst Elvarex right leg thigh high and left leg open toed non custom thigh high along with Elvarex bike short in place. Patient had surgery on left toe to remove toenail and is currently on an antibiotic to address the infection. She has been advised by her doctor not to wear compression on her left leg or use her Flexitouch on her left leg currently. She is using Flexitouch and daytime compression on her right leg. She has been unable to implement her night compression product use at this time, but she has received all of her remade or altered products.   []   Patient reviewed packet received at evaluation  []   Patient completed home program as prescribed wearing compression    []   Patient not fully compliant with home program to date      Function: Patient works 7 days a week typically. She reports working less secondary to her cellulitis. [x]   Patient able to don/doff compression garments with modified independence today  []   ADLs are requiring less assistance   []   Patient able to return to work/leisure activities  Lymphedema Life Impact Scale: Scores 49 with 77% impairment today. Weight:285.2 lbs  TREATMENT AND OBJECTIVE DATA SUMMARY:   Patient/Family Education:      Educated in skin care: [x]   Skin care products  [x]   Hygiene  [x]  Prevention of cellulitis  []   Wound care     Educated in exercise: [x]   Walking program  [x]   Flor ball routine  []   Stick routine  [x]   ROM routine      Instructed in self MLD: Previously reviewed Self MLD packet and instructed to begin performing in the home daily. (excluded axillary, IA, cervical techniques at this time)     Instructed in don/doff of compression system:   [x]   Multi layer bandage (MLB) donning principles and wear precautions were reviewed previously. [x]   Day garments- Remade elvarex garments fit will on trunk and right leg, patient reports good fit on left leg, but unable to assess today due to left first toe complications. Reviewed donning and doffing and daytime wearing schedule only for stockings. Educated patient in the role of the Daytime garments to prevent worsening of swelling but not to decrease it. Reviewed garment 5 month lifespan replacement process. Reviewed trunk compression level options as her compression has been increased to compression level 2 versus initial class1 before remake. Discussed the importance of comfort and compression in the trunk. [x]   Night garments Educated patient in donning Malachi knee highs and Malachi jacket and demonstrated the process on the right leg.  Patient donned with gildardo. Educated patient in initial wearing schedule of 2 hours in the evening without the Malachi Jacket in place and progressing to longer amounts of wearing time over the next couple of weeks. Patient can start to wear the right lower leg garment now but will defer initiation of left leg Malachi use until she is cleared by her doctor. Educated patient that Malachi use can manage and help to decrease her swelling as it closer in style to bandaging her legs. She was advised that they are safe for night use, but can also be worn when patient is home. Patient was previously fitted for and was independent in [address] use. Therapeutic Activity 0 minutes   Treatment time: 0  Functional Mobility: Independent   Fall risk: Low     Therapeutic Exercise/Procedure 0 minutes   Treatment time:  N/A  Flor ball exercise program: Deferred   Free exercises/ROM: Patient was previously educated in home exercise program as follows(these activities were not reviewed today due to time limations): patient prefers to do the exercises in supine due to weak abdominal musculature and she was informed that that would be acceptable. Removed the resisted hip flexion from the program due to concerns about decreased abdominal stability. Home program: Patient to perform daily to BID:  [x]   Skin care/ toe care per phsyician  [x]   Deep abdominal breathing  [x]   Exercise routine  [x]   Walking program  [x]   Rest in supine   []   Compression bandage   [x]   Compression garments right lower extremity and incorporate left leg compression products as toe status permits  [x]   Vasopneumatic device   [x]   Wound care- per physician for left toe  [x]   Self MLD  [x]   Bring supplies to each therapy visit  []   Purchase necessary supplies  [x]   Weight loss program (gave recommended nutrition recommendations hand out to follow)  [x]   Fill out daily home program log to assist with compliance with all aspects of her compression garments. Rationale: Exercise will increase the lymph angiomotoricity and tissue pressure of the skin and thus decrease swelling. Modalities 0 minutes   Treatment time: N/A  Vasopneumatic pump: Patient has a Flexitouch pump for home use. Patient is using the Flexitouch on the trunk and right leg only as she has had complications with her left toenail including and infection. She is not cleared by her physician to resume Flexitouch use on her left leg yet. Manual Lymphatic Drainage (MLD) 85 minutes   Treatment time: 6128-8130  Area to decongest:   [x] LE             [x]  Right     [x]  Left      [x] Trunk  Deferred today due to left toe infection and secondary to time limitations as a result of garment fitting and education   Sequence used and effectiveness:   [] Secondary sequence for lower extremities with trunk involvement (excluded axillary, IA, cervical techniques at this time)    [x] Previously instructed in self MLD sequence/techniques/hand strokes   Issued packet and reviewed with patient. Skin/wound care/debridement: Intact except for left first toe which is currently dressed with a dressing. Patient is soaking her toe multiple times in the day in vinegar per physician instructions. Upper/Lower extremity compression: Patient arrived today with the original right leg elvarex thigh, the remade elvarex bike short and the non-custom open toed thigh high on the left leg. She states she only wore the left thigh high for us and that she has been instructed by the physician not to wear compression on her left leg currently. The remade bike short fits well although patient expresses concern that it looks visibly larger than the first one. Patient was advised that they are 2 different compression classes ( the remake being stronger and in a class 2 and the length of the remade bike short is longer in the panty). It does roll a little at the waist when patient sits down and she has to fix it.   This is most likely currently unavoidable unless the garment is made shorter (patient requested the increased length upon measuring for the remake). Reviewed donning and doffing and daytime wearing schedule only for stockings. Educated patient in the role of the Daytime garments to prevent worsening of swelling but not to decrease it. Reviewed garment 5 month lifespan replacement process. Reviewed trunk compression level options as her compression has been increased to compression level 2 versus initial class1 before remake. Discussed the importance of comfort and compression in the trunk. Right thigh high fits well. Patient reports she has worn the garments about 5 times including on the left leg prior to the toe problem and she reports the left leg stocking fits well. We are unable to don the stocking on the left leg today due to precautions related to the toenail removal on Wednesday. Patient also received her altered Malachi Boxer knee length garments. Fit was previously assessed on [address] [place] [address] with no issues noted. Right knee high was donned with good fit noted and patient was educated in donning and doffing with gildardo. She was able to don and doff the garment herself. We were unable to don the left leg garment due to precautions related to the left first toenail removal and concerns about disturbing the site during the donning process. Educated patient in initial wearing schedule of 2 hours in the evening without the Malachi Jacket in place and progressing to longer amounts of wearing time over the next couple of weeks and adding the Malachi jacket to increase the compression. Patient can start to wear the right lower leg garment now but will defer initiation of left leg Malachi use until she is cleared by her doctor. Educated patient that Malachi use can manage and help to decrease her swelling as it is closer in style to bandaging her legs.   She was advised that they are safe for night use, but can also be worn when patient is home. Patient was previously educated on the wear and care of the [facility] products. Patient donned right leg remade Jobst Elvarex thigh high and Jobst elvarex bike short to wear home with instructions to remove bike shorts when she gets home. She verbalized understanding and is to discuss her left leg compression with her physician when she sees him on Thursday. Therapist correctly labeled and  out the remade versus the original elvarex garments for patient and the originals will be mailed back to [employer] per their instructions. Kinesiotaping: Deferred   Girth/Volume measurement:  Reassessed bilateral lower extremities with right lower extremity limb volume of 15,705. 17ml versus 16,701.75ml on 2/2/18 and 15,543.58 ml on 4/2/18. The left lower extremity limb volume is 14,925.26ml versus 16,329.03ml 4/2/18 and 17,087. 32ml 2/21/18. TOTAL TREATMENT 85 mins        ASSESSMENT:   Treatment effectiveness and tolerance:  Patient has received all of her remade day and night compression products. The daytime Jobst Elvarex right high thigh stocking and bike compression shorts currently fit well. Patient reports some rolling at the waist when she sits. We are unable to assess left leg garments today secondary to Podiatrist instructions to patient not to use compression on the right too at this time due to recent toenail removal secondary to infection. Patient reports she used the Jobst Elvarex thigh high on the left leg with good fit noted prior to the toe infection. Night Malachi knee length portion of garment fit well on the right leg, but could not be assessed on the left leg today again due to toe status. Reviewed with patient all aspects of her compression use including gradual implementation of use of Malachi on the right leg now and adding the left leg when appropriate.   Patient will work with her products and tools over the next 3 months resuming compression use on the left leg as permitted by her Podiatrist.  Will reassess her in 3 months for additional needs and modifications to her home program.   Progress toward goals: See goal section of note for details. STG 1+3 met and LTG 1 and 2 were met. Will extend STG 2 and long term goal 3. PLAN OF CARE:   Changes to the plan of care: Patient to work on increasing her home management routine as her left toes continues to heal and she is allowed to return to compression use. She is to utilize her day and night compression products, along with skin care, exercise and home Flexitouch use to manage her swelling and promote additional decongestion. Frequency: []  2 times a week   []  Weekly  []  Biweekly  [x]  Patient to return for reassessment in 3 months   Other:        MANNY Burger, ETHEL    TREATMENT PLAN EFFECTIVE DATES:   5/7/2018 TO 8/2/18  I have read the above plan of care for Damaso Franklin. I certify the above prescribed services are required by this patient and are medically necessary.   The above plan of care has been developed in conjunction with the lymphedema/physical therapist.   Physician Signature: ____________________________________________________    Date: _________________________________________________________________

## 2018-05-22 ENCOUNTER — PATIENT OUTREACH (OUTPATIENT)
Dept: CARDIOLOGY CLINIC | Age: 48
End: 2018-05-22

## 2018-05-22 NOTE — PROGRESS NOTES
Nurse navigator note - cardiology  Received request for more records - labs and ICD 10 code -   Records were copied and forwarded to Ms. Pedersen including:     To take with you - from 2/8/18 - Dr. Keila Salguero office visit  Copy of order for duplex lower ext venous left and right - order [id number], [id number]  Copy of order for echo - order [id number]    Labs - BMP, C reactive protein and BMP     Esau Heredia RN , [facility], [address] [place] [address]  [phone]

## 2018-05-23 ENCOUNTER — HOSPITAL ENCOUNTER (OUTPATIENT)
Dept: MAMMOGRAPHY | Age: 48
Discharge: HOME | End: 2018-05-23
Attending: FAMILY MEDICINE
Payer: COMMERCIAL

## 2018-05-23 DIAGNOSIS — I89.0 LYMPHEDEMA OF LEFT ARM: ICD-10-CM

## 2018-05-23 DIAGNOSIS — N63.20 BILATERAL BREAST LUMP: ICD-10-CM

## 2018-05-23 DIAGNOSIS — N60.19: ICD-10-CM

## 2018-05-23 DIAGNOSIS — N63.10 BILATERAL BREAST LUMP: ICD-10-CM

## 2018-05-23 DIAGNOSIS — Z12.39 SCREENING BREAST EXAMINATION: ICD-10-CM

## 2018-05-23 DIAGNOSIS — Z12.31 VISIT FOR SCREENING MAMMOGRAM: ICD-10-CM

## 2018-05-23 PROCEDURE — 77067 SCR MAMMO BI INCL CAD: CPT

## 2018-05-23 PROCEDURE — 77062 BREAST TOMOSYNTHESIS BI: CPT

## 2018-05-23 PROCEDURE — 76642 ULTRASOUND BREAST LIMITED: CPT

## 2018-06-03 ENCOUNTER — APPOINTMENT (OUTPATIENT)
Dept: GENERAL RADIOLOGY | Age: 48
End: 2018-06-03
Attending: PHYSICIAN ASSISTANT
Payer: COMMERCIAL

## 2018-06-03 ENCOUNTER — HOSPITAL ENCOUNTER (EMERGENCY)
Age: 48
Discharge: HOME | End: 2018-06-04
Attending: EMERGENCY MEDICINE | Admitting: EMERGENCY MEDICINE
Payer: COMMERCIAL

## 2018-06-03 DIAGNOSIS — R60.0 BILATERAL LEG EDEMA: Primary | ICD-10-CM

## 2018-06-03 LAB
ALBUMIN SERPL-MCNC: 3 G/DL (ref 3.5–5)
ALBUMIN/GLOB SERPL: 0.7 {RATIO} (ref 1.1–2.2)
ALP SERPL-CCNC: 118 U/L (ref 45–117)
ALT SERPL-CCNC: 28 U/L (ref 12–78)
ANION GAP SERPL CALC-SCNC: 9 MMOL/L (ref 5–15)
AST SERPL-CCNC: 25 U/L (ref 15–37)
BASOPHILS # BLD: 0 K/UL (ref 0–0.1)
BASOPHILS NFR BLD: 0 % (ref 0–1)
BILIRUB SERPL-MCNC: 0.3 MG/DL (ref 0.2–1)
BNP SERPL-MCNC: <10 PG/ML (ref 0–125)
BUN SERPL-MCNC: 22 MG/DL (ref 6–20)
BUN/CREAT SERPL: 21 (ref 12–20)
CALCIUM SERPL-MCNC: 8.7 MG/DL (ref 8.5–10.1)
CHLORIDE SERPL-SCNC: 104 MMOL/L (ref 97–108)
CO2 SERPL-SCNC: 27 MMOL/L (ref 21–32)
CREAT SERPL-MCNC: 1.07 MG/DL (ref 0.55–1.02)
DIFFERENTIAL METHOD BLD: NORMAL
EOSINOPHIL # BLD: 0.1 K/UL (ref 0–0.4)
EOSINOPHIL NFR BLD: 2 % (ref 0–7)
ERYTHROCYTE [DISTWIDTH] IN BLOOD BY AUTOMATED COUNT: 13.4 % (ref 11.5–14.5)
GLOBULIN SER CALC-MCNC: 4.1 G/DL (ref 2–4)
GLUCOSE SERPL-MCNC: 102 MG/DL (ref 65–100)
HCT VFR BLD AUTO: 36.5 % (ref 35–47)
HGB BLD-MCNC: 11.6 G/DL (ref 11.5–16)
IMM GRANULOCYTES # BLD: 0 K/UL (ref 0–0.04)
IMM GRANULOCYTES NFR BLD AUTO: 0 % (ref 0–0.5)
LYMPHOCYTES # BLD: 2.6 K/UL (ref 0.8–3.5)
LYMPHOCYTES NFR BLD: 35 % (ref 12–49)
MCH RBC QN AUTO: 28.9 PG (ref 26–34)
MCHC RBC AUTO-ENTMCNC: 31.8 G/DL (ref 30–36.5)
MCV RBC AUTO: 91 FL (ref 80–99)
MONOCYTES # BLD: 0.5 K/UL (ref 0–1)
MONOCYTES NFR BLD: 7 % (ref 5–13)
NEUTS SEG # BLD: 4.1 K/UL (ref 1.8–8)
NEUTS SEG NFR BLD: 56 % (ref 32–75)
NRBC # BLD: 0 K/UL (ref 0–0.01)
NRBC BLD-RTO: 0 PER 100 WBC
PLATELET # BLD AUTO: 207 K/UL (ref 150–400)
PMV BLD AUTO: 10.2 FL (ref 8.9–12.9)
POTASSIUM SERPL-SCNC: 3.6 MMOL/L (ref 3.5–5.1)
PROT SERPL-MCNC: 7.1 G/DL (ref 6.4–8.2)
RBC # BLD AUTO: 4.01 M/UL (ref 3.8–5.2)
SODIUM SERPL-SCNC: 140 MMOL/L (ref 136–145)
TROPONIN I SERPL-MCNC: <0.04 NG/ML
WBC # BLD AUTO: 7.3 K/UL (ref 3.6–11)

## 2018-06-03 PROCEDURE — 83880 ASSAY OF NATRIURETIC PEPTIDE: CPT | Performed by: PHYSICIAN ASSISTANT

## 2018-06-03 PROCEDURE — 36415 COLL VENOUS BLD VENIPUNCTURE: CPT | Performed by: PHYSICIAN ASSISTANT

## 2018-06-03 PROCEDURE — 93005 ELECTROCARDIOGRAM TRACING: CPT

## 2018-06-03 PROCEDURE — 71046 X-RAY EXAM CHEST 2 VIEWS: CPT

## 2018-06-03 PROCEDURE — 85025 COMPLETE CBC W/AUTO DIFF WBC: CPT | Performed by: PHYSICIAN ASSISTANT

## 2018-06-03 PROCEDURE — 84484 ASSAY OF TROPONIN QUANT: CPT | Performed by: PHYSICIAN ASSISTANT

## 2018-06-03 PROCEDURE — 80053 COMPREHEN METABOLIC PANEL: CPT | Performed by: PHYSICIAN ASSISTANT

## 2018-06-03 PROCEDURE — 99283 EMERGENCY DEPT VISIT LOW MDM: CPT

## 2018-06-03 NOTE — LETTER
[address] 
[facility] EMERGENCY DEPT 
[address] [facility] [place] [address] [id number] [phone] Work/School Note Date: 6/3/2018 To Whom It May concern: 
 
Alex Hensley was seen and treated today in the emergency room by the following provider(s): 
Attending Provider: Rubens Gomez DO Physician Assistant: Sorin Palomo PA-C. Please excuse Alex Hensley from work through Thursday 6/7/18. Sincerely, Sorin Palomo PA-C

## 2018-06-03 NOTE — LETTER
[address] 
[facility] EMERGENCY DEPT 
[address] [facility] [place] [id number] [phone] Work/School Note Date: 6/3/2018 To Whom It May concern: 
 
Jose Collins was seen and treated today in the emergency room by the following provider(s): 
Attending Provider: Tacos Marte DO Physician Assistant: Toño Taylor PA-C. Please excuse Jose Collins from work through Tuesday 6/5/18. Sincerely, Toño Taylor PA-C

## 2018-06-04 ENCOUNTER — PATIENT OUTREACH (OUTPATIENT)
Dept: CARDIOLOGY CLINIC | Age: 48
End: 2018-06-04

## 2018-06-04 ENCOUNTER — TELEPHONE (OUTPATIENT)
Dept: CARDIOLOGY CLINIC | Age: 48
End: 2018-06-04

## 2018-06-04 VITALS
HEIGHT: 67 IN | BODY MASS INDEX: 45.43 KG/M2 | WEIGHT: 289.46 LBS | SYSTOLIC BLOOD PRESSURE: 107 MMHG | OXYGEN SATURATION: 97 % | DIASTOLIC BLOOD PRESSURE: 66 MMHG | TEMPERATURE: 97.6 F | RESPIRATION RATE: 20 BRPM | HEART RATE: 84 BPM

## 2018-06-04 LAB
ATRIAL RATE: 82 BPM
CALCULATED R AXIS, ECG10: 2 DEGREES
CALCULATED T AXIS, ECG11: 56 DEGREES
DIAGNOSIS, 93000: NORMAL
Q-T INTERVAL, ECG07: 416 MS
QRS DURATION, ECG06: 84 MS
QTC CALCULATION (BEZET), ECG08: 488 MS
VENTRICULAR RATE, ECG03: 83 BPM

## 2018-06-04 NOTE — PROGRESS NOTES
Nurse navigator note - cardiology  Pt calling in post ED visit for 16 lb weight gain - leg edema/ post ED visit to [address] [place] [address] -   She has note from ED and saw her pcp who will address her general health - but he would not clear her in regards to cardiology -    NN discussed with Ms. Anirudh Osorio that Dr. Arron Lucero was just a referral coordinator - initiated testing for lymphadema and to r/o DVT. She is not in active cardiology care - she is being managed by lymphadema clinic. Pt said she needs card appt for eval and letter of clearance for work -   From the doctor who diagnosed the lymphadema -  Note to office nurse.   Ms. Anirudh Osorio continues to be very tangential in conversation -     Meda Mcburney, RN , [facility], [address] [place] [address]   [address] [facility] [address] [facility] [address]  [phone]

## 2018-06-04 NOTE — DISCHARGE INSTRUCTIONS
Leg and Ankle Edema: Care Instructions  Your Care Instructions  Swelling in the legs, ankles, and feet is called edema. It is common after you sit or stand for a while. Long plane flights or car rides often cause swelling in the legs and feet. You may also have swelling if you have to stand for long periods of time at your job. Problems with the veins in the legs (varicose veins) and changes in hormones can also cause swelling. Sometimes the swelling in the ankles and feet is caused by a more serious problem, such as heart failure, infection, blood clots, or liver or kidney disease. Follow-up care is a key part of your treatment and safety. Be sure to make and go to all appointments, and call your doctor if you are having problems. It's also a good idea to know your test results and keep a list of the medicines you take. How can you care for yourself at home? · If your doctor gave you medicine, take it as prescribed. Call your doctor if you think you are having a problem with your medicine. · Whenever you are resting, raise your legs up. Try to keep the swollen area higher than the level of your heart. · Take breaks from standing or sitting in one position. ¨ Walk around to increase the blood flow in your lower legs. ¨ Move your feet and ankles often while you stand, or tighten and relax your leg muscles. · Wear support stockings. Put them on in the morning, before swelling gets worse. · Eat a balanced diet. Lose weight if you need to. · Limit the amount of salt (sodium) in your diet. Salt holds fluid in the body and may increase swelling. When should you call for help? Call 911 anytime you think you may need emergency care. For example, call if:  ? · You have symptoms of a blood clot in your lung (called a pulmonary embolism). These may include:  ¨ Sudden chest pain. ¨ Trouble breathing. ¨ Coughing up blood.    ?Call your doctor now or seek immediate medical care if:  ? · You have signs of a blood clot, such as:  ¨ Pain in your calf, back of the knee, thigh, or groin. ¨ Redness and swelling in your leg or groin. ? · You have symptoms of infection, such as:  ¨ Increased pain, swelling, warmth, or redness. ¨ Red streaks or pus. ¨ A fever. ? Watch closely for changes in your health, and be sure to contact your doctor if:  ? · Your swelling is getting worse. ? · You have new or worsening pain in your legs. ? · You do not get better as expected. Where can you learn more? Go to http://geronimo-jostin.info/. Enter F646 in the search box to learn more about \"Leg and Ankle Edema: Care Instructions. \"  Current as of: March 20, 2017  Content Version: 11.4  © 9079-8433 [employer]. Care instructions adapted under license by [employer] (which disclaims liability or warranty for this information). If you have questions about a medical condition or this instruction, always ask your healthcare professional. [place] [address] any warranty or liability for your use of this information.

## 2018-06-04 NOTE — ED PROVIDER NOTES
HPI Comments: Nils Gaytan is a 49 y.o. female with PMH of CHF, GERD, kidney stone, lymphedema, obesity, PUD, sz, thromboembolism presents to emergency room ambulatory for evaluation of BL LE pain which began \"after I worked 7 hours today. \" She states she just returned to work after a full workup for CHF and dx of lymphedema and goes to the lymphedema clinic at [facility]. She states she was standing up for 7 hours today and her legs were \"throbbing\" on her anterior shins to her thighs. She is wearing her compression stockings but not her thigh compression as recommended by the clinic. She states she had a DVT study 2/14/18 which was negative and states she does not feel this is a DVT. She states feeling \"a little\" winded. She denies CP, SOB, fever, chills, wound. PCP: Cole Stout MD  Cardiology: [facility]    Surgical hx- multiple, see chart  Social hx- no tobacco, occ ETOH    The patient has no other complaints at this time. The history is provided by the patient. 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  PUD (peptic ulcer disease) 2016    stomach and colon ulcers?     Seizures ([address]     me dside effect? last sz 2013       Past Surgical History:   Procedure Laterality Date    ABDOMEN SURGERY PROC UNLISTED  4/2010    ventral hernia repair with biologic mesh    COLONOSCOPY N/A 3/24/2017    COLON[facility] G [facility] HX APPENDECTOMY  2009    HX BREAST BIOPSY Right 2007    negative pathology    HX BREAST BIOPSY Left 1999    negative pathology    HX ENDOSCOPY  2016    HX HERNIA REPAIR  9/2011 laparoscopic incisional hernia repair    HX HERNIA REPAIR  0390    umbilical hernia repair    HX HYSTERECTOMY  2009    partial hysterectomy (right ovary removed)    HX OOPHORECTOMY  2009    removed post op hematoma and right oopherectomy    HX OOPHORECTOMY  3/2016    mass removed and left oopherectomy    HX OTHER SURGICAL  2009    ileocectomy, bowel resection,     OR COLONOSCOPY FLX DX W/COLLJ SPEC WHEN PFRMD  1/14/2011         US GUIDE BX BREAST Left 2016    neagative paythology         Family History:   Problem Relation Age of Onset    Cancer Maternal Grandmother      colon ca    Breast Cancer Maternal Grandmother     Breast Cancer Paternal Grandmother     Breast Cancer Cousin      multiple; both sides       Social History     Social History    Marital status:      Spouse name: N/A    Number of children: N/A    Years of education: N/A     Occupational History    Not on file. Social History Main Topics    Smoking status: Never Smoker    Smokeless tobacco: Never Used    Alcohol use 0.6 oz/week     1 Glasses of wine per week      Comment: 1 glass every 2 weeks    Drug use: No    Sexual activity: Not on file     Other Topics Concern    Not on file     Social History Narrative         ALLERGIES: Sulfa (sulfonamide antibiotics); Toradol [ketorolac tromethamine]; and Morphine    Review of Systems   Constitutional: Negative. Negative for activity change, chills, fatigue and unexpected weight change. Respiratory: Positive for shortness of breath. Negative for cough, chest tightness and wheezing. Cardiovascular: Negative. Negative for chest pain and palpitations. Gastrointestinal: Negative. Negative for abdominal pain, diarrhea, nausea and vomiting. Genitourinary: Negative. Negative for dysuria, flank pain, frequency and hematuria. Musculoskeletal: Negative. Negative for arthralgias, back pain, neck pain and neck stiffness. Skin: Negative.   Negative for color change and rash.   Neurological: Negative. Negative for dizziness, numbness and headaches. Psychiatric/Behavioral: Negative. Negative for confusion. All other systems reviewed and are negative. Vitals:    06/03/18 2228   BP: 125/75   Pulse: 84   Resp: 20   Temp: 97.6 °F (36.4 °C)   SpO2: 99%   Weight: 131.3 kg (289 lb 7.4 oz)   Height: 5' 7\" (1.702 m)            Physical Exam   Constitutional: She is oriented to person, place, and time. She appears well-developed and well-nourished. She is active. Non-toxic appearance. No distress. Elevated BMI   HENT:   Head: Normocephalic and atraumatic. Eyes: Conjunctivae are normal. Pupils are equal, round, and reactive to light. Right eye exhibits no discharge. Left eye exhibits no discharge. Neck: Normal range of motion and full passive range of motion without pain. Neck supple. No tracheal tenderness present. Cardiovascular: Normal rate, regular rhythm, normal heart sounds, intact distal pulses and normal pulses. Exam reveals no gallop and no friction rub. No murmur heard. Pulmonary/Chest: Effort normal and breath sounds normal. No respiratory distress. She has no wheezes. She has no rales. She exhibits no tenderness. Abdominal: Soft. Bowel sounds are normal. She exhibits no distension. There is no tenderness. There is no rebound and no guarding. Musculoskeletal: Normal range of motion. She exhibits no edema or tenderness. Neurological: She is alert and oriented to person, place, and time. She has normal strength. No cranial nerve deficit or sensory deficit. Coordination normal.   Skin: Skin is warm, dry and intact. No abrasion and no rash noted. She is not diaphoretic. No erythema. Psychiatric: Her speech is normal. Cognition and memory are normal.   anxious   Nursing note and vitals reviewed.        MDM  Number of Diagnoses or Management Options  Bilateral leg edema:   Diagnosis management comments:   Ddx: CHF, ACS, leg edema / lymphedema, electrolyte abnormality       Amount and/or Complexity of Data Reviewed  Clinical lab tests: ordered and reviewed  Tests in the radiology section of CPT®: ordered and reviewed  Review and summarize past medical records: yes    Patient Progress  Patient progress: stable        ED Course       Procedures      Patient declines offer for duplex of bilateral lower extremities. She states she had a negative duplex of both legs 2/14/18 which was negative she states and she does not want to repeat the study. Phuc Dueñas PA-C       EKG interpretation:   Rhythm: normal sinus rhythm; and regular . Rate (approx.): 83; Axis: normal; P wave: normal; QRS interval: normal ; ST/T wave: normal.  Interpreted by Dr. Rafaela Hendrix NOTE:  11:53 PM  The patient's results have been reviewed with them and/or available family. Patient and/or family verbally conveyed their understanding and agreement of the patient's signs, symptoms, diagnosis, treatment and prognosis and additionally agree to follow up as recommended in the discharge instructions or to return to the Emergency Room should their condition change prior to their follow-up appointment. The patient/family verbally agrees with the care-plan and verbally conveys that all of their questions have been answered. The discharge instructions have also been provided to the patient and/or family with some educational information regarding the patient's diagnosis as well a list of reasons why the patient would want to return to the ER prior to their follow-up appointment, should their condition change. Plan:  1. F/U with PCP / cardiology or lymphedema clinic  2.  Return precautions discussed and advised to return to ER if worse

## 2018-06-04 NOTE — ED TRIAGE NOTES
Patient arrives with c/o CHF exacerbation and SOB. Patient states she has gained 20lbs overnight after being sent back to work today.

## 2018-06-05 NOTE — PROGRESS NOTES
Nurse navigator note - cardiology  Incoming call from [facility] short term disability agent - [phone] or [phone]   [phone]     Requesting verification of appt :  6/7/2018 11:20 AM Matilde Jay MD     Return call to Ling Barfield with verification of the above.   Cal Guerra RN , [address], [facility]   [address]  [phone]

## 2018-06-07 ENCOUNTER — OFFICE VISIT (OUTPATIENT)
Dept: CARDIOLOGY CLINIC | Age: 48
End: 2018-06-07

## 2018-06-07 VITALS
OXYGEN SATURATION: 99 % | HEIGHT: 67 IN | HEART RATE: 84 BPM | WEIGHT: 286.4 LBS | BODY MASS INDEX: 44.95 KG/M2 | RESPIRATION RATE: 16 BRPM | DIASTOLIC BLOOD PRESSURE: 80 MMHG | SYSTOLIC BLOOD PRESSURE: 120 MMHG

## 2018-06-07 DIAGNOSIS — E66.01 CLASS 3 SEVERE OBESITY DUE TO EXCESS CALORIES WITH SERIOUS COMORBIDITY AND BODY MASS INDEX (BMI) OF 40.0 TO 44.9 IN ADULT (HCC): Chronic | ICD-10-CM

## 2018-06-07 DIAGNOSIS — F41.9 ANXIETY: Chronic | ICD-10-CM

## 2018-06-07 DIAGNOSIS — I89.0 LYMPHEDEMA: Primary | ICD-10-CM

## 2018-06-07 DIAGNOSIS — R00.2 HEART PALPITATIONS: ICD-10-CM

## 2018-06-07 DIAGNOSIS — R07.89 ATYPICAL CHEST PAIN: ICD-10-CM

## 2018-06-07 NOTE — MR AVS SNAPSHOT
[address] [facility] [address] [place] [address] 
[phone] Patient: Sang Weston MRN: [id number] :1970 Visit Information Date & Time Provider Department Dept. Phone Encounter #  
 2018 11:20 AM Jerson Zambrano MD [facility] [place] [phone] [id number] Upcoming Health Maintenance Date Due DTaP/Tdap/Td series (1 - Tdap) 1990 PAP AKA CERVICAL CYTOLOGY 4/3/2012 Influenza Age 5 to Adult 2018 Allergies as of 2018  Review Complete On: 6/3/2018 By: Joshua Abreu RN Severity Noted Reaction Type Reactions Sulfa (Sulfonamide Antibiotics)  2010    Hives Toradol [Ketorolac Tromethamine]  2011   Intolerance Hives Morphine Low 2016    Hives, Nausea and Vomiting Current Immunizations  Never Reviewed No immunizations on file. Not reviewed this visit Vitals BP Pulse Resp Height(growth percentile) Weight(growth percentile) SpO2  
 120/80 (BP 1 Location: Left arm, BP Patient Position: Sitting) 84 16 5' 7\" (1.702 m) 286 lb 6.4 oz (129.9 kg) 99% BMI OB Status Smoking Status 44.86 kg/m2 Hysterectomy Never Smoker BMI and BSA Data Body Mass Index Body Surface Area 44.86 kg/m 2 2.48 m 2 Preferred Pharmacy Pharmacy Name Phone [id number] [place], [address]) [phone] Your Updated Medication List  
  
   
This list is accurate as of 18 12:40 PM.  Always use your most recent med list.  
  
  
  
  
 * ALPRAZolam 2 mg tablet Commonly known as:  Blanton Russ Take 2 mg by mouth nightly. * ALPRAZolam 1 mg tablet Commonly known as:  Blanton Russ Take 1 mg by mouth two (2) times daily as needed for Anxiety (at breakfast and lunch). aspirin delayed-release 81 mg tablet Take 81 mg by mouth every evening. calcium-vitamin D 500 mg(1,250mg) -200 unit per tablet Commonly known as:  OYSTER SHELL Take 2 Tabs by mouth two (2) times daily (with meals). Indications: hypocalcemia  
  
 cyclobenzaprine 10 mg tablet Commonly known as:  FLEXERIL Take 10 mg by mouth three (3) times daily as needed for Muscle Spasm(s). estradiol 1 mg tablet Commonly known as:  ESTRACE Take 20 mg by mouth nightly. furosemide 40 mg tablet Commonly known as:  LASIX TAKE 2 TABLET BY MOUTH DAILY for 7 days ONE-A-DAY ESSENTIAL PO Take 1 Tab by mouth daily. potassium chloride 20 mEq/15 mL solution Commonly known as:  KAON 10% Take 30 mL by mouth daily. Indications: hypokalemia prevention  
  
 pravastatin 20 mg tablet Commonly known as:  PRAVACHOL Take 40 mg by mouth nightly. SUPER B COMPLEX PO Take 1 Tab by mouth daily. * Notice: This list has 2 medication(s) that are the same as other medications prescribed for you. Read the directions carefully, and ask your doctor or other care provider to review them with you. Patient Instructions Follow up with primary care physician [facility]! Dear Fern Rogers: Thank you for requesting a [employer] account. Our records indicate that you already have an active [employer] account. You can access your account anytime at [email or web address] Did you know that you can access your hospital and ER discharge instructions at any time in [employer]? You can also review all of your test results from your hospital stay or ER visit. Additional Information If you have questions, please visit the Frequently Asked Questions section of the [employer] website at [email or web address] Remember, [employer] is NOT to be used for urgent needs. For medical emergencies, dial 911. Now available from your iPhone and Android! Please provide this summary of care documentation to your next provider. Your primary care clinician is listed as Aline Menendez. If you have any questions after today's visit, please call [phone].

## 2018-06-07 NOTE — TELEPHONE ENCOUNTER
The patient would like to discuss APravastin 20mg and Pravastatin 40mg. She would like to discuss chlorestrol levels and medication options. Please call her on [phone] regarding her medication change and fax sheet for her FMLA. I have faxed the sheets several times; however, no confirmation has been received. We waited over an hour for confirmations. Thanks!

## 2018-06-07 NOTE — TELEPHONE ENCOUNTER
PSR completed fax. Harsha Rodriguez spoke with patient regarding statin medications.  Patient should follow up with PCP for further discussion

## 2018-06-07 NOTE — LETTER
6/7/2018 12:22 PM 
 
Ms. Naldo Hatfield [address] [id number] To Whom It May Concern: 
 
Naldo Hatfield is a 49 y.o. female under the care of her primary care physician for severe lymphedema. She has been evaluated by cardiology, in our office, and does not have a current cardiac condition. She is seen regularly in the lymphedema clinic at [facility]. Unfortunately, her lymphedema is very severe and at this point requires her to use a pneumatic pump for up to 6 hours a day, divided into 3 intervals. Additionally, she must wear compression garments day and night. She at this point is unable to work due to the multiple hours required of her pneumatic pump. Further updates on her work status will be provided by her primary care physician. In the foreseeable next 4 weeks I do not anticipate she would be able to work because of the need for prolonged pneumatic compression pump.   
 
 
 
 
Sincerely, 
 
 
Shanique Gallegos MD 
 
6/7/2018 12:26 PM
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
## 2018-06-13 ENCOUNTER — TELEPHONE (OUTPATIENT)
Dept: CARDIOLOGY CLINIC | Age: 48
End: 2018-06-13

## 2018-06-13 ENCOUNTER — DOCUMENTATION ONLY (OUTPATIENT)
Dept: CARDIOLOGY CLINIC | Age: 48
End: 2018-06-13

## 2018-06-13 NOTE — TELEPHONE ENCOUNTER
Will ask MD to sign LOV note. Upon return call patient to be provided with numbers for [facility] medical records: [id number] office request: [phone]  For future request patient needs submit request to these departments.

## 2018-06-14 NOTE — PROGRESS NOTES
Cardiology NN note:  Received VM message from Ms. Pedersen- she was asking for assistance in getting a signed note from her recent office visit with Dr. Mode Fuller.  She was asking for an updated bill with CPT codes from the visit. I texted her back- asking her to use the proper channels to request information. At our previous meeting- I had provided her with both those phone numbers for her to contact to obtain information. She answered my text that she had contacted the office and was told that they were not able to view the bill- answered her back that she would have to keep checking with them- it does take a period of time before the bill is submitted and can be viewed, etc.      I explained that what she is asking me to assist with is \"not part of my role\" and there are work flows in place for her to obtain the information. I had helped in the past due to a \"deadline needed\" circumstance and I was covering for a co-worker who was out of the office.

## 2018-06-15 ENCOUNTER — TELEPHONE (OUTPATIENT)
Dept: CARDIOLOGY CLINIC | Age: 48
End: 2018-06-15

## 2018-06-15 NOTE — TELEPHONE ENCOUNTER
We dont do direct letter to [employer]OR[employer] [facility] but can provide records Monday once back from dictation   And since its been handled by PCP , there records and letter would be the letters of record  I dont determine disability, I just can state medical facts  She may want to see an Occupational health physician

## 2018-06-15 NOTE — TELEPHONE ENCOUNTER
Pt is calling in regards to getting a letter from Dr. Aime Pierce to be sent to [employer]. Pt asking for this information for the following reasons: \"Permanently laid off by employer due to disability of lymphedema. PCP recommends full social security disability due to chronic life long condition of lymphedema and many other health problems which are in medical charts. Please address letter to social security administration. \"     Pt can be reached @ [phone].      Thanks

## 2018-06-15 NOTE — TELEPHONE ENCOUNTER
Verified patient with two types of identifiers. Notified patient that Dr Arron Lucero gave her the 6/7/18 office note along with a letter and for further recommendations to come from PCP or lymphedema clinic. Patient is aware of this but needs a different letter stating everything we have except leaving out the 4 weeks part and to state in the letter what was stated in the 6/7/18 office note under the impression plan number 5. Advised her that she can take the office note along with the letter to [employer] [employer] office to file for disability but she strongly request a new letter. Advised her that he may not do this but that I would still check as she was very insistent.

## 2018-06-18 NOTE — TELEPHONE ENCOUNTER
Verified patient with two types of identifiers. Advised patient of this. She wanted to let us know that her PCP may call us to get more medical information from us. Advised her that Dr Laura Zepeda is welcome to call us. She request that we mail us the office note once complete and the letter again signed by Dr Bryce Dorman.  She also request that we send the originals and have them on colored paper. Advised her that letters and office notes are placed in our EMR system and that they are not original copies. Advised her that we do not do colored copies and that we will send her what we have. Patient verbalizes understanding. And will call with any questions or concerns.

## 2018-09-02 ENCOUNTER — HOSPITAL ENCOUNTER (EMERGENCY)
Age: 48
Discharge: HOME | End: 2018-09-02
Attending: EMERGENCY MEDICINE
Payer: COMMERCIAL

## 2018-09-02 ENCOUNTER — APPOINTMENT (OUTPATIENT)
Dept: CT IMAGING | Age: 48
End: 2018-09-02
Attending: EMERGENCY MEDICINE
Payer: COMMERCIAL

## 2018-09-02 VITALS
SYSTOLIC BLOOD PRESSURE: 117 MMHG | TEMPERATURE: 97.6 F | HEIGHT: 67 IN | RESPIRATION RATE: 18 BRPM | DIASTOLIC BLOOD PRESSURE: 71 MMHG | HEART RATE: 93 BPM | WEIGHT: 255 LBS | OXYGEN SATURATION: 97 % | BODY MASS INDEX: 40.02 KG/M2

## 2018-09-02 DIAGNOSIS — E87.6 HYPOKALEMIA: Primary | ICD-10-CM

## 2018-09-02 DIAGNOSIS — R10.9 FLANK PAIN: ICD-10-CM

## 2018-09-02 DIAGNOSIS — R79.89 ELEVATED SERUM CREATININE: ICD-10-CM

## 2018-09-02 LAB
ANION GAP SERPL CALC-SCNC: 8 MMOL/L (ref 5–15)
APPEARANCE UR: ABNORMAL
BACTERIA URNS QL MICRO: NEGATIVE /HPF
BASOPHILS # BLD: 0.1 K/UL (ref 0–0.1)
BASOPHILS NFR BLD: 1 % (ref 0–1)
BILIRUB UR QL: NEGATIVE
BUN SERPL-MCNC: 13 MG/DL (ref 6–20)
BUN/CREAT SERPL: 12 (ref 12–20)
CALCIUM SERPL-MCNC: 9.1 MG/DL (ref 8.5–10.1)
CHLORIDE SERPL-SCNC: 102 MMOL/L (ref 97–108)
CO2 SERPL-SCNC: 29 MMOL/L (ref 21–32)
COLOR UR: ABNORMAL
COMMENT, HOLDF: NORMAL
CREAT SERPL-MCNC: 1.13 MG/DL (ref 0.55–1.02)
DIFFERENTIAL METHOD BLD: NORMAL
EOSINOPHIL # BLD: 0.1 K/UL (ref 0–0.4)
EOSINOPHIL NFR BLD: 1 % (ref 0–7)
EPITH CASTS URNS QL MICRO: ABNORMAL /LPF
ERYTHROCYTE [DISTWIDTH] IN BLOOD BY AUTOMATED COUNT: 13 % (ref 11.5–14.5)
GLUCOSE SERPL-MCNC: 118 MG/DL (ref 65–100)
GLUCOSE UR STRIP.AUTO-MCNC: NEGATIVE MG/DL
HCT VFR BLD AUTO: 36.5 % (ref 35–47)
HGB BLD-MCNC: 11.7 G/DL (ref 11.5–16)
HGB UR QL STRIP: NEGATIVE
IMM GRANULOCYTES # BLD: 0 K/UL (ref 0–0.04)
IMM GRANULOCYTES NFR BLD AUTO: 0 % (ref 0–0.5)
KETONES UR QL STRIP.AUTO: NEGATIVE MG/DL
LEUKOCYTE ESTERASE UR QL STRIP.AUTO: NEGATIVE
LYMPHOCYTES # BLD: 2.1 K/UL (ref 0.8–3.5)
LYMPHOCYTES NFR BLD: 40 % (ref 12–49)
MCH RBC QN AUTO: 29.1 PG (ref 26–34)
MCHC RBC AUTO-ENTMCNC: 32.1 G/DL (ref 30–36.5)
MCV RBC AUTO: 90.8 FL (ref 80–99)
MONOCYTES # BLD: 0.3 K/UL (ref 0–1)
MONOCYTES NFR BLD: 6 % (ref 5–13)
NEUTS SEG # BLD: 2.6 K/UL (ref 1.8–8)
NEUTS SEG NFR BLD: 52 % (ref 32–75)
NITRITE UR QL STRIP.AUTO: NEGATIVE
NRBC # BLD: 0 K/UL (ref 0–0.01)
NRBC BLD-RTO: 0 PER 100 WBC
PH UR STRIP: 5.5 [PH] (ref 5–8)
PLATELET # BLD AUTO: 186 K/UL (ref 150–400)
PLATELET COMMENTS,PCOM: NORMAL
PMV BLD AUTO: 11.2 FL (ref 8.9–12.9)
POTASSIUM SERPL-SCNC: 3 MMOL/L (ref 3.5–5.1)
PROT UR STRIP-MCNC: NEGATIVE MG/DL
RBC # BLD AUTO: 4.02 M/UL (ref 3.8–5.2)
RBC #/AREA URNS HPF: ABNORMAL /HPF (ref 0–5)
RBC MORPH BLD: NORMAL
SAMPLES BEING HELD,HOLD: NORMAL
SODIUM SERPL-SCNC: 139 MMOL/L (ref 136–145)
SP GR UR REFRACTOMETRY: 1.02 (ref 1–1.03)
UROBILINOGEN UR QL STRIP.AUTO: 0.2 EU/DL (ref 0.2–1)
WBC # BLD AUTO: 5.2 K/UL (ref 3.6–11)
WBC URNS QL MICRO: ABNORMAL /HPF (ref 0–4)

## 2018-09-02 PROCEDURE — 74176 CT ABD & PELVIS W/O CONTRAST: CPT

## 2018-09-02 PROCEDURE — 36415 COLL VENOUS BLD VENIPUNCTURE: CPT | Performed by: EMERGENCY MEDICINE

## 2018-09-02 PROCEDURE — 85025 COMPLETE CBC W/AUTO DIFF WBC: CPT | Performed by: EMERGENCY MEDICINE

## 2018-09-02 PROCEDURE — 80048 BASIC METABOLIC PNL TOTAL CA: CPT | Performed by: EMERGENCY MEDICINE

## 2018-09-02 PROCEDURE — 99283 EMERGENCY DEPT VISIT LOW MDM: CPT

## 2018-09-02 PROCEDURE — 81001 URINALYSIS AUTO W/SCOPE: CPT | Performed by: EMERGENCY MEDICINE

## 2018-09-02 PROCEDURE — 74011250637 HC RX REV CODE- 250/637: Performed by: EMERGENCY MEDICINE

## 2018-09-02 RX ORDER — ONDANSETRON 4 MG/1
4 TABLET, ORALLY DISINTEGRATING ORAL
Status: COMPLETED | OUTPATIENT
Start: 2018-09-02 | End: 2018-09-02

## 2018-09-02 RX ORDER — POTASSIUM CHLORIDE 750 MG/1
40 TABLET, FILM COATED, EXTENDED RELEASE ORAL
Status: DISCONTINUED | OUTPATIENT
Start: 2018-09-02 | End: 2018-09-03 | Stop reason: HOSPADM

## 2018-09-02 RX ORDER — HYDROMORPHONE HYDROCHLORIDE 2 MG/1
2 TABLET ORAL
Status: COMPLETED | OUTPATIENT
Start: 2018-09-02 | End: 2018-09-02

## 2018-09-02 RX ADMIN — ONDANSETRON 4 MG: 4 TABLET, ORALLY DISINTEGRATING ORAL at 20:26

## 2018-09-02 RX ADMIN — HYDROMORPHONE HYDROCHLORIDE 2 MG: 2 TABLET ORAL at 20:26

## 2018-09-02 NOTE — ED TRIAGE NOTES
Patient arrives with c/o passing 6+ kidney stones in the last 2 days with severe R flank pain; h/o same.

## 2018-09-03 NOTE — ED PROVIDER NOTES
HPI Comments: Kim Pang is a 49 y.o. female  who presents by private vehicle to ER with c/o Patient presents with:  Kidney Stone. PAtient presents with complaints of right flank pain x 2 days. Patient reports history of kidney stones. Patient reports urine is darker then usual. Patient usually takes lasix however stopped it today. Denies fever, chills, nausea or vomiting. She specifically denies any fevers, chills, nausea, vomiting, chest pain, shortness of breath, headache, rash, diarrhea, abdominal pain, urinary/bowel changes, sweating or weight loss. PCP: Kelsea Kennedy MD   PMHx significant for: Past Medical History:  No date: Anxiety  No date: Bowel obstruction (HCC)  No date: Fatty liver  No date: Gall stones  No date: GERD (gastroesophageal reflux disease)  No date: Hematoma      Comment: abdomen on the right overy  No date: Hernia of unspecified site of abdominal cavity*  No date: Hx of thromboembolism of vein      Comment: right upper brachiel vein  No date: Kidney stones  No date: Obesity      Comment: REYNA? 2016: PUD (peptic ulcer disease)      Comment: stomach and colon ulcers?   No date: Seizures [facility])      Comment: me dside effect? last sz 2013   PSHx significant for: Past Surgical History:  4/2010: ABDOMEN SURGERY PROC UNLISTED      Comment: ventral hernia repair with biologic mesh  3/24/2017: COLONOSCOPY N/A      Comment: COLONOSCOPY performed by Lisa Noyola MD at [facility]               ENDOSCOPY  2009: HX APPENDECTOMY  2007: HX BREAST BIOPSY Right      Comment: negative pathology  1999: HX BREAST BIOPSY Left      Comment: negative pathology  2016: HX ENDOSCOPY  9/2011: HX HERNIA REPAIR      Comment: laparoscopic incisional hernia repair  2010: HX HERNIA REPAIR      Comment: umbilical hernia repair  2009: HX HYSTERECTOMY      Comment: partial hysterectomy (right ovary removed)  2009: HX OOPHORECTOMY      Comment: removed post op hematoma and right                oopherectomy  3/2016: HX OOPHORECTOMY      Comment: mass removed and left oopherectomy  2009: HX OTHER SURGICAL      Comment: ileocectomy, bowel resection,   1/14/2011: [place] COLONOSCOPY FLX DX W/COLLJ SPEC WHEN PFRMD      Comment:    2016: US GUIDE BX BREAST Left      Comment: neagative paythology  Social Hx: Tobacco use: Smoking status: Never Smoker                                                              Smokeless status: Never Used                      ; EtOH use: The patient states she drinks 0 per week.; Illicit Drug use: Allergies:   -- Sulfa (Sulfonamide Antibiotics) -- Hives   -- Toradol (Ketorolac Tromethamine) -- Hives   -- Morphine -- Hives and Nausea and Vomiting    There are no other complaints, changes or physical findings at this time. Patient is a 49 y.o. female presenting with kidney stones. The history is provided by the patient. Kidney Stone    This is a new problem. The current episode started 2 days ago. The problem has not changed since onset. The problem occurs constantly. The pain is associated with no known injury. The pain is present in the right side. The quality of the pain is described as sharp. The pain does not radiate. The pain is at a severity of 8/10. The pain is severe. Pertinent negatives include no chest pain, no fever, no numbness, no weight loss, no headaches, no abdominal pain, no abdominal swelling, no bowel incontinence, no perianal numbness, no bladder incontinence, no dysuria, no pelvic pain, no leg pain, no paresthesias, no paresis, no tingling and no weakness. She has tried nothing for the symptoms. Risk factors include history of kidney stones and obesity.  The patient's surgical history non-contributory        Past Medical History:   Diagnosis Date    Anxiety     Bowel obstruction (HCC)     Fatty liver     Gall stones     GERD (gastroesophageal reflux disease)     Hematoma     abdomen on the right overy    Hernia of unspecified site of abdominal cavity without mention of obstruction or gangrene     Hx of thromboembolism of vein     right upper brachiel vein    Kidney stones     Obesity     REYNA?  PUD (peptic ulcer disease) 2016    stomach and colon ulcers?  Seizures ([address]     me dside effect? last sz 2013       Past Surgical History:   Procedure Laterality Date    ABDOMEN SURGERY PROC UNLISTED  4/2010    ventral hernia repair with biologic mesh    COLONOSCOPY N/A 3/24/2017    COLONOSCOPY performed by Rochelle Camarillo MD at [facility] HX APPENDECTOMY  2009    HX BREAST BIOPSY Right 2007    negative pathology    HX BREAST BIOPSY Left 1999    negative pathology    HX ENDOSCOPY  2016    HX HERNIA REPAIR  9/2011    laparoscopic incisional hernia repair    HX HERNIA REPAIR  2614    umbilical hernia repair    HX HYSTERECTOMY  2009    partial hysterectomy (right ovary removed)    HX OOPHORECTOMY  2009    removed post op hematoma and right oopherectomy    HX OOPHORECTOMY  3/2016    mass removed and left oopherectomy    HX OTHER SURGICAL  2009    ileocectomy, bowel resection,     CT COLONOSCOPY FLX DX W/COLLJ SPEC WHEN PFRMD  1/14/2011         US GUIDE BX BREAST Left 2016    neagative paythology         Family History:   Problem Relation Age of Onset    Cancer Maternal Grandmother      colon ca    Breast Cancer Maternal Grandmother     Breast Cancer Paternal Grandmother     Breast Cancer Cousin      multiple; both sides       Social History     Social History    Marital status:      Spouse name: N/A    Number of children: N/A    Years of education: N/A     Occupational History    Not on file.      Social History Main Topics    Smoking status: Never Smoker    Smokeless tobacco: Never Used    Alcohol use 0.6 oz/week     1 Glasses of wine per week      Comment: 1 glass every 2 weeks    Drug use: No    Sexual activity: Not on file     Other Topics Concern    Not on file     Social History Narrative         ALLERGIES: Sulfa (sulfonamide antibiotics); Toradol [ketorolac tromethamine]; and Morphine    Review of Systems   Constitutional: Negative. Negative for fever and weight loss. HENT: Negative. Eyes: Negative. Respiratory: Negative. Cardiovascular: Negative. Negative for chest pain. Gastrointestinal: Negative. Negative for abdominal pain and bowel incontinence. Endocrine: Negative. Genitourinary: Positive for decreased urine volume. Negative for bladder incontinence, dysuria and pelvic pain. Musculoskeletal: Positive for back pain. Skin: Negative. Allergic/Immunologic: Negative. Neurological: Negative. Negative for tingling, weakness, numbness, headaches and paresthesias. Hematological: Negative. Psychiatric/Behavioral: Negative. All other systems reviewed and are negative. Vitals:    09/02/18 1933   BP: 117/71   Pulse: 93   Resp: 18   Temp: 97.6 °F (36.4 °C)   SpO2: 97%   Weight: 115.7 kg (255 lb)   Height: 5' 7\" (1.702 m)            Physical Exam   Constitutional: She is oriented to person, place, and time. She appears well-developed. HENT:   Head: Normocephalic and atraumatic. Right Ear: External ear normal.   Left Ear: External ear normal.   Nose: Nose normal.   Mouth/Throat: Oropharynx is clear and moist. No oropharyngeal exudate. Eyes: Conjunctivae, EOM and lids are normal. Right eye exhibits no discharge. Left eye exhibits no discharge. Neck: Normal range of motion. No tracheal deviation present. No thyromegaly present. Cardiovascular: Normal rate, regular rhythm, normal heart sounds and intact distal pulses. Pulmonary/Chest: Effort normal and breath sounds normal.   Abdominal: Soft. Normal appearance and bowel sounds are normal.   Musculoskeletal: Normal range of motion. Neurological: She is alert and oriented to person, place, and time. Skin: Skin is warm and dry. Psychiatric: She has a normal mood and affect.  Judgment normal.        MDM  Number of Diagnoses or Management Options  Elevated serum creatinine:   Flank pain:   Hypokalemia:   Diagnosis management comments: Assesment/Plan- 49 y.o. Patient presents with:  Kidney Stone  differential includes: flank pain, kidney stone, UTI, muscle strain. Labs and imaging reviewed with mild elevation in creatinine, no acute findings on ct. Urine with no signs of infection, hypokalemia. Patient declines potassium in ED, states she has it at home and will take it when she gets home. Recommend PCP follow up. Patient educated on reasons to return to the ED.          Amount and/or Complexity of Data Reviewed  Clinical lab tests: ordered and reviewed  Tests in the radiology section of CPT®: ordered and reviewed          ED Course       Procedures

## 2018-09-03 NOTE — ED NOTES
Patient discharged by MD. Pt left prior to reassessment/ vitals and medication administration. Papers given and questions answered.

## 2018-09-03 NOTE — DISCHARGE INSTRUCTIONS
Hypokalemia: Care Instructions  Your Care Instructions    Hypokalemia (say \"jk-dm-dro-JADE-haydee-uh\") is a low level of potassium. The heart, muscles, kidneys, and nervous system all need potassium to work well. This problem has many different causes. Kidney problems, diet, and medicines like diuretics and laxatives can cause it. So can vomiting or diarrhea. In some cases, cancer is the cause. Your doctor may do tests to find the cause of your low potassium levels. You may need medicines to bring your potassium levels back to normal. You may also need regular blood tests to check your potassium. If you have very low potassium, you may need intravenous (IV) medicines. You also may need tests to check the electrical activity of your heart. Heart problems caused by low potassium levels can be very serious. Follow-up care is a key part of your treatment and safety. Be sure to make and go to all appointments, and call your doctor if you are having problems. It's also a good idea to know your test results and keep a list of the medicines you take. How can you care for yourself at home? · If your doctor recommends it, eat foods that have a lot of potassium. These include fresh fruits, juices, and vegetables. They also include nuts, beans, and milk. · Be safe with medicines. If your doctor prescribes medicines or potassium supplements, take them exactly as directed. Call your doctor if you have any problems with your medicines. · Get your potassium levels tested as often as your doctor tells you. When should you call for help? Call 911 anytime you think you may need emergency care. For example, call if:    · You feel like your heart is missing beats. Heart problems caused by low potassium can cause death.     · You passed out (lost consciousness).     · You have a seizure.    Call your doctor now or seek immediate medical care if:    · You feel weak or unusually tired.     · You have severe arm or leg cramps.   · You have tingling or numbness.     · You feel sick to your stomach, or you vomit.     · You have belly cramps.     · You feel bloated or constipated.     · You have to urinate a lot.     · You feel very thirsty most of the time.     · You are dizzy or lightheaded, or you feel like you may faint.     · You feel depressed, or you lose touch with reality.    Watch closely for changes in your health, and be sure to contact your doctor if:    · You do not get better as expected. Where can you learn more? Go to http://geronimo-jostin.info/. Enter G358 in the search box to learn more about \"Hypokalemia: Care Instructions. \"  Current as of: May 12, 2017  Content Version: 11.7  © 7151-3795 [employer]. Care instructions adapted under license by [employer] (which disclaims liability or warranty for this information). If you have questions about a medical condition or this instruction, always ask your healthcare professional. [address] any warranty or liability for your use of this information. Abdominal Pain: Care Instructions  Your Care Instructions    Abdominal pain has many possible causes. Some aren't serious and get better on their own in a few days. Others need more testing and treatment. If your pain continues or gets worse, you need to be rechecked and may need more tests to find out what is wrong. You may need surgery to correct the problem. Don't ignore new symptoms, such as fever, nausea and vomiting, urination problems, pain that gets worse, and dizziness. These may be signs of a more serious problem. Your doctor may have recommended a follow-up visit in the next 8 to 12 hours. If you are not getting better, you may need more tests or treatment. The doctor has checked you carefully, but problems can develop later. If you notice any problems or new symptoms, get medical treatment right away.   Follow-up care is a key part of your treatment and safety. Be sure to make and go to all appointments, and call your doctor if you are having problems. It's also a good idea to know your test results and keep a list of the medicines you take. How can you care for yourself at home? · Rest until you feel better. · To prevent dehydration, drink plenty of fluids, enough so that your urine is light yellow or clear like water. Choose water and other caffeine-free clear liquids until you feel better. If you have kidney, heart, or liver disease and have to limit fluids, talk with your doctor before you increase the amount of fluids you drink. · If your stomach is upset, eat mild foods, such as rice, dry toast or crackers, bananas, and applesauce. Try eating several small meals instead of two or three large ones. · Wait until 48 hours after all symptoms have gone away before you have spicy foods, alcohol, and drinks that contain caffeine. · Do not eat foods that are high in fat. · Avoid anti-inflammatory medicines such as aspirin, ibuprofen (Advil, Motrin), and naproxen (Aleve). These can cause stomach upset. Talk to your doctor if you take daily aspirin for another health problem. When should you call for help? Call 911 anytime you think you may need emergency care. For example, call if:    · You passed out (lost consciousness).     · You pass maroon or very bloody stools.     · You vomit blood or what looks like coffee grounds.     · You have new, severe belly pain.    Call your doctor now or seek immediate medical care if:    · Your pain gets worse, especially if it becomes focused in one area of your belly.     · You have a new or higher fever.     · Your stools are black and look like tar, or they have streaks of blood.     · You have unexpected vaginal bleeding.     · You have symptoms of a urinary tract infection. These may include:  ¨ Pain when you urinate.   ¨ Urinating more often than usual.  ¨ Blood in your urine.     · You are dizzy or lightheaded, or you feel like you may faint.    Watch closely for changes in your health, and be sure to contact your doctor if:    · You are not getting better after 1 day (24 hours). Where can you learn more? Go to http://geronimo-jostin.info/. Enter C060 in the search box to learn more about \"Abdominal Pain: Care Instructions. \"  Current as of: November 20, 2017  Content Version: 11.7  © 4578-6180 [employer] Care instructions adapted under license by [employer] (which disclaims liability or warranty for this information). If you have questions about a medical condition or this instruction, always ask your healthcare professional. [address] any warranty or liability for your use of this information. We hope that we have addressed all of your medical concerns. The examination and treatment you received in the Emergency Department were for an emergent problem and were not intended as complete care. It is important that you follow up with your healthcare provider(s) for ongoing care. If your symptoms worsen or do not improve as expected, and you are unable to reach your usual health care provider(s), you should return to the Emergency Department. Today's healthcare is undergoing tremendous change, and patient satisfaction surveys are one of the many tools to assess the quality of medical care. You may receive a survey from the [employer] organization regarding your experience in the Emergency Department. I hope that your experience has been completely positive, particularly the medical care that I provided. As such, please participate in the survey; anything less than excellent does not meet my expectations or intentions. [facility] participate in nationally recognized quality of care measures.   If your blood pressure is greater than 120/80, as reported below, we urge that you seek medical care to address the potential of high blood pressure, commonly known as hypertension. Hypertension can be hereditary or can be caused by certain medical conditions, pain, stress, or \"white coat syndrome. \"       Please make an appointment with your health care provider(s) for follow up of your Emergency Department visit. VITALS:   Patient Vitals for the past 8 hrs:   Temp Pulse Resp BP SpO2   09/02/18 1933 97.6 °F (36.4 °C) 93 18 117/71 97 %          Thank you for allowing us to provide you with medical care today. We realize that you have many choices for your emergency care needs. Please choose us in the future for any continued health care needs. Zita Barriga, [address] [place] [address].   Office: [phone]            Recent Results (from the past 24 hour(s))   METABOLIC PANEL, BASIC    Collection Time: 09/02/18  8:01 PM   Result Value Ref Range    Sodium 139 136 - 145 mmol/L    Potassium 3.0 (L) 3.5 - 5.1 mmol/L    Chloride 102 97 - 108 mmol/L    CO2 29 21 - 32 mmol/L    Anion gap 8 5 - 15 mmol/L    Glucose 118 (H) 65 - 100 mg/dL    BUN 13 6 - 20 MG/DL    Creatinine 1.13 (H) 0.55 - 1.02 MG/DL    BUN/Creatinine ratio 12 12 - 20      GFR est AA >60 >60 ml/min/1.73m2    GFR est non-AA 51 (L) >60 ml/min/1.73m2    Calcium 9.1 8.5 - 10.1 MG/DL   CBC WITH AUTOMATED DIFF    Collection Time: 09/02/18  8:01 PM   Result Value Ref Range    WBC 5.2 3.6 - 11.0 K/uL    RBC 4.02 3.80 - 5.20 M/uL    HGB 11.7 11.5 - 16.0 g/dL    HCT 36.5 35.0 - 47.0 %    MCV 90.8 80.0 - 99.0 FL    MCH 29.1 26.0 - 34.0 PG    MCHC 32.1 30.0 - 36.5 g/dL    RDW 13.0 11.5 - 14.5 %    PLATELET 244 791 - 551 K/uL    MPV 11.2 8.9 - 12.9 FL    NRBC 0.0 0  WBC    ABSOLUTE NRBC 0.00 0.00 - 0.01 K/uL    NEUTROPHILS 52 32 - 75 %    LYMPHOCYTES 40 12 - 49 %    MONOCYTES 6 5 - 13 %    EOSINOPHILS 1 0 - 7 %    BASOPHILS 1 0 - 1 %    IMMATURE GRANULOCYTES 0 0.0 - 0.5 %    ABS. NEUTROPHILS 2.6 1.8 - 8.0 K/UL    ABS. LYMPHOCYTES 2.1 0.8 - 3.5 K/UL    ABS. MONOCYTES 0.3 0.0 - 1.0 K/UL    ABS. EOSINOPHILS 0.1 0.0 - 0.4 K/UL    ABS. BASOPHILS 0.1 0.0 - 0.1 K/UL    ABS. IMM. GRANS. 0.0 0.00 - 0.04 K/UL    DF AUTOMATED      PLATELET COMMENTS Large Platelets      RBC COMMENTS NORMOCYTIC, NORMOCHROMIC     SAMPLES BEING HELD    Collection Time: 09/02/18  8:01 PM   Result Value Ref Range    SAMPLES BEING HELD NSR     COMMENT        Add-on orders for these samples will be processed based on acceptable specimen integrity and analyte stability, which may vary by analyte. URINALYSIS W/MICROSCOPIC    Collection Time: 09/02/18  8:03 PM   Result Value Ref Range    Color YELLOW/STRAW      Appearance CLOUDY (A) CLEAR      Specific gravity 1.016 1.003 - 1.030      pH (UA) 5.5 5.0 - 8.0      Protein NEGATIVE  NEG mg/dL    Glucose NEGATIVE  NEG mg/dL    Ketone NEGATIVE  NEG mg/dL    Bilirubin NEGATIVE  NEG      Blood NEGATIVE  NEG      Urobilinogen 0.2 0.2 - 1.0 EU/dL    Nitrites NEGATIVE  NEG      Leukocyte Esterase NEGATIVE  NEG      WBC 0-4 0 - 4 /hpf    RBC 0-5 0 - 5 /hpf    Epithelial cells FEW FEW /lpf    Bacteria NEGATIVE  NEG /hpf       Ct Abd Pelv Wo Cont    Result Date: 9/2/2018  EXAM:  CT ABD PELV WO CONT INDICATION: Flank pain, recurrent stone disease suspected COMPARISON: CT 10/9/2017. CONTRAST:  None. TECHNIQUE: Thin axial images were obtained through the abdomen and pelvis. Coronal and sagittal reconstructions were generated. Oral contrast was not administered. CT dose reduction was achieved through use of a standardized protocol tailored for this examination and automatic exposure control for dose modulation. The absence of intravenous contrast material reduces the sensitivity for evaluation of the solid parenchymal organs of the abdomen. FINDINGS: LUNG BASES: Clear. INCIDENTALLY IMAGED HEART AND MEDIASTINUM: Unremarkable. LIVER: No mass or biliary dilatation. GALLBLADDER: Surgically absent. SPLEEN: No mass. PANCREAS: No mass or ductal dilatation. ADRENALS: Unremarkable. KIDNEYS/URETERS: Punctate 1 to 2 mm stone lower pole collecting system of the left kidney. No other stones, hydronephrosis, or renal masses seen. Ureters are nondilated with no stones or other obstructing lesion seen. STOMACH: Unremarkable. SMALL BOWEL: No dilatation or wall thickening. COLON: No dilatation or wall thickening. APPENDIX: Surgically absent. PERITONEUM: No ascites or pneumoperitoneum. RETROPERITONEUM: No lymphadenopathy or aortic aneurysm. REPRODUCTIVE ORGANS: Uterus and ovaries are surgically absent. URINARY BLADDER: No mass or calculus. BONES: Minimal degenerative spine change. No acute fracture or aggressive lesion. ADDITIONAL COMMENTS: Anterior abdominal wall mesh hernia repair change. IMPRESSION: Nonobstructing punctate 1 to 2 mm lower pole collecting system stone of the left kidney. No other stones or evidence of obstruction. No other cause for flank pain identified. Incidental findings as above.

## 2018-09-13 ENCOUNTER — HOSPITAL ENCOUNTER (OUTPATIENT)
Dept: PHYSICAL THERAPY | Age: 48
End: 2018-09-13

## 2018-10-25 ENCOUNTER — HOSPITAL ENCOUNTER (OUTPATIENT)
Dept: PHYSICAL THERAPY | Age: 48
Discharge: HOME | End: 2018-10-25
Payer: COMMERCIAL

## 2018-10-25 VITALS — BODY MASS INDEX: 42.68 KG/M2 | WEIGHT: 265.6 LBS | HEIGHT: 66 IN

## 2018-10-25 PROCEDURE — 97140 MANUAL THERAPY 1/> REGIONS: CPT

## 2018-10-25 PROCEDURE — 97161 PT EVAL LOW COMPLEX 20 MIN: CPT
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
## 2019-03-07 ENCOUNTER — HOSPITAL ENCOUNTER (EMERGENCY)
Age: 49
Discharge: HOME | End: 2019-03-08
Attending: EMERGENCY MEDICINE | Admitting: EMERGENCY MEDICINE
Payer: COMMERCIAL

## 2019-03-07 ENCOUNTER — APPOINTMENT (OUTPATIENT)
Dept: CT IMAGING | Age: 49
End: 2019-03-07
Attending: PHYSICIAN ASSISTANT
Payer: COMMERCIAL

## 2019-03-07 DIAGNOSIS — R19.7 DIARRHEA, UNSPECIFIED TYPE: Primary | ICD-10-CM

## 2019-03-07 DIAGNOSIS — K52.9 COLITIS: ICD-10-CM

## 2019-03-07 LAB
ALBUMIN SERPL-MCNC: 3.5 G/DL (ref 3.5–5)
ALBUMIN/GLOB SERPL: 0.9 {RATIO} (ref 1.1–2.2)
ALP SERPL-CCNC: 139 U/L (ref 45–117)
ALT SERPL-CCNC: 32 U/L (ref 12–78)
ANION GAP SERPL CALC-SCNC: 10 MMOL/L (ref 5–15)
AST SERPL-CCNC: 27 U/L (ref 15–37)
BASOPHILS # BLD: 0 K/UL (ref 0–0.1)
BASOPHILS NFR BLD: 1 % (ref 0–1)
BILIRUB SERPL-MCNC: 0.2 MG/DL (ref 0.2–1)
BUN SERPL-MCNC: 15 MG/DL (ref 6–20)
BUN/CREAT SERPL: 14 (ref 12–20)
CALCIUM SERPL-MCNC: 9.1 MG/DL (ref 8.5–10.1)
CHLORIDE SERPL-SCNC: 107 MMOL/L (ref 97–108)
CO2 SERPL-SCNC: 24 MMOL/L (ref 21–32)
COMMENT, HOLDF: NORMAL
CREAT SERPL-MCNC: 1.06 MG/DL (ref 0.55–1.02)
DIFFERENTIAL METHOD BLD: NORMAL
EOSINOPHIL # BLD: 0.1 K/UL (ref 0–0.4)
EOSINOPHIL NFR BLD: 1 % (ref 0–7)
ERYTHROCYTE [DISTWIDTH] IN BLOOD BY AUTOMATED COUNT: 13.3 % (ref 11.5–14.5)
GLOBULIN SER CALC-MCNC: 4.1 G/DL (ref 2–4)
GLUCOSE SERPL-MCNC: 90 MG/DL (ref 65–100)
HCT VFR BLD AUTO: 39.2 % (ref 35–47)
HGB BLD-MCNC: 11.9 G/DL (ref 11.5–16)
IMM GRANULOCYTES # BLD AUTO: 0 K/UL (ref 0–0.04)
IMM GRANULOCYTES NFR BLD AUTO: 0 % (ref 0–0.5)
LIPASE SERPL-CCNC: 141 U/L (ref 73–393)
LYMPHOCYTES # BLD: 2.1 K/UL (ref 0.8–3.5)
LYMPHOCYTES NFR BLD: 29 % (ref 12–49)
MCH RBC QN AUTO: 28.4 PG (ref 26–34)
MCHC RBC AUTO-ENTMCNC: 30.4 G/DL (ref 30–36.5)
MCV RBC AUTO: 93.6 FL (ref 80–99)
MONOCYTES # BLD: 0.5 K/UL (ref 0–1)
MONOCYTES NFR BLD: 7 % (ref 5–13)
NEUTS SEG # BLD: 4.3 K/UL (ref 1.8–8)
NEUTS SEG NFR BLD: 62 % (ref 32–75)
NRBC # BLD: 0 K/UL (ref 0–0.01)
NRBC BLD-RTO: 0 PER 100 WBC
PLATELET # BLD AUTO: 249 K/UL (ref 150–400)
PMV BLD AUTO: 10.3 FL (ref 8.9–12.9)
POTASSIUM SERPL-SCNC: 3.6 MMOL/L (ref 3.5–5.1)
PROT SERPL-MCNC: 7.6 G/DL (ref 6.4–8.2)
RBC # BLD AUTO: 4.19 M/UL (ref 3.8–5.2)
SAMPLES BEING HELD,HOLD: NORMAL
SODIUM SERPL-SCNC: 141 MMOL/L (ref 136–145)
WBC # BLD AUTO: 7 K/UL (ref 3.6–11)

## 2019-03-07 PROCEDURE — 89055 LEUKOCYTE ASSESSMENT FECAL: CPT

## 2019-03-07 PROCEDURE — 87177 OVA AND PARASITES SMEARS: CPT

## 2019-03-07 PROCEDURE — 87045 FECES CULTURE AEROBIC BACT: CPT

## 2019-03-07 PROCEDURE — 74011636320 HC RX REV CODE- 636/320: Performed by: RADIOLOGY

## 2019-03-07 PROCEDURE — 74011000258 HC RX REV CODE- 258: Performed by: RADIOLOGY

## 2019-03-07 PROCEDURE — 74177 CT ABD & PELVIS W/CONTRAST: CPT

## 2019-03-07 PROCEDURE — 85025 COMPLETE CBC W/AUTO DIFF WBC: CPT

## 2019-03-07 PROCEDURE — 74011250637 HC RX REV CODE- 250/637: Performed by: PHYSICIAN ASSISTANT

## 2019-03-07 PROCEDURE — 99283 EMERGENCY DEPT VISIT LOW MDM: CPT

## 2019-03-07 PROCEDURE — 36415 COLL VENOUS BLD VENIPUNCTURE: CPT

## 2019-03-07 PROCEDURE — 80053 COMPREHEN METABOLIC PANEL: CPT

## 2019-03-07 PROCEDURE — 83690 ASSAY OF LIPASE: CPT

## 2019-03-07 PROCEDURE — 87449 NOS EACH ORGANISM AG IA: CPT

## 2019-03-07 RX ORDER — HYDROCODONE BITARTRATE AND ACETAMINOPHEN 5; 325 MG/1; MG/1
1 TABLET ORAL
Qty: 18 TAB | Refills: 0 | Status: SHIPPED | OUTPATIENT
Start: 2019-03-07 | End: 2019-03-08

## 2019-03-07 RX ORDER — DICYCLOMINE HYDROCHLORIDE 10 MG/1
10 CAPSULE ORAL
Status: COMPLETED | OUTPATIENT
Start: 2019-03-07 | End: 2019-03-07

## 2019-03-07 RX ORDER — SODIUM CHLORIDE 0.9 % (FLUSH) 0.9 %
10 SYRINGE (ML) INJECTION
Status: COMPLETED | OUTPATIENT
Start: 2019-03-07 | End: 2019-03-07

## 2019-03-07 RX ORDER — DICYCLOMINE HYDROCHLORIDE 20 MG/1
20 TABLET ORAL EVERY 6 HOURS
Qty: 20 TAB | Refills: 0 | Status: SHIPPED | OUTPATIENT
Start: 2019-03-07 | End: 2019-03-12

## 2019-03-07 RX ORDER — DICYCLOMINE HYDROCHLORIDE 20 MG/1
20 TABLET ORAL EVERY 6 HOURS
Qty: 20 TAB | Refills: 0 | Status: SHIPPED | OUTPATIENT
Start: 2019-03-07 | End: 2019-03-07

## 2019-03-07 RX ORDER — ONDANSETRON 4 MG/1
4 TABLET, ORALLY DISINTEGRATING ORAL
Qty: 15 TAB | Refills: 0 | Status: SHIPPED | OUTPATIENT
Start: 2019-03-07 | End: 2019-03-07

## 2019-03-07 RX ORDER — LORAZEPAM 2 MG/ML
1 INJECTION INTRAMUSCULAR
Status: DISCONTINUED | OUTPATIENT
Start: 2019-03-07 | End: 2019-03-07

## 2019-03-07 RX ORDER — ONDANSETRON 4 MG/1
4 TABLET, ORALLY DISINTEGRATING ORAL
Qty: 15 TAB | Refills: 0 | Status: SHIPPED | OUTPATIENT
Start: 2019-03-07 | End: 2019-03-08

## 2019-03-07 RX ADMIN — SODIUM CHLORIDE 100 ML: 900 INJECTION, SOLUTION INTRAVENOUS at 22:31

## 2019-03-07 RX ADMIN — Medication 10 ML: at 22:31

## 2019-03-07 RX ADMIN — DICYCLOMINE HYDROCHLORIDE 10 MG: 10 CAPSULE ORAL at 22:23

## 2019-03-07 RX ADMIN — IOPAMIDOL 100 ML: 755 INJECTION, SOLUTION INTRAVENOUS at 22:31

## 2019-03-07 NOTE — LETTER
[address] 
[address] [facility] [address] 
[phone] Work/School Note Date: 3/7/2019 To Whom It May concern: 
 
Alex Hensley was seen and treated today in the emergency room by the following provider(s): 
Physician Assistant: Ran, [place], [place]. Alex Hensley may return to work on 3/10. Sincerely, Quinn Palmer

## 2019-03-08 VITALS
HEART RATE: 89 BPM | SYSTOLIC BLOOD PRESSURE: 139 MMHG | TEMPERATURE: 97.9 F | OXYGEN SATURATION: 98 % | RESPIRATION RATE: 18 BRPM | DIASTOLIC BLOOD PRESSURE: 69 MMHG

## 2019-03-08 LAB
C DIFF GDH STL QL: NEGATIVE
C DIFF TOX A+B STL QL IA: NEGATIVE
INTERPRETATION: NORMAL
WBC #/AREA STL HPF: NORMAL /HPF (ref 0–4)

## 2019-03-08 RX ORDER — PROMETHAZINE HYDROCHLORIDE 25 MG/1
25 TABLET ORAL
Qty: 12 TAB | Refills: 0 | Status: SHIPPED | OUTPATIENT
Start: 2019-03-08 | End: 2019-04-19

## 2019-03-08 RX ORDER — OXYCODONE AND ACETAMINOPHEN 5; 325 MG/1; MG/1
1 TABLET ORAL
Qty: 8 TAB | Refills: 0 | Status: SHIPPED | OUTPATIENT
Start: 2019-03-08 | End: 2019-03-11

## 2019-03-08 NOTE — ED TRIAGE NOTES
Patient comes in from home with complaints of abdominal pain x 2 days. Additionally has \"bile diarrhea\" x 2 days. Referred for admission by PCP.

## 2019-03-08 NOTE — ED NOTES
Pt moved to be moved to room 6 due to continued complaints of unrelieved lower abd pain. Provider made aware of room change.
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
## 2019-03-08 NOTE — DISCHARGE INSTRUCTIONS

## 2019-03-08 NOTE — ED NOTES
Provider updated on Pts cont complaint of abd pain. Provider awaiting results of CT, orders received, primary nurse updated.

## 2019-03-09 LAB
BACTERIA SPEC CULT: NORMAL
C JEJUNI+C COLI AG STL QL: NEGATIVE
E COLI SXT1+2 STL IA: NEGATIVE
SERVICE CMNT-IMP: NORMAL

## 2019-03-12 LAB
O+P SPEC MICRO: NORMAL
O+P STL CONC: NORMAL
SPECIMEN SOURCE: NORMAL

## 2019-04-13 ENCOUNTER — APPOINTMENT (OUTPATIENT)
Dept: GENERAL RADIOLOGY | Age: 49
DRG: 389 | End: 2019-04-13
Attending: PHYSICIAN ASSISTANT
Payer: COMMERCIAL

## 2019-04-13 ENCOUNTER — HOSPITAL ENCOUNTER (INPATIENT)
Age: 49
LOS: 6 days | Discharge: HOME | DRG: 389 | End: 2019-04-19
Attending: EMERGENCY MEDICINE | Admitting: SURGERY
Payer: COMMERCIAL

## 2019-04-13 ENCOUNTER — APPOINTMENT (OUTPATIENT)
Dept: CT IMAGING | Age: 49
DRG: 389 | End: 2019-04-13
Attending: EMERGENCY MEDICINE
Payer: COMMERCIAL

## 2019-04-13 DIAGNOSIS — K56.609 SBO (SMALL BOWEL OBSTRUCTION) (HCC): Primary | ICD-10-CM

## 2019-04-13 LAB
ALBUMIN SERPL-MCNC: 3.4 G/DL (ref 3.5–5)
ALBUMIN/GLOB SERPL: 0.9 {RATIO} (ref 1.1–2.2)
ALP SERPL-CCNC: 123 U/L (ref 45–117)
ALT SERPL-CCNC: 38 U/L (ref 12–78)
ANION GAP SERPL CALC-SCNC: 7 MMOL/L (ref 5–15)
APPEARANCE UR: CLEAR
AST SERPL-CCNC: 36 U/L (ref 15–37)
BACTERIA URNS QL MICRO: ABNORMAL /HPF
BASOPHILS # BLD: 0 K/UL (ref 0–0.1)
BASOPHILS NFR BLD: 1 % (ref 0–1)
BILIRUB SERPL-MCNC: 0.3 MG/DL (ref 0.2–1)
BILIRUB UR QL: NEGATIVE
BUN SERPL-MCNC: 16 MG/DL (ref 6–20)
BUN/CREAT SERPL: 15 (ref 12–20)
CALCIUM SERPL-MCNC: 8.7 MG/DL (ref 8.5–10.1)
CHLORIDE SERPL-SCNC: 107 MMOL/L (ref 97–108)
CO2 SERPL-SCNC: 26 MMOL/L (ref 21–32)
COLOR UR: ABNORMAL
COMMENT, HOLDF: NORMAL
CREAT SERPL-MCNC: 1.07 MG/DL (ref 0.55–1.02)
DIFFERENTIAL METHOD BLD: NORMAL
EOSINOPHIL # BLD: 0.1 K/UL (ref 0–0.4)
EOSINOPHIL NFR BLD: 1 % (ref 0–7)
EPITH CASTS URNS QL MICRO: ABNORMAL /LPF
ERYTHROCYTE [DISTWIDTH] IN BLOOD BY AUTOMATED COUNT: 13.3 % (ref 11.5–14.5)
GLOBULIN SER CALC-MCNC: 4 G/DL (ref 2–4)
GLUCOSE SERPL-MCNC: 92 MG/DL (ref 65–100)
GLUCOSE UR STRIP.AUTO-MCNC: NEGATIVE MG/DL
HCT VFR BLD AUTO: 39.3 % (ref 35–47)
HGB BLD-MCNC: 12.4 G/DL (ref 11.5–16)
HGB UR QL STRIP: NEGATIVE
IMM GRANULOCYTES # BLD AUTO: 0 K/UL (ref 0–0.04)
IMM GRANULOCYTES NFR BLD AUTO: 0 % (ref 0–0.5)
KETONES UR QL STRIP.AUTO: NEGATIVE MG/DL
LACTATE BLD-SCNC: 1.97 MMOL/L (ref 0.4–2)
LEUKOCYTE ESTERASE UR QL STRIP.AUTO: NEGATIVE
LIPASE SERPL-CCNC: 101 U/L (ref 73–393)
LYMPHOCYTES # BLD: 1.6 K/UL (ref 0.8–3.5)
LYMPHOCYTES NFR BLD: 19 % (ref 12–49)
MCH RBC QN AUTO: 29 PG (ref 26–34)
MCHC RBC AUTO-ENTMCNC: 31.6 G/DL (ref 30–36.5)
MCV RBC AUTO: 92 FL (ref 80–99)
MONOCYTES # BLD: 0.4 K/UL (ref 0–1)
MONOCYTES NFR BLD: 5 % (ref 5–13)
NEUTS SEG # BLD: 6 K/UL (ref 1.8–8)
NEUTS SEG NFR BLD: 74 % (ref 32–75)
NITRITE UR QL STRIP.AUTO: NEGATIVE
NRBC # BLD: 0 K/UL (ref 0–0.01)
NRBC BLD-RTO: 0 PER 100 WBC
PH UR STRIP: 7.5 [PH] (ref 5–8)
PLATELET # BLD AUTO: 233 K/UL (ref 150–400)
PMV BLD AUTO: 10.5 FL (ref 8.9–12.9)
POTASSIUM SERPL-SCNC: 3.8 MMOL/L (ref 3.5–5.1)
PROT SERPL-MCNC: 7.4 G/DL (ref 6.4–8.2)
PROT UR STRIP-MCNC: NEGATIVE MG/DL
RBC # BLD AUTO: 4.27 M/UL (ref 3.8–5.2)
RBC #/AREA URNS HPF: ABNORMAL /HPF (ref 0–5)
SAMPLES BEING HELD,HOLD: NORMAL
SODIUM SERPL-SCNC: 140 MMOL/L (ref 136–145)
SP GR UR REFRACTOMETRY: <1.005 (ref 1–1.03)
UR CULT HOLD, URHOLD: NORMAL
UROBILINOGEN UR QL STRIP.AUTO: 0.2 EU/DL (ref 0.2–1)
WBC # BLD AUTO: 8.2 K/UL (ref 3.6–11)
WBC URNS QL MICRO: ABNORMAL /HPF (ref 0–4)

## 2019-04-13 PROCEDURE — 85025 COMPLETE CBC W/AUTO DIFF WBC: CPT

## 2019-04-13 PROCEDURE — 81001 URINALYSIS AUTO W/SCOPE: CPT

## 2019-04-13 PROCEDURE — 74011250636 HC RX REV CODE- 250/636: Performed by: EMERGENCY MEDICINE

## 2019-04-13 PROCEDURE — 83605 ASSAY OF LACTIC ACID: CPT

## 2019-04-13 PROCEDURE — 96376 TX/PRO/DX INJ SAME DRUG ADON: CPT

## 2019-04-13 PROCEDURE — 80053 COMPREHEN METABOLIC PANEL: CPT

## 2019-04-13 PROCEDURE — 77030008771 HC TU NG SALEM SUMP -A

## 2019-04-13 PROCEDURE — 74177 CT ABD & PELVIS W/CONTRAST: CPT

## 2019-04-13 PROCEDURE — 71045 X-RAY EXAM CHEST 1 VIEW: CPT

## 2019-04-13 PROCEDURE — 36415 COLL VENOUS BLD VENIPUNCTURE: CPT

## 2019-04-13 PROCEDURE — 99285 EMERGENCY DEPT VISIT HI MDM: CPT

## 2019-04-13 PROCEDURE — 74011250636 HC RX REV CODE- 250/636: Performed by: SURGERY

## 2019-04-13 PROCEDURE — 77030019563 HC DEV ATTCH FEED HOLL -A

## 2019-04-13 PROCEDURE — 74011636320 HC RX REV CODE- 636/320: Performed by: RADIOLOGY

## 2019-04-13 PROCEDURE — 83690 ASSAY OF LIPASE: CPT

## 2019-04-13 PROCEDURE — 96361 HYDRATE IV INFUSION ADD-ON: CPT

## 2019-04-13 PROCEDURE — 74011250637 HC RX REV CODE- 250/637: Performed by: SURGERY

## 2019-04-13 PROCEDURE — 96375 TX/PRO/DX INJ NEW DRUG ADDON: CPT

## 2019-04-13 PROCEDURE — 96374 THER/PROPH/DIAG INJ IV PUSH: CPT

## 2019-04-13 PROCEDURE — 65270000029 HC RM PRIVATE

## 2019-04-13 PROCEDURE — 74011250636 HC RX REV CODE- 250/636: Performed by: PHYSICIAN ASSISTANT

## 2019-04-13 RX ORDER — ONDANSETRON 2 MG/ML
4 INJECTION INTRAMUSCULAR; INTRAVENOUS
Status: DISCONTINUED | OUTPATIENT
Start: 2019-04-13 | End: 2019-04-13

## 2019-04-13 RX ORDER — SODIUM CHLORIDE 0.9 % (FLUSH) 0.9 %
5-40 SYRINGE (ML) INJECTION EVERY 8 HOURS
Status: DISCONTINUED | OUTPATIENT
Start: 2019-04-13 | End: 2019-04-19 | Stop reason: HOSPADM

## 2019-04-13 RX ORDER — CHOLESTYRAMINE 4 G/9G
1 POWDER, FOR SUSPENSION ORAL DAILY
Status: ON HOLD | COMMUNITY
End: 2021-07-20

## 2019-04-13 RX ORDER — ALPRAZOLAM 0.5 MG/1
1 TABLET ORAL 2 TIMES DAILY
Status: DISCONTINUED | OUTPATIENT
Start: 2019-04-14 | End: 2019-04-19 | Stop reason: HOSPADM

## 2019-04-13 RX ORDER — METRONIDAZOLE 7.5 MG/G
1 GEL VAGINAL
Status: COMPLETED | OUTPATIENT
Start: 2019-04-13 | End: 2019-04-17

## 2019-04-13 RX ORDER — HYDROMORPHONE HYDROCHLORIDE 2 MG/ML
1 INJECTION, SOLUTION INTRAMUSCULAR; INTRAVENOUS; SUBCUTANEOUS ONCE
Status: COMPLETED | OUTPATIENT
Start: 2019-04-13 | End: 2019-04-13

## 2019-04-13 RX ORDER — PANTOPRAZOLE SODIUM 40 MG/1
40 TABLET, DELAYED RELEASE ORAL
Status: ON HOLD | COMMUNITY
End: 2021-07-20

## 2019-04-13 RX ORDER — QUETIAPINE FUMARATE 200 MG/1
200 TABLET, FILM COATED ORAL
Status: ON HOLD | COMMUNITY
End: 2021-07-20

## 2019-04-13 RX ORDER — RANITIDINE 150 MG/1
150 TABLET, FILM COATED ORAL 2 TIMES DAILY
Status: ON HOLD | COMMUNITY
End: 2021-07-20

## 2019-04-13 RX ORDER — POTASSIUM CHLORIDE 20MEQ/15ML
60 LIQUID (ML) ORAL DAILY
Status: ON HOLD | COMMUNITY
End: 2021-07-20

## 2019-04-13 RX ORDER — METRONIDAZOLE 7.5 MG/G
1 GEL VAGINAL
Status: ON HOLD | COMMUNITY
End: 2021-07-20

## 2019-04-13 RX ORDER — HYDROMORPHONE HYDROCHLORIDE 2 MG/ML
1 INJECTION, SOLUTION INTRAMUSCULAR; INTRAVENOUS; SUBCUTANEOUS
Status: DISCONTINUED | OUTPATIENT
Start: 2019-04-13 | End: 2019-04-18

## 2019-04-13 RX ORDER — DIPHENHYDRAMINE HYDROCHLORIDE 50 MG/ML
25 INJECTION, SOLUTION INTRAMUSCULAR; INTRAVENOUS
Status: COMPLETED | OUTPATIENT
Start: 2019-04-13 | End: 2019-04-13

## 2019-04-13 RX ORDER — SODIUM CHLORIDE 0.9 % (FLUSH) 0.9 %
5-40 SYRINGE (ML) INJECTION AS NEEDED
Status: DISCONTINUED | OUTPATIENT
Start: 2019-04-13 | End: 2019-04-19 | Stop reason: HOSPADM

## 2019-04-13 RX ORDER — ONDANSETRON 2 MG/ML
4 INJECTION INTRAMUSCULAR; INTRAVENOUS
Status: COMPLETED | OUTPATIENT
Start: 2019-04-13 | End: 2019-04-13

## 2019-04-13 RX ORDER — CITALOPRAM 20 MG/1
40 TABLET, FILM COATED ORAL
Status: DISCONTINUED | OUTPATIENT
Start: 2019-04-14 | End: 2019-04-19 | Stop reason: HOSPADM

## 2019-04-13 RX ORDER — ENOXAPARIN SODIUM 100 MG/ML
40 INJECTION SUBCUTANEOUS EVERY 12 HOURS
Status: DISCONTINUED | OUTPATIENT
Start: 2019-04-13 | End: 2019-04-19 | Stop reason: HOSPADM

## 2019-04-13 RX ORDER — ESTRADIOL 1 MG/1
1 TABLET ORAL
Status: DISCONTINUED | OUTPATIENT
Start: 2019-04-14 | End: 2019-04-19 | Stop reason: HOSPADM

## 2019-04-13 RX ORDER — CITALOPRAM 40 MG/1
40 TABLET, FILM COATED ORAL
Status: ON HOLD | COMMUNITY
End: 2021-07-20

## 2019-04-13 RX ORDER — QUETIAPINE FUMARATE 100 MG/1
200 TABLET, FILM COATED ORAL
Status: DISCONTINUED | OUTPATIENT
Start: 2019-04-13 | End: 2019-04-19 | Stop reason: HOSPADM

## 2019-04-13 RX ORDER — ALPRAZOLAM 0.5 MG/1
2 TABLET ORAL
Status: DISCONTINUED | OUTPATIENT
Start: 2019-04-13 | End: 2019-04-19 | Stop reason: HOSPADM

## 2019-04-13 RX ORDER — PRAVASTATIN SODIUM 20 MG/1
40 TABLET ORAL
Status: DISCONTINUED | OUTPATIENT
Start: 2019-04-13 | End: 2019-04-19 | Stop reason: HOSPADM

## 2019-04-13 RX ORDER — HYDROMORPHONE HYDROCHLORIDE 2 MG/ML
0.5 INJECTION, SOLUTION INTRAMUSCULAR; INTRAVENOUS; SUBCUTANEOUS
Status: COMPLETED | OUTPATIENT
Start: 2019-04-13 | End: 2019-04-13

## 2019-04-13 RX ORDER — DICYCLOMINE HYDROCHLORIDE 20 MG/1
20 TABLET ORAL
Status: ON HOLD | COMMUNITY
End: 2021-07-20

## 2019-04-13 RX ADMIN — METRONIDAZOLE 5 G: 7.5 GEL VAGINAL at 22:42

## 2019-04-13 RX ADMIN — PROCHLORPERAZINE EDISYLATE 10 MG: 5 INJECTION INTRAMUSCULAR; INTRAVENOUS at 22:42

## 2019-04-13 RX ADMIN — DIPHENHYDRAMINE HYDROCHLORIDE 25 MG: 50 INJECTION, SOLUTION INTRAMUSCULAR; INTRAVENOUS at 16:41

## 2019-04-13 RX ADMIN — ONDANSETRON 4 MG: 2 INJECTION INTRAMUSCULAR; INTRAVENOUS at 16:41

## 2019-04-13 RX ADMIN — QUETIAPINE FUMARATE 200 MG: 100 TABLET ORAL at 21:06

## 2019-04-13 RX ADMIN — SODIUM CHLORIDE 1000 ML: 900 INJECTION, SOLUTION INTRAVENOUS at 14:46

## 2019-04-13 RX ADMIN — HYDROMORPHONE HYDROCHLORIDE 0.5 MG: 2 INJECTION INTRAMUSCULAR; INTRAVENOUS; SUBCUTANEOUS at 17:05

## 2019-04-13 RX ADMIN — PROMETHAZINE HYDROCHLORIDE 12.5 MG: 25 INJECTION INTRAMUSCULAR; INTRAVENOUS at 14:47

## 2019-04-13 RX ADMIN — HYDROMORPHONE HYDROCHLORIDE 1 MG: 2 INJECTION INTRAMUSCULAR; INTRAVENOUS; SUBCUTANEOUS at 14:47

## 2019-04-13 RX ADMIN — IOPAMIDOL 100 ML: 755 INJECTION, SOLUTION INTRAVENOUS at 15:25

## 2019-04-13 RX ADMIN — ALPRAZOLAM 2 MG: 0.5 TABLET ORAL at 21:05

## 2019-04-13 RX ADMIN — Medication 10 ML: at 17:06

## 2019-04-13 RX ADMIN — Medication 10 ML: at 21:06

## 2019-04-13 RX ADMIN — HYDROMORPHONE HYDROCHLORIDE 1 MG: 2 INJECTION INTRAMUSCULAR; INTRAVENOUS; SUBCUTANEOUS at 21:13

## 2019-04-13 NOTE — ED NOTES
TRANSFER - OUT REPORT:    Verbal report given to Jered Gómez RN(name) on [employer]  being transferred to 434(unit) for routine progression of care       Report consisted of patients Situation, Background, Assessment and   Recommendations(SBAR). Information from the following report(s) SBAR, ED Summary, [facility] and Recent Results was reviewed with the receiving nurse. Lines:   Peripheral IV 04/13/19 Right Antecubital (Active)   Site Assessment Clean, dry, & intact 4/13/2019  2:47 PM   Phlebitis Assessment 0 4/13/2019  2:47 PM   Infiltration Assessment 0 4/13/2019  2:47 PM   Dressing Status Clean, dry, & intact 4/13/2019  2:47 PM   Dressing Type Transparent 4/13/2019  2:47 PM   Hub Color/Line Status Pink 4/13/2019  2:47 PM        Opportunity for questions and clarification was provided.       Patient transported with:   OL[employer]

## 2019-04-13 NOTE — PROGRESS NOTES
[facility] Dosing Services: 4/13/19    Pharmacy note for Dr Sandra Polanco regarding Lovenox dose adjustment for increased BMI:    Wt Readings from Last 1 Encounters:   04/13/19 120.2 kg (265 lb)       Ht Readings from Last 1 Encounters:   04/13/19 167.6 cm (66\")           Previous Dose 40mg sq 24h   Creatinine Clearance Estimated Creatinine Clearance: 84 mL/min (A) (based on SCr of 1.07 mg/dL (H)). Creatinine Lab Results   Component Value Date/Time    Creatinine 1.07 (H) 04/13/2019 02:17 PM    Creatinine (POC) 0.9 01/14/2018 03:07 PM         Platelet Lab Results   Component Value Date/Time    PLATELET 214 33/17/6163 02:17 PM        H/H Lab Results   Component Value Date/Time    HGB 12.4 04/13/2019 02:17 PM             Pharmacist made change to enoxaparin therapy based on:    [ x ] BMI: dose changed to: 40mg sq q12h    Pharmacy to automatically make dose adjustment for obesity (BMI greater than 40)  Pharmacy to make dose rounding adjustments per [facility] dose adjustment scale.         Greg Sloan, PHARMD . Contact information: [phone]

## 2019-04-13 NOTE — H&P
Assessment:     50 yo woman with complex medical history presents with abdominal pain and partial small bowel obstruction    Plan:     Admit for NG decompression and bowel rest  NPO  IV pain meds PRN  Repeat labs in am  OOB as able    Signed By: Khai Alejandro MD    April 13, 2019          General Surgery History and Physical    Subjective:      Cookie Simpson is a 51 y.o.  female who presents with abdominal pain and vomiting. She has a very complex medical history and gives a very detailed report of her multiple medical issues. She reports that she has had a know incisional hernia with a loop of bowel herniated for several years. She reports that she has discussed repair on several occasions with surgeons but given her difficult surgical history the decision has always been to avoid further intervention. She reports chronic loose BM's. She reports severe abdominal cramping that began this morning. She reports that the pain felt like knife blades in the lower lobe of her liver. She had several episodes of vomiting prior to arrival in the ER. She reports a recent colonoscopy and upper endoscopy. She is currently undergoing evaluation for possible autoimmune hepatitis. 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  PUD (peptic ulcer disease) 2016    stomach and colon ulcers?     Seizures ([address]     me dside effect? last sz 2013     Past Surgical History:   Procedure Laterality Date    ABDOMEN SURGERY PROC UNLISTED  4/2010    ventral hernia repair with biologic mesh    COLONOSCOPY N/A 3/24/2017    COLONOSCOPY performed by Corazon Navas MD at [facility]    HX APPENDECTOMY  2009    HX BREAST BIOPSY Right 2007    negative pathology    HX BREAST BIOPSY Left 1999    negative pathology    HX ENDOSCOPY  2016    HX HERNIA REPAIR  9/2011    laparoscopic incisional hernia repair    HX HERNIA REPAIR  4728    umbilical hernia repair    HX HYSTERECTOMY  2009    partial hysterectomy (right ovary removed)    HX OOPHORECTOMY  2009    removed post op hematoma and right oopherectomy    HX OOPHORECTOMY  3/2016    mass removed and left oopherectomy    HX OTHER SURGICAL  2009    ileocectomy, bowel resection,     AR COLONOSCOPY FLX DX W/COLLJ SPEC WHEN PFRMD  1/14/2011         US GUIDE BX BREAST Left 2016    neagative paythology      Family History   Problem Relation Age of Onset    Cancer Maternal Grandmother         colon ca    Breast Cancer Maternal Grandmother     Breast Cancer Paternal Grandmother     Breast Cancer Cousin         multiple; both sides     Social History     Socioeconomic History    Marital status:      Spouse name: Not on file    Number of children: Not on file    Years of education: Not on file    Highest education level: Not on file   Tobacco Use    Smoking status: Never Smoker    Smokeless tobacco: Never Used   Substance and Sexual Activity    Alcohol use:  Yes     Alcohol/week: 0.6 oz     Types: 1 Glasses of wine per week     Comment: 1 glass every 2 weeks    Drug use: No      Current Facility-Administered Medications   Medication Dose Route Frequency    sodium chloride (NS) flush 5-40 mL  5-40 mL IntraVENous Q8H    sodium chloride (NS) flush 5-40 mL  5-40 mL IntraVENous PRN    HYDROmorphone (PF) (DILAUDID) injection 1 mg  1 mg IntraVENous Q4H PRN    enoxaparin (LOVENOX) injection 40 mg  40 mg SubCUTAneous Q12H    ALPRAZolam (XANAX) tablet 2 mg  2 mg Oral QHS    ALPRAZolam (XANAX) tablet 1 mg  1 mg Oral BID    [START ON 4/14/2019] citalopram (CELEXA) tablet 40 mg  40 mg Oral DAILY WITH LUNCH    [START ON 2019] estradiol (ESTRACE) tablet 1 mg  1 mg Oral DAILY WITH LUNCH    metroNIDAZOLE (METROGEL) 0.75 % vaginal gel 5 g  1 Applicator Vaginal QHS    pravastatin (PRAVACHOL) tablet 40 mg  40 mg Oral QHS    QUEtiapine (SEROquel) tablet 200 mg  200 mg Oral QHS    prochlorperazine (COMPAZINE) with saline injection 10 mg  10 mg IntraVENous Q6H PRN      Allergies   Allergen Reactions    Sulfa (Sulfonamide Antibiotics) Hives    Toradol [Ketorolac Tromethamine] Hives    Morphine Hives and Nausea and Vomiting       Review of Systems:     []     Unable to obtain  ROS due to  []    mental status change  []    sedated   []    intubated   [x]    Total of 12 system negative, unless specified below or in HPI:  Constitutional: negative fever, negative chills, negative weight loss  Eyes:   negative visual changes  ENT:   negative sore throat, tongue or lip swelling  Respiratory:  negative cough, negative dyspnea  Cards:  negative for chest pain, palpitations, lower extremity edema  GI:   negative for nausea, vomiting, diarrhea, and abdominal pain  :  negative for frequency, dysuria  Integument:  negative for rash and pruritus  Heme:  negative for easy bruising and gum/nose bleeding  Musculoskel: negative for myalgias,  back pain and muscle weakness  Neuro:  negative for headaches, dizziness, vertigo  Psych:  negative for feelings of anxiety, depression     Objective:        Patient Vitals for the past 8 hrs:   BP Temp Pulse Resp SpO2 Height Weight   19 1950 113/76 97.6 °F (36.4 °C) 81 18 94 % -- --   19 1730 124/72 -- -- -- 96 % -- --   19 1715 113/65 -- -- -- 95 % -- --   19 1700 106/53 -- -- -- 94 % -- --   19 1645 113/65 -- -- -- 95 % -- --   19 1630 110/66 -- -- -- 93 % -- --   19 1500 114/79 -- -- -- 96 % -- --   19 1445 (!) 121/92 -- -- -- 98 % -- --   04/13/19 1401 121/68 98.3 °F (36.8 °C) (!) 101 18 95 % 5' 6\" (1.676 m) 120.2 kg (265 lb)       Temp (24hrs), Av °F (36.7 °C), Min:97.6 °F (36.4 °C), Max:98.3 °F (36.8 °C)      Physical Exam:  General:  Alert, cooperative, no distress, appears stated age. Eyes:  Conjunctivae clear. PERRL, EOMs intact. Nose: Nares normal. Septum midline. Mucosa normal. No drainage or sinus tenderness. Mouth/Throat: Lips, mucosa, and tongue normal. Teeth and gums normal.   Neck: Trachea midline, supple non tender   Back:   Symmetric, no curvature. ROM normal. No CVA tenderness. Lungs:   Clear to auscultation bilaterally. Heart:  Regular rate and rhythm   Abdomen:   Soft, tender lower abdomen with deep palpation. Bowel sounds normal. No masses,  No organomegaly. Extremities: Extremities normal, atraumatic, no cyanosis or edema. Pulses: 2+ and symmetric all extremities.    Skin: Skin color, texture, turgor normal. No rashes or lesions         BMP:   Lab Results   Component Value Date/Time     04/13/2019 02:17 PM    K 3.8 04/13/2019 02:17 PM     04/13/2019 02:17 PM    CO2 26 04/13/2019 02:17 PM    AGAP 7 04/13/2019 02:17 PM    GLU 92 04/13/2019 02:17 PM    BUN 16 04/13/2019 02:17 PM    CREA 1.07 (H) 04/13/2019 02:17 PM    GFRAA >60 04/13/2019 02:17 PM    GFRNA 55 (L) 04/13/2019 02:17 PM     CBC:   Lab Results   Component Value Date/Time    WBC 8.2 04/13/2019 02:17 PM    HGB 12.4 04/13/2019 02:17 PM    HCT 39.3 04/13/2019 02:17 PM     04/13/2019 02:17 PM

## 2019-04-13 NOTE — ED PROVIDER NOTES
1:58 PM  I have evaluated the patient as the Provider in Triage. I have reviewed Her vital signs and the triage nurse assessment. I have talked with the patient and any available family and advised that I am the provider in triage and have ordered the appropriate study to initiate their work up based on the clinical presentation during my assessment. I have advised that the patient will be accommodated in the Main ED as soon as possible. I have also requested to contact the triage nurse or myself immediately if the patient experiences any changes in their condition during this brief waiting period. Note written by Barbara Ayala, as dictated by Yehuda Benavides MD 1:58 PM    Akira Hudson is a 51 y.o. female who presents ambulatory to the ED with a c/o right sided abd pain with nausea and vomiting \"bile\". Pt notes she woke up this morning with \"12/10\" pain right sided abdomen from RUQ to right groin, \"throbbing\", \"feels like blood flow cut off\". She notes her abdomen \"drooped\" when trying to get out of bed this morning, unable to get out of bed until ~1 hour ago. Per pt, her symptoms similar to those felt with pancolitis and bowel obstruction. Pt notes she has an extensive abd hx with surgeries and pain. Pt notes she had endoscopy and colonoscopy on Monday. She also had a liver ultrasound this week, Dr. Alan Shearer. She states the Elishailynn Miguel  Was performed due to \"extreme abdominal pain\", h/o abdominal surgeries x 9. Pt denies having bowel movements since then, not passing gas. She states she has a known hernia tear lower abdomen. She also has concerns about her lymphedema in her legs and abd. Pt notes a \"40lb weight gain secondary to fluid\". She denies f/c, cp, sob, dysuria or hematuria. Pt denies diarrhea    PCP: Loly Manning MD  PMHx significant for: Past Medical History:  No date: Anxiety  No date:  Bowel obstruction (HCC)  No date: Fatty liver  No date: Gall stones  No date: GERD (gastroesophageal reflux disease)  No date: Hematoma      Comment:  abdomen on the right overy  No date: Hernia of unspecified site of abdominal cavity without   mention of obstruction or gangrene  No date: Hx of thromboembolism of vein      Comment:  right upper brachiel vein  No date: Kidney stones  No date: Obesity      Comment:  REYNA? 2016: PUD (peptic ulcer disease)      Comment:  stomach and colon ulcers? No date: Seizures [facility])      Comment:  me dside effect? last sz 2013  PSHx significant for: Past Surgical History:  4/2010: ABDOMEN SURGERY PROC UNLISTED      Comment:  ventral hernia repair with biologic mesh  3/24/2017: COLONOSCOPY; N/A      Comment:  COLONOSCOPY performed by Danuta Guzman MD at [facility] ENDOSCOPY  2009: HX APPENDECTOMY  2007: HX BREAST BIOPSY; Right      Comment:  negative pathology  1999: HX BREAST BIOPSY; Left      Comment:  negative pathology  2016: HX ENDOSCOPY  9/2011: HX HERNIA REPAIR      Comment:  laparoscopic incisional hernia repair  2010: HX HERNIA REPAIR      Comment:  umbilical hernia repair  2009: HX HYSTERECTOMY      Comment:  partial hysterectomy (right ovary removed)  2009: HX OOPHORECTOMY      Comment:  removed post op hematoma and right oopherectomy  3/2016: HX OOPHORECTOMY      Comment:  mass removed and left oopherectomy  2009: HX OTHER SURGICAL      Comment:  ileocectomy, bowel resection,   1/14/2011: FL COLONOSCOPY FLX DX W/COLLJ SPEC WHEN PFRMD      Comment:     2016: US GUIDE BX BREAST; Left      Comment:  neagative paythology  Social Hx: Tobacco: denies  EtOH: denies  Illicit drug use: denies    There are no further complaints or symptoms at this time. The history is provided by the patient and medical records. No  was used.         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  PUD (peptic ulcer disease) 2016    stomach and colon ulcers?     Seizures ([address]     me dside effect? last sz 2013       Past Surgical History:   Procedure Laterality Date    ABDOMEN SURGERY PROC UNLISTED  4/2010    ventral hernia repair with biologic mesh    COLONOSCOPY N/A 3/24/2017    COLONOSCOPY performed by Yesika Parker MD at [address] [facility] HX APPENDECTOMY  2009    HX BREAST BIOPSY Right 2007    negative pathology    HX BREAST BIOPSY Left 1999    negative pathology    HX ENDOSCOPY  2016    HX HERNIA REPAIR  9/2011    laparoscopic incisional hernia repair    HX HERNIA REPAIR  7200    umbilical hernia repair    HX HYSTERECTOMY  2009    partial hysterectomy (right ovary removed)    HX OOPHORECTOMY  2009    removed post op hematoma and right oopherectomy    HX OOPHORECTOMY  3/2016    mass removed and left oopherectomy    HX OTHER SURGICAL  2009    ileocectomy, bowel resection,     AR COLONOSCOPY FLX DX W/COLLJ SPEC WHEN PFRMD  1/14/2011         US GUIDE BX BREAST Left 2016    neagative paythology         Family History:   Problem Relation Age of Onset    Cancer Maternal Grandmother         colon ca    Breast Cancer Maternal Grandmother     Breast Cancer Paternal Grandmother     Breast Cancer Cousin         multiple; both sides       Social History     Socioeconomic History    Marital status:      Spouse name: Not on file    Number of children: Not on file    Years of education: Not on file    Highest education level: Not on file   Occupational History    Not on file   Social Needs    Financial resource strain: Not on file    Food insecurity:     Worry: Not on file     Inability: Not on file    Transportation needs:     Medical: Not on file     Non-medical: Not on file   Tobacco Use    Smoking status: Never Smoker    Smokeless tobacco: Never Used   Substance and Sexual Activity    Alcohol use: Yes     Alcohol/week: 0.6 oz     Types: 1 Glasses of wine per week     Comment: 1 glass every 2 weeks    Drug use: No    Sexual activity: Not on file   Lifestyle    Physical activity:     Days per week: Not on file     Minutes per session: Not on file    Stress: Not on file   Relationships    Social connections:     Talks on phone: Not on file     Gets together: Not on file     Attends [employer] service: Not on file     Active member of club or organization: Not on file     Attends meetings of clubs or organizations: Not on file     Relationship status: Not on file    Intimate partner violence:     Fear of current or ex partner: Not on file     Emotionally abused: Not on file     Physically abused: Not on file     Forced sexual activity: Not on file   Other Topics Concern    Not on file   Social History Narrative    Not on file         ALLERGIES: Sulfa (sulfonamide antibiotics); Toradol [ketorolac tromethamine]; and Morphine    Review of Systems   Constitutional: Negative for chills and fever. HENT: Negative for congestion, rhinorrhea, sneezing and sore throat. Eyes: Negative for redness and visual disturbance. Respiratory: Negative for shortness of breath. Cardiovascular: Negative for chest pain and leg swelling. Gastrointestinal: Positive for abdominal pain (right sided), constipation and vomiting. Negative for nausea. Genitourinary: Negative for difficulty urinating and frequency. Musculoskeletal: Negative for back pain, myalgias and neck stiffness. Skin: Negative for rash. Neurological: Negative for dizziness, syncope, weakness and headaches. Hematological: Negative for adenopathy.        Patient Vitals for the past 12 hrs:   Temp Pulse Resp BP SpO2   04/13/19 1730 -- -- -- 124/72 96 %   04/13/19 1715 -- -- -- 113/65 95 %   04/13/19 1700 -- -- -- 106/53 94 %   04/13/19 1645 -- -- -- 113/65 95 % 04/13/19 1630 -- -- -- 110/66 93 %   04/13/19 1500 -- -- -- 114/79 96 %   04/13/19 1445 -- -- -- (!) 121/92 98 %   04/13/19 1401 98.3 °F (36.8 °C) (!) 101 18 121/68 95 %              Physical Exam   Constitutional: She is oriented to person, place, and time. She appears well-developed and well-nourished. No distress. Above average bmi female   HENT:   Head: Normocephalic and atraumatic. Right Ear: External ear normal.   Left Ear: External ear normal.   Eyes: Pupils are equal, round, and reactive to light. EOM are normal.   Neck: Neck supple. Cardiovascular: Normal rate, regular rhythm, normal heart sounds and intact distal pulses. Exam reveals no gallop and no friction rub. No murmur heard. Pulmonary/Chest: Effort normal and breath sounds normal. No stridor. No respiratory distress. She has no wheezes. She has no rales. She exhibits no tenderness. Abdominal: Soft. Bowel sounds are normal. She exhibits no distension and no mass. There is tenderness. There is no rebound and no guarding. No hernia. Diffuse right sided abd pain more focal to RLQ without rebounding or guarding + CVAT   Musculoskeletal: Normal range of motion. She exhibits no edema, tenderness or deformity. Neurological: She is alert and oriented to person, place, and time. No cranial nerve deficit. Coordination normal.   Skin: Skin is warm and dry. Capillary refill takes less than 2 seconds. No rash noted. No erythema. No pallor. Psychiatric: She has a normal mood and affect. Her behavior is normal.   Nursing note and vitals reviewed. MDM  Number of Diagnoses or Management Options  SBO (small bowel obstruction) [facility]):   Diagnosis management comments: I personally saw and examined the patient. I have reviewed and agree with the MLP's findings, including all diagnostic interpretations, and plans as written. I was present during the key portions of separately billed procedures.       Saba Chambers MD      51 y.o. f with complex past surgical hx including large ventral hernia, multiple sbos presenting with severe ap, vomiting. ttp lower abdomen, ct with ev of PSBO; admitted to surgery, IVF, NPO         Amount and/or Complexity of Data Reviewed  Clinical lab tests: ordered and reviewed  Tests in the radiology section of CPT®: ordered and reviewed  Tests in the medicine section of CPT®: reviewed and ordered  Review and summarize past medical records: yes  Independent visualization of images, tracings, or specimens: yes    Patient Progress  Patient progress: stable         Procedures    2:45  PM  Discussed pt, sx, hx and current findings with Buffy Dutton MD. He is in agreement with plan and will see pt. Will get labs, urine, imaging and continue to monitor. Will give fluids and meds for sx. Naty Edward. LUPE Ventura    4:41 PM   Ct shows partial sbo. Will contact surgery for admission. Pt requesting additional meds for n/v, abd pain. Naty Edward. LUPE Ventura      4:50 PM  Naty Edward. LUPE Ventura spoke with Dr. Jorge Chavez, Consult for Surgery. Discussed available diagnostic tests and clinical findings. She is in agreement with care plans as outlined. She will admit pt   Naty Edward. LUPE Ventura    LABS COMPLETED AND REVIEWED:  Recent Results (from the past 12 hour(s))   CBC WITH AUTOMATED DIFF    Collection Time: 04/13/19  2:17 PM   Result Value Ref Range    WBC 8.2 3.6 - 11.0 K/uL    RBC 4.27 3.80 - 5.20 M/uL    HGB 12.4 11.5 - 16.0 g/dL    HCT 39.3 35.0 - 47.0 %    MCV 92.0 80.0 - 99.0 FL    MCH 29.0 26.0 - 34.0 PG    MCHC 31.6 30.0 - 36.5 g/dL    RDW 13.3 11.5 - 14.5 %    PLATELET 996 747 - 703 K/uL    MPV 10.5 8.9 - 12.9 FL    NRBC 0.0 0  WBC    ABSOLUTE NRBC 0.00 0.00 - 0.01 K/uL    NEUTROPHILS 74 32 - 75 %    LYMPHOCYTES 19 12 - 49 %    MONOCYTES 5 5 - 13 %    EOSINOPHILS 1 0 - 7 %    BASOPHILS 1 0 - 1 %    IMMATURE GRANULOCYTES 0 0.0 - 0.5 %    ABS. NEUTROPHILS 6.0 1.8 - 8.0 K/UL    ABS. LYMPHOCYTES 1.6 0.8 - 3.5 K/UL    ABS. MONOCYTES 0.4 0.0 - 1.0 K/UL    ABS. EOSINOPHILS 0.1 0.0 - 0.4 K/UL    ABS. BASOPHILS 0.0 0.0 - 0.1 K/UL    ABS. IMM. GRANS. 0.0 0.00 - 0.04 K/UL    DF AUTOMATED     METABOLIC PANEL, COMPREHENSIVE    Collection Time: 04/13/19  2:17 PM   Result Value Ref Range    Sodium 140 136 - 145 mmol/L    Potassium 3.8 3.5 - 5.1 mmol/L    Chloride 107 97 - 108 mmol/L    CO2 26 21 - 32 mmol/L    Anion gap 7 5 - 15 mmol/L    Glucose 92 65 - 100 mg/dL    BUN 16 6 - 20 MG/DL    Creatinine 1.07 (H) 0.55 - 1.02 MG/DL    BUN/Creatinine ratio 15 12 - 20      GFR est AA >60 >60 ml/min/1.73m2    GFR est non-AA 55 (L) >60 ml/min/1.73m2    Calcium 8.7 8.5 - 10.1 MG/DL    Bilirubin, total 0.3 0.2 - 1.0 MG/DL    ALT (SGPT) 38 12 - 78 U/L    AST (SGOT) 36 15 - 37 U/L    Alk. phosphatase 123 (H) 45 - 117 U/L    Protein, total 7.4 6.4 - 8.2 g/dL    Albumin 3.4 (L) 3.5 - 5.0 g/dL    Globulin 4.0 2.0 - 4.0 g/dL    A-G Ratio 0.9 (L) 1.1 - 2.2     LIPASE    Collection Time: 04/13/19  2:17 PM   Result Value Ref Range    Lipase 101 73 - 393 U/L   SAMPLES BEING HELD    Collection Time: 04/13/19  2:17 PM   Result Value Ref Range    SAMPLES BEING HELD SST,RED,JANIS     COMMENT        Add-on orders for these samples will be processed based on acceptable specimen integrity and analyte stability, which may vary by analyte.    POC LACTIC ACID    Collection Time: 04/13/19  2:26 PM   Result Value Ref Range    Lactic Acid (POC) 1.97 0.40 - 2.00 mmol/L   URINALYSIS W/MICROSCOPIC    Collection Time: 04/13/19  4:17 PM   Result Value Ref Range    Color YELLOW/STRAW      Appearance CLEAR CLEAR      Specific gravity <1.005 1.003 - 1.030    pH (UA) 7.5 5.0 - 8.0      Protein NEGATIVE  NEG mg/dL    Glucose NEGATIVE  NEG mg/dL    Ketone NEGATIVE  NEG mg/dL    Bilirubin NEGATIVE  NEG      Blood NEGATIVE  NEG      Urobilinogen 0.2 0.2 - 1.0 EU/dL    Nitrites NEGATIVE  NEG      Leukocyte Esterase NEGATIVE  NEG      WBC 0-4 0 - 4 /hpf    RBC 0-5 0 - 5 /hpf    Epithelial cells MANY (A) FEW /lpf    Bacteria 1+ (A) NEG /hpf   URINE CULTURE HOLD SAMPLE    Collection Time: 04/13/19  4:17 PM   Result Value Ref Range    Urine culture hold        URINE ON HOLD IN MICROBIOLOGY DEPT FOR 3 DAYS. IF UNPRESERVED URINE IS SUBMITTED, IT CANNOT BE USED FOR ADDITIONAL TESTING AFTER 24 HRS, RECOLLECTION WILL BE REQUIRED. IMAGING COMPLETED AND REVIEWED:  The following have been ordered and reviewed:    Ct Abd Pelv W Cont    Result Date: 4/13/2019  EXAM: CT ABD PELV W CONT INDICATION: right-sided abd pain COMPARISON: CT March 7, 2019 CONTRAST: 100 mL of Isovue-370. TECHNIQUE: Following the uneventful intravenous administration of contrast, thin axial images were obtained through the abdomen and pelvis. Coronal and sagittal reconstructions were generated. Oral contrast was not administered. CT dose reduction was achieved through use of a standardized protocol tailored for this examination and automatic exposure control for dose modulation. FINDINGS: The patient is morbidly obese. LUNG BASES: There is mild bibasilar atelectasis. INCIDENTALLY IMAGED HEART AND MEDIASTINUM: Unremarkable. LIVER: There is diffuse fatty infiltration of the liver. GALLBLADDER: Status post cholecystectomy. SPLEEN: Chronic oval low-density lesion arising from the anterior spleen measuring 17 mm, unchanged. PANCREAS: No mass or ductal dilatation. ADRENALS: Unremarkable. KIDNEYS: No mass, calculus, or hydronephrosis. STOMACH: Unremarkable. SMALL BOWEL: There is evidence of prior ventral hernia repair. There is generalized small bowel dilatation, up to 3.5 cm. Below the ventral hernia postsurgical changes, there is a ventral hernia containing a loop of small bowel, likely serving as the transition point for this partial small bowel obstruction. Distal to this point, the small bowel is decompressed. COLON: There is moderate generalized constipation. There is evidence of resection of the cecum.  There is evidence of fecal stasis but no obstruction at the anastomosis. APPENDIX: Not visualized. PERITONEUM: No ascites or pneumoperitoneum. RETROPERITONEUM: No lymphadenopathy or aortic aneurysm. REPRODUCTIVE ORGANS: Status post hysterectomy. No pelvic free fluid. URINARY BLADDER: No mass or calculus. BONES: No destructive bone lesion. ADDITIONAL COMMENTS: N/A     IMPRESSION: Recurrent ventral hernia immediately inferior to the postsurgical mesh within the anterior abdominal wall. A small bowel loop extends into this ventral hernia, and this causes a partial small bowel obstruction. Maximal caliber proximal small bowel dilatation is 3.5 cm. No evidence of bowel perforation. Additionally, there is moderate generalized constipation. MEDICATIONS GIVEN:  Medications   sodium chloride (NS) flush 5-40 mL (10 mL IntraVENous Given 4/13/19 1706)   sodium chloride (NS) flush 5-40 mL (has no administration in time range)   HYDROmorphone (PF) (DILAUDID) injection 1 mg (has no administration in time range)   ondansetron (ZOFRAN) injection 4 mg (has no administration in time range)   enoxaparin (LOVENOX) injection 40 mg (has no administration in time range)   sodium chloride 0.9 % bolus infusion 1,000 mL (0 mL IntraVENous IV Completed 4/13/19 1734)   HYDROmorphone (PF) (DILAUDID) injection 1 mg (1 mg IntraVENous Given 4/13/19 1447)   promethazine (PHENERGAN) 12.5 mg in NS 50 mL IVPB (0 mg IntraVENous IV Completed 4/13/19 1500)   iopamidol (ISOVUE-370) 76 % injection 100 mL (100 mL IntraVENous Given 4/13/19 1525)   HYDROmorphone (PF) (DILAUDID) injection 0.5 mg (0.5 mg IntraVENous Given 4/13/19 1705)   diphenhydrAMINE (BENADRYL) injection 25 mg (25 mg IntraVENous Given 4/13/19 1641)   ondansetron (ZOFRAN) injection 4 mg (4 mg IntraVENous Given 4/13/19 1641)         CLINICAL IMPRESSION:  1. SBO (small bowel obstruction) ([facility] [address]          Plan  1.  Admission per Dr. Surgery      4:50 PM  The patient is being admitted to the hospital.  The results of their tests and reasons for their admission have been discussed with them and/or available family. The patient/family has conveyed agreement and understanding for the need to be admitted and for their admission diagnosis. Consultation has been made with the inpatient physician specialist for hospitalization.

## 2019-04-13 NOTE — PROGRESS NOTES
[facility]: MED RECONCILIATION    Comments/Recommendations:   Patient is awake, alert, and knowledgeable about home medications   Verifies allergies  Preferred pharmacy updated. The patient was diagnosed with bacterial vaginosis on 4/10 and received a prescription for metronidazole gel but was unable to start the medication prior to admission due to feeling very ill. Cholestyramine is a new medication from Dr. Martin Stephens which the patient was unable to start prior to admission but plans to start. Medications added:     Cholestyramine  Citalopram  Dicyclomine  Metronidazole gel  Pantoprazole  Quetiapine  Ranitidine    Medications removed:    Aspirin  Calcium  Furosemide    Medications adjusted:    Alprazolam changed to 1 mg BID and 2 mg at HS  Estradiol changed to 1 mg daily  Potassium changed to 60 meq daily    Allergies: Sulfa (sulfonamide antibiotics); Toradol [ketorolac tromethamine]; and Morphine    Prior to Admission Medications:     Prior to Admission Medications   Prescriptions Last Dose Informant Patient Reported? Taking? ALPRAZolam (XANAX) 2 mg tablet 4/12/2019 at Unknown time Self Yes Yes   Sig: Take 2 mg by mouth nightly. ALPRAZolam (XANAX) 2 mg tablet 4/12/2019 at Unknown time Self Yes Yes   Sig: Take 1 mg by mouth two (2) times a day. Patient takes in the morning and with a late lunch   FERROUS FUMARATE/VIT BCOMP,C (SUPER B COMPLEX PO) 4/12/2019 at Unknown time Self Yes Yes   Sig: Take 1 Tab by mouth daily. MULTIVITAMIN (ONE-A-DAY ESSENTIAL PO) 4/12/2019 at Unknown time Self Yes Yes   Sig: Take 1 Tab by mouth daily. QUEtiapine (SEROQUEL) 200 mg tablet 4/12/2019 at Unknown time Self Yes Yes   Sig: Take 200 mg by mouth nightly. cholestyramine (QUESTRAN) 4 gram packet  Self Yes Yes   Sig: Take 1 Packet by mouth daily.  Administer other oral medications at least 1 hour before or 4 to 6 hours after cholestyramine, due to the potential to bind to orally administered drugs [1]   citalopram (CELEXA) 40 mg tablet 4/12/2019 at Unknown time Self Yes Yes   Sig: Take 40 mg by mouth daily (with lunch). cyclobenzaprine (FLEXERIL) 10 mg tablet  Self Yes Yes   Sig: Take 10 mg by mouth three (3) times daily as needed for Muscle Spasm(s). dicyclomine (BENTYL) 20 mg tablet  Self Yes Yes   Sig: Take 20 mg by mouth three (3) times daily as needed (abdominal cramping). estradiol (ESTRACE) 1 mg tablet 4/12/2019 at Unknown time Self Yes Yes   Sig: Take 1 mg by mouth daily (with lunch). metroNIDAZOLE (METROGEL) 0.75 % gel  Self Yes Yes   Sig: Insert 1 Applicator into vagina nightly. Planned 5 days of treatment   pantoprazole (PROTONIX) 40 mg tablet 4/12/2019 at Unknown time Self Yes Yes   Sig: Take 40 mg by mouth Daily (before breakfast). potassium chloride (KAON 10%) 20 mEq/15 mL solution 4/12/2019 at Unknown time Self Yes Yes   Sig: Take 60 mEq by mouth daily. pravastatin (PRAVACHOL) 40 mg tablet 4/12/2019 at Unknown time Self Yes Yes   Sig: Take 40 mg by mouth nightly. promethazine (PHENERGAN) 25 mg tablet  Self No Yes   Sig: Take 1 Tab by mouth every six (6) hours as needed for Nausea. raNITIdine (ZANTAC) 150 mg tablet 4/12/2019 at Unknown time Self Yes Yes   Sig: Take 150 mg by mouth two (2) times a day.  Patient takes with lunch and at bedtime      Facility-Administered Medications: None        Thank you,      Brenda Kc, PharmD, BCPS

## 2019-04-13 NOTE — ED NOTES
Security at bedside per patient request to lock up court documents and house keys while patient in OR.

## 2019-04-13 NOTE — ED TRIAGE NOTES
Awoke with increased abdominal pain this morning 6 AM, \"intense pain in liver down to bladder at the torn hernia\"

## 2019-04-14 PROCEDURE — 65270000029 HC RM PRIVATE

## 2019-04-14 PROCEDURE — 74011250636 HC RX REV CODE- 250/636: Performed by: SURGERY

## 2019-04-14 PROCEDURE — 74011000258 HC RX REV CODE- 258: Performed by: SURGERY

## 2019-04-14 PROCEDURE — 74011250637 HC RX REV CODE- 250/637: Performed by: SURGERY

## 2019-04-14 RX ORDER — DEXTROSE MONOHYDRATE AND SODIUM CHLORIDE 5; .9 G/100ML; G/100ML
125 INJECTION, SOLUTION INTRAVENOUS CONTINUOUS
Status: DISCONTINUED | OUTPATIENT
Start: 2019-04-14 | End: 2019-04-18

## 2019-04-14 RX ORDER — ONDANSETRON 2 MG/ML
4 INJECTION INTRAMUSCULAR; INTRAVENOUS
Status: DISCONTINUED | OUTPATIENT
Start: 2019-04-14 | End: 2019-04-18

## 2019-04-14 RX ORDER — PEPPERMINT OIL
SPIRIT ORAL ONCE
Status: COMPLETED | OUTPATIENT
Start: 2019-04-14 | End: 2019-04-14

## 2019-04-14 RX ORDER — ACETAMINOPHEN 325 MG/1
650 TABLET ORAL
Status: DISCONTINUED | OUTPATIENT
Start: 2019-04-14 | End: 2019-04-19 | Stop reason: HOSPADM

## 2019-04-14 RX ADMIN — HYDROMORPHONE HYDROCHLORIDE 1 MG: 2 INJECTION INTRAMUSCULAR; INTRAVENOUS; SUBCUTANEOUS at 22:23

## 2019-04-14 RX ADMIN — PRAVASTATIN SODIUM 40 MG: 20 TABLET ORAL at 20:38

## 2019-04-14 RX ADMIN — CITALOPRAM 40 MG: 20 TABLET, FILM COATED ORAL at 13:06

## 2019-04-14 RX ADMIN — DEXTROSE MONOHYDRATE AND SODIUM CHLORIDE 125 ML/HR: 5; .9 INJECTION, SOLUTION INTRAVENOUS at 15:35

## 2019-04-14 RX ADMIN — ACETAMINOPHEN 650 MG: 325 TABLET, FILM COATED ORAL at 20:38

## 2019-04-14 RX ADMIN — ONDANSETRON 4 MG: 2 INJECTION INTRAMUSCULAR; INTRAVENOUS at 22:23

## 2019-04-14 RX ADMIN — ALPRAZOLAM 1 MG: 0.5 TABLET ORAL at 13:06

## 2019-04-14 RX ADMIN — ONDANSETRON 4 MG: 2 INJECTION INTRAMUSCULAR; INTRAVENOUS at 18:03

## 2019-04-14 RX ADMIN — Medication 10 ML: at 22:00

## 2019-04-14 RX ADMIN — ESTRADIOL 1 MG: 1 TABLET ORAL at 13:06

## 2019-04-14 RX ADMIN — PROCHLORPERAZINE EDISYLATE 10 MG: 5 INJECTION INTRAMUSCULAR; INTRAVENOUS at 08:05

## 2019-04-14 RX ADMIN — ALPRAZOLAM 1 MG: 0.5 TABLET ORAL at 08:05

## 2019-04-14 RX ADMIN — ALPRAZOLAM 2 MG: 0.5 TABLET ORAL at 20:39

## 2019-04-14 RX ADMIN — HYDROMORPHONE HYDROCHLORIDE 1 MG: 2 INJECTION INTRAMUSCULAR; INTRAVENOUS; SUBCUTANEOUS at 08:06

## 2019-04-14 RX ADMIN — HYDROMORPHONE HYDROCHLORIDE 1 MG: 2 INJECTION INTRAMUSCULAR; INTRAVENOUS; SUBCUTANEOUS at 13:06

## 2019-04-14 RX ADMIN — QUETIAPINE FUMARATE 200 MG: 100 TABLET ORAL at 22:23

## 2019-04-14 RX ADMIN — Medication: at 19:00

## 2019-04-14 RX ADMIN — Medication 10 ML: at 13:08

## 2019-04-14 RX ADMIN — HYDROMORPHONE HYDROCHLORIDE 1 MG: 2 INJECTION INTRAMUSCULAR; INTRAVENOUS; SUBCUTANEOUS at 18:03

## 2019-04-14 RX ADMIN — METRONIDAZOLE 5 G: 7.5 GEL VAGINAL at 22:25

## 2019-04-14 NOTE — PROGRESS NOTES
SURGERY PROGRESS NOTE      Admit Date: 2019    POD * No surgery found *    Procedure: * No surgery found *      Subjective:   Pain is improved with pain meds      Objective:     Visit Vitals  /73   Pulse 92   Temp 97.9 °F (36.6 °C)   Resp 16   Ht 5' 6\" (1.676 m)   Wt 120.2 kg (265 lb)   SpO2 92%   BMI 42.77 kg/m²        Temp (24hrs), Av.6 °F (36.4 °C), Min:97.4 °F (36.3 °C), Max:97.9 °F (36.6 °C)      Physical Exam:     Abdomen:  Soft. Non-tender, non-distended.          Lab Results   Component Value Date/Time    WBC 8.2 2019 02:17 PM    HGB 12.4 2019 02:17 PM    Hemoglobin (POC) 11.2 (L) 2018 03:07 PM    HCT 39.3 2019 02:17 PM    Hematocrit (POC) 33 (L) 2018 03:07 PM    PLATELET 342  02:17 PM    MCV 92.0 2019 02:17 PM     Lab Results   Component Value Date/Time    GFR est non-AA 55 (L) 2019 02:17 PM    GFRNA, POC >60 2018 03:07 PM    GFR est AA >60 2019 02:17 PM    GFRAA, POC >60 2018 03:07 PM    Creatinine 1.07 (H) 2019 02:17 PM    Creatinine (POC) 0.9 2018 03:07 PM    BUN 16 2019 02:17 PM    BUN (POC) 12 2018 03:07 PM    Sodium 140 2019 02:17 PM    Sodium (POC) 141 2018 03:07 PM    Potassium 3.8 2019 02:17 PM    Potassium (POC) 3.7 2018 03:07 PM    Chloride 107 2019 02:17 PM    Chloride (POC) 103 2018 03:07 PM    CO2 26 2019 02:17 PM    Magnesium 1.7 2017 07:24 AM    Phosphorus 3.9 2017 07:24 AM       Assessment:     Active Problems:    Small bowel obstruction ([facility] [address] (2019)        Plan/Recommendations/Medical Decision Making:     Continue NG decompression  NPO  IVF  Enema for constipation  IV pain meds PRN

## 2019-04-15 PROCEDURE — 74011250637 HC RX REV CODE- 250/637: Performed by: SURGERY

## 2019-04-15 PROCEDURE — 65270000029 HC RM PRIVATE

## 2019-04-15 PROCEDURE — 74011250636 HC RX REV CODE- 250/636: Performed by: SURGERY

## 2019-04-15 PROCEDURE — 94760 N-INVAS EAR/PLS OXIMETRY 1: CPT

## 2019-04-15 RX ORDER — DIPHENHYDRAMINE HCL 25 MG
25 CAPSULE ORAL
Status: DISCONTINUED | OUTPATIENT
Start: 2019-04-15 | End: 2019-04-19 | Stop reason: HOSPADM

## 2019-04-15 RX ORDER — PEPPERMINT OIL
OIL (ML) MISCELLANEOUS ONCE
Status: COMPLETED | OUTPATIENT
Start: 2019-04-15 | End: 2019-04-15

## 2019-04-15 RX ADMIN — ALPRAZOLAM 1 MG: 0.5 TABLET ORAL at 09:25

## 2019-04-15 RX ADMIN — QUETIAPINE FUMARATE 200 MG: 100 TABLET ORAL at 22:38

## 2019-04-15 RX ADMIN — ACETAMINOPHEN 650 MG: 325 TABLET, FILM COATED ORAL at 16:20

## 2019-04-15 RX ADMIN — ESTRADIOL 1 MG: 1 TABLET ORAL at 13:53

## 2019-04-15 RX ADMIN — Medication: at 04:52

## 2019-04-15 RX ADMIN — HYDROMORPHONE HYDROCHLORIDE 1 MG: 2 INJECTION INTRAMUSCULAR; INTRAVENOUS; SUBCUTANEOUS at 08:34

## 2019-04-15 RX ADMIN — CITALOPRAM 40 MG: 20 TABLET, FILM COATED ORAL at 13:53

## 2019-04-15 RX ADMIN — ALPRAZOLAM 2 MG: 0.5 TABLET ORAL at 22:38

## 2019-04-15 RX ADMIN — ACETAMINOPHEN 650 MG: 325 TABLET, FILM COATED ORAL at 22:38

## 2019-04-15 RX ADMIN — ALPRAZOLAM 1 MG: 0.5 TABLET ORAL at 13:54

## 2019-04-15 RX ADMIN — Medication 10 ML: at 14:00

## 2019-04-15 RX ADMIN — ONDANSETRON 4 MG: 2 INJECTION INTRAMUSCULAR; INTRAVENOUS at 13:54

## 2019-04-15 RX ADMIN — ACETAMINOPHEN 650 MG: 325 TABLET, FILM COATED ORAL at 04:30

## 2019-04-15 RX ADMIN — ONDANSETRON 4 MG: 2 INJECTION INTRAMUSCULAR; INTRAVENOUS at 03:18

## 2019-04-15 RX ADMIN — ACETAMINOPHEN 650 MG: 325 TABLET, FILM COATED ORAL at 09:24

## 2019-04-15 RX ADMIN — ONDANSETRON 4 MG: 2 INJECTION INTRAMUSCULAR; INTRAVENOUS at 21:03

## 2019-04-15 RX ADMIN — HYDROMORPHONE HYDROCHLORIDE 1 MG: 2 INJECTION INTRAMUSCULAR; INTRAVENOUS; SUBCUTANEOUS at 20:58

## 2019-04-15 RX ADMIN — DIPHENHYDRAMINE HYDROCHLORIDE 25 MG: 25 CAPSULE ORAL at 17:03

## 2019-04-15 RX ADMIN — HYDROMORPHONE HYDROCHLORIDE 1 MG: 2 INJECTION INTRAMUSCULAR; INTRAVENOUS; SUBCUTANEOUS at 13:54

## 2019-04-15 RX ADMIN — METRONIDAZOLE 5 G: 7.5 GEL VAGINAL at 22:38

## 2019-04-15 RX ADMIN — HYDROMORPHONE HYDROCHLORIDE 1 MG: 2 INJECTION INTRAMUSCULAR; INTRAVENOUS; SUBCUTANEOUS at 03:17

## 2019-04-15 RX ADMIN — ONDANSETRON 4 MG: 2 INJECTION INTRAMUSCULAR; INTRAVENOUS at 08:33

## 2019-04-15 NOTE — PROGRESS NOTES
SURGERY PROGRESS NOTE      Admit Date: 2019    POD * No surgery found *    Procedure: * No surgery found *      Subjective:   Pain is improved with pain meds. NGT output still high. Objective:     Visit Vitals  /80 (BP 1 Location: Left arm, BP Patient Position: At rest)   Pulse 72   Temp 98 °F (36.7 °C)   Resp 16   Ht 5' 6\" (1.676 m)   Wt 120.2 kg (265 lb)   SpO2 98%   BMI 42.77 kg/m²        Temp (24hrs), Av.8 °F (36.6 °C), Min:97.5 °F (36.4 °C), Max:98 °F (36.7 °C)      Physical Exam:     Abdomen:  Soft. Non-tender, non-distended.          Lab Results   Component Value Date/Time    WBC 8.2 2019 02:17 PM    HGB 12.4 2019 02:17 PM    Hemoglobin (POC) 11.2 (L) 2018 03:07 PM    HCT 39.3 2019 02:17 PM    Hematocrit (POC) 33 (L) 2018 03:07 PM    PLATELET 424  02:17 PM    MCV 92.0 2019 02:17 PM     Lab Results   Component Value Date/Time    GFR est non-AA 55 (L) 2019 02:17 PM    GFRNA, POC >60 2018 03:07 PM    GFR est AA >60 2019 02:17 PM    GFRAA, POC >60 2018 03:07 PM    Creatinine 1.07 (H) 2019 02:17 PM    Creatinine (POC) 0.9 2018 03:07 PM    BUN 16 2019 02:17 PM    BUN (POC) 12 2018 03:07 PM    Sodium 140 2019 02:17 PM    Sodium (POC) 141 2018 03:07 PM    Potassium 3.8 2019 02:17 PM    Potassium (POC) 3.7 2018 03:07 PM    Chloride 107 2019 02:17 PM    Chloride (POC) 103 2018 03:07 PM    CO2 26 2019 02:17 PM    Magnesium 1.7 2017 07:24 AM    Phosphorus 3.9 2017 07:24 AM       Assessment:     Active Problems:    Small bowel obstruction ([facility] [address] (2019)        Plan/Recommendations/Medical Decision Making:     Continue NG decompression  NPO  IVF  Enema for constipation  IV pain meds PRN

## 2019-04-15 NOTE — PROGRESS NOTES
Patient reports plans of possible surgery today. OR does not see her on the schedule, and says surgery not likely unless Dr. Kulwinder Monson calls to arrange. Patient has not been seen by Dr. Kulwinder Monson today, therefore holding Lovenox until AM rounding is complete.

## 2019-04-15 NOTE — PROGRESS NOTES
4/15/2019 6:00 PM Met with pt. Charted address and phone numbers confirmed. Reason for Admission:  Small bowel obstruction                     RRAT Score: 8                   Plan for utilizing home health: tbd, had home health over 9 years ago when she had a wound vac. Current Advanced Directive/Advance Care Plan: Not on file, pt declined further information. Likelihood of Readmission: low                          Transition of Care Plan: home with outpatient follow up    Pt lives alone in a 3rd floor apartment in [place], there are 2 flights of steps to pt's apartment. Pt reported her mother lives close by. Pt was independent with adls, driving and working full time prior to admission. Pt owns a lymphedema pump at home and follows up with the lymphedema clinic at [facility]. Pt has rx coverage under her [employer] and fills her scripts at [facility]. Pt's mother will transport pt home. CM will follow for final discharge needs, none identified at this time.  ELIU Elliott  Care Management Interventions  PCP Verified by CM: Sara Escalante, no nurse navigator )  Last Visit to PCP: 04/09/19  Ravi Signup: No  Discharge Durable Medical Equipment: No  Physical Therapy Consult: No  Occupational Therapy Consult: No  Speech Therapy Consult: No  Current Support Network: Own Home, Lives Alone

## 2019-04-15 NOTE — PROGRESS NOTES
Bedside and Verbal shift change report given to German Martell (oncoming nurse) by Tomeka Potter student nurse (offgoing nurse). Report included the following information SBAR, Kardex, OR Summary, Procedure Summary, Recent Results and Med Rec Status.

## 2019-04-15 NOTE — PROGRESS NOTES
Bedside and Verbal shift change report given to [address] [place] [address] (oncoming nurse) by Yenifer Johnson (offgoing nurse). Report included the following information SBAR, Kardex, Intake/Output, MAR and Recent Results.

## 2019-04-16 ENCOUNTER — APPOINTMENT (OUTPATIENT)
Dept: GENERAL RADIOLOGY | Age: 49
DRG: 389 | End: 2019-04-16
Attending: SURGERY
Payer: COMMERCIAL

## 2019-04-16 PROCEDURE — 65270000029 HC RM PRIVATE

## 2019-04-16 PROCEDURE — 74011000258 HC RX REV CODE- 258: Performed by: SURGERY

## 2019-04-16 PROCEDURE — 05HB33Z INSERTION OF INFUSION DEVICE INTO RIGHT BASILIC VEIN, PERCUTANEOUS APPROACH: ICD-10-PCS | Performed by: SURGERY

## 2019-04-16 PROCEDURE — 76937 US GUIDE VASCULAR ACCESS: CPT

## 2019-04-16 PROCEDURE — 77030018786 HC NDL GD F/USND BARD -B

## 2019-04-16 PROCEDURE — 74011250637 HC RX REV CODE- 250/637: Performed by: SURGERY

## 2019-04-16 PROCEDURE — 74011250636 HC RX REV CODE- 250/636: Performed by: SURGERY

## 2019-04-16 PROCEDURE — C1751 CATH, INF, PER/CENT/MIDLINE: HCPCS

## 2019-04-16 RX ADMIN — QUETIAPINE FUMARATE 200 MG: 100 TABLET ORAL at 21:46

## 2019-04-16 RX ADMIN — ALPRAZOLAM 1 MG: 0.5 TABLET ORAL at 13:00

## 2019-04-16 RX ADMIN — DEXTROSE MONOHYDRATE AND SODIUM CHLORIDE 125 ML/HR: 5; .9 INJECTION, SOLUTION INTRAVENOUS at 22:42

## 2019-04-16 RX ADMIN — ONDANSETRON 4 MG: 2 INJECTION INTRAMUSCULAR; INTRAVENOUS at 01:35

## 2019-04-16 RX ADMIN — HYDROMORPHONE HYDROCHLORIDE 1 MG: 2 INJECTION INTRAMUSCULAR; INTRAVENOUS; SUBCUTANEOUS at 21:42

## 2019-04-16 RX ADMIN — ONDANSETRON 4 MG: 2 INJECTION INTRAMUSCULAR; INTRAVENOUS at 17:22

## 2019-04-16 RX ADMIN — HYDROMORPHONE HYDROCHLORIDE 1 MG: 2 INJECTION INTRAMUSCULAR; INTRAVENOUS; SUBCUTANEOUS at 17:22

## 2019-04-16 RX ADMIN — ALPRAZOLAM 1 MG: 0.5 TABLET ORAL at 08:00

## 2019-04-16 RX ADMIN — ONDANSETRON 4 MG: 2 INJECTION INTRAMUSCULAR; INTRAVENOUS at 07:08

## 2019-04-16 RX ADMIN — PRAVASTATIN SODIUM 40 MG: 20 TABLET ORAL at 21:46

## 2019-04-16 RX ADMIN — ALPRAZOLAM 2 MG: 0.5 TABLET ORAL at 21:45

## 2019-04-16 RX ADMIN — HYDROMORPHONE HYDROCHLORIDE 1 MG: 2 INJECTION INTRAMUSCULAR; INTRAVENOUS; SUBCUTANEOUS at 07:07

## 2019-04-16 RX ADMIN — ONDANSETRON 4 MG: 2 INJECTION INTRAMUSCULAR; INTRAVENOUS at 21:42

## 2019-04-16 RX ADMIN — Medication 10 ML: at 12:53

## 2019-04-16 RX ADMIN — ACETAMINOPHEN 650 MG: 325 TABLET, FILM COATED ORAL at 18:48

## 2019-04-16 RX ADMIN — ESTRADIOL 1 MG: 1 TABLET ORAL at 12:53

## 2019-04-16 RX ADMIN — Medication 10 ML: at 21:47

## 2019-04-16 RX ADMIN — ACETAMINOPHEN 650 MG: 325 TABLET, FILM COATED ORAL at 07:59

## 2019-04-16 RX ADMIN — CITALOPRAM 40 MG: 20 TABLET, FILM COATED ORAL at 12:53

## 2019-04-16 RX ADMIN — Medication 10 ML: at 07:08

## 2019-04-16 RX ADMIN — HYDROMORPHONE HYDROCHLORIDE 1 MG: 2 INJECTION INTRAMUSCULAR; INTRAVENOUS; SUBCUTANEOUS at 01:35

## 2019-04-16 RX ADMIN — METRONIDAZOLE 5 G: 7.5 GEL VAGINAL at 21:54

## 2019-04-16 NOTE — PROGRESS NOTES
SURGERY PROGRESS NOTE      Admit Date: 2019    POD * No surgery found *    Procedure: * No surgery found *      Subjective:   Pain is improved with pain meds. Had large BM overnight. Thirsty. Objective:     Visit Vitals  /75 (BP 1 Location: Left arm, BP Patient Position: At rest;Head of bed elevated (Comment degrees)) Comment (BP Patient Position): hob 45   Pulse 72   Temp 97.7 °F (36.5 °C)   Resp 16   Ht 5' 6\" (1.676 m)   Wt 120.2 kg (265 lb)   SpO2 96%   BMI 42.77 kg/m²        Temp (24hrs), Av.7 °F (36.5 °C), Min:97.4 °F (36.3 °C), Max:98 °F (36.7 °C)      Physical Exam:     Abdomen:  Soft. Non-tender, non-distended.          Lab Results   Component Value Date/Time    WBC 8.2 2019 02:17 PM    HGB 12.4 2019 02:17 PM    Hemoglobin (POC) 11.2 (L) 2018 03:07 PM    HCT 39.3 2019 02:17 PM    Hematocrit (POC) 33 (L) 2018 03:07 PM    PLATELET 277  02:17 PM    MCV 92.0 2019 02:17 PM     Lab Results   Component Value Date/Time    GFR est non-AA 55 (L) 2019 02:17 PM    GFRNA, POC >60 2018 03:07 PM    GFR est AA >60 2019 02:17 PM    GFRAA, POC >60 2018 03:07 PM    Creatinine 1.07 (H) 2019 02:17 PM    Creatinine (POC) 0.9 2018 03:07 PM    BUN 16 2019 02:17 PM    BUN (POC) 12 2018 03:07 PM    Sodium 140 2019 02:17 PM    Sodium (POC) 141 2018 03:07 PM    Potassium 3.8 2019 02:17 PM    Potassium (POC) 3.7 2018 03:07 PM    Chloride 107 2019 02:17 PM    Chloride (POC) 103 2018 03:07 PM    CO2 26 2019 02:17 PM    Magnesium 1.7 2017 07:24 AM    Phosphorus 3.9 2017 07:24 AM       Assessment:     Active Problems:    Small bowel obstruction (HCC) (2019)        Plan/Recommendations/Medical Decision Making:     SBS in AM  Continue NG decompression  NPO  IVF  Enema for constipation  IV pain meds PRN

## 2019-04-16 NOTE — PROGRESS NOTES
VAT Note - Chart reviewed for MIDLINE placement evaluation. Areas reviewed include, but are not limited to, patient's current and past H&P, Lab and Procedure Results, all notes, media records and medications. Will place Midline in pt with no current peripheral IV access.

## 2019-04-16 NOTE — PROGRESS NOTES
Visit documented 4/16/19  Spiritual Care Partner Volunteer visited patient in 5 Med Surg on {4/15/19.   Documented by: Ginny Narvaez., MS., [address] [facility] [place] ([id number])

## 2019-04-16 NOTE — PROGRESS NOTES
Bedside shift report received from Alvaro Butterfield RN; Pt obs resting comfortably in bed w/ NGT to left nare at 61cm; Dark green bile obs to suction cannister    (2120) Pt obs manipulating NGT suction tubing & states \"The fluid is coming back in towards me. \" Relaxation techniques reviewed w/ pt; Pt currently exhibiting signs of anxiety & is currently refusing to have a secondary PIV line started, as her R hand PIV has been stopped d/t concerns of infiltration; Pt informed that a PIV is needed for IV medication administration; however, she states that she will not have a PIV placed unless the ultrasound equipment is present; Pt encouraged to return to supine position; Pt also states that her Luke Beagle is enlarged,\" \"I can feel the fluid running through it. \" Will con't to monitor pt    (2135) Pt refuses tiffanie SCD's, she states \"They make my legs sweat and bleed when they rub against my legs. \" Pt encouraged to wear SCD's & educated on DVT ppx at this time; Pt adamantly refusing application of SCD's to BLE

## 2019-04-16 NOTE — CDMP QUERY
Patient admitted with SBO , noted to have a BMI of 42.8 If possible, please document in progress notes and d/c summary if you are evaluating and/or treating any of the following:  
 
=> Morbid obesity with BMI 42.8 
=> Obesity with BMI 42.8  
=> Other explanation of clinical findings  
=> Clinically Undetermined (no explanation for clinical findings) The medical record reflects the following: 
   Risk Factors:49 yo F admitted with SBO Clinical Indicators: Ht 5'6\" Wt 265 lb BMI 42.8 Treatment: Please specify in your progress notes if this pt has obesity or morbid obesity and if any dietary counseling was provided Thank you for your time [facility] RN/BSN, CCDS Desk:   [phone] Other:  [phone]

## 2019-04-16 NOTE — PROGRESS NOTES
Problem: Falls - Risk of  Goal: *Absence of Falls  Description  Document Zhou Acosta Fall Risk and appropriate interventions in the flowsheet.   Outcome: Progressing Towards Goal     Problem: Patient Education: Go to Patient Education Activity  Goal: Patient/Family Education  Outcome: Progressing Towards Goal     Problem: Patient Education: Go to Patient Education Activity  Goal: Patient/Family Education  Outcome: Progressing Towards Goal     Problem: Small Bowel Obstruction: Day 3  Goal: Off Pathway (Use only if patient is Off Pathway)  Outcome: Progressing Towards Goal  Goal: Activity/Safety  Outcome: Progressing Towards Goal  Goal: Consults, if ordered  Outcome: Progressing Towards Goal  Goal: Diagnostic Test/Procedures  Outcome: Progressing Towards Goal  Goal: Nutrition/Diet  Outcome: Progressing Towards Goal  Goal: Discharge Planning  Outcome: Progressing Towards Goal  Goal: Medications  Outcome: Progressing Towards Goal  Goal: Respiratory  Outcome: Progressing Towards Goal  Goal: Treatments/Interventions/Procedures  Outcome: Progressing Towards Goal  Goal: Psychosocial  Outcome: Progressing Towards Goal  Goal: *Optimal pain control at patient's stated goal  Outcome: Progressing Towards Goal  Goal: *Adequate urinary output (equal to or greater than 30 milliliters/hour)  Outcome: Progressing Towards Goal  Goal: *Hemodynamically stable  Outcome: Progressing Towards Goal  Goal: *Adequate nutrition  Outcome: Progressing Towards Goal  Goal: *Demonstrates progressive activity  Outcome: Progressing Towards Goal  Goal: *Participates in discharge planning  Outcome: Progressing Towards Goal     Problem: Small Bowel Obstruction: Day 4 to Discharge  Goal: Off Pathway (Use only if patient is Off Pathway)  Outcome: Progressing Towards Goal  Goal: Activity/Safety  Outcome: Progressing Towards Goal  Goal: Nutrition/Diet  Outcome: Progressing Towards Goal  Goal: Discharge Planning  Outcome: Progressing Towards Goal  Goal: Medications  Outcome: Progressing Towards Goal  Goal: Respiratory  Outcome: Progressing Towards Goal  Goal: Treatments/Interventions/Procedures  Outcome: Progressing Towards Goal  Goal: Psychosocial  Outcome: Progressing Towards Goal     Problem: Small Bowel Obstruction - Non Surgical: Discharge Outcomes  Goal: *Hemodynamically stable  Outcome: Progressing Towards Goal  Goal: *Demonstrates independent activity or return to baseline  Outcome: Progressing Towards Goal  Goal: *Optimal pain control at patient's stated goal  Outcome: Progressing Towards Goal  Goal: *Verbalizes understanding and describes prescribed diet  Outcome: Progressing Towards Goal  Goal: *Tolerating diet  Outcome: Progressing Towards Goal  Goal: *Verbalizes name, dosage, time, side effects, and number of days to continue medications  Outcome: Progressing Towards Goal  Goal: *Anxiety reduced or absent  Outcome: Progressing Towards Goal  Goal: *Understands and describes signs and symptoms to report to providers(Stroke Metric)  Outcome: Progressing Towards Goal  Goal: *Describes follow-up/return visits to physicians  Outcome: Progressing Towards Goal  Goal: *Describes available resources and support systems  Outcome: Progressing Towards Goal  Goal: *Active bowel function  Outcome: Progressing Towards Goal

## 2019-04-16 NOTE — PROGRESS NOTES
MIDLINE PLACEMENT NOTE    Midline catheters are NOT central lines. Approved midline products are peripheral infusion devices with the tip terminating in the arm at or below the axillary vein, distal to the shoulder. The tip DOES NOT ENTER THE CENTRAL VASCULATURE. A Midline is for reliable short term (less than 30 days) vascular access. PRE-PROCEDURE VERIFICATION  Correct Procedure: yes  Correct Site:  yes  Temperature: Temp: 97.7 °F (36.5 °C), Temperature Source: Temp Source: Oral  No results for input(s): BUN, CREA, PLT, INR, WBC, PLTEXT in the last 72 hours. No lab exists for component: APTHR, INREXT  Allergies: Sulfa (sulfonamide antibiotics); Toradol [ketorolac tromethamine]; and Morphine  Education materials for Midline Care given: yes. See Patient Education activity for further details. Midline Booklet placed at bedside: yes    Midline Catheter Maintenance: For complete information reference Policy # [email or web address]    A. Dressing Changes       1. Assess the dressing in the first 24 hours for accumulation of blood, fluid or moisture beneath the dressing. During dressing changes, assess the external length of the catheter to determine if migration of the catheter has occurred. Periodically confirm catheter placement, tip location, patency and security of dressing. Dressing changes should be done every 7 days, and PRN using sterile technique if integrity of dressing is compromised. Apply new dressing per hospital policy. Caution: Do not use scissors to remove dressing to minimize the risk of cutting the  Catheter. B. Flushing       1. Flush each lumen of the catheter with 10 mL of sterile saline every 12 hours or after each use. In addition, lock each lumen of the catheter with sterile saline. Note: Flush with 20 mL of sterile saline after blood therapy   Caution: DO NOT USE A SYRINGE SMALLER THAN 10 mL TO FLUSH AND CONFIRM PATENCY.   Patency should be assessed with a 10 mL or larger syringe and sterile saline. Upon confirmation of patency, administration of medication should be  given in a syringe appropriately sized for the dose. Do not infuse against resistance. C. Blood Draws:        1. Stop infusion, draw off and waste 10 ml of blood. Draw sample with 10 mL syringe or          greater. DO NOT USE VACUTAINER . Transfer with appropriate device to lab tubes. Flush        with 20 ml NS.  D. Occluded or Partially Occluded Catheter       1. Catheters that present resistance to flushing and aspiration may be partially or completely  occluded. Do not flush against resistance. PROCEDURE DETAIL:    Verbal consent was obtained and all questions were answered related to risks and benefits. A Midline was inserted as a sterile procedure using ultrasound and Modified Seldinger Technique for vascular access. The following documentation is in addition to the Midline properties in the lines/airways Doc Flow Sheet:  Lot #: [id number]  Lidocaine 1% administered intradermally :yes  Internal Catheter Total Length: 13 (cm)   External catheter length: 0 (cm)  Vein Selection for Midline:right basilic  Sterile CVC Insertion Bundle was used to place line yes  Complication Related to Insertion:no    Prior to use, line patency MUST be verified by flushing at least a 10 mL syringe. Line should flush easily without resistance, and withdrawal of brisk blood return to verify patency.     Line is okay to use: yes        (Remove Midline by: 5/15/19             )  Gordy Palomino RN

## 2019-04-17 ENCOUNTER — APPOINTMENT (OUTPATIENT)
Dept: GENERAL RADIOLOGY | Age: 49
DRG: 389 | End: 2019-04-17
Attending: SURGERY
Payer: COMMERCIAL

## 2019-04-17 LAB
ANION GAP SERPL CALC-SCNC: 7 MMOL/L (ref 5–15)
BASOPHILS # BLD: 0 K/UL (ref 0–0.1)
BASOPHILS NFR BLD: 1 % (ref 0–1)
BUN SERPL-MCNC: 10 MG/DL (ref 6–20)
BUN/CREAT SERPL: 11 (ref 12–20)
CALCIUM SERPL-MCNC: 8.3 MG/DL (ref 8.5–10.1)
CHLORIDE SERPL-SCNC: 107 MMOL/L (ref 97–108)
CO2 SERPL-SCNC: 28 MMOL/L (ref 21–32)
CREAT SERPL-MCNC: 0.91 MG/DL (ref 0.55–1.02)
DIFFERENTIAL METHOD BLD: ABNORMAL
EOSINOPHIL # BLD: 0.1 K/UL (ref 0–0.4)
EOSINOPHIL NFR BLD: 1 % (ref 0–7)
ERYTHROCYTE [DISTWIDTH] IN BLOOD BY AUTOMATED COUNT: 13.2 % (ref 11.5–14.5)
GLUCOSE SERPL-MCNC: 104 MG/DL (ref 65–100)
HCT VFR BLD AUTO: 34.6 % (ref 35–47)
HGB BLD-MCNC: 10.4 G/DL (ref 11.5–16)
IMM GRANULOCYTES # BLD AUTO: 0 K/UL (ref 0–0.04)
IMM GRANULOCYTES NFR BLD AUTO: 0 % (ref 0–0.5)
LYMPHOCYTES # BLD: 1.6 K/UL (ref 0.8–3.5)
LYMPHOCYTES NFR BLD: 30 % (ref 12–49)
MCH RBC QN AUTO: 28.1 PG (ref 26–34)
MCHC RBC AUTO-ENTMCNC: 30.1 G/DL (ref 30–36.5)
MCV RBC AUTO: 93.5 FL (ref 80–99)
MONOCYTES # BLD: 0.5 K/UL (ref 0–1)
MONOCYTES NFR BLD: 9 % (ref 5–13)
NEUTS SEG # BLD: 3.2 K/UL (ref 1.8–8)
NEUTS SEG NFR BLD: 59 % (ref 32–75)
NRBC # BLD: 0 K/UL (ref 0–0.01)
NRBC BLD-RTO: 0 PER 100 WBC
PLATELET # BLD AUTO: 188 K/UL (ref 150–400)
PMV BLD AUTO: 10.3 FL (ref 8.9–12.9)
POTASSIUM SERPL-SCNC: 3.2 MMOL/L (ref 3.5–5.1)
RBC # BLD AUTO: 3.7 M/UL (ref 3.8–5.2)
SODIUM SERPL-SCNC: 142 MMOL/L (ref 136–145)
WBC # BLD AUTO: 5.4 K/UL (ref 3.6–11)

## 2019-04-17 PROCEDURE — 80048 BASIC METABOLIC PNL TOTAL CA: CPT

## 2019-04-17 PROCEDURE — 85025 COMPLETE CBC W/AUTO DIFF WBC: CPT

## 2019-04-17 PROCEDURE — 74250 X-RAY XM SM INT 1CNTRST STD: CPT

## 2019-04-17 PROCEDURE — 74011250637 HC RX REV CODE- 250/637: Performed by: SURGERY

## 2019-04-17 PROCEDURE — 65270000029 HC RM PRIVATE

## 2019-04-17 PROCEDURE — 74011250636 HC RX REV CODE- 250/636: Performed by: SURGERY

## 2019-04-17 PROCEDURE — 36415 COLL VENOUS BLD VENIPUNCTURE: CPT

## 2019-04-17 PROCEDURE — 74011000258 HC RX REV CODE- 258: Performed by: SURGERY

## 2019-04-17 RX ORDER — OXYCODONE AND ACETAMINOPHEN 5; 325 MG/1; MG/1
2 TABLET ORAL
Status: DISCONTINUED | OUTPATIENT
Start: 2019-04-17 | End: 2019-04-18

## 2019-04-17 RX ADMIN — HYDROMORPHONE HYDROCHLORIDE 1 MG: 2 INJECTION INTRAMUSCULAR; INTRAVENOUS; SUBCUTANEOUS at 16:52

## 2019-04-17 RX ADMIN — ONDANSETRON 4 MG: 2 INJECTION INTRAMUSCULAR; INTRAVENOUS at 13:00

## 2019-04-17 RX ADMIN — Medication 10 ML: at 16:52

## 2019-04-17 RX ADMIN — DEXTROSE MONOHYDRATE AND SODIUM CHLORIDE 125 ML/HR: 5; .9 INJECTION, SOLUTION INTRAVENOUS at 06:26

## 2019-04-17 RX ADMIN — ONDANSETRON 4 MG: 2 INJECTION INTRAMUSCULAR; INTRAVENOUS at 16:52

## 2019-04-17 RX ADMIN — Medication 10 ML: at 06:29

## 2019-04-17 RX ADMIN — ONDANSETRON 4 MG: 2 INJECTION INTRAMUSCULAR; INTRAVENOUS at 22:14

## 2019-04-17 RX ADMIN — HYDROMORPHONE HYDROCHLORIDE 1 MG: 2 INJECTION INTRAMUSCULAR; INTRAVENOUS; SUBCUTANEOUS at 22:14

## 2019-04-17 RX ADMIN — ALPRAZOLAM 2 MG: 0.5 TABLET ORAL at 22:07

## 2019-04-17 RX ADMIN — DEXTROSE MONOHYDRATE AND SODIUM CHLORIDE 125 ML/HR: 5; .9 INJECTION, SOLUTION INTRAVENOUS at 19:19

## 2019-04-17 RX ADMIN — CITALOPRAM 40 MG: 20 TABLET, FILM COATED ORAL at 14:42

## 2019-04-17 RX ADMIN — ONDANSETRON 4 MG: 2 INJECTION INTRAMUSCULAR; INTRAVENOUS at 06:58

## 2019-04-17 RX ADMIN — METRONIDAZOLE 5 G: 7.5 GEL VAGINAL at 22:07

## 2019-04-17 RX ADMIN — ONDANSETRON 4 MG: 2 INJECTION INTRAMUSCULAR; INTRAVENOUS at 01:57

## 2019-04-17 RX ADMIN — DIPHENHYDRAMINE HYDROCHLORIDE 25 MG: 25 CAPSULE ORAL at 19:19

## 2019-04-17 RX ADMIN — OXYCODONE HYDROCHLORIDE AND ACETAMINOPHEN 2 TABLET: 5; 325 TABLET ORAL at 19:19

## 2019-04-17 RX ADMIN — QUETIAPINE FUMARATE 200 MG: 100 TABLET ORAL at 22:13

## 2019-04-17 RX ADMIN — Medication 10 ML: at 22:08

## 2019-04-17 RX ADMIN — PRAVASTATIN SODIUM 40 MG: 20 TABLET ORAL at 22:07

## 2019-04-17 RX ADMIN — ESTRADIOL 1 MG: 1 TABLET ORAL at 14:43

## 2019-04-17 RX ADMIN — HYDROMORPHONE HYDROCHLORIDE 1 MG: 2 INJECTION INTRAMUSCULAR; INTRAVENOUS; SUBCUTANEOUS at 06:58

## 2019-04-17 RX ADMIN — HYDROMORPHONE HYDROCHLORIDE 1 MG: 2 INJECTION INTRAMUSCULAR; INTRAVENOUS; SUBCUTANEOUS at 02:26

## 2019-04-17 RX ADMIN — ALPRAZOLAM 1 MG: 0.5 TABLET ORAL at 14:44

## 2019-04-17 RX ADMIN — HYDROMORPHONE HYDROCHLORIDE 1 MG: 2 INJECTION INTRAMUSCULAR; INTRAVENOUS; SUBCUTANEOUS at 13:00

## 2019-04-17 NOTE — ROUTINE PROCESS
Bedside shift change report given to Maxime Goodwin RN (oncoming nurse) by Reyna Newby RN (offgoing nurse). Report included the following information SBAR, Kardex and MAR.

## 2019-04-18 PROCEDURE — 97535 SELF CARE MNGMENT TRAINING: CPT

## 2019-04-18 PROCEDURE — 74011250637 HC RX REV CODE- 250/637: Performed by: SURGERY

## 2019-04-18 PROCEDURE — 97116 GAIT TRAINING THERAPY: CPT

## 2019-04-18 PROCEDURE — 97165 OT EVAL LOW COMPLEX 30 MIN: CPT

## 2019-04-18 PROCEDURE — 65270000029 HC RM PRIVATE

## 2019-04-18 PROCEDURE — 97161 PT EVAL LOW COMPLEX 20 MIN: CPT

## 2019-04-18 PROCEDURE — 74011250636 HC RX REV CODE- 250/636: Performed by: SURGERY

## 2019-04-18 RX ORDER — OXYCODONE AND ACETAMINOPHEN 10; 325 MG/1; MG/1
1 TABLET ORAL
Status: DISCONTINUED | OUTPATIENT
Start: 2019-04-18 | End: 2019-04-19 | Stop reason: HOSPADM

## 2019-04-18 RX ORDER — PROMETHAZINE HYDROCHLORIDE 25 MG/1
25 TABLET ORAL
Status: DISCONTINUED | OUTPATIENT
Start: 2019-04-18 | End: 2019-04-19 | Stop reason: HOSPADM

## 2019-04-18 RX ORDER — POTASSIUM CHLORIDE 750 MG/1
10 TABLET, FILM COATED, EXTENDED RELEASE ORAL 3 TIMES DAILY
Status: DISCONTINUED | OUTPATIENT
Start: 2019-04-18 | End: 2019-04-19 | Stop reason: HOSPADM

## 2019-04-18 RX ORDER — PANTOPRAZOLE SODIUM 40 MG/1
40 TABLET, DELAYED RELEASE ORAL
Status: DISCONTINUED | OUTPATIENT
Start: 2019-04-19 | End: 2019-04-19 | Stop reason: HOSPADM

## 2019-04-18 RX ORDER — ONDANSETRON 4 MG/1
4 TABLET, ORALLY DISINTEGRATING ORAL
Status: DISCONTINUED | OUTPATIENT
Start: 2019-04-18 | End: 2019-04-19 | Stop reason: HOSPADM

## 2019-04-18 RX ORDER — POLYETHYLENE GLYCOL 3350 17 G/17G
17 POWDER, FOR SOLUTION ORAL 2 TIMES DAILY
Status: DISCONTINUED | OUTPATIENT
Start: 2019-04-18 | End: 2019-04-19 | Stop reason: HOSPADM

## 2019-04-18 RX ORDER — OXYCODONE AND ACETAMINOPHEN 10; 325 MG/1; MG/1
2 TABLET ORAL
Status: DISCONTINUED | OUTPATIENT
Start: 2019-04-18 | End: 2019-04-19 | Stop reason: HOSPADM

## 2019-04-18 RX ADMIN — PROMETHAZINE HYDROCHLORIDE 25 MG: 25 TABLET ORAL at 20:53

## 2019-04-18 RX ADMIN — CITALOPRAM 40 MG: 20 TABLET, FILM COATED ORAL at 14:04

## 2019-04-18 RX ADMIN — QUETIAPINE FUMARATE 200 MG: 100 TABLET ORAL at 21:00

## 2019-04-18 RX ADMIN — ALPRAZOLAM 1 MG: 0.5 TABLET ORAL at 14:05

## 2019-04-18 RX ADMIN — OXYCODONE AND ACETAMINOPHEN 2 TABLET: 10; 325 TABLET ORAL at 14:07

## 2019-04-18 RX ADMIN — POLYETHYLENE GLYCOL 3350 17 G: 17 POWDER, FOR SOLUTION ORAL at 10:05

## 2019-04-18 RX ADMIN — OXYCODONE AND ACETAMINOPHEN 2 TABLET: 10; 325 TABLET ORAL at 20:53

## 2019-04-18 RX ADMIN — PROMETHAZINE HYDROCHLORIDE 25 MG: 25 TABLET ORAL at 17:50

## 2019-04-18 RX ADMIN — ONDANSETRON 4 MG: 2 INJECTION INTRAMUSCULAR; INTRAVENOUS at 06:54

## 2019-04-18 RX ADMIN — ONDANSETRON 4 MG: 2 INJECTION INTRAMUSCULAR; INTRAVENOUS at 02:20

## 2019-04-18 RX ADMIN — PRAVASTATIN SODIUM 40 MG: 20 TABLET ORAL at 21:00

## 2019-04-18 RX ADMIN — ALPRAZOLAM 1 MG: 0.5 TABLET ORAL at 10:04

## 2019-04-18 RX ADMIN — POTASSIUM CHLORIDE 10 MEQ: 750 TABLET, EXTENDED RELEASE ORAL at 17:50

## 2019-04-18 RX ADMIN — HYDROMORPHONE HYDROCHLORIDE 1 MG: 2 INJECTION INTRAMUSCULAR; INTRAVENOUS; SUBCUTANEOUS at 02:20

## 2019-04-18 RX ADMIN — Medication 10 ML: at 06:54

## 2019-04-18 RX ADMIN — ALPRAZOLAM 2 MG: 0.5 TABLET ORAL at 21:00

## 2019-04-18 RX ADMIN — POTASSIUM CHLORIDE 10 MEQ: 750 TABLET, EXTENDED RELEASE ORAL at 10:05

## 2019-04-18 RX ADMIN — POTASSIUM CHLORIDE 10 MEQ: 750 TABLET, EXTENDED RELEASE ORAL at 21:00

## 2019-04-18 RX ADMIN — ESTRADIOL 1 MG: 1 TABLET ORAL at 14:04

## 2019-04-18 RX ADMIN — Medication 10 ML: at 16:31

## 2019-04-18 RX ADMIN — HYDROMORPHONE HYDROCHLORIDE 1 MG: 2 INJECTION INTRAMUSCULAR; INTRAVENOUS; SUBCUTANEOUS at 06:53
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
## 2019-04-18 NOTE — PROGRESS NOTES
Problem: Mobility Impaired (Adult and Pediatric)  Goal: *Acute Goals and Plan of Care (Insert Text)  Description  Physical Therapy Goals  Initiated 4/18/2019  1. Patient will move from supine to sit and sit to supine  in bed with independence within 7 day(s). 2.  Patient will transfer from bed to chair and chair to bed with independence using the least restrictive device within 7 day(s). 3.  Patient will perform sit to stand with independence within 7 day(s). 4.  Patient will ambulate with independence for 300 feet with the least restrictive device within 7 day(s). 5.  Patient will ascend/descend 14 stairs with 1 handrail(s) with supervision/set-up within 7 day(s). Outcome: Progressing Towards Goal   PHYSICAL THERAPY EVALUATION  Patient: Arturo Winston (66 y.o. female)  Date: 4/18/2019  Primary Diagnosis: Small bowel obstruction (HCC) [K56.609]        Precautions:   Fall(no BP in right arm)    ASSESSMENT :  Based on the objective data described below, the patient presents with generalized weakness, slightly decreased dynamic balance, and moderate risk for falls following admission for SBO, being treated conservatively with bowel rest. Patient was received in bed alert and voicing many complaints regarding nursing care. PT advised nurse leader [facility] who addressed situation, then patient was agreeable to working with PT. She stood and ambulated 200 feet with CGA, and no overt loss of balance. She keeps right foot everted, and has increased trunk sway with  wide based and slow gait. PT offered patient assistive device but she declined. She reports that she has had 4 falls since February which she claims is because of her lymphedema condition causing sudden weight gain and fluctuations in her balance. Patient asked PT to read a written explanation of her condition; PT complied. Patient lives alone in a third story apartment. 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  Patient agreeable to continue to work with PT while in hospital. Hopefully will not have any needs upon discharge. Patient will benefit from skilled intervention to address the above impairments. Patient?s rehabilitation potential is considered to be Good  Factors which may influence rehabilitation potential include:   ? None noted  ? Mental ability/status  ? Medical condition  ? Home/family situation and support systems  ? Safety awareness  ? Pain tolerance/management  ? Other:      PLAN :  Recommendations and Planned Interventions:  ?           Bed Mobility Training             ? Neuromuscular Re-Education  ? Transfer Training                   ? Orthotic/Prosthetic Training  ? Gait Training                         ? Modalities  ? Therapeutic Exercises           ? Edema Management/Control  ? Therapeutic Activities            ? Patient and Family Training/Education  ? Other (comment):    Frequency/Duration: Patient will be followed by physical therapy  5 times a week to address goals. Discharge Recommendations: To Be Determined  Further Equipment Recommendations for Discharge: none      SUBJECTIVE:   Patient stated ? I have a conditioned called lymphedema; it is very rare; if you will take two minutes to ready this it will explain it.?    OBJECTIVE DATA SUMMARY:   HISTORY:    Past Medical History:   Diagnosis Date    Anxiety     Bowel obstruction ([address]     Fatty liver     Gall stones     GERD (gastroesophageal reflux disease)     Hematoma     abdomen on the right overy    Hernia of unspecified site of abdominal cavity without mention of obstruction or gangrene     Hx of thromboembolism of vein     right upper brachiel vein    Kidney stones     Obesity     REYNA? PUD (peptic ulcer disease) 2016    stomach and colon ulcers?     Seizures ([address]     me dside effect? last sz 2013     Past Surgical History:   Procedure Laterality Date    ABDOMEN SURGERY PROC UNLISTED  4/2010    ventral hernia repair with biologic mesh    COLONOSCOPY N/A 3/24/2017    COLONOSCOPY performed by Shameka Alves MD at [facility] ENDOSCOPY    HX APPENDECTOMY  2009    HX BREAST BIOPSY Right 2007    negative pathology    HX BREAST BIOPSY Left 1999    negative pathology    HX ENDOSCOPY  2016    HX HERNIA REPAIR  9/2011    laparoscopic incisional hernia repair    HX HERNIA REPAIR  2311    umbilical hernia repair    HX HYSTERECTOMY  2009    partial hysterectomy (right ovary removed)    HX OOPHORECTOMY  2009    removed post op hematoma and right oopherectomy    HX OOPHORECTOMY  3/2016    mass removed and left oopherectomy    HX OTHER SURGICAL  2009    ileocectomy, bowel resection,     [facility] COLONOSCOPY FLX DX W/COLLJ SPEC WHEN PFRMD  1/14/2011         US GUIDE BX BREAST Left 2016    neagative paythology     Prior Level of Function/Home Situation: independent but history of falls  Personal factors and/or comorbidities impacting plan of care: lives alone in 3rd story apartment; has had 4 falls since February. Home Situation  Home Environment: Apartment  # Steps to Enter: 39  One/Two Story Residence: Other (Comment)(lives on 3rd floor)  # of Interior Steps: 0  Lift Chair Available: No  Living Alone: Yes  Support Systems: Family member(s)  Patient Expects to be Discharged to[de-identified] Apartment  Current DME Used/Available at Home: None    EXAMINATION/PRESENTATION/DECISION MAKING:   Critical Behavior:  Neurologic State: Alert  Orientation Level: Oriented X4  Cognition: Appropriate safety awareness, Follows commands     Hearing:   Auditory  Auditory Impairment: None  Skin:  not fully observed  Edema: chronic BLE due to lymphedema  Range Of Motion:  AROM: Generally decreased, functional(BLE)                       Strength:    Strength: Grossly decreased, non-functional(BLE)                    Tone & Sensation:   Tone: Normal              Sensation: Intact                       Functional Mobility:  Bed Mobility:     Supine to Sit: Independent        Transfers:  Sit to Stand: Independent  Stand to Sit: Independent                       Balance:   Sitting: Intact  Standing: Intact  Ambulation/Gait Training:  Distance (ft): 200 Feet (ft)  Assistive Device: Gait belt  Ambulation - Level of Assistance: Contact guard assistance        Gait Abnormalities: Decreased step clearance        Base of Support: Widened     Speed/Zulema: Pace decreased (<100 feet/min)                                  Therapeutic Exercises: Ankle pumps    Functional Measure:  Tinetti test:    Sitting Balance: 1  Arises: 1  Attempts to Rise: 2  Immediate Standing Balance: 2  Standing Balance: 1  Nudged: 2  Eyes Closed: 1  Turn 360 Degrees - Continuous/Discontinuous: 1  Turn 360 Degrees - Steady/Unsteady: 1  Sitting Down: 1  Balance Score: 13  Indication of Gait: 1  R Step Length/Height: 1  L Step Length/Height: 1  R Foot Clearance: 1  L Foot Clearance: 1  Step Symmetry: 1  Step Continuity: 1  Path: 2  Trunk: 1  Walking Time: 0  Gait Score: 10  Total Score: 23         Tinetti Tool Score Risk of Falls  <19 = High Fall Risk  19-24 = Moderate Fall Risk  25-28 = Low Fall Risk  Tinetti ME. Performance-Oriented Assessment of Mobility Problems in Elderly Patients. Goldberg 65; [id number].  (Scoring Description: PT Bulletin Feb. 10, 1993)    Older adults: Yovani Us et al, 2009; n = 1000 [facility] elderly evaluated with ABC, SAM, ADL, and IADL)  · Mean SAM score for males aged 69-68 years = 26.21(3.40)  · Mean SAM score for females age 69-68 years = 25.16(4.30)  · Mean SAM score for males over 80 years = 23.29(6.02)  · Mean SAM score for females over 80 years = 17.20(8.32)           Physical Therapy Evaluation Charge Determination   History Examination Presentation Decision-Making   MEDIUM  Complexity : 1-2 comorbidities / personal factors will impact the outcome/ POC  LOW Complexity : 1-2 Standardized tests and measures addressing body structure, function, activity limitation and / or participation in recreation  LOW Complexity : Stable, uncomplicated  Other outcome measures Tinetti  LOW       Based on the above components, the patient evaluation is determined to be of the following complexity level: LOW     Pain:  Pain Scale 1: Numeric (0 - 10)  Pain Intensity 1: 9  Pain Location 1: Abdomen  Pain Orientation 1: Lower     Activity Tolerance:   good  Please refer to the flowsheet for vital signs taken during this treatment. After treatment:   ?         Patient left in no apparent distress sitting up in restroom working with OT  ? Patient left in no apparent distress in bed  ? Call bell left within reach  ? Nursing notified  ? Caregiver present  ? Bed alarm activated    COMMUNICATION/EDUCATION:   The patient?s plan of care was discussed with: Occupational Therapist and Registered Nurse. ?         Fall prevention education was provided and the patient/caregiver indicated understanding. ? Patient/family have participated as able in goal setting and plan of care. ?         Patient/family agree to work toward stated goals and plan of care. ?         Patient understands intent and goals of therapy, but is neutral about his/her participation. ? Patient is unable to participate in goal setting and plan of care.     Thank you for this referral.  Nasrin Jones, PT   Time Calculation: 35 mins

## 2019-04-18 NOTE — PROGRESS NOTES
Call placed to Dr. Bello Hill office regarding patient's request for PRN phenergan for nausea. Pt states that Dr. Karthik Ely verbally relayed to her that a 25mg order for phenergan was okay with him. No order was placed. Message left for Stephanie in Dr. Bello Hill order. Awaiting return call.

## 2019-04-18 NOTE — PROGRESS NOTES
SURGERY PROGRESS NOTE      Admit Date: 2019    POD * No surgery found *    Procedure: * No surgery found *      Subjective: Tolerating fulls, has bowel function. Refused lab draw, lovenox IM this morning. Objective:     Visit Vitals  /60 (BP 1 Location: Left arm, BP Patient Position: At rest;Head of bed elevated (Comment degrees))   Pulse 80   Temp 97.5 °F (36.4 °C)   Resp 17   Ht 5' 6\" (1.676 m)   Wt 120.2 kg (265 lb)   SpO2 97%   BMI 42.77 kg/m²        Temp (24hrs), Av °F (36.7 °C), Min:97.5 °F (36.4 °C), Max:98.3 °F (36.8 °C)      Physical Exam:     Abdomen:  Soft. Non-tender, non-distended.          Lab Results   Component Value Date/Time    WBC 5.4 2019 04:20 AM    HGB 10.4 (L) 2019 04:20 AM    Hemoglobin (POC) 11.2 (L) 2018 03:07 PM    HCT 34.6 (L) 2019 04:20 AM    Hematocrit (POC) 33 (L) 2018 03:07 PM    PLATELET 740  04:20 AM    MCV 93.5 2019 04:20 AM     Lab Results   Component Value Date/Time    GFR est non-AA >60 2019 04:20 AM    GFRNA, POC >60 2018 03:07 PM    GFR est AA >60 2019 04:20 AM    GFRAA, POC >60 2018 03:07 PM    Creatinine 0.91 2019 04:20 AM    Creatinine (POC) 0.9 2018 03:07 PM    BUN 10 2019 04:20 AM    BUN (POC) 12 2018 03:07 PM    Sodium 142 2019 04:20 AM    Sodium (POC) 141 2018 03:07 PM    Potassium 3.2 (L) 2019 04:20 AM    Potassium (POC) 3.7 2018 03:07 PM    Chloride 107 2019 04:20 AM    Chloride (POC) 103 2018 03:07 PM    CO2 28 2019 04:20 AM    Magnesium 1.7 2017 07:24 AM    Phosphorus 3.9 2017 07:24 AM       Assessment:     Active Problems:    Small bowel obstruction (HCC) (2019)        Plan/Recommendations/Medical Decision Making:     Full liquids  BID Miralax  PT/OT  AM LABS  Home tomorroow if bowel function continues, on full liquids

## 2019-04-18 NOTE — PROGRESS NOTES
Telephone order with read back from Dr. Isael Villafuerte for 25mg of promethazine tablet every 6hrs as needed for nausea. Also received telephone order to resume patient's home protonix and zantac (or hospital substitute). Orders for promethazine and protonix placed in CC. Reaching to pharmacy for guidance placing order for zantac substitute.

## 2019-04-18 NOTE — PROGRESS NOTES
This nurse reviewed ordered medication with the patient. The changes made on her EMR indicated changes to pain medication and nausea medications specifically that patient questioned. The nausea medication and pain medication changes she does not agree with, the miralax ordered she does not agree with based on her ulcerative colitis history per patient. This nurse called the office, Claire Pham will have Dr. Eric Stern come see her after he's out of surgery.

## 2019-04-18 NOTE — PROGRESS NOTES
The patient questioned the medication orders again regarding pain and nausea. This nurse reviewed the medication orders with the patient. This nurse spoke with Dr Randy Casillas prior to coming in the room with the patient, as he had just visited with the patient with the charge nurse Tai Pritchard. This nurse specifically asked about the phenergan and pain medicine as an anticipation of discussion with the patient. The Dr verbalized no order will be given for phenergan, everything is staying PO as written and she will likely go home tomorrow. This nurse arrived in the patient room, with Percocet and Zofran along with her afternoon medications. The patient reported the doctor ordered the medicine and got agitated and blamed this nurse for insinuating she was being a liar. This nurse reinforced the orders as written. The patient reported she has Phenergan in her purse but hasn't taken any \"and has been honest\". This nurse provided active listening, while maintaining the reinforcement of written orders. The patient was offered 2 tabs of Percocet and 1 ODT Zofran. The patient accepted the Percocet and refused the Zofran. Advised charge nurse of above, as the patient implicated Tai Pritchard the charge nurse in the Phenergan incident. Tai Pritchard said she would go see the patient as requested by this nurse.

## 2019-04-18 NOTE — PROGRESS NOTES
Late input, saw patient yesterday at [place] [address] Date: 2019    POD * No surgery found *    Procedure: * No surgery found *      Subjective:   Small bowel series unremarkable. Continued bowel function. Objective:     Visit Vitals  BP 98/65 (BP 1 Location: Left arm, BP Patient Position: At rest;Head of bed elevated (Comment degrees))   Pulse 73   Temp 98.2 °F (36.8 °C)   Resp 17   Ht 5' 6\" (1.676 m)   Wt 120.2 kg (265 lb)   SpO2 97%   BMI 42.77 kg/m²        Temp (24hrs), Av.1 °F (36.7 °C), Min:97.8 °F (36.6 °C), Max:98.3 °F (36.8 °C)      Physical Exam:     Abdomen:  Soft. Non-tender, non-distended.          Lab Results   Component Value Date/Time    WBC 5.4 2019 04:20 AM    HGB 10.4 (L) 2019 04:20 AM    Hemoglobin (POC) 11.2 (L) 2018 03:07 PM    HCT 34.6 (L) 2019 04:20 AM    Hematocrit (POC) 33 (L) 2018 03:07 PM    PLATELET 417  04:20 AM    MCV 93.5 2019 04:20 AM     Lab Results   Component Value Date/Time    GFR est non-AA >60 2019 04:20 AM    GFRNA, POC >60 2018 03:07 PM    GFR est AA >60 2019 04:20 AM    GFRAA, POC >60 2018 03:07 PM    Creatinine 0.91 2019 04:20 AM    Creatinine (POC) 0.9 2018 03:07 PM    BUN 10 2019 04:20 AM    BUN (POC) 12 2018 03:07 PM    Sodium 142 2019 04:20 AM    Sodium (POC) 141 2018 03:07 PM    Potassium 3.2 (L) 2019 04:20 AM    Potassium (POC) 3.7 2018 03:07 PM    Chloride 107 2019 04:20 AM    Chloride (POC) 103 2018 03:07 PM    CO2 28 2019 04:20 AM    Magnesium 1.7 2017 07:24 AM    Phosphorus 3.9 2017 07:24 AM       Assessment:     Active Problems:    Small bowel obstruction (HCC) (2019)        Plan/Recommendations/Medical Decision Making:     Full liquids  BID Miralax  PT/OT  Replete Potassium

## 2019-04-18 NOTE — PROGRESS NOTES
Bedside and Verbal shift change report given to [address] [facility] (oncoming nurse) by Gisela Hui (offgoing nurse). Report included the following information SBAR, Kardex, MAR and Recent Results.

## 2019-04-18 NOTE — PROGRESS NOTES
Problem: Self Care Deficits Care Plan (Adult)  Goal: *Acute Goals and Plan of Care (Insert Text)  Description  Occupational Therapy Goals  Initiated 4/18/2019  1. Patient will perform grooming with modified independence standing >3 minutes within 7 day(s). 2.  Patient will perform lower body dressing with modified independence within 7 day(s). 3.  Patient will perform toilet transfers with independence within 7 day(s). 4.  Patient will perform all aspects of toileting with independence within 7 day(s). 5.  Patient will participate in upper extremity therapeutic exercise/activities with independence for 5 minutes within 7 day(s). OCCUPATIONAL THERAPY EVALUATION  Patient: Rebeca Huitron (57 y.o. female)  Date: 4/18/2019  Primary Diagnosis: Small bowel obstruction (HCC) [K56.609]        Precautions:  Fall(no BP in right arm)    ASSESSMENT :  Cleared by RN to see pt for therapy session. Based on the objective data described below, the patient presents with BLE lymphedema, decreased endurance, and impaired balance following admission for SBO. At baseline pt lives alone in apartment with 2 flights of stairs to enter, is I with ADLs and functional mobility, works and drives. Has history of LE lymphedema and typically wears day/nighttime LE garments and utilizes pump. Does report recent history of falls due to impaired balance from fluctuating fluid retention. Received ambulating with PT, agreeable to participate. Functional mobility and transfers in bathroom performed with mod I, mildly unsteady, pt reported feeling off balance at times due to increased LE lymphedema. PT reported pt had performed toileting ADLs and brief standing grooming ADLs with supervision. Transferred to chair and pt demonstrated ability to reach to feet with some difficulty. In chair at end of session with LEs elevated. At this time pt is mildly below her functional baseline primarily due to increased LE lymphedema.  Will continue to follow and anticipate no further OT needs at discharge. Patient will benefit from skilled intervention to address the above impairments. Patients rehabilitation potential is considered to be Good  Factors which may influence rehabilitation potential include:   ? None noted  ? Mental ability/status  ? Medical condition  ? Home/family situation and support systems  ? Safety awareness  ? Pain tolerance/management  ? Other:      PLAN :  Recommendations and Planned Interventions:  ?               Self Care Training                  ? Therapeutic Activities  ? Functional Mobility Training    ? Cognitive Retraining  ? Therapeutic Exercises           ? Endurance Activities  ? Balance Training                   ? Neuromuscular Re-Education  ? Visual/Perceptual Training     ? Home Safety Training  ? Patient Education                 ? Family Training/Education  ? Other (comment):    Frequency/Duration: Patient will be followed by occupational therapy 5 times a week to address goals. Discharge Recommendations: None  Further Equipment Recommendations for Discharge: TBD      SUBJECTIVE:   Patient stated I have to wear garments all the time.     OBJECTIVE DATA SUMMARY:   HISTORY:   Past Medical History:   Diagnosis Date    Anxiety     Bowel obstruction ([address]     Fatty liver     Gall stones     GERD (gastroesophageal reflux disease)     Hematoma     abdomen on the right overy    Hernia of unspecified site of abdominal cavity without mention of obstruction or gangrene     Hx of thromboembolism of vein     right upper brachiel vein    Kidney stones     Obesity     REYNA?  PUD (peptic ulcer disease) 2016    stomach and colon ulcers?     Seizures ([address]     me dside effect? last sz 2013     Past Surgical History: Procedure Laterality Date    ABDOMEN SURGERY PROC UNLISTED  4/2010    ventral hernia repair with biologic mesh    COLONOSCOPY N/A 3/24/2017    COLONOSCOPY performed by Lisa Mandujano MD at [facility] HX APPENDECTOMY  2009    HX BREAST BIOPSY Right 2007    negative pathology    HX BREAST BIOPSY Left 1999    negative pathology    HX ENDOSCOPY  2016    HX HERNIA REPAIR  9/2011    laparoscopic incisional hernia repair    HX HERNIA REPAIR  6476    umbilical hernia repair    HX HYSTERECTOMY  2009    partial hysterectomy (right ovary removed)    HX OOPHORECTOMY  2009    removed post op hematoma and right oopherectomy    HX OOPHORECTOMY  3/2016    mass removed and left oopherectomy    HX OTHER SURGICAL  2009    ileocectomy, bowel resection,     DE COLONOSCOPY FLX DX W/COLLJ SPEC WHEN PFRMD  1/14/2011         US GUIDE BX BREAST Left 2016    neagative paythology       Prior Level of Function/Environment/Context: At baseline pt lives alone in apartment with 2 flights of stairs to enter, is I with ADLs and functional mobility, works and drives. Has history of LE lymphedema and typically wears day/nighttime LE garments and utilizes pump. Home Situation  Home Environment: Apartment  # Steps to Enter: 39  One/Two Story Residence: Other (Comment)(lives on 3rd floor)  # of Interior Steps: 0  Lift Chair Available: No  Living Alone: Yes  Support Systems: Family member(s)  Patient Expects to be Discharged to[de-identified] Apartment  Current DME Used/Available at Home: None    Hand dominance: Right    EXAMINATION OF PERFORMANCE DEFICITS:  Cognitive/Behavioral Status:  Neurologic State: Alert  Orientation Level: Oriented X4  Cognition: Appropriate decision making; Appropriate for age attention/concentration; Appropriate safety awareness; Follows commands  Perception: Appears intact  Perseveration: No perseveration noted  Safety/Judgement: Awareness of environment; Fall prevention      Hearing:   Auditory  Auditory Impairment: None    Vision/Perceptual:                           Acuity: Within Defined Limits         Range of Motion:  AROM: Generally decreased, functional(BLE)  PROM: Generally decreased, functional          Strength:  Strength: Generally decreased, functional(BLE)     Coordination:  Coordination: Generally decreased, functional  Fine Motor Skills-Upper: Left Intact; Right Intact    Gross Motor Skills-Upper: Left Intact; Right Intact    Tone & Sensation:  Tone: Normal  Sensation: Intact       Balance:  Sitting: Intact  Standing: Intact    Functional Mobility and Transfers for ADLs:  Bed Mobility:  Supine to Sit: Independent    Transfers:  Sit to Stand: Independent  Stand to Sit: Independent  Toilet Transfer : Modified independent    ADL Assessment:  Feeding: Independent    Oral Facial Hygiene/Grooming: Supervision(standing)    Bathing: Supervision; Adaptive equipment; Additional time    Upper Body Dressing: Independent    Lower Body Dressing: Minimum assistance    Toileting: Modified independent     ADL Intervention and task modifications:       Grooming  Washing Hands: Supervision/set-up(standing at sink)       Lower [address] [place]: (demonstrated ability to reach to feet with mild difficulty)         Cognitive Retraining  Safety/Judgement: Awareness of environment; Fall prevention    Functional Measure:  Barthel Index:    Bathin  Bladder: 10  Bowels: 10  Groomin  Dressin  Feeding: 10  Mobility: 10  Stairs: 5  Toilet Use: 10  Transfer (Bed to Chair and Back): 10  Total: 75/100        Percentage of impairment   0%   1-19%   20-39%   40-59%   60-79%   80-99%   100%   Barthel Score 0-100 100 99-80 79-60 59-40 20-39 1-19   0     The Barthel ADL Index: Guidelines  1. The index should be used as a record of what a patient does, not as a record of what a patient could do. 2. The main aim is to establish degree of independence from any help, physical or verbal, however minor and for whatever reason.   3. The need for supervision renders the patient not independent. 4. A patient's performance should be established using the best available evidence. Asking the patient, friends/relatives and nurses are the usual sources, but direct observation and common sense are also important. However direct testing is not needed. 5. Usually the patient's performance over the preceding 24-48 hours is important, but occasionally longer periods will be relevant. 6. Middle categories imply that the patient supplies over 50 per cent of the effort. 7. Use of aids to be independent is allowed. Mehreen Dupont., Barthel, D.W. (9803). Functional evaluation: the Barthel Index. [address] [facility] (14)2. Aaliyah Vasques ck INDU Morgan, Dong Castillo, Chet Palmer., [place], [address] (1999). Measuring the change indisability after inpatient rehabilitation; comparison of the responsiveness of the Barthel Index and Functional [place] Measure. Journal of Neurology, Neurosurgery, and Psychiatry, 66(4), 015-273. Tracey Lamb, N.[place].A, KOJO Duncan, & Clark Wong M.A. (2004.) Assessment of post-stroke quality of life in cost-effectiveness studies: The usefulness of the Barthel Index and the EuroQoL-5D.  Quality of Life Research, 15, 267-76        Occupational Therapy Evaluation Charge Determination   History Examination Decision-Making   LOW Complexity : Brief history review  LOW Complexity : 1-3 performance deficits relating to physical, cognitive , or psychosocial skils that result in activity limitations and / or participation restrictions  LOW Complexity : No comorbidities that affect functional and no verbal or physical assistance needed to complete eval tasks       Based on the above components, the patient evaluation is determined to be of the following complexity level: LOW   Pain:  Pain Scale 1: Numeric (0 - 10)  Pain Intensity 1: 8  Pain Location 1: Abdomen  Pain Orientation 1: Lower;Right  Pain Description 1: Sharp  Pain Intervention(s) 1: Medication (see MAR)  Activity Tolerance:   Good  Please refer to the flowsheet for vital signs taken during this treatment. After treatment:   ? Patient left in no apparent distress sitting up in chair  ? Patient left in no apparent distress in bed  ? Call bell left within reach  ? Nursing notified  ? Caregiver present  ? Bed alarm activated    COMMUNICATION/EDUCATION:   The patients plan of care was discussed with: Physical Therapist and Registered Nurse.  ? Home safety education was provided and the patient/caregiver indicated understanding. ? Patient/family have participated as able in goal setting and plan of care. ? Patient/family agree to work toward stated goals and plan of care. ? Patient understands intent and goals of therapy, but is neutral about his/her participation. ? Patient is unable to participate in goal setting and plan of care. This patients plan of care is appropriate for delegation to [facility].     Thank you for this referral.  Tavares Shipman OT  Time Calculation: 37 mins

## 2019-04-19 VITALS
TEMPERATURE: 97.3 F | BODY MASS INDEX: 42.59 KG/M2 | RESPIRATION RATE: 16 BRPM | HEIGHT: 66 IN | HEART RATE: 76 BPM | SYSTOLIC BLOOD PRESSURE: 125 MMHG | WEIGHT: 265 LBS | DIASTOLIC BLOOD PRESSURE: 75 MMHG | OXYGEN SATURATION: 97 %

## 2019-04-19 LAB
ALBUMIN SERPL-MCNC: 2.8 G/DL (ref 3.5–5)
ALBUMIN/GLOB SERPL: 0.8 {RATIO} (ref 1.1–2.2)
ALP SERPL-CCNC: 123 U/L (ref 45–117)
ALT SERPL-CCNC: 41 U/L (ref 12–78)
ANION GAP SERPL CALC-SCNC: 5 MMOL/L (ref 5–15)
AST SERPL-CCNC: 40 U/L (ref 15–37)
BASOPHILS # BLD: 0 K/UL (ref 0–0.1)
BASOPHILS NFR BLD: 0 % (ref 0–1)
BILIRUB SERPL-MCNC: 0.3 MG/DL (ref 0.2–1)
BUN SERPL-MCNC: 4 MG/DL (ref 6–20)
BUN/CREAT SERPL: 4 (ref 12–20)
CALCIUM SERPL-MCNC: 8.4 MG/DL (ref 8.5–10.1)
CHLORIDE SERPL-SCNC: 108 MMOL/L (ref 97–108)
CO2 SERPL-SCNC: 28 MMOL/L (ref 21–32)
CREAT SERPL-MCNC: 1.02 MG/DL (ref 0.55–1.02)
DIFFERENTIAL METHOD BLD: ABNORMAL
EOSINOPHIL # BLD: 0.1 K/UL (ref 0–0.4)
EOSINOPHIL NFR BLD: 2 % (ref 0–7)
ERYTHROCYTE [DISTWIDTH] IN BLOOD BY AUTOMATED COUNT: 13.6 % (ref 11.5–14.5)
GLOBULIN SER CALC-MCNC: 3.6 G/DL (ref 2–4)
GLUCOSE SERPL-MCNC: 85 MG/DL (ref 65–100)
HCT VFR BLD AUTO: 33.9 % (ref 35–47)
HGB BLD-MCNC: 10.4 G/DL (ref 11.5–16)
IMM GRANULOCYTES # BLD AUTO: 0 K/UL (ref 0–0.04)
IMM GRANULOCYTES NFR BLD AUTO: 0 % (ref 0–0.5)
LYMPHOCYTES # BLD: 2.1 K/UL (ref 0.8–3.5)
LYMPHOCYTES NFR BLD: 41 % (ref 12–49)
MCH RBC QN AUTO: 28.9 PG (ref 26–34)
MCHC RBC AUTO-ENTMCNC: 30.7 G/DL (ref 30–36.5)
MCV RBC AUTO: 94.2 FL (ref 80–99)
MONOCYTES # BLD: 0.5 K/UL (ref 0–1)
MONOCYTES NFR BLD: 9 % (ref 5–13)
NEUTS SEG # BLD: 2.5 K/UL (ref 1.8–8)
NEUTS SEG NFR BLD: 48 % (ref 32–75)
NRBC # BLD: 0 K/UL (ref 0–0.01)
NRBC BLD-RTO: 0 PER 100 WBC
PLATELET # BLD AUTO: 218 K/UL (ref 150–400)
PMV BLD AUTO: 10.1 FL (ref 8.9–12.9)
POTASSIUM SERPL-SCNC: 3.4 MMOL/L (ref 3.5–5.1)
PROT SERPL-MCNC: 6.4 G/DL (ref 6.4–8.2)
RBC # BLD AUTO: 3.6 M/UL (ref 3.8–5.2)
SODIUM SERPL-SCNC: 141 MMOL/L (ref 136–145)
WBC # BLD AUTO: 5.3 K/UL (ref 3.6–11)

## 2019-04-19 PROCEDURE — 97535 SELF CARE MNGMENT TRAINING: CPT

## 2019-04-19 PROCEDURE — 74011250637 HC RX REV CODE- 250/637: Performed by: SURGERY

## 2019-04-19 PROCEDURE — 36415 COLL VENOUS BLD VENIPUNCTURE: CPT

## 2019-04-19 PROCEDURE — 80053 COMPREHEN METABOLIC PANEL: CPT

## 2019-04-19 PROCEDURE — 85025 COMPLETE CBC W/AUTO DIFF WBC: CPT

## 2019-04-19 RX ORDER — OXYCODONE AND ACETAMINOPHEN 10; 325 MG/1; MG/1
1 TABLET ORAL
Qty: 20 TAB | Refills: 0 | Status: SHIPPED | OUTPATIENT
Start: 2019-04-19 | End: 2019-04-24

## 2019-04-19 RX ORDER — POTASSIUM CHLORIDE 750 MG/1
10 TABLET, EXTENDED RELEASE ORAL 3 TIMES DAILY
Qty: 20 TAB | Refills: 0 | Status: SHIPPED | OUTPATIENT
Start: 2019-04-19 | End: 2019-04-19

## 2019-04-19 RX ORDER — POLYETHYLENE GLYCOL 3350 17 G/17G
17 POWDER, FOR SOLUTION ORAL DAILY
Qty: 238 G | Refills: 0 | Status: ON HOLD | OUTPATIENT
Start: 2019-04-19 | End: 2021-07-20

## 2019-04-19 RX ORDER — PROMETHAZINE HYDROCHLORIDE 12.5 MG/1
25 TABLET ORAL
Qty: 20 TAB | Refills: 0 | OUTPATIENT
Start: 2019-04-19 | End: 2020-02-20

## 2019-04-19 RX ADMIN — PANTOPRAZOLE SODIUM 40 MG: 40 TABLET, DELAYED RELEASE ORAL at 07:26

## 2019-04-19 RX ADMIN — POTASSIUM CHLORIDE 10 MEQ: 750 TABLET, EXTENDED RELEASE ORAL at 08:36

## 2019-04-19 RX ADMIN — CITALOPRAM 40 MG: 20 TABLET, FILM COATED ORAL at 12:25

## 2019-04-19 RX ADMIN — OXYCODONE AND ACETAMINOPHEN 2 TABLET: 10; 325 TABLET ORAL at 04:19

## 2019-04-19 RX ADMIN — ALPRAZOLAM 1 MG: 0.5 TABLET ORAL at 08:36

## 2019-04-19 RX ADMIN — ALPRAZOLAM 1 MG: 0.5 TABLET ORAL at 14:05

## 2019-04-19 RX ADMIN — OXYCODONE AND ACETAMINOPHEN 2 TABLET: 10; 325 TABLET ORAL at 10:44

## 2019-04-19 RX ADMIN — ESTRADIOL 1 MG: 1 TABLET ORAL at 12:25

## 2019-04-19 RX ADMIN — PROMETHAZINE HYDROCHLORIDE 25 MG: 25 TABLET ORAL at 10:44

## 2019-04-19 RX ADMIN — PROMETHAZINE HYDROCHLORIDE 25 MG: 25 TABLET ORAL at 04:19

## 2019-04-19 NOTE — PROGRESS NOTES
4/19/2019 3:26 PM Pt will discharge home today. Pt will follow up at [address] [place] [address] lymphedema clinic. No home health will be arranged due to home health and lymphedema cannot be done at the same time. No further needs identified.  Mariela Bridges, BSW

## 2019-04-19 NOTE — DISCHARGE SUMMARY
Discharge Summary    Patient: Rose Abad               Sex: female          DOA: 4/13/2019  2:24 PM       YOB: 1970      Age:  51 y.o.        LOS:  LOS: 6 days                Discharge Date:      Admission Diagnoses: Small bowel obstruction ([facility] [K56.609]    Discharge Diagnoses:  Same    Procedure:  n/a    Discharge Condition: Good    Hospital Course: Resumption of bowel function after surgery. Discharge to home in stable condition. Consults: None    Significant Diagnostic Studies: See full electronic record. Discharge Medications:     Current Discharge Medication List      START taking these medications    Details   oxyCODONE-acetaminophen (PERCOCET 10)  mg per tablet Take 1 Tab by mouth every four (4) hours as needed for Pain for up to 5 days. Max Daily Amount: 6 Tabs. Indications: Pain  Qty: 20 Tab, Refills: 0    Associated Diagnoses: [facility]BO [facility] obstruction) ([facility]      ! ! promethazine (PHENERGAN) 12.5 mg tablet Take 2 Tabs by mouth every six (6) hours as needed for Nausea. Indications: nausea and vomiting  Qty: 20 Tab, Refills: 0      potassium chloride (KLOR-CON) 10 mEq tablet Take 1 Tab by mouth three (3) times daily. Indications: low amount of potassium in the blood  Qty: 20 Tab, Refills: 0       !! - Potential duplicate medications found. Please discuss with provider. CONTINUE these medications which have NOT CHANGED    Details   potassium chloride (KAON 10%) 20 mEq/15 mL solution Take 60 mEq by mouth daily. cholestyramine (QUESTRAN) 4 gram packet Take 1 Packet by mouth daily. Administer other oral medications at least 1 hour before or 4 to 6 hours after cholestyramine, due to the potential to bind to orally administered drugs [1]      citalopram (CELEXA) 40 mg tablet Take 40 mg by mouth daily (with lunch). dicyclomine (BENTYL) 20 mg tablet Take 20 mg by mouth three (3) times daily as needed (abdominal cramping).       metroNIDAZOLE (METROGEL) 0.75 % gel Insert 1 Applicator into vagina nightly. Planned 5 days of treatment      pantoprazole (PROTONIX) 40 mg tablet Take 40 mg by mouth Daily (before breakfast). QUEtiapine (SEROQUEL) 200 mg tablet Take 200 mg by mouth nightly. raNITIdine (ZANTAC) 150 mg tablet Take 150 mg by mouth two (2) times a day. Patient takes with lunch and at bedtime      !! promethazine (PHENERGAN) 25 mg tablet Take 1 Tab by mouth every six (6) hours as needed for Nausea. Qty: 12 Tab, Refills: 0    Associated Diagnoses: Colitis      cyclobenzaprine (FLEXERIL) 10 mg tablet Take 10 mg by mouth three (3) times daily as needed for Muscle Spasm(s). pravastatin (PRAVACHOL) 40 mg tablet Take 40 mg by mouth nightly. FERROUS FUMARATE/VIT BCOMP,C (SUPER B COMPLEX PO) Take 1 Tab by mouth daily. MULTIVITAMIN (ONE-A-DAY ESSENTIAL PO) Take 1 Tab by mouth daily. !! ALPRAZolam (XANAX) 2 mg tablet Take 1 mg by mouth two (2) times a day. Patient takes in the morning and with a late lunch      !! ALPRAZolam (XANAX) 2 mg tablet Take 2 mg by mouth nightly. estradiol (ESTRACE) 1 mg tablet Take 1 mg by mouth daily (with lunch). !! - Potential duplicate medications found. Please discuss with provider. Activity/Diet/Wound Care: See patient administered discharge instructions.     Follow-up: As needed    Corey Franklin MD  [facility]  Office:  [phone]

## 2019-04-19 NOTE — PROGRESS NOTES
PT NOTE - Patient declining OOB this am. Patient discharging when transportation available. Will check back later if able.

## 2019-04-19 NOTE — PROGRESS NOTES
Nutrition:  Provided pt with education on Full Liquid diet order for pt to follow x 1 week after discharge and then advancing to a soft GI lite diet. Pt engaged in education, asking questions and taking notes. Verbalized understanding.     Ruben Matamoros RD

## 2019-04-19 NOTE — PROGRESS NOTES
SURGERY PROGRESS NOTE      Admit Date: 2019    POD * No surgery found *    Procedure: * No surgery found *      Subjective: Tolerating fulls, has bowel function. Has continued diarrhea as normal.  Has lower abdominal pain from reported fall at home. Objective:     Visit Vitals  /75 (BP 1 Location: Left arm)   Pulse 76   Temp 97.3 °F (36.3 °C)   Resp 16   Ht 5' 6\" (1.676 m)   Wt 120.2 kg (265 lb)   SpO2 97%   BMI 42.77 kg/m²        Temp (24hrs), Av.6 °F (36.4 °C), Min:97.3 °F (36.3 °C), Max:98.2 °F (36.8 °C)      Physical Exam:     Abdomen:  Soft. Non-tender, non-distended. Lab Results   Component Value Date/Time    WBC 5.3 2019 04:25 AM    HGB 10.4 (L) 2019 04:25 AM    Hemoglobin (POC) 11.2 (L) 2018 03:07 PM    HCT 33.9 (L) 2019 04:25 AM    Hematocrit (POC) 33 (L) 2018 03:07 PM    PLATELET 603  04:25 AM    MCV 94.2 2019 04:25 AM     Lab Results   Component Value Date/Time    GFR est non-AA 58 (L) 2019 04:25 AM    GFRNA, POC >60 2018 03:07 PM    GFR est AA >60 2019 04:25 AM    GFRAA, POC >60 2018 03:07 PM    Creatinine 1.02 2019 04:25 AM    Creatinine (POC) 0.9 2018 03:07 PM    BUN 4 (L) 2019 04:25 AM    BUN (POC) 12 2018 03:07 PM    Sodium 141 2019 04:25 AM    Sodium (POC) 141 2018 03:07 PM    Potassium 3.4 (L) 2019 04:25 AM    Potassium (POC) 3.7 2018 03:07 PM    Chloride 108 2019 04:25 AM    Chloride (POC) 103 2018 03:07 PM    CO2 28 2019 04:25 AM    Magnesium 1.7 2017 07:24 AM    Phosphorus 3.9 2017 07:24 AM       Assessment:     Active Problems:    Small bowel obstruction (HCC) (2019)        Plan/Recommendations/Medical Decision Making:     Full liquids  BID Miralax  Appreciate PT/OT evaluation  Continue potassium supplementation. Follow up with [facility] as outpatient.   Home today

## 2019-04-19 NOTE — PROGRESS NOTES
Pharmacist Discharge Medication Reconciliation    Discharge Provider:  Dr. Norma Church     Pt discharged on home dose of potassium, K = 3.4 today. Per MD notes plan for pt to follow-up with [facility] as outpatient to re-evaluate. Discharge Medications:      My Medications        START taking these medications        Instructions Each Dose to Equal Morning Noon Evening Bedtime   oxyCODONE-acetaminophen  mg per tablet  Commonly known as:  PERCOCET 10    Your last dose was: Your next dose is: Take 1 Tab by mouth every four (4) hours as needed for Pain for up to 5 days. Max Daily Amount: 6 Tabs. Indications: Pain   1 Tab                 polyethylene glycol 17 gram packet  Commonly known as:  MIRALAX    Your last dose was: Your next dose is: Take 1 Packet by mouth daily. Indications: constipation, If constipation last more than 24-48 hours, despite colace use. 17 g                        CHANGE how you take these medications        Instructions Each Dose to Equal Morning Noon Evening Bedtime   promethazine 12.5 mg tablet  Commonly known as:  PHENERGAN  What changed:  medication strength    Your last dose was: Your next dose is: Take 2 Tabs by mouth every six (6) hours as needed for Nausea. Indications: nausea and vomiting   25 mg                        CONTINUE taking these medications        Instructions Each Dose to Equal Morning Noon Evening Bedtime   * ALPRAZolam 2 mg tablet  Commonly known as:  XANAX    Your last dose was: Your next dose is: Take 2 mg by mouth nightly. 2 mg                 * ALPRAZolam 2 mg tablet  Commonly known as:  XANAX    Your last dose was: Your next dose is: Take 1 mg by mouth two (2) times a day. Patient takes in the morning and with a late lunch   1 mg                 cholestyramine 4 gram packet  Commonly known as:  [place] Form    Your last dose was: Your next dose is: Take 1 Packet by mouth daily. Administer other oral medications at least 1 hour before or 4 to 6 hours after cholestyramine, due to the potential to bind to orally administered drugs [1]   1 Packet                 citalopram 40 mg tablet  Commonly known as:  CELEXA    Your last dose was: Your next dose is: Take 40 mg by mouth daily (with lunch). 40 mg                 cyclobenzaprine 10 mg tablet  Commonly known as:  FLEXERIL    Your last dose was: Your next dose is: Take 10 mg by mouth three (3) times daily as needed for Muscle Spasm(s). 10 mg                 dicyclomine 20 mg tablet  Commonly known as:  BENTYL    Your last dose was: Your next dose is: Take 20 mg by mouth three (3) times daily as needed (abdominal cramping). 20 mg                 estradiol 1 mg tablet  Commonly known as:  ESTRACE    Your last dose was: Your next dose is: Take 1 mg by mouth daily (with lunch). 1 mg                 metroNIDAZOLE 0.75 % Gel  Commonly known as:  METROGEL    Your last dose was: Your next dose is: Insert 1 Applicator into vagina nightly. Planned 5 days of treatment   1 Applicator                 ONE-A-DAY ESSENTIAL PO    Your last dose was: Your next dose is: Take 1 Tab by mouth daily. 1 Tab                 potassium chloride 20 mEq/15 mL solution  Commonly known as:  KAON 10%    Your last dose was: Your next dose is: Take 60 mEq by mouth daily. 60 mEq                 pravastatin 40 mg tablet  Commonly known as:  PRAVACHOL    Your last dose was: Your next dose is: Take 40 mg by mouth nightly. 40 mg                 PROTONIX 40 mg tablet  Generic drug:  pantoprazole    Your last dose was: Your next dose is: Take 40 mg by mouth Daily (before breakfast). 40 mg                 QUEtiapine 200 mg tablet  Commonly known as:  SEROquel    Your last dose was: Your next dose is: Take 200 mg by mouth nightly. 200 mg                 raNITIdine 150 mg tablet  Commonly known as:  ZANTAC    Your last dose was: Your next dose is: Take 150 mg by mouth two (2) times a day. Patient takes with lunch and at bedtime   150 mg                 SUPER B COMPLEX PO    Your last dose was: Your next dose is: Take 1 Tab by mouth daily. 1 Tab                       * This list has 2 medication(s) that are the same as other medications prescribed for you. Read the directions carefully, and ask your doctor or other care provider to review them with you. Where to Get Your Medications        Information on where to get these meds will be given to you by the nurse or doctor.     Ask your nurse or doctor about these medications  oxyCODONE-acetaminophen  mg per tablet  polyethylene glycol 17 gram packet  promethazine 12.5 mg tablet       The patient's chart, MAR, and AVS were reviewed by   Izaiah Mortensen,   Contact: [phone]

## 2019-04-19 NOTE — PROGRESS NOTES
Pt's midline removed, per Gen Hawley, LISBET; Bronwyn Olson, contacted at ext [phone] to complete pt consult prior to her d/c, Medardo Best states that she previously attempted to complete the pt's consult, earlier today; however, the pt was on the phone; RN informs Medardo Best that pt is currently ready for dietician consult to be completed

## 2019-04-19 NOTE — PROGRESS NOTES
Verbal shift change report (Pt in BR) given to Travon Burns RN (oncoming nurse) by Harsh Wood RN (offgoing nurse). Report included the following information SBAR, Kardex, Procedure Summary, Intake/Output, MAR and Recent Results.

## 2019-04-19 NOTE — PROGRESS NOTES
Problem: Self Care Deficits Care Plan (Adult)  Goal: *Acute Goals and Plan of Care (Insert Text)  Description  Occupational Therapy Goals  Initiated 4/18/2019  1. Patient will perform grooming with modified independence standing >3 minutes within 7 day(s). 2.  Patient will perform lower body dressing with modified independence within 7 day(s). 3.  Patient will perform toilet transfers with independence within 7 day(s). 4.  Patient will perform all aspects of toileting with independence within 7 day(s). 5.  Patient will participate in upper extremity therapeutic exercise/activities with independence for 5 minutes within 7 day(s). Note:   OCCUPATIONAL THERAPY TREATMENT  Patient: Alden Smith (2 y.o. female)  Date: 4/19/2019  Diagnosis: Small bowel obstruction ([facility] [K88.055] <principal problem not specified>       Precautions: Fall(no BP in right arm)  Chart, occupational therapy assessment, plan of care, and goals were reviewed. ASSESSMENT:  Pt seen for ADL re-training. She was able to dress herself needing assist to don adult brief due to body habitus other than that donned pullover shirt and pants herself. She was able to ambulate to bathroom and transfer to commode for toileting. No further OT needs at this time. Progression toward goals:  ?       Improving appropriately and progressing toward goals  ? Improving slowly and progressing toward goals  ? Not making progress toward goals and plan of care will be adjusted     PLAN:  Patient continues to benefit from skilled intervention to address the above impairments. Continue treatment per established plan of care. Discharge Recommendations:  None for OT  Further Equipment Recommendations for Discharge: None     SUBJECTIVE:   Patient stated \"I have been dealing with this for a while. ?    OBJECTIVE DATA SUMMARY:   Cognitive/Behavioral Status:  Neurologic State: Alert  Orientation Level: Oriented X4  Cognition: Appropriate for age attention/concentration             Functional Mobility and Transfers for ADLs:  Bed Mobility:   Mod i    Transfers:   Independent          Balance:  Sitting: Intact  Standing: Intact    ADL Intervention:       Upper Body Dressing Assistance  Dressing Assistance: Independent  Pullover Shirt: Independent    Lower Body Dressing Assistance  Dressing Assistance: Modified independent  Pants With Elastic Waist: Modified independent  Slip on Shoes with Back: Modified independent  Position Performed: Seated in chair       Pain:  Pain Scale 1: Numeric (0 - 10)  Pain Intensity 1: 8  Pain Location 1: Abdomen  Pain Orientation 1: Anterior  Pain Description 1: Aching;Stabbing  Pain Intervention(s) 1: Medication (see MAR)  Activity Tolerance:   No signs/symptoms of distress or discomfort during OT  Please refer to the flowsheet for vital signs taken during this treatment. After treatment:   ? Patient left in no apparent distress sitting up in chair  ? Patient left in no apparent distress in bed  ? Call bell left within reach  ? Nursing notified  ? Caregiver present  ?  Bed alarm activated    COMMUNICATION/COLLABORATION:   The patient?s plan of care was discussed with: Physical Therapist, Occupational Therapist and Registered Nurse    KAYKAY Barry  Time Calculation: 15 mins

## 2019-04-19 NOTE — PROGRESS NOTES
1226: Pt aware d/c order is in place. Pt stated she will not be ready for d/c until 1600/1700, pt stated MD Dave Fonseca told her this is OK. 1233: Notified security dept that pt is requesting her belongings that are locked up. 1527: Pt dressed for d/c. Notified pt that her midline needs to be d/c. Pt stated Rigoberto Brandon me 15 minutes. \" Pt talking on phone. 1718: I have reviewed discharge instructions with the patient and mother. The patient and mother verbalized understanding. Discharge medications reviewed with patient and mother and appropriate educational materials and side effects teaching were provided. Signed copy of d/c instructions placed in pt's chart. RX given to pt.

## 2019-04-19 NOTE — DISCHARGE INSTRUCTIONS
Patient Education     Full LIQUID DIET ON DISCHARGE UNTIL NEXT WEEK. RESUME NORMAL DIET Wednesday, April 24, 2019  Bowel Blockage (Intestinal Obstruction): Care Instructions  Your Care Instructions  A bowel blockage, also called an intestinal obstruction, can prevent gas, fluids, or solids from moving through the intestines normally. It can cause constipation and, rarely, diarrhea. You may have pain, nausea, vomiting, and cramping. Most of the time, complete blockages require a stay in the hospital and possibly surgery. But if your bowel is only partly blocked, your doctor may tell you to wait until it clears on its own and you are able to pass gas and stool. If so, there are things you can do at home to help make you feel better. If you have had surgery for a bowel blockage, there are things you can do at home to make sure you heal well. You can also make some changes to keep your bowel from becoming blocked again. Follow-up care is a key part of your treatment and safety. Be sure to make and go to all appointments, and call your doctor if you are having problems. It's also a good idea to know your test results and keep a list of the medicines you take. How can you care for yourself at home? If your doctor has told you to wait at home for a blockage to clear on its own:  · Follow your doctor's instructions. These may include eating a liquid diet to avoid complete blockage. · Be safe with medicines. Take your medicines exactly as prescribed. Call your doctor if you think you are having a problem with your medicine. · Put a heating pad set on low on your belly to relieve mild cramps and pain. To prevent another blockage  · Try to eat smaller amounts of food more often. For example, have 5 or 6 small meals throughout the day instead of 2 or 3 large meals. · Chew your food very well. Try to chew each bite about 20 times or until it is liquid.   · Avoid high-fiber foods and raw fruits and vegetables with skins, husks, strings, or seeds. These can form a ball of undigested material that can cause a blockage if a part of your bowel is scarred or narrowed. · Check with your doctor before you eat whole-grain products or use a fiber supplement such as Citrucel or Metamucil. · To help you have regular bowel movements, eat at regular times, do not strain during a bowel movement, and drink at least 8 to 10 glasses of water each day. If you have kidney, heart, or liver disease and have to limit fluids, talk with your doctor or before you increase the amount of fluids you drink. · Drink high-calorie liquid formulas if your doctor says to. Severe symptoms may make it hard for your body to take in vitamins and minerals. · Get regular exercise. It helps you digest your food better. Get at least 30 minutes of physical activity on most days of the week. Walking is a good choice. When should you call for help? Call your doctor now or seek immediate medical care if:    · You have a fever.     · You are vomiting.     · You have new or worse belly pain.     · You cannot pass stools or gas.    Watch closely for changes in your health, and be sure to contact your doctor if you have any problems. Where can you learn more? Go to http://geronimo-jostin.info/. Enter K084 in the search box to learn more about \"Bowel Blockage (Intestinal Obstruction): Care Instructions. \"  Current as of: March 27, 2018  Content Version: 11.9  © 8697-9373 [employer]. Care instructions adapted under license by [employer] (which disclaims liability or warranty for this information). If you have questions about a medical condition or this instruction, always ask your healthcare professional. [place] [address] any warranty or liability for your use of this information.

## 2019-05-15 ENCOUNTER — HOSPITAL ENCOUNTER (OUTPATIENT)
Dept: MAMMOGRAPHY | Age: 49
Discharge: HOME | End: 2019-05-15
Attending: FAMILY MEDICINE
Payer: COMMERCIAL

## 2019-05-15 DIAGNOSIS — Z12.39 BREAST SCREENING, UNSPECIFIED: ICD-10-CM

## 2019-05-15 PROCEDURE — 77063 BREAST TOMOSYNTHESIS BI: CPT

## 2019-06-14 ENCOUNTER — APPOINTMENT (OUTPATIENT)
Dept: GENERAL RADIOLOGY | Age: 49
End: 2019-06-14
Attending: EMERGENCY MEDICINE
Payer: COMMERCIAL

## 2019-06-14 ENCOUNTER — APPOINTMENT (OUTPATIENT)
Dept: CT IMAGING | Age: 49
End: 2019-06-14
Attending: EMERGENCY MEDICINE
Payer: COMMERCIAL

## 2019-06-14 ENCOUNTER — HOSPITAL ENCOUNTER (EMERGENCY)
Age: 49
Discharge: HOME | End: 2019-06-14
Attending: EMERGENCY MEDICINE
Payer: COMMERCIAL

## 2019-06-14 VITALS
HEART RATE: 75 BPM | SYSTOLIC BLOOD PRESSURE: 108 MMHG | RESPIRATION RATE: 16 BRPM | OXYGEN SATURATION: 96 % | WEIGHT: 293 LBS | DIASTOLIC BLOOD PRESSURE: 61 MMHG | TEMPERATURE: 98 F | HEIGHT: 67 IN | BODY MASS INDEX: 45.99 KG/M2

## 2019-06-14 DIAGNOSIS — M54.6 ACUTE MIDLINE THORACIC BACK PAIN: ICD-10-CM

## 2019-06-14 DIAGNOSIS — S09.90XA CLOSED HEAD INJURY, INITIAL ENCOUNTER: ICD-10-CM

## 2019-06-14 DIAGNOSIS — T07.XXXA MULTIPLE CONTUSIONS: Primary | ICD-10-CM

## 2019-06-14 PROCEDURE — 72125 CT NECK SPINE W/O DYE: CPT

## 2019-06-14 PROCEDURE — 96374 THER/PROPH/DIAG INJ IV PUSH: CPT

## 2019-06-14 PROCEDURE — 74011250637 HC RX REV CODE- 250/637: Performed by: EMERGENCY MEDICINE

## 2019-06-14 PROCEDURE — 99283 EMERGENCY DEPT VISIT LOW MDM: CPT

## 2019-06-14 PROCEDURE — 70450 CT HEAD/BRAIN W/O DYE: CPT

## 2019-06-14 PROCEDURE — 74011250636 HC RX REV CODE- 250/636: Performed by: EMERGENCY MEDICINE

## 2019-06-14 PROCEDURE — 72072 X-RAY EXAM THORAC SPINE 3VWS: CPT

## 2019-06-14 RX ORDER — BUTALBITAL, ACETAMINOPHEN AND CAFFEINE 50; 325; 40 MG/1; MG/1; MG/1
2 TABLET ORAL
Status: COMPLETED | OUTPATIENT
Start: 2019-06-14 | End: 2019-06-14

## 2019-06-14 RX ORDER — FENTANYL CITRATE 50 UG/ML
100 INJECTION, SOLUTION INTRAMUSCULAR; INTRAVENOUS
Status: COMPLETED | OUTPATIENT
Start: 2019-06-14 | End: 2019-06-14

## 2019-06-14 RX ORDER — LIDOCAINE 50 MG/G
PATCH TOPICAL
Qty: 15 EACH | Refills: 0 | OUTPATIENT
Start: 2019-06-14 | End: 2020-02-20

## 2019-06-14 RX ORDER — CYCLOBENZAPRINE HCL 10 MG
10 TABLET ORAL
Status: COMPLETED | OUTPATIENT
Start: 2019-06-14 | End: 2019-06-14

## 2019-06-14 RX ORDER — BUTALBITAL, ACETAMINOPHEN AND CAFFEINE 300; 40; 50 MG/1; MG/1; MG/1
1 CAPSULE ORAL
Qty: 12 CAP | Refills: 0 | Status: ON HOLD | OUTPATIENT
Start: 2019-06-14 | End: 2021-07-20

## 2019-06-14 RX ADMIN — CYCLOBENZAPRINE HYDROCHLORIDE 10 MG: 10 TABLET, FILM COATED ORAL at 19:09

## 2019-06-14 RX ADMIN — BUTALBITAL, ACETAMINOPHEN, AND CAFFEINE 2 TABLET: 50; 325; 40 TABLET ORAL at 19:09

## 2019-06-14 RX ADMIN — FENTANYL CITRATE 100 MCG: 50 INJECTION, SOLUTION INTRAMUSCULAR; INTRAVENOUS at 19:44

## 2019-06-14 NOTE — ED PROVIDER NOTES
6:04 PM  I have evaluated the patient as the Provider in Triage. I have reviewed Her vital signs and the triage nurse assessment. I have talked with the patient and any available family and advised that I am the provider in triage and have ordered the appropriate study to initiate their work up based on the clinical presentation during my assessment. I have advised that the patient will be accommodated in the Main ED as soon as possible. I have also requested to contact the triage nurse or myself immediately if the patient experiences any changes in their condition during this brief waiting period. Laney Carvajal    51 y.o. female with past medical history significant for obesity, gall stones, anxiety, hematoma, bowel obstruction, GERD, kidney stones, PUD, seizures and fatty liver who presents from work via personal vehicle with chief complaint of headache. Pt states 1.5 hours ago pt was getting up to take her afternoon break when her chair slid backwards and she hit her head on the ground first, followed by her back, hips, then feet. According to coworkers, pt states pt states \"(she) had LOC for no more than 2 minutes. \" Pt states when she came to she could recall what happened. Pt states she is unable to move her head or neck with severe pain, had vision disturbances, back pain and \"doesn't feel like herself. \" Pt states at first her pain was localized to where her head hit the ground but currently, she has pain \"circulating\" through her head. Pt denies cough, congestion and There are no other acute medical concerns at this time. Social hx: No tobacco use, EtOH use (1 glass of wine/week, 0.6oz EtOH per week)    PCP: Jean Carlos Garza MD    Note written by Mary Curry.  Bryanna Buchanan, as dictated by Tyler Baker MD 6:04 PM      51 y.o. female with past medical history significant for anxiety, seizure, right arm thromboembolus, GERD, bowel obstruction, abdominal hernia, kidney stones and gall stones who presents via private vehicle for evaluation s/p fall and with chief complaint of HA. Pt reports that while at work her chair slid out underneath from her and she fell onto the floor. She states she hit her posterior head, sustained a minor bump to the back of her head and had LOC for about 2 minutes. Pt endorses \"throbbing\" HA, neck pain, and back pain at this time. Pt states she has leg swelling at baseline. There are no other acute medical concerns at this time. Social hx: Denies tobacco use; Occasional use of EtOH  PCP: Jamel Parisi MD    Note written by Barbara Cabrera, as dictated by Piper Diehl MD 6:36 PM      The history is provided by the patient. No  was used. 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  PUD (peptic ulcer disease) 2016    stomach and colon ulcers?     Seizures ([address]     me dside effect? last sz 2013       Past Surgical History:   Procedure Laterality Date    ABDOMEN SURGERY PROC UNLISTED  4/2010    ventral hernia repair with biologic mesh    COLONOSCOPY N/A 3/24/2017    [facility] HX APPENDECTOMY  2009    HX BREAST BIOPSY Right 2007    negative pathology    HX BREAST BIOPSY Left 1999    negative pathology    HX ENDOSCOPY  2016    HX HERNIA REPAIR  9/2011    laparoscopic incisional hernia repair    HX HERNIA REPAIR  5027    umbilical hernia repair    HX HYSTERECTOMY  2009    partial hysterectomy (right ovary removed)    HX OOPHORECTOMY  2009    removed post op hematoma and right oopherectomy    HX OOPHORECTOMY  3/2016    mass removed and left oopherectomy    HX OTHER SURGICAL  2009    ileocectomy, bowel resection,     MT COLONOSCOPY FLX DX W/COLLJ SPEC WHEN PFRMD  1/14/2011         US GUIDE BX BREAST Left 2016    neagative paythology         Family History:   Problem Relation Age of Onset    Cancer Maternal Grandmother         colon ca    Breast Cancer Maternal Grandmother     Breast Cancer Paternal Grandmother     Breast Cancer Cousin         multiple; both sides       Social History     Socioeconomic History    Marital status:      Spouse name: Not on file    Number of children: Not on file    Years of education: Not on file    Highest education level: Not on file   Occupational History    Not on file   Social Needs    Financial resource strain: Not on file    Food insecurity:     Worry: Not on file     Inability: Not on file    Transportation needs:     Medical: Not on file     Non-medical: Not on file   Tobacco Use    Smoking status: Never Smoker    Smokeless tobacco: Never Used   Substance and Sexual Activity    Alcohol use: Yes     Alcohol/week: 0.6 oz     Types: 1 Glasses of wine per week     Comment: 1 glass every 2 weeks    Drug use: No    Sexual activity: Not on file   Lifestyle    Physical activity:     Days per week: Not on file     Minutes per session: Not on file    Stress: Not on file   Relationships    Social connections:     Talks on phone: Not on file     Gets together: Not on file     Attends Protestant service: Not on file     Active member of club or organization: Not on file     Attends meetings of clubs or organizations: Not on file     Relationship status: Not on file    Intimate partner violence:     Fear of current or ex partner: Not on file     Emotionally abused: Not on file     Physically abused: Not on file     Forced sexual activity: Not on file   Other Topics Concern    Not on file   Social History Narrative    Not on file         ALLERGIES: Sulfa (sulfonamide antibiotics);  Toradol [ketorolac tromethamine]; Compazine [prochlorperazine]; Hydrocodone-acetaminophen; and Morphine    Review of Systems   Constitutional: Negative for chills and fever. Respiratory: Negative for shortness of breath. Cardiovascular: Negative for chest pain. Gastrointestinal: Negative for abdominal pain, constipation, diarrhea and vomiting. Musculoskeletal: Positive for back pain and neck pain. Neurological: Positive for headaches. Negative for dizziness and light-headedness. All other systems reviewed and are negative. Vitals:    06/14/19 1807   BP: 121/78   Pulse: 97   Resp: 20   Temp: 98 °F (36.7 °C)   SpO2: 100%   Weight: 135.2 kg (298 lb)   Height: 5' 7\" (1.702 m)            Physical Exam   Constitutional: She is oriented to person, place, and time. She appears well-developed and well-nourished. HENT:   Head: Normocephalic. Head is with contusion. Shallow contusion to left occipital region. Eyes: Pupils are equal, round, and reactive to light. Neck: Normal range of motion. Neck supple. Cardiovascular: Normal rate and regular rhythm. Pulmonary/Chest: Effort normal and breath sounds normal.   Abdominal: Soft. She exhibits no distension. There is no tenderness. Musculoskeletal:        Right hip: She exhibits normal range of motion. Left hip: She exhibits normal range of motion. Cervical back: She exhibits tenderness. Thoracic back: She exhibits tenderness. Lower cervical and upper thoracic spine tenderness. Full ROM of hips and pelvis. Pelvis is stable to A/P and lateral compression. Neurological: She is alert and oriented to person, place, and time. Skin: Skin is warm and dry. Capillary refill takes less than 2 seconds. Psychiatric: She has a normal mood and affect. Her behavior is normal.   Nursing note and vitals reviewed.      Note written by Barbara Sofia, as dictated by Oren Olivares MD 6:36 PM  MDM  Number of Diagnoses or Management Options  Acute midline thoracic back pain:   Closed head injury, initial encounter:   Multiple contusions:   Diagnosis management comments: The patient presented after a fall. The patient is now resting comfortably and feels better, is alert and in no distress. The patient has a normal mental status and is neurologically intact. The history, exam, diagnostic testing (if any), and current condition do not demonstrate signs of clinically significant intra-cranial, intra-thoracic, intra-abdominal, or musculoskeletal trauma. The vital signs have been stable. The patient was discharged with a plan for pain control and following up for her closed head injury. The patient's condition is stable and appropriate for discharge. The patient will pursue further outpatient evaluation with the primary care physician or other designated or consulting physician as indicated in the discharge instructions. Procedures      The patient's results have been reviewed with them and/or available family. Patient and/or family verbally conveyed their understanding and agreement of the patient's signs, symptoms, diagnosis, treatment and prognosis and additionally agree to follow up as recommended in the discharge instructions or to return to the Emergency Room should their condition change prior to their follow-up appointment. The patient/family verbally agrees with the care-plan and verbally conveys that all of their questions have been answered. The discharge instructions have also been provided to the patient and/or family with some educational information regarding the patient's diagnosis as well a list of reasons why the patient would want to return to the ER prior to their follow-up appointment, should their condition change.

## 2019-06-14 NOTE — ED TRIAGE NOTES
Patient states she fell onto the ground when her chair moved away from her. C/o headache, vision problems, neck pain and mid back pain. Patient states LOC x 2 minutes, did not urinate on self, \"knot on my head\".

## 2019-06-14 NOTE — LETTER
[address] 
[facility] EMERGENCY DEPT 
[address] [facility] [place] [id number] [phone] Work/School Note Date: 6/14/2019 To Whom It May concern: 
 
Lino Record was seen and treated today in the emergency room by the following provider(s): 
Attending Provider: Vianey Faulkner MD.   
 
Lino Record may return to work on 6/19/19. Sincerely, Debbie Beal MD

## 2019-06-15 NOTE — ED NOTES
Discharge Instructions Reviewed with patient. Discharge instructions given to patient per provider. patient able to return verbalize discharge instructions. Paper copy of discharge instructions given. One RX given to patient per provider. Patient condition stable, Respiratory status WNL, Neurostatus intact. Patient ambulatory out of er, to home with wife.

## 2019-11-24 ENCOUNTER — HOSPITAL ENCOUNTER (EMERGENCY)
Age: 49
Discharge: HOME | End: 2019-11-24
Attending: EMERGENCY MEDICINE
Payer: COMMERCIAL

## 2019-11-24 ENCOUNTER — APPOINTMENT (OUTPATIENT)
Dept: CT IMAGING | Age: 49
End: 2019-11-24
Attending: NURSE PRACTITIONER
Payer: COMMERCIAL

## 2019-11-24 VITALS
TEMPERATURE: 97.7 F | DIASTOLIC BLOOD PRESSURE: 74 MMHG | RESPIRATION RATE: 18 BRPM | HEART RATE: 78 BPM | SYSTOLIC BLOOD PRESSURE: 100 MMHG | OXYGEN SATURATION: 100 %

## 2019-11-24 DIAGNOSIS — R10.9 ACUTE ABDOMINAL PAIN: Primary | ICD-10-CM

## 2019-11-24 LAB
ALBUMIN SERPL-MCNC: 3 G/DL (ref 3.5–5)
ALBUMIN/GLOB SERPL: 0.7 {RATIO} (ref 1.1–2.2)
ALP SERPL-CCNC: 155 U/L (ref 45–117)
ALT SERPL-CCNC: 28 U/L (ref 12–78)
ANION GAP SERPL CALC-SCNC: 9 MMOL/L (ref 5–15)
APPEARANCE UR: CLEAR
AST SERPL-CCNC: 21 U/L (ref 15–37)
BASOPHILS # BLD: 0 K/UL (ref 0–0.1)
BASOPHILS NFR BLD: 1 % (ref 0–1)
BILIRUB SERPL-MCNC: 0.3 MG/DL (ref 0.2–1)
BILIRUB UR QL: NEGATIVE
BUN SERPL-MCNC: 14 MG/DL (ref 6–20)
BUN/CREAT SERPL: 12 (ref 12–20)
CALCIUM SERPL-MCNC: 8.8 MG/DL (ref 8.5–10.1)
CHLORIDE SERPL-SCNC: 108 MMOL/L (ref 97–108)
CO2 SERPL-SCNC: 25 MMOL/L (ref 21–32)
COLOR UR: NORMAL
COMMENT, HOLDF: NORMAL
CREAT SERPL-MCNC: 1.13 MG/DL (ref 0.55–1.02)
DIFFERENTIAL METHOD BLD: NORMAL
EOSINOPHIL # BLD: 0.1 K/UL (ref 0–0.4)
EOSINOPHIL NFR BLD: 2 % (ref 0–7)
ERYTHROCYTE [DISTWIDTH] IN BLOOD BY AUTOMATED COUNT: 13.7 % (ref 11.5–14.5)
GLOBULIN SER CALC-MCNC: 4.2 G/DL (ref 2–4)
GLUCOSE SERPL-MCNC: 91 MG/DL (ref 65–100)
GLUCOSE UR STRIP.AUTO-MCNC: NEGATIVE MG/DL
HCT VFR BLD AUTO: 38.1 % (ref 35–47)
HGB BLD-MCNC: 11.9 G/DL (ref 11.5–16)
HGB UR QL STRIP: NEGATIVE
IMM GRANULOCYTES # BLD AUTO: 0 K/UL (ref 0–0.04)
IMM GRANULOCYTES NFR BLD AUTO: 0 % (ref 0–0.5)
KETONES UR QL STRIP.AUTO: NEGATIVE MG/DL
LACTATE SERPL-SCNC: 1.6 MMOL/L (ref 0.4–2)
LEUKOCYTE ESTERASE UR QL STRIP.AUTO: NEGATIVE
LIPASE SERPL-CCNC: 89 U/L (ref 73–393)
LYMPHOCYTES # BLD: 1.3 K/UL (ref 0.8–3.5)
LYMPHOCYTES NFR BLD: 26 % (ref 12–49)
MCH RBC QN AUTO: 29.2 PG (ref 26–34)
MCHC RBC AUTO-ENTMCNC: 31.2 G/DL (ref 30–36.5)
MCV RBC AUTO: 93.6 FL (ref 80–99)
MONOCYTES # BLD: 0.4 K/UL (ref 0–1)
MONOCYTES NFR BLD: 7 % (ref 5–13)
NEUTS SEG # BLD: 3.3 K/UL (ref 1.8–8)
NEUTS SEG NFR BLD: 64 % (ref 32–75)
NITRITE UR QL STRIP.AUTO: NEGATIVE
NRBC # BLD: 0 K/UL (ref 0–0.01)
NRBC BLD-RTO: 0 PER 100 WBC
PH UR STRIP: 5 [PH] (ref 5–8)
PLATELET # BLD AUTO: 211 K/UL (ref 150–400)
PMV BLD AUTO: 10.4 FL (ref 8.9–12.9)
POTASSIUM SERPL-SCNC: 3.5 MMOL/L (ref 3.5–5.1)
PROT SERPL-MCNC: 7.2 G/DL (ref 6.4–8.2)
PROT UR STRIP-MCNC: NEGATIVE MG/DL
RBC # BLD AUTO: 4.07 M/UL (ref 3.8–5.2)
SAMPLES BEING HELD,HOLD: NORMAL
SODIUM SERPL-SCNC: 142 MMOL/L (ref 136–145)
SP GR UR REFRACTOMETRY: 1.02 (ref 1–1.03)
UR CULT HOLD, URHOLD: NORMAL
UROBILINOGEN UR QL STRIP.AUTO: 0.2 EU/DL (ref 0.2–1)
WBC # BLD AUTO: 5.1 K/UL (ref 3.6–11)

## 2019-11-24 PROCEDURE — 36415 COLL VENOUS BLD VENIPUNCTURE: CPT

## 2019-11-24 PROCEDURE — 80053 COMPREHEN METABOLIC PANEL: CPT

## 2019-11-24 PROCEDURE — 83605 ASSAY OF LACTIC ACID: CPT

## 2019-11-24 PROCEDURE — 74177 CT ABD & PELVIS W/CONTRAST: CPT

## 2019-11-24 PROCEDURE — 74011000258 HC RX REV CODE- 258: Performed by: EMERGENCY MEDICINE

## 2019-11-24 PROCEDURE — 81003 URINALYSIS AUTO W/O SCOPE: CPT

## 2019-11-24 PROCEDURE — 96372 THER/PROPH/DIAG INJ SC/IM: CPT

## 2019-11-24 PROCEDURE — 74011636320 HC RX REV CODE- 636/320: Performed by: RADIOLOGY

## 2019-11-24 PROCEDURE — 99284 EMERGENCY DEPT VISIT MOD MDM: CPT

## 2019-11-24 PROCEDURE — 96365 THER/PROPH/DIAG IV INF INIT: CPT

## 2019-11-24 PROCEDURE — 74011250636 HC RX REV CODE- 250/636: Performed by: EMERGENCY MEDICINE

## 2019-11-24 PROCEDURE — 83690 ASSAY OF LIPASE: CPT

## 2019-11-24 PROCEDURE — 96375 TX/PRO/DX INJ NEW DRUG ADDON: CPT

## 2019-11-24 PROCEDURE — 74011250636 HC RX REV CODE- 250/636: Performed by: NURSE PRACTITIONER

## 2019-11-24 PROCEDURE — 85025 COMPLETE CBC W/AUTO DIFF WBC: CPT

## 2019-11-24 RX ORDER — SODIUM CHLORIDE 9 MG/ML
125 INJECTION, SOLUTION INTRAVENOUS CONTINUOUS
Status: DISCONTINUED | OUTPATIENT
Start: 2019-11-24 | End: 2019-11-24 | Stop reason: HOSPADM

## 2019-11-24 RX ORDER — DICYCLOMINE HYDROCHLORIDE 10 MG/ML
20 INJECTION INTRAMUSCULAR
Status: COMPLETED | OUTPATIENT
Start: 2019-11-24 | End: 2019-11-24

## 2019-11-24 RX ORDER — HYDROMORPHONE HYDROCHLORIDE 2 MG/ML
2 INJECTION, SOLUTION INTRAMUSCULAR; INTRAVENOUS; SUBCUTANEOUS ONCE
Status: COMPLETED | OUTPATIENT
Start: 2019-11-24 | End: 2019-11-24

## 2019-11-24 RX ADMIN — PROMETHAZINE HYDROCHLORIDE 25 MG: 25 INJECTION INTRAMUSCULAR; INTRAVENOUS at 12:34

## 2019-11-24 RX ADMIN — HYDROMORPHONE HYDROCHLORIDE 2 MG: 2 INJECTION, SOLUTION INTRAMUSCULAR; INTRAVENOUS; SUBCUTANEOUS at 12:37

## 2019-11-24 RX ADMIN — IOPAMIDOL 100 ML: 755 INJECTION, SOLUTION INTRAVENOUS at 13:32

## 2019-11-24 RX ADMIN — DICYCLOMINE HYDROCHLORIDE 20 MG: 10 INJECTION INTRAMUSCULAR at 15:02

## 2019-11-24 RX ADMIN — SODIUM CHLORIDE 1000 ML: 900 INJECTION, SOLUTION INTRAVENOUS at 12:19

## 2019-11-24 NOTE — DISCHARGE INSTRUCTIONS
Patient Education   Abdominal Pain: After Your Visit to the Emergency Room  Your Care Instructions  Many abdominal problems can cause belly pain. Some are not serious and get better on their own in a few days. Other abdominal problems need more testing and emergency treatment. Your doctor may have recommended a follow-up visit in the next 8 to 12 hours. If you are not getting better, you may need more tests or treatment. Even though you have been released from the emergency room, you still need to watch for any problems. The doctor carefully checked you. But sometimes problems can develop later. If you have new symptoms, or if your symptoms do not get better, return to the emergency room or call your doctor right away. A visit to the emergency room is only one step in your treatment. Even if you feel better, you still need to do what your doctor recommends, such as going to all suggested follow-up appointments and taking medicines exactly as directed. This will help you recover and help prevent future problems. How can you care for yourself at home? · Rest until you feel better. · Drink plenty of fluids to prevent dehydration. Choose water and other caffeine-free clear liquids until you feel better. If you have kidney, heart, or liver disease and have to limit fluids, talk with your doctor before you increase the amount of fluids you drink. · Take your medicines exactly as directed. Call your doctor if you think you are having a problem with your medicine. · Do not drive after taking a prescription pain medicine. When should you call for help? Call 911 if:  · You passed out (lost consciousness). · You have sudden chest pain and shortness of breath, or you cough up blood. · You pass maroon or very bloody stools. · You vomit blood or what looks like coffee grounds. · You have new, severe belly pain. · You have other symptoms that you think are a medical emergency.   Return to the emergency room now if:  · You feel weak and lightheaded. · Your pain becomes focused in one area of your belly. · Your belly pain is worse after you cough or move. · You have a new or higher fever. Call your doctor today if:  · Your stools are black and tarlike or have streaks of blood. · You have new, unusual bleeding or discharge from the vagina. · You have blood in your urine, it hurts when you urinate, or you have to urinate more often than usual.  · Your belly pain has not improved after 1 day. Where can you learn more? Go to 3point5.com.be  Enter D163 in the search box to learn more about \"Abdominal Pain: After Your Visit to the Emergency Room. \"   © 4592-0893 [employer]. Care instructions adapted under license by [facility] (which disclaims liability or warranty for this information). This care instruction is for use with your licensed healthcare professional. If you have questions about a medical condition or this instruction, always ask your healthcare professional. [place] [address] any warranty or liability for your use of this information.   Content Version: 9.3.24361; Last Revised: January 14, 2011

## 2019-11-24 NOTE — ED TRIAGE NOTES
Pt here because \"I think I have a bowel obstruction with same symptoms that I was hospitalized with before. \"

## 2019-11-24 NOTE — ED PROVIDER NOTES
51 y.o. female with past medical history significant for anxiety, gall stones, bowel obstruction, GERD, PUD, and seizures who presents from home via family member with chief complaint of abdominal pain. Patient believes to have an abdominal obstruction again. Pt has a past history of one 4/13/19-4/20/19, and in addition has a past history of Pancolitis. Pt is having similar symptoms to the time , sharp shooting pain in liver area, had it prior when admitted for same issue, sleep disturbance, NVD since yesterday at 0400 cant turn body not eaten since Friday, continues to vomit bile, \"10/10\" pain, cant stand up straight, hx of hysterectomy - abdominal mesh in place. There are no other acute medical concerns at this time. Never Smoker, +EtOH use, No illicit drug use  Dr. Emily Hollingsworth - GI  PCP: Jeremy Reyes MD      Note written by Barbara Coto, as dictated by Sarah Grewal NP 11:34 AM        This patient has severe abdominal pain on the right side. She has had no bowel movement or flatus in 48 hours. No food in 2 days. She is drinking some fluids. Nothing seems to make the pain better or worse. She has had multiple bowel obstructions in the past.  She has had 12 abdominal surgeries. No diarrhea. No chest pain. Jenni Dakins, DO      Old chart reviewed: She was admitted for several days earlier this year for bowel obstruction. This was managed nonoperatively. She did have an NG tube. She was taken care of by Dr. Nathalia Gaytan. The history is provided by the patient. No  was used.         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  PUD (peptic ulcer disease) 2016    stomach and colon ulcers?  Seizures ([address]     me dside effect? last sz 2013       Past Surgical History:   Procedure Laterality Date    ABDOMEN SURGERY PROC UNLISTED  4/2010    ventral hernia repair with biologic mesh    COLONOSCOPY N/A 3/24/2017    COLONOSCOPY performed by Edwin Calles MD at [facility] HX APPENDECTOMY  2009    HX BREAST BIOPSY Right 2007    negative pathology    HX BREAST BIOPSY Left 1999    negative pathology    HX ENDOSCOPY  2016    HX HERNIA REPAIR  9/2011    laparoscopic incisional hernia repair    HX HERNIA REPAIR  3861    umbilical hernia repair    HX HYSTERECTOMY  2009    partial hysterectomy (right ovary removed)    HX OOPHORECTOMY  2009    removed post op hematoma and right oopherectomy    HX OOPHORECTOMY  3/2016    mass removed and left oopherectomy    HX OTHER SURGICAL  2009    ileocectomy, bowel resection,     UT COLONOSCOPY FLX DX W/COLLJ SPEC WHEN PFRMD  1/14/2011         US GUIDE BX BREAST Left 2016    neagative paythology         Family History:   Problem Relation Age of Onset    Cancer Maternal Grandmother         colon ca    Breast Cancer Maternal Grandmother     Breast Cancer Paternal Grandmother     Breast Cancer Cousin         multiple; both sides       Social History     Socioeconomic History    Marital status:      Spouse name: Not on file    Number of children: Not on file    Years of education: Not on file    Highest education level: Not on file   Occupational History    Not on file   Social Needs    Financial resource strain: Not on file    Food insecurity:     Worry: Not on file     Inability: Not on file    Transportation needs:     Medical: Not on file     Non-medical: Not on file   Tobacco Use    Smoking status: Never Smoker    Smokeless tobacco: Never Used   Substance and Sexual Activity    Alcohol use:  Yes     Alcohol/week: 1.0 standard drinks     Types: 1 Glasses of wine per week     Comment: 1 glass every 2 weeks    Drug use: No    Sexual activity: Not on file   Lifestyle    Physical activity:     Days per week: Not on file     Minutes per session: Not on file    Stress: Not on file   Relationships    Social connections:     Talks on phone: Not on file     Gets together: Not on file     Attends [employer] service: Not on file     Active member of club or organization: Not on file     Attends meetings of clubs or organizations: Not on file     Relationship status: Not on file    Intimate partner violence:     Fear of current or ex partner: Not on file     Emotionally abused: Not on file     Physically abused: Not on file     Forced sexual activity: Not on file   Other Topics Concern    Not on file   Social History Narrative    Not on file         ALLERGIES: Sulfa (sulfonamide antibiotics); Toradol [ketorolac tromethamine]; Compazine [prochlorperazine]; Hydrocodone-acetaminophen; and Morphine    Review of Systems   All other systems reviewed and are negative. Vitals:    11/24/19 1136   BP: 125/84   Pulse: 94   Resp: 16   Temp: 97.8 °F (36.6 °C)   SpO2: 95%            Physical Exam      Constitutional: Pt is awake and alert. Pt appears well-developed and well-nourished. NAD. HENT:   Head: Normocephalic and atraumatic. Nose: Nose normal.   Mouth/Throat: Oropharynx is clear and moist. No oropharyngeal exudate. Eyes: Conjunctivae and extraocular motions are normal. Pupils are equal, round, and reactive to light. Right eye exhibits no discharge. Left eye exhibits no discharge. No scleral icterus. Neck: No tracheal deviation present. Supple neck. Cardiovascular: Normal rate, regular rhythm, normal heart sounds and intact distal pulses. Exam reveals no gallop and no friction rub. No murmur heard. Pulmonary/Chest: Effort normal and breath sounds normal.  Pt  has no wheezes. Pt  has no rales. Musculoskeletal:  Pt  exhibits no edema and no tenderness.    Ext: Normal ROM in all four extremities; not tender to palpation; distal pulses are normal, no edema. Neurological:  Pt is alert. nonfocal neuro exam.  Skin: Skin is warm and dry. Pt  is not diaphoretic. Psychiatric:  Pt  has a normal mood and affect. Behavior is normal.       Abdomen: Obese, distended, soft. She has severe abdominal tenderness on the right side. Small mass in the central lower abdomen. MDM       Procedures      12:20 PM: CT has been ordered. We will give IV fluid resuscitation, Phenergan and Dilaudid which she has done well within the past.  Will reevaluate. Labs Reviewed   METABOLIC PANEL, COMPREHENSIVE - Abnormal; Notable for the following components:       Result Value    Creatinine 1.13 (*)     GFR est non-AA 51 (*)     Alk.  phosphatase 155 (*)     Albumin 3.0 (*)     Globulin 4.2 (*)     A-G Ratio 0.7 (*)     All other components within normal limits   URINE CULTURE HOLD SAMPLE   CBC WITH AUTOMATED DIFF   LIPASE   URINALYSIS W/ RFLX MICROSCOPIC   LACTIC ACID   SAMPLES BEING HELD     DC home    [place] better  No indication for admission  This is acute on chronic abd pain  F/u pcp and GI      Reviewed ct images

## 2019-11-24 NOTE — ED NOTES
Discharge instructions given to patient, and able to verbalize understanding. Patient ambulatory from ED to home.

## 2020-02-20 ENCOUNTER — APPOINTMENT (OUTPATIENT)
Dept: CT IMAGING | Age: 50
End: 2020-02-20
Attending: EMERGENCY MEDICINE
Payer: COMMERCIAL

## 2020-02-20 ENCOUNTER — HOSPITAL ENCOUNTER (EMERGENCY)
Age: 50
Discharge: HOME | End: 2020-02-20
Attending: EMERGENCY MEDICINE
Payer: COMMERCIAL

## 2020-02-20 VITALS
OXYGEN SATURATION: 96 % | RESPIRATION RATE: 18 BRPM | HEIGHT: 67 IN | DIASTOLIC BLOOD PRESSURE: 66 MMHG | TEMPERATURE: 98.1 F | SYSTOLIC BLOOD PRESSURE: 135 MMHG | HEART RATE: 90 BPM | BODY MASS INDEX: 45.99 KG/M2 | WEIGHT: 293 LBS
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
## 2020-02-20 DIAGNOSIS — R10.32 ABDOMINAL PAIN, LLQ (LEFT LOWER QUADRANT): ICD-10-CM

## 2020-02-20 DIAGNOSIS — S06.0X0A CONCUSSION WITHOUT LOSS OF CONSCIOUSNESS, INITIAL ENCOUNTER: Primary | ICD-10-CM

## 2020-02-20 LAB
ANION GAP SERPL CALC-SCNC: 5 MMOL/L (ref 5–15)
BASOPHILS # BLD: 0 K/UL (ref 0–0.1)
BASOPHILS NFR BLD: 0 % (ref 0–1)
BUN SERPL-MCNC: 12 MG/DL (ref 6–20)
BUN/CREAT SERPL: 9 (ref 12–20)
CALCIUM SERPL-MCNC: 9.1 MG/DL (ref 8.5–10.1)
CHLORIDE SERPL-SCNC: 108 MMOL/L (ref 97–108)
CO2 SERPL-SCNC: 26 MMOL/L (ref 21–32)
CREAT SERPL-MCNC: 1.3 MG/DL (ref 0.55–1.02)
DIFFERENTIAL METHOD BLD: NORMAL
EOSINOPHIL # BLD: 0.1 K/UL (ref 0–0.4)
EOSINOPHIL NFR BLD: 1 % (ref 0–7)
ERYTHROCYTE [DISTWIDTH] IN BLOOD BY AUTOMATED COUNT: 13.9 % (ref 11.5–14.5)
GLUCOSE SERPL-MCNC: 99 MG/DL (ref 65–100)
HCT VFR BLD AUTO: 40.5 % (ref 35–47)
HGB BLD-MCNC: 12.6 G/DL (ref 11.5–16)
IMM GRANULOCYTES # BLD AUTO: 0 K/UL (ref 0–0.04)
IMM GRANULOCYTES NFR BLD AUTO: 0 % (ref 0–0.5)
LYMPHOCYTES # BLD: 1.4 K/UL (ref 0.8–3.5)
LYMPHOCYTES NFR BLD: 21 % (ref 12–49)
MCH RBC QN AUTO: 28.9 PG (ref 26–34)
MCHC RBC AUTO-ENTMCNC: 31.1 G/DL (ref 30–36.5)
MCV RBC AUTO: 92.9 FL (ref 80–99)
MONOCYTES # BLD: 0.5 K/UL (ref 0–1)
MONOCYTES NFR BLD: 7 % (ref 5–13)
NEUTS SEG # BLD: 4.9 K/UL (ref 1.8–8)
NEUTS SEG NFR BLD: 71 % (ref 32–75)
NRBC # BLD: 0 K/UL (ref 0–0.01)
NRBC BLD-RTO: 0 PER 100 WBC
PLATELET # BLD AUTO: 232 K/UL (ref 150–400)
PLATELET COMMENTS,PCOM: NORMAL
PMV BLD AUTO: 10.4 FL (ref 8.9–12.9)
POTASSIUM SERPL-SCNC: 4.7 MMOL/L (ref 3.5–5.1)
RBC # BLD AUTO: 4.36 M/UL (ref 3.8–5.2)
RBC MORPH BLD: NORMAL
SODIUM SERPL-SCNC: 139 MMOL/L (ref 136–145)
WBC # BLD AUTO: 6.9 K/UL (ref 3.6–11)

## 2020-02-20 PROCEDURE — 85025 COMPLETE CBC W/AUTO DIFF WBC: CPT

## 2020-02-20 PROCEDURE — 96374 THER/PROPH/DIAG INJ IV PUSH: CPT

## 2020-02-20 PROCEDURE — 74011636320 HC RX REV CODE- 636/320

## 2020-02-20 PROCEDURE — 74011250636 HC RX REV CODE- 250/636: Performed by: EMERGENCY MEDICINE

## 2020-02-20 PROCEDURE — 70450 CT HEAD/BRAIN W/O DYE: CPT

## 2020-02-20 PROCEDURE — 74011250637 HC RX REV CODE- 250/637: Performed by: EMERGENCY MEDICINE

## 2020-02-20 PROCEDURE — 96375 TX/PRO/DX INJ NEW DRUG ADDON: CPT

## 2020-02-20 PROCEDURE — 80048 BASIC METABOLIC PNL TOTAL CA: CPT

## 2020-02-20 PROCEDURE — 74177 CT ABD & PELVIS W/CONTRAST: CPT

## 2020-02-20 PROCEDURE — 36415 COLL VENOUS BLD VENIPUNCTURE: CPT

## 2020-02-20 PROCEDURE — 99283 EMERGENCY DEPT VISIT LOW MDM: CPT

## 2020-02-20 RX ORDER — PROMETHAZINE HYDROCHLORIDE 25 MG/1
25 TABLET ORAL
Qty: 12 TAB | Refills: 0 | Status: SHIPPED | OUTPATIENT
Start: 2020-02-20

## 2020-02-20 RX ORDER — CYCLOBENZAPRINE HCL 10 MG
10 TABLET ORAL
Qty: 12 TAB | Refills: 0 | Status: SHIPPED | OUTPATIENT
Start: 2020-02-20 | End: 2021-03-26

## 2020-02-20 RX ORDER — LIDOCAINE 50 MG/G
PATCH TOPICAL
Qty: 1 EACH | Refills: 0 | OUTPATIENT
Start: 2020-02-20 | End: 2020-10-04

## 2020-02-20 RX ORDER — PROMETHAZINE HYDROCHLORIDE 25 MG/1
25 TABLET ORAL
Status: COMPLETED | OUTPATIENT
Start: 2020-02-20 | End: 2020-02-20

## 2020-02-20 RX ORDER — HYDROMORPHONE HYDROCHLORIDE 2 MG/ML
2 INJECTION, SOLUTION INTRAMUSCULAR; INTRAVENOUS; SUBCUTANEOUS
Status: COMPLETED | OUTPATIENT
Start: 2020-02-20 | End: 2020-02-20

## 2020-02-20 RX ORDER — HYDROMORPHONE HYDROCHLORIDE 1 MG/ML
1 INJECTION, SOLUTION INTRAMUSCULAR; INTRAVENOUS; SUBCUTANEOUS
Status: COMPLETED | OUTPATIENT
Start: 2020-02-20 | End: 2020-02-20

## 2020-02-20 RX ADMIN — IOPAMIDOL 100 ML: 755 INJECTION, SOLUTION INTRAVENOUS at 15:10

## 2020-02-20 RX ADMIN — HYDROMORPHONE HYDROCHLORIDE 1 MG: 1 INJECTION, SOLUTION INTRAMUSCULAR; INTRAVENOUS; SUBCUTANEOUS at 12:40

## 2020-02-20 RX ADMIN — PROMETHAZINE HYDROCHLORIDE 25 MG: 25 TABLET ORAL at 12:36

## 2020-02-20 RX ADMIN — HYDROMORPHONE HYDROCHLORIDE 2 MG: 2 INJECTION, SOLUTION INTRAMUSCULAR; INTRAVENOUS; SUBCUTANEOUS at 14:38
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
## 2020-02-20 NOTE — LETTER
[address] 
[facility] EMERGENCY DEPT 
[address] [facility] [id number] 
[phone] Work/School Note Date: 2/20/2020 To Whom It May concern: 
 
Scott Vidales was seen and treated today in the emergency room by the following provider(s): 
Attending Provider: Julius Curry MD.   
 
Scott Vidales may return to work 02/20/20. Sincerely, Elias Nguyen RN
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
## 2020-04-22 ENCOUNTER — APPOINTMENT (OUTPATIENT)
Dept: CT IMAGING | Age: 50
End: 2020-04-22
Attending: STUDENT IN AN ORGANIZED HEALTH CARE EDUCATION/TRAINING PROGRAM
Payer: COMMERCIAL

## 2020-04-22 ENCOUNTER — HOSPITAL ENCOUNTER (EMERGENCY)
Age: 50
Discharge: HOME | End: 2020-04-22
Attending: STUDENT IN AN ORGANIZED HEALTH CARE EDUCATION/TRAINING PROGRAM | Admitting: STUDENT IN AN ORGANIZED HEALTH CARE EDUCATION/TRAINING PROGRAM
Payer: COMMERCIAL

## 2020-04-22 VITALS
TEMPERATURE: 97.5 F | DIASTOLIC BLOOD PRESSURE: 71 MMHG | SYSTOLIC BLOOD PRESSURE: 120 MMHG | OXYGEN SATURATION: 92 % | HEIGHT: 67 IN | HEART RATE: 98 BPM | BODY MASS INDEX: 45.99 KG/M2 | WEIGHT: 293 LBS | RESPIRATION RATE: 20 BRPM

## 2020-04-22 DIAGNOSIS — R10.2 PELVIC PAIN: Primary | ICD-10-CM

## 2020-04-22 DIAGNOSIS — B49 FUNGUS PRESENT IN URINE: ICD-10-CM

## 2020-04-22 LAB
ALBUMIN SERPL-MCNC: 3 G/DL (ref 3.5–5)
ALBUMIN/GLOB SERPL: 0.7 {RATIO} (ref 1.1–2.2)
ALP SERPL-CCNC: 135 U/L (ref 45–117)
ALT SERPL-CCNC: 37 U/L (ref 12–78)
ANION GAP SERPL CALC-SCNC: 7 MMOL/L (ref 5–15)
APPEARANCE UR: ABNORMAL
AST SERPL-CCNC: 35 U/L (ref 15–37)
BACTERIA URNS QL MICRO: NEGATIVE /HPF
BASOPHILS # BLD: 0 K/UL (ref 0–0.1)
BASOPHILS NFR BLD: 0 % (ref 0–1)
BILIRUB SERPL-MCNC: 0.2 MG/DL (ref 0.2–1)
BILIRUB UR QL: NEGATIVE
BUN SERPL-MCNC: 12 MG/DL (ref 6–20)
BUN/CREAT SERPL: 10 (ref 12–20)
CALCIUM SERPL-MCNC: 9 MG/DL (ref 8.5–10.1)
CHLORIDE SERPL-SCNC: 107 MMOL/L (ref 97–108)
CO2 SERPL-SCNC: 25 MMOL/L (ref 21–32)
COLOR UR: ABNORMAL
COMMENT, HOLDF: NORMAL
CREAT SERPL-MCNC: 1.21 MG/DL (ref 0.55–1.02)
DIFFERENTIAL METHOD BLD: NORMAL
EOSINOPHIL # BLD: 0.1 K/UL (ref 0–0.4)
EOSINOPHIL NFR BLD: 2 % (ref 0–7)
EPITH CASTS URNS QL MICRO: ABNORMAL /LPF
ERYTHROCYTE [DISTWIDTH] IN BLOOD BY AUTOMATED COUNT: 14.2 % (ref 11.5–14.5)
GLOBULIN SER CALC-MCNC: 4.2 G/DL (ref 2–4)
GLUCOSE SERPL-MCNC: 96 MG/DL (ref 65–100)
GLUCOSE UR STRIP.AUTO-MCNC: NEGATIVE MG/DL
HCT VFR BLD AUTO: 36.7 % (ref 35–47)
HGB BLD-MCNC: 11.7 G/DL (ref 11.5–16)
HGB UR QL STRIP: ABNORMAL
IMM GRANULOCYTES # BLD AUTO: 0 K/UL (ref 0–0.04)
IMM GRANULOCYTES NFR BLD AUTO: 0 % (ref 0–0.5)
KETONES UR QL STRIP.AUTO: NEGATIVE MG/DL
LEUKOCYTE ESTERASE UR QL STRIP.AUTO: ABNORMAL
LYMPHOCYTES # BLD: 2 K/UL (ref 0.8–3.5)
LYMPHOCYTES NFR BLD: 33 % (ref 12–49)
MCH RBC QN AUTO: 29.1 PG (ref 26–34)
MCHC RBC AUTO-ENTMCNC: 31.9 G/DL (ref 30–36.5)
MCV RBC AUTO: 91.3 FL (ref 80–99)
MONOCYTES # BLD: 0.6 K/UL (ref 0–1)
MONOCYTES NFR BLD: 10 % (ref 5–13)
NEUTS SEG # BLD: 3.4 K/UL (ref 1.8–8)
NEUTS SEG NFR BLD: 55 % (ref 32–75)
NITRITE UR QL STRIP.AUTO: NEGATIVE
NRBC # BLD: 0 K/UL (ref 0–0.01)
NRBC BLD-RTO: 0 PER 100 WBC
PH UR STRIP: 5.5 [PH] (ref 5–8)
PLATELET # BLD AUTO: 238 K/UL (ref 150–400)
PMV BLD AUTO: 10.8 FL (ref 8.9–12.9)
POTASSIUM SERPL-SCNC: 3.4 MMOL/L (ref 3.5–5.1)
PROT SERPL-MCNC: 7.2 G/DL (ref 6.4–8.2)
PROT UR STRIP-MCNC: NEGATIVE MG/DL
RBC # BLD AUTO: 4.02 M/UL (ref 3.8–5.2)
RBC #/AREA URNS HPF: ABNORMAL /HPF (ref 0–5)
SAMPLES BEING HELD,HOLD: NORMAL
SODIUM SERPL-SCNC: 139 MMOL/L (ref 136–145)
SP GR UR REFRACTOMETRY: 1.02 (ref 1–1.03)
UROBILINOGEN UR QL STRIP.AUTO: 0.2 EU/DL (ref 0.2–1)
WBC # BLD AUTO: 6.1 K/UL (ref 3.6–11)
WBC URNS QL MICRO: ABNORMAL /HPF (ref 0–4)

## 2020-04-22 PROCEDURE — 87086 URINE CULTURE/COLONY COUNT: CPT

## 2020-04-22 PROCEDURE — 74011250637 HC RX REV CODE- 250/637: Performed by: STUDENT IN AN ORGANIZED HEALTH CARE EDUCATION/TRAINING PROGRAM

## 2020-04-22 PROCEDURE — 85025 COMPLETE CBC W/AUTO DIFF WBC: CPT

## 2020-04-22 PROCEDURE — 80053 COMPREHEN METABOLIC PANEL: CPT

## 2020-04-22 PROCEDURE — 99285 EMERGENCY DEPT VISIT HI MDM: CPT

## 2020-04-22 PROCEDURE — 81001 URINALYSIS AUTO W/SCOPE: CPT

## 2020-04-22 PROCEDURE — 74176 CT ABD & PELVIS W/O CONTRAST: CPT

## 2020-04-22 PROCEDURE — 36415 COLL VENOUS BLD VENIPUNCTURE: CPT

## 2020-04-22 RX ORDER — OXYBUTYNIN CHLORIDE 10 MG/1
10 TABLET, EXTENDED RELEASE ORAL DAILY
Qty: 7 TAB | Refills: 0 | Status: SHIPPED | OUTPATIENT
Start: 2020-04-22 | End: 2020-04-29

## 2020-04-22 RX ORDER — OXYCODONE HYDROCHLORIDE 5 MG/1
10 TABLET ORAL
Status: COMPLETED | OUTPATIENT
Start: 2020-04-22 | End: 2020-04-22

## 2020-04-22 RX ORDER — FLUCONAZOLE 150 MG/1
150 TABLET ORAL
Qty: 3 TAB | Refills: 0 | Status: SHIPPED | OUTPATIENT
Start: 2020-04-22 | End: 2020-04-29

## 2020-04-22 RX ORDER — OXYCODONE AND ACETAMINOPHEN 5; 325 MG/1; MG/1
1 TABLET ORAL
Qty: 12 TAB | Refills: 0 | Status: SHIPPED | OUTPATIENT
Start: 2020-04-22 | End: 2020-04-25

## 2020-04-22 RX ORDER — OXYCODONE AND ACETAMINOPHEN 5; 325 MG/1; MG/1
2 TABLET ORAL ONCE
Status: DISCONTINUED | OUTPATIENT
Start: 2020-04-22 | End: 2020-04-22 | Stop reason: SDUPTHER

## 2020-04-22 RX ADMIN — OXYCODONE 10 MG: 5 TABLET ORAL at 18:34

## 2020-04-22 NOTE — DISCHARGE INSTRUCTIONS
The fungus in the urine is likely from the vulvovaginal area. Fluconazole is effective treatment for this condition. Please follow-up with your primary care physician in regards to your urinary symptoms. You may need to see a uro-gynecologist for further evaluation.

## 2020-04-22 NOTE — ED NOTES
Pt says she cant have the percocet because it has tylenol and she 2 extra strength before coming to the ER around 1635. Pt states she took tylenol for a fever of 102. 3. Pt specifically request Dilaudid. Pt states, \"yeah when I usually come into the hospital they give me Dilaudid for my pain and then if it isn't working they give me pills. This is really just too much tylenol for me. \" Provider notified.
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
## 2020-04-22 NOTE — ED TRIAGE NOTES
Pt was sent by Dr. Farooq Estrada for IV antibiotics. Pt was told she had + blood cultures Candida Tropicalis. Pt reports hx of cellulitis. Pt also reports extreme pelvic pain and back pain.
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
## 2020-04-23 ENCOUNTER — PATIENT OUTREACH (OUTPATIENT)
Dept: CASE MANAGEMENT | Age: 50
End: 2020-04-23

## 2020-04-23 NOTE — PROGRESS NOTES
Pt reports that this is the third call today and that she has been offered [employer] [facility] and states that she is a little concerned about all of these calls. Pt states that she does not wish to go through more questions or education. Pt states that I may send her a [employer] message with my contact information.

## 2020-04-24 LAB
BACTERIA SPEC CULT: NORMAL
CC UR VC: NORMAL
SERVICE CMNT-IMP: NORMAL

## 2020-05-01 ENCOUNTER — PATIENT OUTREACH (OUTPATIENT)
Dept: CASE MANAGEMENT | Age: 50
End: 2020-05-01

## 2020-05-01 NOTE — PROGRESS NOTES
Called pt to follow up on ED visit to [facility] on 4/22/2020. LVM stating my name, CTN calling on behalf of [facility], date and time of call and my return telephone number.   Second call to

## 2020-05-06 ENCOUNTER — PATIENT OUTREACH (OUTPATIENT)
Dept: CASE MANAGEMENT | Age: 50
End: 2020-05-06

## 2020-05-06 NOTE — PROGRESS NOTES
Patient resolved from Transition of Care episode on 5/6/2020. ACM/CTN was unsuccessful at contacting this patient today. Patient/family was provided the following resources and education related to COVID-19 during the initial call:                         Signs, symptoms and red flags related to COVID-19            CDC exposure and quarantine guidelines            [facility] exposure contact - [phone]            Contact for their local Department of Health                 Patient has not had any additional ED or hospital visits. No further outreach scheduled with this CTN/ACM. Episode of Care resolved. Patient has this CTN/ACM contact information if future needs arise.

## 2020-09-22 ENCOUNTER — OFFICE VISIT (OUTPATIENT)
Dept: SURGERY | Age: 50
End: 2020-09-22
Payer: COMMERCIAL

## 2020-09-22 VITALS
OXYGEN SATURATION: 95 % | SYSTOLIC BLOOD PRESSURE: 128 MMHG | BODY MASS INDEX: 45.99 KG/M2 | RESPIRATION RATE: 18 BRPM | HEIGHT: 67 IN | TEMPERATURE: 97.8 F | WEIGHT: 293 LBS | DIASTOLIC BLOOD PRESSURE: 86 MMHG | HEART RATE: 115 BPM

## 2020-09-22 DIAGNOSIS — E66.01 MORBID OBESITY WITH BMI OF 50.0-59.9, ADULT (HCC): Primary | ICD-10-CM

## 2020-09-22 DIAGNOSIS — K43.2 RECURRENT INCISIONAL HERNIA: ICD-10-CM

## 2020-09-22 DIAGNOSIS — K51.919 ULCERATIVE COLITIS WITH COMPLICATION, UNSPECIFIED LOCATION (HCC): ICD-10-CM

## 2020-09-22 DIAGNOSIS — R11.2 NAUSEA AND VOMITING, INTRACTABILITY OF VOMITING NOT SPECIFIED, UNSPECIFIED VOMITING TYPE: ICD-10-CM

## 2020-09-22 DIAGNOSIS — I89.0 LYMPHEDEMA: ICD-10-CM

## 2020-09-22 PROCEDURE — 99205 OFFICE O/P NEW HI 60 MIN: CPT | Performed by: SURGERY

## 2020-09-22 NOTE — PROGRESS NOTES
1. Have you been to the ER, urgent care clinic since your last visit? Hospitalized since your last visit? No    2. Have you seen or consulted any other health care providers outside of the [address] [place] [address] since your last visit? Include any pap smears or colon screening.  No
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
## 2020-09-22 NOTE — LETTER
9/22/20 Patient: Sajan Holguin YOB: 1970 Date of Visit: 9/22/2020 Vaishali Dangelo MD 
[address] [facility] [address] VIA Facsimile: [phone] Dear Vaishali Dangelo MD, Thank you for referring Ms. Harpal Ramirez to [facility] for evaluation. My notes for this consultation are attached. If you have questions, please do not hesitate to call me. I look forward to following your patient along with you. Sincerely, Garrison Prasad MD

## 2020-10-01 ENCOUNTER — TRANSCRIBE ORDER (OUTPATIENT)
Dept: SCHEDULING | Age: 50
End: 2020-10-01

## 2020-10-01 DIAGNOSIS — N60.11 FIBROCYSTIC DISEASE OF RIGHT BREAST: ICD-10-CM

## 2020-10-01 DIAGNOSIS — N60.12 FIBROCYSTIC DISEASE OF LEFT BREAST: ICD-10-CM

## 2020-10-01 DIAGNOSIS — M81.0 OSTEOPOROSIS: Primary | ICD-10-CM

## 2020-10-03 VITALS
BODY MASS INDEX: 45.99 KG/M2 | HEIGHT: 67 IN | WEIGHT: 293 LBS | RESPIRATION RATE: 19 BRPM | DIASTOLIC BLOOD PRESSURE: 73 MMHG | OXYGEN SATURATION: 98 % | TEMPERATURE: 98.5 F | HEART RATE: 105 BPM | SYSTOLIC BLOOD PRESSURE: 114 MMHG

## 2020-10-03 PROCEDURE — 99283 EMERGENCY DEPT VISIT LOW MDM: CPT

## 2020-10-04 ENCOUNTER — HOSPITAL ENCOUNTER (OUTPATIENT)
Dept: GENERAL RADIOLOGY | Age: 50
Discharge: HOME | End: 2020-10-04
Attending: EMERGENCY MEDICINE

## 2020-10-04 ENCOUNTER — HOSPITAL ENCOUNTER (EMERGENCY)
Age: 50
Discharge: HOME | End: 2020-10-04
Attending: EMERGENCY MEDICINE | Admitting: EMERGENCY MEDICINE
Payer: COMMERCIAL

## 2020-10-04 ENCOUNTER — APPOINTMENT (OUTPATIENT)
Dept: GENERAL RADIOLOGY | Age: 50
End: 2020-10-04
Attending: EMERGENCY MEDICINE
Payer: COMMERCIAL

## 2020-10-04 DIAGNOSIS — M25.562 CHRONIC PAIN OF LEFT KNEE: ICD-10-CM

## 2020-10-04 DIAGNOSIS — G89.29 CHRONIC PAIN OF LEFT KNEE: ICD-10-CM

## 2020-10-04 DIAGNOSIS — I89.0 LYMPHEDEMA: Primary | ICD-10-CM

## 2020-10-04 LAB
ALBUMIN SERPL-MCNC: 2.9 G/DL (ref 3.5–5)
ALBUMIN/GLOB SERPL: 0.7 {RATIO} (ref 1.1–2.2)
ALP SERPL-CCNC: 123 U/L (ref 45–117)
ALT SERPL-CCNC: 29 U/L (ref 12–78)
ANION GAP SERPL CALC-SCNC: 5 MMOL/L (ref 5–15)
AST SERPL-CCNC: 24 U/L (ref 15–37)
BASOPHILS # BLD: 0 K/UL (ref 0–0.1)
BASOPHILS NFR BLD: 0 % (ref 0–1)
BILIRUB SERPL-MCNC: 0.3 MG/DL (ref 0.2–1)
BNP SERPL-MCNC: 49 PG/ML
BUN SERPL-MCNC: 14 MG/DL (ref 6–20)
BUN/CREAT SERPL: 13 (ref 12–20)
CALCIUM SERPL-MCNC: 8.6 MG/DL (ref 8.5–10.1)
CHLORIDE SERPL-SCNC: 109 MMOL/L (ref 97–108)
CO2 SERPL-SCNC: 25 MMOL/L (ref 21–32)
COMMENT, HOLDF: NORMAL
CREAT SERPL-MCNC: 1.08 MG/DL (ref 0.55–1.02)
DIFFERENTIAL METHOD BLD: ABNORMAL
EOSINOPHIL # BLD: 0.2 K/UL (ref 0–0.4)
EOSINOPHIL NFR BLD: 2 % (ref 0–7)
ERYTHROCYTE [DISTWIDTH] IN BLOOD BY AUTOMATED COUNT: 14.6 % (ref 11.5–14.5)
GLOBULIN SER CALC-MCNC: 4 G/DL (ref 2–4)
GLUCOSE SERPL-MCNC: 90 MG/DL (ref 65–100)
HCT VFR BLD AUTO: 34.1 % (ref 35–47)
HGB BLD-MCNC: 10.7 G/DL (ref 11.5–16)
IMM GRANULOCYTES # BLD AUTO: 0 K/UL (ref 0–0.04)
IMM GRANULOCYTES NFR BLD AUTO: 0 % (ref 0–0.5)
LYMPHOCYTES # BLD: 2.1 K/UL (ref 0.8–3.5)
LYMPHOCYTES NFR BLD: 28 % (ref 12–49)
MCH RBC QN AUTO: 29.4 PG (ref 26–34)
MCHC RBC AUTO-ENTMCNC: 31.4 G/DL (ref 30–36.5)
MCV RBC AUTO: 93.7 FL (ref 80–99)
MONOCYTES # BLD: 0.6 K/UL (ref 0–1)
MONOCYTES NFR BLD: 7 % (ref 5–13)
NEUTS SEG # BLD: 4.8 K/UL (ref 1.8–8)
NEUTS SEG NFR BLD: 63 % (ref 32–75)
NRBC # BLD: 0 K/UL (ref 0–0.01)
NRBC BLD-RTO: 0 PER 100 WBC
PLATELET # BLD AUTO: 216 K/UL (ref 150–400)
PMV BLD AUTO: 11.1 FL (ref 8.9–12.9)
POTASSIUM SERPL-SCNC: 3.6 MMOL/L (ref 3.5–5.1)
PROT SERPL-MCNC: 6.9 G/DL (ref 6.4–8.2)
RBC # BLD AUTO: 3.64 M/UL (ref 3.8–5.2)
SAMPLES BEING HELD,HOLD: NORMAL
SODIUM SERPL-SCNC: 139 MMOL/L (ref 136–145)
WBC # BLD AUTO: 7.6 K/UL (ref 3.6–11)

## 2020-10-04 PROCEDURE — 96374 THER/PROPH/DIAG INJ IV PUSH: CPT

## 2020-10-04 PROCEDURE — 83880 ASSAY OF NATRIURETIC PEPTIDE: CPT

## 2020-10-04 PROCEDURE — 74011250636 HC RX REV CODE- 250/636: Performed by: EMERGENCY MEDICINE

## 2020-10-04 PROCEDURE — 85025 COMPLETE CBC W/AUTO DIFF WBC: CPT

## 2020-10-04 PROCEDURE — 96375 TX/PRO/DX INJ NEW DRUG ADDON: CPT

## 2020-10-04 PROCEDURE — 73562 X-RAY EXAM OF KNEE 3: CPT

## 2020-10-04 PROCEDURE — 36600 WITHDRAWAL OF ARTERIAL BLOOD: CPT

## 2020-10-04 PROCEDURE — 80053 COMPREHEN METABOLIC PANEL: CPT

## 2020-10-04 PROCEDURE — 71045 X-RAY EXAM CHEST 1 VIEW: CPT

## 2020-10-04 RX ORDER — MELOXICAM 15 MG/1
15 TABLET ORAL DAILY
Qty: 30 TAB | Refills: 0 | Status: ON HOLD | OUTPATIENT
Start: 2020-10-04 | End: 2021-07-20

## 2020-10-04 RX ORDER — ACETAMINOPHEN 500 MG
1000 TABLET ORAL ONCE
Status: DISCONTINUED | OUTPATIENT
Start: 2020-10-04 | End: 2020-10-04 | Stop reason: HOSPADM

## 2020-10-04 RX ORDER — FENTANYL CITRATE 50 UG/ML
50 INJECTION, SOLUTION INTRAMUSCULAR; INTRAVENOUS ONCE
Status: COMPLETED | OUTPATIENT
Start: 2020-10-04 | End: 2020-10-04

## 2020-10-04 RX ORDER — DICLOFENAC SODIUM 10 MG/G
4 GEL TOPICAL 4 TIMES DAILY
Qty: 100 G | Refills: 0 | Status: SHIPPED | OUTPATIENT
Start: 2020-10-04 | End: 2020-10-04 | Stop reason: SDUPTHER

## 2020-10-04 RX ORDER — DICLOFENAC SODIUM 10 MG/G
4 GEL TOPICAL 4 TIMES DAILY
Qty: 100 G | Refills: 0 | Status: ON HOLD | OUTPATIENT
Start: 2020-10-04 | End: 2021-07-20

## 2020-10-04 RX ORDER — LIDOCAINE 50 MG/G
PATCH TOPICAL
Qty: 30 EACH | Refills: 0 | Status: ON HOLD | OUTPATIENT
Start: 2020-10-04 | End: 2021-07-20

## 2020-10-04 RX ORDER — HYDROMORPHONE HYDROCHLORIDE 1 MG/ML
0.5 INJECTION, SOLUTION INTRAMUSCULAR; INTRAVENOUS; SUBCUTANEOUS ONCE
Status: COMPLETED | OUTPATIENT
Start: 2020-10-04 | End: 2020-10-04

## 2020-10-04 RX ORDER — BUMETANIDE 1 MG/1
1 TABLET ORAL 2 TIMES DAILY
Qty: 60 TAB | Refills: 0 | Status: ON HOLD | OUTPATIENT
Start: 2020-10-04 | End: 2021-07-20

## 2020-10-04 RX ADMIN — FENTANYL CITRATE 50 MCG: 50 INJECTION, SOLUTION INTRAMUSCULAR; INTRAVENOUS at 03:45

## 2020-10-04 RX ADMIN — HYDROMORPHONE HYDROCHLORIDE 0.5 MG: 1 INJECTION, SOLUTION INTRAMUSCULAR; INTRAVENOUS; SUBCUTANEOUS at 06:31

## 2020-10-04 NOTE — ED TRIAGE NOTES
Patient with hx of lymphedema co falling on left knee 3x in 9 days. States she thinks she may have pulled calf muscle in right calf, no redness, but swelling is noted in calf. Swelling noted in left knee no redness or bruising noted.  Patient reports anxiety that is causing SOB

## 2020-10-04 NOTE — ED NOTES
Bedside and Verbal shift change report received from [place]. Report included the following information SBAR, Kardex, ED Summary, Intake/Output, MAR and Recent Results.
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
## 2020-10-09 ENCOUNTER — HOSPITAL ENCOUNTER (OUTPATIENT)
Dept: MAMMOGRAPHY | Age: 50
Discharge: HOME | End: 2020-10-09
Attending: FAMILY MEDICINE
Payer: COMMERCIAL

## 2020-10-09 DIAGNOSIS — N60.12 FIBROCYSTIC DISEASE OF LEFT BREAST: ICD-10-CM

## 2020-10-09 DIAGNOSIS — N60.11 FIBROCYSTIC DISEASE OF RIGHT BREAST: ICD-10-CM

## 2020-10-09 DIAGNOSIS — M81.0 OSTEOPOROSIS: ICD-10-CM

## 2020-10-09 PROCEDURE — 76642 ULTRASOUND BREAST LIMITED: CPT

## 2020-10-09 PROCEDURE — 77062 BREAST TOMOSYNTHESIS BI: CPT

## 2020-10-09 PROCEDURE — 77080 DXA BONE DENSITY AXIAL: CPT

## 2021-03-26 ENCOUNTER — HOSPITAL ENCOUNTER (EMERGENCY)
Age: 51
Discharge: HOME | End: 2021-03-27
Attending: EMERGENCY MEDICINE
Payer: COMMERCIAL

## 2021-03-26 ENCOUNTER — APPOINTMENT (OUTPATIENT)
Dept: GENERAL RADIOLOGY | Age: 51
End: 2021-03-26
Attending: EMERGENCY MEDICINE
Payer: COMMERCIAL
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
## 2021-03-26 DIAGNOSIS — S80.00XA CONTUSION OF KNEE, UNSPECIFIED LATERALITY, INITIAL ENCOUNTER: ICD-10-CM

## 2021-03-26 DIAGNOSIS — S50.02XA CONTUSION OF LEFT ELBOW, INITIAL ENCOUNTER: ICD-10-CM

## 2021-03-26 PROCEDURE — 73562 X-RAY EXAM OF KNEE 3: CPT

## 2021-03-26 PROCEDURE — 99282 EMERGENCY DEPT VISIT SF MDM: CPT

## 2021-03-26 PROCEDURE — 73080 X-RAY EXAM OF ELBOW: CPT

## 2021-03-26 RX ORDER — CYCLOBENZAPRINE HCL 10 MG
10 TABLET ORAL
Qty: 12 TAB | Refills: 0 | Status: ON HOLD | OUTPATIENT
Start: 2021-03-26 | End: 2021-07-20

## 2021-03-27 VITALS
RESPIRATION RATE: 18 BRPM | TEMPERATURE: 97.9 F | OXYGEN SATURATION: 96 % | DIASTOLIC BLOOD PRESSURE: 95 MMHG | SYSTOLIC BLOOD PRESSURE: 152 MMHG | HEART RATE: 98 BPM

## 2021-03-27 NOTE — ED PROVIDER NOTES
History of obesity, anxiety, bowel obstruction, gallstones, GERD, kidney stones, peptic ulcer disease. She presents after sustaining a fall earlier this evening. She states that she tripped over some tubing that she uses for her chronic lymphedema. She states that she landed first on her knees and then on her elbows. She complains of bilateral knee and bilateral elbow pain. The pain is moderate and worse with movement. No treatment prior to arrival.  No other injuries or complaints. 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  PUD (peptic ulcer disease) 2016    stomach and colon ulcers?     Seizures ([address]     me dside effect? last sz 2013       Past Surgical History:   Procedure Laterality Date    ABDOMEN SURGERY PROC UNLISTED  4/2010    ventral hernia repair with biologic mesh    COLONOSCOPY N/A 3/24/2017    COLONOSCOPY performed by Noé Prieto MD at [facility] HX APPENDECTOMY  2009    HX BREAST BIOPSY Right 2007    negative pathology    HX BREAST BIOPSY Left 1999    negative pathology    HX ENDOSCOPY  2016    HX HERNIA REPAIR  9/2011    laparoscopic incisional hernia repair    HX HERNIA REPAIR  8946    umbilical hernia repair    HX HYSTERECTOMY  2009    partial hysterectomy (right ovary removed)    HX OOPHORECTOMY  2009    removed post op hematoma and right oopherectomy    HX OOPHORECTOMY  3/2016    mass removed and left oopherectomy    HX OTHER SURGICAL  2009    ileocectomy, bowel resection,     [place] COLONOSCOPY FLX DX W/COLLJ SPEC WHEN PFRMD  1/14/2011         [facility]AST [facility] Family History:   Problem Relation Age of Onset    Cancer Maternal Grandmother         colon ca    Breast Cancer Maternal Grandmother     Breast Cancer Paternal Grandmother     Breast Cancer Cousin         multiple; both sides       Social History     Socioeconomic History    Marital status:      Spouse name: Not on file    Number of children: Not on file    Years of education: Not on file    Highest education level: Not on file   Occupational History    Not on file   Social Needs    Financial resource strain: Not on file    Food insecurity     Worry: Not on file     Inability: Not on file    Transportation needs     Medical: Not on file     Non-medical: Not on file   Tobacco Use    Smoking status: Never Smoker    Smokeless tobacco: Never Used   Substance and Sexual Activity    Alcohol use: Yes     Alcohol/week: 1.0 standard drinks     Types: 1 Glasses of wine per week     Comment: 1 glass every 2 weeks    Drug use: No    Sexual activity: Not on file   Lifestyle    Physical activity     Days per week: Not on file     Minutes per session: Not on file    Stress: Not on file   Relationships    Social connections     Talks on phone: Not on file     Gets together: Not on file     Attends [employer]an service: Not on file     Active member of club or organization: Not on file     Attends meetings of clubs or organizations: Not on file     Relationship status: Not on file    Intimate partner violence     Fear of current or ex partner: Not on file     Emotionally abused: Not on file     Physically abused: Not on file     Forced sexual activity: Not on file   Other Topics Concern    Not on file   Social History Narrative    Not on file         ALLERGIES: Sulfa (sulfonamide antibiotics), Toradol [ketorolac tromethamine], Compazine [prochlorperazine], Hydrocodone-acetaminophen, and Morphine    Review of Systems   All other systems reviewed and are negative.       Vitals:    03/26/21 9694   BP: (!) 156/98   Pulse: (!) 106   Resp: 20   Temp: 97.9 °F (36.6 °C)   SpO2: 96%            Physical Exam  Vitals signs and nursing note reviewed. Constitutional:       Appearance: She is well-developed. Comments: Obese. HENT:      Head: Normocephalic and atraumatic. Eyes:      Conjunctiva/sclera: Conjunctivae normal.   Neck:      Musculoskeletal: Neck supple. Trachea: No tracheal deviation. Cardiovascular:      Rate and Rhythm: Normal rate and regular rhythm. Heart sounds: Normal heart sounds. No murmur. No friction rub. No gallop. Pulmonary:      Effort: Pulmonary effort is normal.      Breath sounds: Normal breath sounds. Abdominal:      Palpations: Abdomen is soft. Tenderness: There is no abdominal tenderness. Musculoskeletal:         General: No deformity. Comments: Bilateral elbow and bilateral knee tenderness. Full range of motion. Neurovascular intact. Skin:     General: Skin is warm and dry. Neurological:      Mental Status: She is alert. Comments: oriented          MDM       Procedures    Progress Note:  Results, treatment, and follow up plan have been discussed with patient/mom. Questions were answered. Jasiel Jennings MD  11:59 PM    Assessment/plan: Fall with bilateral knee and left elbow pain -suspect contusion; reassuring appearance/exam with stable vital signs. Left elbow and bilateral knee films negative for acute fracture. Home with recommendations of rest, ice, elevation. She requests a prescription for Flexeril which was sent to the pharmacy. PCP follow-up. Return precautions discussed.   Jasiel Jennings MD  11:57 PM

## 2021-03-27 NOTE — ED TRIAGE NOTES
Pt reports tripping over her lymphadema equipment and injured bilateral elbows and knees. Pt reports increased pain in left elbow and left knee.

## 2021-03-27 NOTE — ED NOTES
Patient presented to Ed c/o bilateral knee and elbow pain and left hip pain after falling on left side at 80. Dr Keiko Muller at bedside.

## 2021-05-18 NOTE — ED NOTES
7:32 PM  I have evaluated the patient as the Provider in Triage. I have reviewed Her vital signs and the triage nurse assessment. I have talked with the patient and any available family and advised that I am the provider in triage and have ordered the appropriate study to initiate their work up based on the clinical presentation during my assessment. I have advised that the patient will be accommodated in the Main ED as soon as possible. I have also requested to contact the triage nurse or myself immediately if the patient experiences any changes in their condition during this brief waiting period. Jovany Colon MD    6 kidney stones in past 2 years. Now pain R flank. Tinge of blood in urine. Classic for previous kidney stones. Called patient to ask patient if he needs a new referral or authorization through his insurance company  States that he needs a new referral for autonomic testing  Said his first referral was for office visits  Reviewed authorization on file  Auth  [id number]  Submitted another out of network request to [facility]

## 2021-05-19 NOTE — PROGRESS NOTES
[facility]. [address], [address]  [phone]              GI PROGRESS NOTE    NAME: Lory Ly   :  1970   MRN:  [id number]     CC:abdominal pain    Subjective:   Does not voice c/o pain, nausea, or vomiting. Reviewed CT results per patient request. For MRI this afternoon. Objective:     VITALS:   Last 24hrs VS reviewed since prior progress note. Most recent are:  Visit Vitals    /56 (BP 1 Location: Left arm, BP Patient Position: At rest)    Pulse 82    Temp 98.2 °F (36.8 °C)    Resp 16    Ht 5' 6\" (1.676 m)    Wt 97.5 kg (215 lb)    SpO2 96%    BMI 34.7 kg/m2       Intake & Output      1901 -  0700  In: 1248 [P.O.:240; I.V.:2255]  Out: -     ROS: Neg for fever, chest pain, SOB. PHYSICAL EXAM:  General: Cooperative, no acute distress    Neurologic:  Alert and oriented X 3. HEENT: PERRLA, EOMI. Lungs:  CTA Bilaterally. No Wheezing  Heart:  s1 s2, Regular  rhythm,  No murmur   Abdomen: Soft, Non distended, Non tender. Bowel sounds normal. No guarding or rebound. No hepatosplenomegaly. Extremities: No edema  Psych:   Good insight. Not anxious nor agitated. Lab Data Reviewed:   None today      ________________________________________________________________________       Assessment/Plan:   Abdominal pain- reviewed negative colon bx with patient. For MRE today. Tolerating diet this am ( pancakes).    PUD- on Pepcid  Nausea/vomiting- resolved      Signed By: Johnna Cooks, NP     3/28/2017  10:20 AM MED

## 2021-06-05 ENCOUNTER — APPOINTMENT (OUTPATIENT)
Dept: CT IMAGING | Age: 51
End: 2021-06-05
Attending: EMERGENCY MEDICINE
Payer: COMMERCIAL

## 2021-06-05 ENCOUNTER — HOSPITAL ENCOUNTER (EMERGENCY)
Age: 51
Discharge: HOME | End: 2021-06-05
Attending: EMERGENCY MEDICINE
Payer: COMMERCIAL

## 2021-06-05 VITALS
TEMPERATURE: 98.8 F | HEIGHT: 66 IN | OXYGEN SATURATION: 96 % | RESPIRATION RATE: 18 BRPM | HEART RATE: 99 BPM | WEIGHT: 293 LBS | SYSTOLIC BLOOD PRESSURE: 108 MMHG | DIASTOLIC BLOOD PRESSURE: 54 MMHG | BODY MASS INDEX: 47.09 KG/M2

## 2021-06-05 DIAGNOSIS — K59.00 CONSTIPATION, UNSPECIFIED CONSTIPATION TYPE: ICD-10-CM

## 2021-06-05 DIAGNOSIS — R10.84 ABDOMINAL PAIN, GENERALIZED: Primary | ICD-10-CM

## 2021-06-05 LAB
ALBUMIN SERPL-MCNC: 3.3 G/DL (ref 3.5–5)
ALBUMIN/GLOB SERPL: 0.8 {RATIO} (ref 1.1–2.2)
ALP SERPL-CCNC: 106 U/L (ref 45–117)
ALT SERPL-CCNC: 42 U/L (ref 12–78)
ANION GAP SERPL CALC-SCNC: 3 MMOL/L (ref 5–15)
AST SERPL-CCNC: 29 U/L (ref 15–37)
BASOPHILS # BLD: 0 K/UL (ref 0–0.1)
BASOPHILS NFR BLD: 0 % (ref 0–1)
BILIRUB SERPL-MCNC: 0.3 MG/DL (ref 0.2–1)
BUN SERPL-MCNC: 17 MG/DL (ref 6–20)
BUN/CREAT SERPL: 16 (ref 12–20)
CALCIUM SERPL-MCNC: 9 MG/DL (ref 8.5–10.1)
CHLORIDE SERPL-SCNC: 106 MMOL/L (ref 97–108)
CO2 SERPL-SCNC: 29 MMOL/L (ref 21–32)
COMMENT, HOLDF: NORMAL
CREAT SERPL-MCNC: 1.04 MG/DL (ref 0.55–1.02)
DIFFERENTIAL METHOD BLD: NORMAL
EOSINOPHIL # BLD: 0.2 K/UL (ref 0–0.4)
EOSINOPHIL NFR BLD: 3 % (ref 0–7)
ERYTHROCYTE [DISTWIDTH] IN BLOOD BY AUTOMATED COUNT: 13.2 % (ref 11.5–14.5)
GLOBULIN SER CALC-MCNC: 3.9 G/DL (ref 2–4)
GLUCOSE SERPL-MCNC: 94 MG/DL (ref 65–100)
HCT VFR BLD AUTO: 36.4 % (ref 35–47)
HGB BLD-MCNC: 11.7 G/DL (ref 11.5–16)
IMM GRANULOCYTES # BLD AUTO: 0 K/UL (ref 0–0.04)
IMM GRANULOCYTES NFR BLD AUTO: 0 % (ref 0–0.5)
LIPASE SERPL-CCNC: 102 U/L (ref 73–393)
LYMPHOCYTES # BLD: 2.1 K/UL (ref 0.8–3.5)
LYMPHOCYTES NFR BLD: 37 % (ref 12–49)
MCH RBC QN AUTO: 30.5 PG (ref 26–34)
MCHC RBC AUTO-ENTMCNC: 32.1 G/DL (ref 30–36.5)
MCV RBC AUTO: 95 FL (ref 80–99)
MONOCYTES # BLD: 0.5 K/UL (ref 0–1)
MONOCYTES NFR BLD: 9 % (ref 5–13)
NEUTS SEG # BLD: 2.9 K/UL (ref 1.8–8)
NEUTS SEG NFR BLD: 51 % (ref 32–75)
NRBC # BLD: 0 K/UL (ref 0–0.01)
NRBC BLD-RTO: 0 PER 100 WBC
PLATELET # BLD AUTO: 215 K/UL (ref 150–400)
PMV BLD AUTO: 11 FL (ref 8.9–12.9)
POTASSIUM SERPL-SCNC: 3.7 MMOL/L (ref 3.5–5.1)
PROT SERPL-MCNC: 7.2 G/DL (ref 6.4–8.2)
RBC # BLD AUTO: 3.83 M/UL (ref 3.8–5.2)
SAMPLES BEING HELD,HOLD: NORMAL
SODIUM SERPL-SCNC: 138 MMOL/L (ref 136–145)
WBC # BLD AUTO: 5.8 K/UL (ref 3.6–11)

## 2021-06-05 PROCEDURE — 96376 TX/PRO/DX INJ SAME DRUG ADON: CPT

## 2021-06-05 PROCEDURE — 85025 COMPLETE CBC W/AUTO DIFF WBC: CPT

## 2021-06-05 PROCEDURE — 96375 TX/PRO/DX INJ NEW DRUG ADDON: CPT

## 2021-06-05 PROCEDURE — 80053 COMPREHEN METABOLIC PANEL: CPT

## 2021-06-05 PROCEDURE — 74011250636 HC RX REV CODE- 250/636: Performed by: EMERGENCY MEDICINE

## 2021-06-05 PROCEDURE — 83690 ASSAY OF LIPASE: CPT

## 2021-06-05 PROCEDURE — 74177 CT ABD & PELVIS W/CONTRAST: CPT

## 2021-06-05 PROCEDURE — 36415 COLL VENOUS BLD VENIPUNCTURE: CPT

## 2021-06-05 PROCEDURE — 99282 EMERGENCY DEPT VISIT SF MDM: CPT

## 2021-06-05 PROCEDURE — 96365 THER/PROPH/DIAG IV INF INIT: CPT

## 2021-06-05 PROCEDURE — 74011000636 HC RX REV CODE- 636: Performed by: RADIOLOGY

## 2021-06-05 RX ORDER — HYDROMORPHONE HYDROCHLORIDE 1 MG/ML
1 INJECTION, SOLUTION INTRAMUSCULAR; INTRAVENOUS; SUBCUTANEOUS ONCE
Status: COMPLETED | OUTPATIENT
Start: 2021-06-05 | End: 2021-06-05

## 2021-06-05 RX ORDER — ONDANSETRON 2 MG/ML
4 INJECTION INTRAMUSCULAR; INTRAVENOUS
Status: DISCONTINUED | OUTPATIENT
Start: 2021-06-05 | End: 2021-06-05

## 2021-06-05 RX ORDER — SODIUM CHLORIDE 0.9 % (FLUSH) 0.9 %
5-40 SYRINGE (ML) INJECTION EVERY 8 HOURS
Status: DISCONTINUED | OUTPATIENT
Start: 2021-06-05 | End: 2021-06-05 | Stop reason: HOSPADM

## 2021-06-05 RX ORDER — SODIUM CHLORIDE 0.9 % (FLUSH) 0.9 %
5-40 SYRINGE (ML) INJECTION AS NEEDED
Status: DISCONTINUED | OUTPATIENT
Start: 2021-06-05 | End: 2021-06-05 | Stop reason: HOSPADM

## 2021-06-05 RX ADMIN — IOPAMIDOL 100 ML: 755 INJECTION, SOLUTION INTRAVENOUS at 06:04

## 2021-06-05 RX ADMIN — HYDROMORPHONE HYDROCHLORIDE 1 MG: 1 INJECTION, SOLUTION INTRAMUSCULAR; INTRAVENOUS; SUBCUTANEOUS at 04:45

## 2021-06-05 RX ADMIN — SODIUM CHLORIDE 1000 ML: 9 INJECTION, SOLUTION INTRAVENOUS at 04:28

## 2021-06-05 RX ADMIN — HYDROMORPHONE HYDROCHLORIDE 1 MG: 1 INJECTION, SOLUTION INTRAMUSCULAR; INTRAVENOUS; SUBCUTANEOUS at 06:30

## 2021-06-05 RX ADMIN — PROMETHAZINE HYDROCHLORIDE 12.5 MG: 25 INJECTION INTRAMUSCULAR; INTRAVENOUS at 04:45

## 2021-06-05 NOTE — ED PROVIDER NOTES
History of obesity, bowel obstructions, anxiety, GERD, hernia repair, kidney stones, peptic ulcer disease. She presents with complaints of generalized abdominal pain that began 2 nights ago. It has been constant but waxes and wanes in intensity. She reports nausea and vomiting that began earlier this evening. She states that she has vomited 6 times. Her last bowel movement was 4 days ago. She has seen her PCP this week. She states that her symptoms feel similar to when she has had bowel obstructions in the past.           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  PUD (peptic ulcer disease) 2016    stomach and colon ulcers?     Seizures ([address]     me dside effect? last sz 2013       Past Surgical History:   Procedure Laterality Date    COLONOSCOPY N/A 3/24/2017    COLONOSCOPY performed by Sylvie Hull MD at [facility] HX APPENDECTOMY  2009    HX BREAST BIOPSY Right 2007    negative pathology    HX BREAST BIOPSY Left 1999    negative pathology    HX ENDOSCOPY  2016    HX HERNIA REPAIR  9/2011    laparoscopic incisional hernia repair    HX HERNIA REPAIR  3324    umbilical hernia repair    HX HYSTERECTOMY  2009    partial hysterectomy (right ovary removed)    HX OOPHORECTOMY  2009    removed post op hematoma and right oopherectomy    HX OOPHORECTOMY  3/2016    mass removed and left oopherectomy    HX OTHER SURGICAL  2009    ileocectomy, bowel resection,     [place] ABDOMEN SURGERY PROC UNLISTED  4/2010    ventral hernia repair with biologic mesh    [place] COLONOSCOPY FLX DX W/COLLJ SPEC WHEN PFRMD  1/14/2011         US GUIDE BX BREAST Left 2016 [facility]         Family History:   Problem Relation Age of Onset    Cancer Maternal Grandmother         colon ca    Breast Cancer Maternal Grandmother     Breast Cancer Paternal Grandmother     Breast Cancer Cousin         multiple; both sides       Social History     Socioeconomic History    Marital status:      Spouse name: Not on file    Number of children: Not on file    Years of education: Not on file    Highest education level: Not on file   Occupational History    Not on file   Tobacco Use    Smoking status: Never Smoker    Smokeless tobacco: Never Used   Substance and Sexual Activity    Alcohol use: Yes     Alcohol/week: 1.0 standard drinks     Types: 1 Glasses of wine per week     Comment: 1 glass every 2 weeks    Drug use: No    Sexual activity: Not on file   Other Topics Concern    Not on file   Social History Narrative    Not on file     Social Determinants of Health     Financial Resource Strain:     Difficulty of Paying Living Expenses:    Food Insecurity:     Worried About Running Out of Food in the Last Year:     Ran Out of Food in the Last Year:    Transportation Needs:     Lack of Transportation (Medical):  Lack of Transportation (Non-Medical):    Physical Activity:     Days of Exercise per Week:     Minutes of Exercise per Session:    Stress:     Feeling of Stress :    Social Connections:     Frequency of Communication with Friends and Family:     Frequency of Social Gatherings with Friends and Family:     Attends Judaism Services:     Active Member of Clubs or Organizations:     Attends Club or Organization Meetings:     Marital Status:    Intimate Partner Violence:     Fear of Current or Ex-Partner:     Emotionally Abused:     Physically Abused:     Sexually Abused:           ALLERGIES: Sulfa (sulfonamide antibiotics), Toradol [ketorolac tromethamine], Cymbalta [duloxetine], Effexor [venlafaxine], Tramadol, Compazine [prochlorperazine], Hydrocodone-acetaminophen, and Morphine    Review of Systems   All other systems reviewed and are negative. Vitals:    06/05/21 0258   BP: (!) 143/70   Pulse: 99   Resp: 18   Temp: 98.8 °F (37.1 °C)   SpO2: 96%   Weight: 141.5 kg (312 lb)   Height: 5' 6\" (1.676 m)            Physical Exam  Vitals and nursing note reviewed. Constitutional:       Appearance: She is well-developed. Comments: Obese. Appears in no distress. HENT:      Head: Normocephalic and atraumatic. Eyes:      Conjunctiva/sclera: Conjunctivae normal.   Neck:      Trachea: No tracheal deviation. Cardiovascular:      Rate and Rhythm: Normal rate and regular rhythm. Heart sounds: Normal heart sounds. No murmur heard. No friction rub. No gallop. Pulmonary:      Effort: Pulmonary effort is normal.      Breath sounds: Normal breath sounds. Abdominal:      Palpations: Abdomen is soft. Comments: Generalized tenderness. Musculoskeletal:         General: No deformity. Cervical back: Neck supple. Skin:     General: Skin is warm and dry. Neurological:      Mental Status: She is alert. Comments: oriented          MDM       Procedures    Progress Note:  Results, treatment, and follow up plan have been discussed with patient. Questions were answered. Analy Canela MD  7:06 AM    Assessment/plan: Generalized abdominal pain. Patient was convinced she had a small bowel obstruction as she has had many in the past.  Differential diagnosis includes bowel obstruction, abdominal pain caused by constipation, pancreatitis, and others. Reassuring appearance/exam with stable vital signs. CBC, CMP, lipase, abdominal CT okay. Home with recommendations of MiraLAX. PCP follow-up. Return precautions discussed.   Analy Canela MD  7:07 AM

## 2021-06-05 NOTE — ED NOTES
MD and RN reviewed discharge instructions and options with patient; patient verbalized understanding. Pt. Ambulated to exit without difficulty and in no signs of acute distress. Patient was counseled will follow up as discussed.

## 2021-06-05 NOTE — ED TRIAGE NOTES
Patient arrives to ED with complaints of abdominal pain and vomiting     Symptoms started on Monday, consulted PCP and was advised to come to ED for concern of blockage     Patient has history of liver lesions, elevated liver enzymes, and intestinal blockages

## 2021-07-19 ENCOUNTER — APPOINTMENT (OUTPATIENT)
Dept: CT IMAGING | Age: 51
DRG: 394 | End: 2021-07-19
Attending: EMERGENCY MEDICINE
Payer: COMMERCIAL

## 2021-07-19 ENCOUNTER — HOSPITAL ENCOUNTER (INPATIENT)
Age: 51
LOS: 8 days | Discharge: HOME HEALTH | DRG: 394 | End: 2021-07-28
Attending: EMERGENCY MEDICINE | Admitting: SURGERY
Payer: COMMERCIAL

## 2021-07-19 DIAGNOSIS — R19.7 BLOODY DIARRHEA: Primary | ICD-10-CM

## 2021-07-19 DIAGNOSIS — R10.31 ABDOMINAL PAIN, RIGHT LOWER QUADRANT: ICD-10-CM

## 2021-07-19 DIAGNOSIS — R10.813 RIGHT LOWER QUADRANT ABDOMINAL TENDERNESS WITHOUT REBOUND TENDERNESS: ICD-10-CM

## 2021-07-19 LAB
ALBUMIN SERPL-MCNC: 3.5 G/DL (ref 3.5–5)
ALBUMIN/GLOB SERPL: 0.8 {RATIO} (ref 1.1–2.2)
ALP SERPL-CCNC: 125 U/L (ref 45–117)
ALT SERPL-CCNC: 43 U/L (ref 12–78)
ANION GAP SERPL CALC-SCNC: 7 MMOL/L (ref 5–15)
APPEARANCE UR: CLEAR
AST SERPL-CCNC: 38 U/L (ref 15–37)
BACTERIA URNS QL MICRO: NEGATIVE /HPF
BASOPHILS # BLD: 0 K/UL (ref 0–0.1)
BASOPHILS NFR BLD: 1 % (ref 0–1)
BILIRUB SERPL-MCNC: 0.4 MG/DL (ref 0.2–1)
BILIRUB UR QL: NEGATIVE
BUN SERPL-MCNC: 11 MG/DL (ref 6–20)
BUN/CREAT SERPL: 11 (ref 12–20)
CALCIUM SERPL-MCNC: 9.4 MG/DL (ref 8.5–10.1)
CHLORIDE SERPL-SCNC: 104 MMOL/L (ref 97–108)
CO2 SERPL-SCNC: 26 MMOL/L (ref 21–32)
COLOR UR: ABNORMAL
CREAT SERPL-MCNC: 1.04 MG/DL (ref 0.55–1.02)
DIFFERENTIAL METHOD BLD: ABNORMAL
EOSINOPHIL # BLD: 0 K/UL (ref 0–0.4)
EOSINOPHIL NFR BLD: 0 % (ref 0–7)
EPITH CASTS URNS QL MICRO: ABNORMAL /LPF
ERYTHROCYTE [DISTWIDTH] IN BLOOD BY AUTOMATED COUNT: 13 % (ref 11.5–14.5)
GLOBULIN SER CALC-MCNC: 4.6 G/DL (ref 2–4)
GLUCOSE SERPL-MCNC: 104 MG/DL (ref 65–100)
GLUCOSE UR STRIP.AUTO-MCNC: NEGATIVE MG/DL
HCT VFR BLD AUTO: 40.8 % (ref 35–47)
HGB BLD-MCNC: 13.3 G/DL (ref 11.5–16)
HGB UR QL STRIP: NEGATIVE
HYALINE CASTS URNS QL MICRO: ABNORMAL /LPF (ref 0–5)
IMM GRANULOCYTES # BLD AUTO: 0 K/UL (ref 0–0.04)
IMM GRANULOCYTES NFR BLD AUTO: 0 % (ref 0–0.5)
KETONES UR QL STRIP.AUTO: NEGATIVE MG/DL
LEUKOCYTE ESTERASE UR QL STRIP.AUTO: ABNORMAL
LIPASE SERPL-CCNC: 67 U/L (ref 73–393)
LYMPHOCYTES # BLD: 1.7 K/UL (ref 0.8–3.5)
LYMPHOCYTES NFR BLD: 21 % (ref 12–49)
MCH RBC QN AUTO: 30.3 PG (ref 26–34)
MCHC RBC AUTO-ENTMCNC: 32.6 G/DL (ref 30–36.5)
MCV RBC AUTO: 92.9 FL (ref 80–99)
MONOCYTES # BLD: 0.3 K/UL (ref 0–1)
MONOCYTES NFR BLD: 4 % (ref 5–13)
NEUTS SEG # BLD: 6 K/UL (ref 1.8–8)
NEUTS SEG NFR BLD: 74 % (ref 32–75)
NITRITE UR QL STRIP.AUTO: NEGATIVE
NRBC # BLD: 0 K/UL (ref 0–0.01)
NRBC BLD-RTO: 0 PER 100 WBC
PH UR STRIP: 8 [PH] (ref 5–8)
PLATELET # BLD AUTO: 275 K/UL (ref 150–400)
PMV BLD AUTO: 10.8 FL (ref 8.9–12.9)
POTASSIUM SERPL-SCNC: 4 MMOL/L (ref 3.5–5.1)
PROT SERPL-MCNC: 8.1 G/DL (ref 6.4–8.2)
PROT UR STRIP-MCNC: NEGATIVE MG/DL
RBC # BLD AUTO: 4.39 M/UL (ref 3.8–5.2)
RBC #/AREA URNS HPF: ABNORMAL /HPF (ref 0–5)
SODIUM SERPL-SCNC: 137 MMOL/L (ref 136–145)
SP GR UR REFRACTOMETRY: 1.01 (ref 1–1.03)
UR CULT HOLD, URHOLD: NORMAL
UROBILINOGEN UR QL STRIP.AUTO: 0.2 EU/DL (ref 0.2–1)
WBC # BLD AUTO: 8.1 K/UL (ref 3.6–11)
WBC URNS QL MICRO: ABNORMAL /HPF (ref 0–4)

## 2021-07-19 PROCEDURE — 74177 CT ABD & PELVIS W/CONTRAST: CPT

## 2021-07-19 PROCEDURE — 81001 URINALYSIS AUTO W/SCOPE: CPT

## 2021-07-19 PROCEDURE — 74011000636 HC RX REV CODE- 636: Performed by: RADIOLOGY

## 2021-07-19 PROCEDURE — 36415 COLL VENOUS BLD VENIPUNCTURE: CPT

## 2021-07-19 PROCEDURE — 96375 TX/PRO/DX INJ NEW DRUG ADDON: CPT

## 2021-07-19 PROCEDURE — 83690 ASSAY OF LIPASE: CPT

## 2021-07-19 PROCEDURE — 74011250636 HC RX REV CODE- 250/636: Performed by: EMERGENCY MEDICINE

## 2021-07-19 PROCEDURE — 80053 COMPREHEN METABOLIC PANEL: CPT

## 2021-07-19 PROCEDURE — 96374 THER/PROPH/DIAG INJ IV PUSH: CPT

## 2021-07-19 PROCEDURE — 99284 EMERGENCY DEPT VISIT MOD MDM: CPT

## 2021-07-19 PROCEDURE — 85025 COMPLETE CBC W/AUTO DIFF WBC: CPT

## 2021-07-19 PROCEDURE — 96376 TX/PRO/DX INJ SAME DRUG ADON: CPT

## 2021-07-19 RX ORDER — HYDROMORPHONE HYDROCHLORIDE 1 MG/ML
1 INJECTION, SOLUTION INTRAMUSCULAR; INTRAVENOUS; SUBCUTANEOUS ONCE
Status: COMPLETED | OUTPATIENT
Start: 2021-07-19 | End: 2021-07-19

## 2021-07-19 RX ORDER — ONDANSETRON 2 MG/ML
4 INJECTION INTRAMUSCULAR; INTRAVENOUS
Status: COMPLETED | OUTPATIENT
Start: 2021-07-19 | End: 2021-07-19

## 2021-07-19 RX ADMIN — ONDANSETRON 4 MG: 2 INJECTION INTRAMUSCULAR; INTRAVENOUS at 21:52

## 2021-07-19 RX ADMIN — IOPAMIDOL 100 ML: 755 INJECTION, SOLUTION INTRAVENOUS at 23:04

## 2021-07-19 RX ADMIN — ONDANSETRON 4 MG: 2 INJECTION INTRAMUSCULAR; INTRAVENOUS at 22:29

## 2021-07-19 RX ADMIN — HYDROMORPHONE HYDROCHLORIDE 1 MG: 1 INJECTION, SOLUTION INTRAMUSCULAR; INTRAVENOUS; SUBCUTANEOUS at 23:45

## 2021-07-19 RX ADMIN — SODIUM CHLORIDE 1000 ML: 9 INJECTION, SOLUTION INTRAVENOUS at 21:53

## 2021-07-19 RX ADMIN — HYDROMORPHONE HYDROCHLORIDE 1 MG: 1 INJECTION, SOLUTION INTRAMUSCULAR; INTRAVENOUS; SUBCUTANEOUS at 22:29

## 2021-07-19 NOTE — ED TRIAGE NOTES
Patient reports she has a history of ulcerative colitis and diverticulitis-    Reports she had a \"UC flare on Wednesday\" and patient reports no bowel movements since Wednesday.  Patient reports her abdomen is distended and she has been vomiting since Saturday

## 2021-07-19 NOTE — ED NOTES
5:02 PM    Pt is a 44 yo F presenting to ED for generalized abdominal pain with onset 5 days ago. Last BM 5 days ago, vomiting began 3 days ago. Hx of UC, diverticulitis, and bowel obstructions. I have evaluated the patient as the Provider in Triage. I have reviewed Her vital signs and the triage nurse assessment. I have talked with the patient and any available family and advised that I am the provider in triage and have ordered the appropriate study to initiate their work up based on the clinical presentation during my assessment. I have advised that the patient will be accommodated in the Main ED as soon as possible. I have also requested to contact the triage nurse or myself immediately if the patient experiences any changes in their condition during this brief waiting period.   MIKE Platt

## 2021-07-20 ENCOUNTER — APPOINTMENT (OUTPATIENT)
Dept: GENERAL RADIOLOGY | Age: 51
DRG: 394 | End: 2021-07-20
Attending: INTERNAL MEDICINE
Payer: COMMERCIAL

## 2021-07-20 PROBLEM — K43.9 VENTRAL HERNIA: Status: ACTIVE | Noted: 2021-07-20

## 2021-07-20 PROBLEM — R19.7 BLOODY DIARRHEA: Status: ACTIVE | Noted: 2021-07-20

## 2021-07-20 PROBLEM — R10.9 ABDOMINAL PAIN: Status: ACTIVE | Noted: 2021-07-20

## 2021-07-20 PROCEDURE — C9113 INJ PANTOPRAZOLE SODIUM, VIA: HCPCS | Performed by: INTERNAL MEDICINE

## 2021-07-20 PROCEDURE — 99218 HC RM OBSERVATION: CPT

## 2021-07-20 PROCEDURE — 74011250636 HC RX REV CODE- 250/636: Performed by: INTERNAL MEDICINE

## 2021-07-20 PROCEDURE — 74011000250 HC RX REV CODE- 250: Performed by: INTERNAL MEDICINE

## 2021-07-20 PROCEDURE — 74011250636 HC RX REV CODE- 250/636: Performed by: SURGERY

## 2021-07-20 PROCEDURE — 65270000029 HC RM PRIVATE

## 2021-07-20 PROCEDURE — 96374 THER/PROPH/DIAG INJ IV PUSH: CPT

## 2021-07-20 PROCEDURE — 74011250637 HC RX REV CODE- 250/637: Performed by: INTERNAL MEDICINE

## 2021-07-20 PROCEDURE — 74011250637 HC RX REV CODE- 250/637: Performed by: SURGERY

## 2021-07-20 PROCEDURE — 2709999900 HC NON-CHARGEABLE SUPPLY

## 2021-07-20 PROCEDURE — 74018 RADEX ABDOMEN 1 VIEW: CPT

## 2021-07-20 RX ORDER — SODIUM CHLORIDE 0.9 % (FLUSH) 0.9 %
5-40 SYRINGE (ML) INJECTION EVERY 8 HOURS
Status: DISCONTINUED | OUTPATIENT
Start: 2021-07-20 | End: 2021-07-28 | Stop reason: HOSPADM

## 2021-07-20 RX ORDER — SODIUM CHLORIDE 9 MG/ML
50 INJECTION, SOLUTION INTRAVENOUS CONTINUOUS
Status: DISCONTINUED | OUTPATIENT
Start: 2021-07-20 | End: 2021-07-28

## 2021-07-20 RX ORDER — ENOXAPARIN SODIUM 100 MG/ML
40 INJECTION SUBCUTANEOUS EVERY 24 HOURS
Status: DISCONTINUED | OUTPATIENT
Start: 2021-07-20 | End: 2021-07-20

## 2021-07-20 RX ORDER — NALOXONE HYDROCHLORIDE 0.4 MG/ML
0.4 INJECTION, SOLUTION INTRAMUSCULAR; INTRAVENOUS; SUBCUTANEOUS
Status: DISCONTINUED | OUTPATIENT
Start: 2021-07-20 | End: 2021-07-28 | Stop reason: HOSPADM

## 2021-07-20 RX ORDER — ESCITALOPRAM OXALATE 20 MG/1
20 TABLET ORAL DAILY
COMMUNITY
End: 2022-09-26

## 2021-07-20 RX ORDER — ACETAMINOPHEN 650 MG/1
650 SUPPOSITORY RECTAL
Status: DISCONTINUED | OUTPATIENT
Start: 2021-07-20 | End: 2021-07-28 | Stop reason: HOSPADM

## 2021-07-20 RX ORDER — ENOXAPARIN SODIUM 100 MG/ML
40 INJECTION SUBCUTANEOUS EVERY 12 HOURS
Status: DISCONTINUED | OUTPATIENT
Start: 2021-07-20 | End: 2021-07-28 | Stop reason: HOSPADM

## 2021-07-20 RX ORDER — BUMETANIDE 2 MG/1
2 TABLET ORAL DAILY
COMMUNITY
End: 2022-08-02

## 2021-07-20 RX ORDER — HYDROMORPHONE HYDROCHLORIDE 1 MG/ML
1 INJECTION, SOLUTION INTRAMUSCULAR; INTRAVENOUS; SUBCUTANEOUS
Status: DISCONTINUED | OUTPATIENT
Start: 2021-07-20 | End: 2021-07-20

## 2021-07-20 RX ORDER — HYDROMORPHONE HYDROCHLORIDE 2 MG/ML
2 INJECTION, SOLUTION INTRAMUSCULAR; INTRAVENOUS; SUBCUTANEOUS
Status: DISCONTINUED | OUTPATIENT
Start: 2021-07-20 | End: 2021-07-27

## 2021-07-20 RX ORDER — POLYETHYLENE GLYCOL 3350 17 G/17G
17 POWDER, FOR SOLUTION ORAL DAILY PRN
Status: DISCONTINUED | OUTPATIENT
Start: 2021-07-20 | End: 2021-07-26

## 2021-07-20 RX ORDER — DIPHENHYDRAMINE HYDROCHLORIDE 50 MG/ML
25 INJECTION, SOLUTION INTRAMUSCULAR; INTRAVENOUS
Status: DISCONTINUED | OUTPATIENT
Start: 2021-07-20 | End: 2021-07-27

## 2021-07-20 RX ORDER — ACETAMINOPHEN 325 MG/1
650 TABLET ORAL
Status: DISCONTINUED | OUTPATIENT
Start: 2021-07-20 | End: 2021-07-28 | Stop reason: HOSPADM

## 2021-07-20 RX ORDER — ONDANSETRON 2 MG/ML
4 INJECTION INTRAMUSCULAR; INTRAVENOUS
Status: DISCONTINUED | OUTPATIENT
Start: 2021-07-20 | End: 2021-07-27

## 2021-07-20 RX ORDER — SODIUM CHLORIDE 0.9 % (FLUSH) 0.9 %
5-40 SYRINGE (ML) INJECTION AS NEEDED
Status: DISCONTINUED | OUTPATIENT
Start: 2021-07-20 | End: 2021-07-28 | Stop reason: HOSPADM

## 2021-07-20 RX ORDER — LORAZEPAM 2 MG/ML
1 INJECTION INTRAMUSCULAR
Status: DISCONTINUED | OUTPATIENT
Start: 2021-07-20 | End: 2021-07-27

## 2021-07-20 RX ADMIN — HYDROMORPHONE HYDROCHLORIDE 1 MG: 1 INJECTION, SOLUTION INTRAMUSCULAR; INTRAVENOUS; SUBCUTANEOUS at 14:05

## 2021-07-20 RX ADMIN — LORAZEPAM 1 MG: 2 INJECTION INTRAMUSCULAR; INTRAVENOUS at 17:38

## 2021-07-20 RX ADMIN — ENOXAPARIN SODIUM 40 MG: 40 INJECTION SUBCUTANEOUS at 20:44

## 2021-07-20 RX ADMIN — DIPHENHYDRAMINE HYDROCHLORIDE 25 MG: 50 INJECTION, SOLUTION INTRAMUSCULAR; INTRAVENOUS at 17:38

## 2021-07-20 RX ADMIN — ENOXAPARIN SODIUM 40 MG: 40 INJECTION SUBCUTANEOUS at 09:17

## 2021-07-20 RX ADMIN — ONDANSETRON 4 MG: 2 INJECTION INTRAMUSCULAR; INTRAVENOUS at 01:29

## 2021-07-20 RX ADMIN — Medication 10 ML: at 01:29

## 2021-07-20 RX ADMIN — PROMETHAZINE HYDROCHLORIDE 25 MG: 25 INJECTION INTRAMUSCULAR; INTRAVENOUS at 04:24

## 2021-07-20 RX ADMIN — HYDROMORPHONE HYDROCHLORIDE 1 MG: 1 INJECTION, SOLUTION INTRAMUSCULAR; INTRAVENOUS; SUBCUTANEOUS at 01:29

## 2021-07-20 RX ADMIN — SODIUM CHLORIDE 100 ML/HR: 9 INJECTION, SOLUTION INTRAVENOUS at 01:29

## 2021-07-20 RX ADMIN — Medication 10 ML: at 21:04

## 2021-07-20 RX ADMIN — LORAZEPAM 1 MG: 2 INJECTION INTRAMUSCULAR; INTRAVENOUS at 13:20

## 2021-07-20 RX ADMIN — Medication 10 ML: at 05:55

## 2021-07-20 RX ADMIN — HYDROMORPHONE HYDROCHLORIDE 1 MG: 1 INJECTION, SOLUTION INTRAMUSCULAR; INTRAVENOUS; SUBCUTANEOUS at 05:53

## 2021-07-20 RX ADMIN — SODIUM CHLORIDE 40 MG: 9 INJECTION INTRAMUSCULAR; INTRAVENOUS; SUBCUTANEOUS at 10:05

## 2021-07-20 RX ADMIN — SODIUM CHLORIDE 40 MG: 9 INJECTION INTRAMUSCULAR; INTRAVENOUS; SUBCUTANEOUS at 04:27

## 2021-07-20 RX ADMIN — PROMETHAZINE HYDROCHLORIDE 25 MG: 25 INJECTION INTRAMUSCULAR; INTRAVENOUS at 21:54

## 2021-07-20 RX ADMIN — PHENOL 1 SPRAY: 1.5 LIQUID ORAL at 17:29

## 2021-07-20 RX ADMIN — PROMETHAZINE HYDROCHLORIDE 25 MG: 25 INJECTION INTRAMUSCULAR; INTRAVENOUS at 13:21

## 2021-07-20 RX ADMIN — HYDROMORPHONE HYDROCHLORIDE 2 MG: 2 INJECTION, SOLUTION INTRAMUSCULAR; INTRAVENOUS; SUBCUTANEOUS at 20:44

## 2021-07-20 RX ADMIN — HYDROMORPHONE HYDROCHLORIDE 2 MG: 2 INJECTION, SOLUTION INTRAMUSCULAR; INTRAVENOUS; SUBCUTANEOUS at 18:09

## 2021-07-20 RX ADMIN — ACETAMINOPHEN 650 MG: 650 SUPPOSITORY RECTAL at 20:50

## 2021-07-20 RX ADMIN — HYDROMORPHONE HYDROCHLORIDE 1 MG: 1 INJECTION, SOLUTION INTRAMUSCULAR; INTRAVENOUS; SUBCUTANEOUS at 10:06

## 2021-07-20 NOTE — ED NOTES
TRANSFER - OUT REPORT:    Verbal report given to Rakesh Teixeira RN(name) on [employer]  being transferred to 425(unit) for routine progression of care       Report consisted of patients Situation, Background, Assessment and   Recommendations(SBAR). Information from the following report(s) SBAR, ED Summary, [facility] and Recent Results was reviewed with the receiving nurse. Lines:   Peripheral IV 07/19/21 Right Other(comment) (Active)   Site Assessment Clean, dry, & intact 07/19/21 2219   Phlebitis Assessment 0 07/19/21 2219   Infiltration Assessment 0 07/19/21 2219   Dressing Status Clean, dry, & intact 07/19/21 2219   Dressing Type Tape;Transparent 07/19/21 2219   Hub Color/Line Status Patent; Flushed;Blue 07/19/21 2219        Opportunity for questions and clarification was provided.       Patient transported with:   Registered Nurse

## 2021-07-20 NOTE — PROGRESS NOTES
Problem: Falls - Risk of  Goal: *Absence of Falls  Description: Document Jose Enrique Gregg Fall Risk and appropriate interventions in the flowsheet.   Outcome: Progressing Towards Goal  Note: Fall Risk Interventions:  Mobility Interventions: Bed/chair exit alarm, Communicate number of staff needed for ambulation/transfer         Medication Interventions: Bed/chair exit alarm, Patient to call before getting OOB, Teach patient to arise slowly    Elimination Interventions: Call light in reach, Bed/chair exit alarm    History of Falls Interventions: Bed/chair exit alarm, Consult care management for discharge planning         Problem: Patient Education: Go to Patient Education Activity  Goal: Patient/Family Education  Outcome: Progressing Towards Goal     Problem: General Medical Care Plan  Goal: *Vital signs within specified parameters  Outcome: Progressing Towards Goal  Goal: *Absence of infection signs and symptoms  Outcome: Progressing Towards Goal  Goal: *Optimal pain control at patient's stated goal  Outcome: Progressing Towards Goal

## 2021-07-20 NOTE — PROGRESS NOTES
[facility]: MED RECONCILIATION    Comments/Recommendations:   Patient alert and oriented for medication reconciliation. Patient last took home medications on Saturday morning as she was instructed by outpatient MD to hold medications due to N/V. Patient had concerns regarding IV medication for anxiety as she is on scheduled alprazolam at home and concerns regarding treatment for bacterial vaginosis-information relayed to attending. Confirmed patient's preferred pharmacy as [facility] on [address]. Medications added:     lexapro    Medications removed:    Fioricet  Questran  Citalopram  Cyclobenzaprine   Diclofenac gel  Dicyclomine  Ferrous fumarate  Lidocaine patch  Meloxicam  Metronidazole vaginal gel  Pantoprazole  Miralax  Potassium chloride  Quetiapine  Ranitidine     Medications adjusted:    Bumetanide 2mg daily adjusted from 1mg BID    Information obtained from: patient, [facility]     Allergies: Sulfa (sulfonamide antibiotics), Toradol [ketorolac tromethamine], Cymbalta [duloxetine], Effexor [venlafaxine], Tramadol, Compazine [prochlorperazine], Hydrocodone-acetaminophen, and Morphine    Prior to Admission Medications:     Medication Documentation Review Audit       Reviewed by Sherry Heck (Pharmacist) on 07/20/21 at       Medication Sig Documenting Provider Last Dose Status Taking? ALPRAZolam (XANAX) 2 mg tablet Take 2 mg by mouth nightly. Provider, Historical 7/16/2021 PM Active Yes   ALPRAZolam (XANAX) 2 mg tablet Take 1 mg by mouth two (2) times a day. Patient takes in the morning and with a late lunch Provider, Historical 7/17/2021 AM Active Yes   bumetanide (BUMEX) 2 mg tablet Take 2 mg by mouth daily. Provider, Historical 7/17/2021 AM Active Yes   escitalopram oxalate (Lexapro) 20 mg tablet Take 20 mg by mouth daily. Provider, Historical 7/17/2021 AM Active Yes   estradiol (ESTRACE) 1 mg tablet Take 1 mg by mouth nightly.  Provider, Historical 7/16/2021 PM Active Yes           Med Note (Pushpa Vincent   Wed Mar 22, 2017  8:11 PM)     MULTIVITAMIN (ONE-A-DAY ESSENTIAL PO) Take 1 Tab by mouth daily. Provider, Historical 7/17/2021 AM Active Yes   pravastatin (PRAVACHOL) 40 mg tablet Take 40 mg by mouth nightly. Other, MD Codi 7/16/2021 PM Active Yes   promethazine (PHENERGAN) 25 mg tablet Take 1 Tab by mouth every six (6) hours as needed for Nausea.  Rabia Ely MD  Active Yes                    Thank you,   Farooq Berman, PharmD, BCPS   Contact: [phone]

## 2021-07-20 NOTE — PROGRESS NOTES
+ placement verified via NG tube to LIS. C/o nausea, phenergan to be given. Explained pain regiment and GI /surgery consulted for today. 4060- vomited @ 200 ml greenish emesis.

## 2021-07-20 NOTE — CONSULTS
[facility]. [address] [place] [address] NP  [phone]                    GASTROENTEROLOGY CONSULTATION NOTE              NAME:  Farnaz Schaefer   :   1970   MRN:   [id number]       Referring Physician:   Dr. Veneica Arce Date:   2021 5:54 PM    Chief Complaint:    Abdominal pain, rectal bleeding     History of Present Illness:    Patient is a 45 y.o. with past medical history of anxiety, bowel obstruction who we have been asked to see in consultation for the above complaints. The patient presented to the ER for complaints of bloody diarrhea which started one week ago. It lasted one day and she has not had any further bowel movements since then. Symptoms have been associated with abdominal pain, vomiting. She feels these symptoms are very similar to her last admission for bowel obstruction. She has surgical history of laparoscopic hysterectomy complicated by SBO requiring exploratory laparotomy. CT this admission shows Small ventral hernia is again noted containing loops small bowel with mild  interval upstream dilatation of the of the small bowel loop with compared to prior study     PMH:  Past Medical History:   Diagnosis Date    Anxiety     Bowel obstruction (HCC)     Fatty liver     Gall stones     GERD (gastroesophageal reflux disease)     Hematoma     abdomen on the right overy    Hernia of unspecified site of abdominal cavity without mention of obstruction or gangrene     Hx of thromboembolism of vein     right upper brachiel vein    Kidney stones     Obesity     REYNA?  PUD (peptic ulcer disease) 2016    stomach and colon ulcers?     Seizures ([address]     me dside effect? last sz 2013       [facility]  Past Surgical History:   Procedure Laterality Date    COLONOSCOPY N/A 3/24/2017    COLONOSCOPY performed by Sebastien Friedman MD at [facility] HX APPENDECTOMY      HX BREAST BIOPSY Right     negative pathology    HX BREAST BIOPSY Left     negative pathology    HX ENDOSCOPY  2016    HX HERNIA REPAIR  9/2011    laparoscopic incisional hernia repair    HX HERNIA REPAIR  3613    umbilical hernia repair    HX HYSTERECTOMY  2009    partial hysterectomy (right ovary removed)    HX OOPHORECTOMY  2009    removed post op hematoma and right oopherectomy    HX OOPHORECTOMY  3/2016    mass removed and left oopherectomy    HX OTHER SURGICAL  2009    ileocectomy, bowel resection,     ND ABDOMEN SURGERY PROC UNLISTED  4/2010    ventral hernia repair with biologic mesh    ND COLONOSCOPY FLX DX W/COLLJ SPEC WHEN PFRMD  1/14/2011         US GUIDE BX BREAST Left 2016    neagative paythology       Allergies: Allergies   Allergen Reactions    Sulfa (Sulfonamide Antibiotics) Hives     Childhood per parent    Toradol [Ketorolac Tromethamine] Hives    Cymbalta [Duloxetine] Seizures    Effexor [Venlafaxine] Seizures    Tramadol Seizures    Compazine [Prochlorperazine] Rash    Hydrocodone-Acetaminophen Rash    Morphine Hives and Nausea and Vomiting       Home Medications:  Prior to Admission Medications   Prescriptions Last Dose Informant Patient Reported? Taking? ALPRAZolam (XANAX) 2 mg tablet 7/16/2021 at PM Self Yes Yes   Sig: Take 2 mg by mouth nightly. ALPRAZolam (XANAX) 2 mg tablet 7/17/2021 at AM Self Yes Yes   Sig: Take 1 mg by mouth two (2) times a day. Patient takes in the morning and with a late lunch   MULTIVITAMIN (ONE-A-DAY ESSENTIAL PO) 7/17/2021 at AM Self Yes Yes   Sig: Take 1 Tab by mouth daily. bumetanide (BUMEX) 2 mg tablet 7/17/2021 at AM Self Yes Yes   Sig: Take 2 mg by mouth daily. escitalopram oxalate (Lexapro) 20 mg tablet 7/17/2021 at AM Self Yes Yes   Sig: Take 20 mg by mouth daily. estradiol (ESTRACE) 1 mg tablet 7/16/2021 at PM Self Yes Yes   Sig: Take 1 mg by mouth nightly. pravastatin (PRAVACHOL) 40 mg tablet 7/16/2021 at PM Self Yes Yes   Sig: Take 40 mg by mouth nightly.    promethazine (PHENERGAN) 25 mg tablet  Self No Yes Sig: Take 1 Tab by mouth every six (6) hours as needed for Nausea. Facility-Administered Medications: None       Hospital Medications:  Current Facility-Administered Medications   Medication Dose Route Frequency    sodium chloride (NS) flush 5-40 mL  5-40 mL IntraVENous Q8H    sodium chloride (NS) flush 5-40 mL  5-40 mL IntraVENous PRN    acetaminophen (TYLENOL) tablet 650 mg  650 mg Oral Q6H PRN    Or    acetaminophen (TYLENOL) suppository 650 mg  650 mg Rectal Q6H PRN    polyethylene glycol (MIRALAX) packet 17 g  17 g Oral DAILY PRN    0.9% sodium chloride infusion  100 mL/hr IntraVENous CONTINUOUS    ondansetron (ZOFRAN) injection 4 mg  4 mg IntraVENous Q6H PRN    naloxone (NARCAN) injection 0.4 mg  0.4 mg IntraVENous EVERY 2 MINUTES AS NEEDED    promethazine (PHENERGAN) 25 mg in NS IVPB  25 mg IntraVENous Q6H PRN    pantoprazole (PROTONIX) 40 mg in 0.9% sodium chloride 10 mL injection  40 mg IntraVENous Q24H    enoxaparin (LOVENOX) injection 40 mg  40 mg SubCUTAneous Q12H    LORazepam (ATIVAN) injection 1 mg  1 mg IntraVENous Q4H PRN    phenol throat spray (CHLORASEPTIC) 1 Spray  1 Spray Oral PRN    diphenhydrAMINE (BENADRYL) injection 25 mg  25 mg IntraVENous Q6H PRN    HYDROmorphone (DILAUDID) injection 2 mg  2 mg IntraVENous Q3H PRN       Social History:  Social History     Tobacco Use    Smoking status: Never Smoker    Smokeless tobacco: Never Used   Substance Use Topics    Alcohol use:  Yes     Alcohol/week: 1.0 standard drinks     Types: 1 Glasses of wine per week     Comment: 1 glass every 2 weeks       Family History:  Family History   Problem Relation Age of Onset    Cancer Maternal Grandmother         colon ca    Breast Cancer Maternal Grandmother     Breast Cancer Paternal Grandmother     Breast Cancer Cousin         multiple; both sides       Review of Systems:  Constitutional: negative fever, negative chills, negative weight loss  Eyes:   negative visual changes  ENT: negative sore throat, tongue or lip swelling  Respiratory:  negative cough, negative dyspnea  Cards:  negative for chest pain, palpitations, lower extremity edema  GI:   See HPI  :  negative for frequency, dysuria  Integument:  negative for rash and pruritus  Heme:  negative for easy bruising and gum/nose bleeding  Musculoskel: negative for myalgias,  back pain and muscle weakness  Neuro: negative for headaches, dizziness, vertigo  Psych:  negative for feelings of anxiety, depression     Objective:     Patient Vitals for the past 8 hrs:   BP Temp Pulse Resp SpO2   07/20/21 1557 139/89 98.5 °F (36.9 °C) 84 18 94 %   07/20/21 1150 (!) 141/88 98.5 °F (36.9 °C) 83 18 92 %     07/20 0701 - 07/20 1900  In: 0   Out: 850   07/18 1901 - 07/20 0700  In: 50 [I.V.:50]  Out: 200     EXAM:     NEURO-alert, oriented x3, affect appropriate   HEENT-Head: Normocephalic, no lesions, without obvious abnormality. LUNGS-clear to auscultation bilaterally    COR-regular rate and rhythym     ABD- Firm, generalized TTP. Bowel sounds hypoactive. No masses,  no organomegaly     EXT-no edema    Skin - No rash     Data Review     Recent Labs     07/19/21  1858   WBC 8.1   HGB 13.3   HCT 40.8        Recent Labs     07/19/21  1858      K 4.0      CO2 26   BUN 11   CREA 1.04*   *   CA 9.4     Recent Labs     07/19/21  1858   *   TP 8.1   ALB 3.5   GLOB 4.6*   LPSE 67*     No results for input(s): INR, PTP, APTT, INREXT in the last 72 hours.     Patient Active Problem List   Diagnosis Code    Depression F32.9    Cholecystitis K81.9    Anemia D64.9    Ulcerative colitis ([address] K51.90    Hx of thromboembolism of vein Z86.718    PUD (peptic ulcer disease) K27.9    Anxiety F41.9    Obesity E66.9    Fatty liver K76.0    Acute colitis K52.9    Nausea and vomiting R11.2    Obesity, morbid ([address] E66.01    Small bowel obstruction ([address] K56.609    Abdominal pain R10.9    Bloody diarrhea R19.7    Ventral hernia K43.9       Assessment and Plan:  Abdominal pain, Vomiting, Rectal Bleeding: CT scan showing ventral hernia containing loop of small bowel along with dilated bowel upstream.  Her last coloscopy was in 2019 with biopsies not consistent with ulcerative colitis. I suspect her symptoms are related to adhesive disease \ ventral hernia. - Continue NGT to LWS  - OOB ambulate  - NPO  - Continue monitoring labs and replete electrolytes  - Surgery is following    Following      Thanks for allowing me to participate in the care of this patient.   Signed By: Treva Lucas NP     7/20/2021  5:54 PM

## 2021-07-20 NOTE — PROGRESS NOTES
7/20/2021 9:15 AM   Reason for Admission: Emergency -       ICD-10-CM ICD-9-CM    1. Bloody diarrhea  R19.7 787.91    2. Abdominal pain, right lower quadrant  R10.31 789.03    3. Right lower quadrant abdominal tenderness without rebound tenderness  R10.813 789.63      Observation notice provided in writing to patient and/or caregiver as well as verbal explanation of the policy. Patients who are in outpatient status also receive the Observation notice. Patient has received notice and or patient representative has received via secure email, fax, or certified mail based on patient representative's preference. Assessment:   [x]In person with pt  Charted address and phone numbers confirmed. RUR: n/a Under obs   Risk Level: [x]Low []Moderate []High  Value-based purchasing: [] Yes [x] No  Bundle patient: [] Yes [x] No   Specify:     Advance Directive: Full Code. [x] No AD on file. Pt declined. [] AD on file. [] Current AD not on file. Copy requested. [] Requests AD, and referral submitted to [facility].      Healthcare Decision Maker:         Assessment:    Age: 45    Sex: [] Male [x]Female     Residency: []Private residence [x]Apartment []Assisted Living []LTC []Other:   Exterior Steps: 0  Interior Steps: 0    Lives With: []With spouse []Other family members []Underage children [x]Alone []Care provider []Other:    Prior functioning:  [x]Independent with ADLs and iADLS []Dependent with ADLs and iADLs []Partial dependence, Specify:     Prior DME required:  []None []RW []Cane [x]Crutches []Bedside commode []CPAP []Home O2 (Liter/Provider: ) []Nebulizer   []Shower Chair []Wheelchair []Hospital Bed []Timo []Stair lift []Rollator [x]Other: lymphedema equipment at home         Rehab history: []None []Outpatient PT [x]Home Health (in 2010 when pt had a wound vac at home ) []SNF (Provider/Date: ) []IPR (Provider/Date: ) []LTC (Provider/Date: ) []Hospice (Provider/Date: )  []Other:     Discharge Concerns: []Yes [x]No []Unknown   Describe:    COVID vaccines: Received both doses on Moderna vaccine in March 2021    Insurer:   [employer] Monteiro Phone:     Subscriber: Snow Luis Antonioas Subscriber#: [id number]    Group#: [id number] Precert#:           PCP: Alejandra Mejia    Address: [address] [place] [address]   Phone number: [phone]   Current patient: [x]Yes []No   Approximate date of last visit: July 5 2021   Access to virtual PCP visits: []Yes [x]No    Pharmacy:  [facility] Transport: Pt's mother, Gabriel Body        Transition of care plan:    [x] Home with outpatient follow-up  CM will follow for needs. ELIU Gonzalez  Care Management Interventions  PCP Verified by CM:  Yes Rachel Dias  Transition of Care Consult (CM Consult): Discharge Planning  MyChart Signup: No  Discharge Durable Medical Equipment: No  Physical Therapy Consult: No  Occupational Therapy Consult: No  Speech Therapy Consult: No  Current Support Network: Lives Alone  Discharge Location  Discharge Placement: Home

## 2021-07-20 NOTE — ED NOTES
Bedside shift change report given to LISBET Cuenca (oncoming nurse) by Aixa Hawley RN (offgoing nurse). Report included the following information SBAR, ED Summary, MAR and Recent Results.

## 2021-07-20 NOTE — H&P
[facility]  [address] [place] [address]  [phone]    Hospitalist Admission Note      NAME:  Asher Joseph   :   1970   MRN:  [id number]     PCP:  Karen Fermin MD     Date of Service/Time:  2021 3:23 AM         Assessment / Plan:       45 y.o. female with hx of ?UC, complicated abdominal surgical hx, depression/anxiety, chronic pain presenting w/ abd pain, n/v, found to have possible early SBO       Abdominal pain / nausea / vomiting: pt reports no BM for 6 days. CT showed small ventral hernia containing loops small bowel with mild interval upstream dilatation of the of the small bowel loop compared to prior study. Per report, she does have a complex past surgical history of laparoscopic hysterectomy complicated by SBO requiring exploratory laparotomy / LISET followed by wound infection and wound vac. She may have early SBO. Actively vomiting bilious fluid on my evaluation. NPO, NGT, IVF. IV opioid analgesics PRN for now, but stop soon if able. IV antiemetics PRN. General surgery already consulted. Bloody diarrhea: pt reports a bout of bloody diarrhea at home and states that she has ulcerative colitis and is followed by Dr. Gt Adams. I see no evidence of UC at all, including path report and CT/MRI studies. Consult GI. Depression / anxiety / chronic pain: I believe this is certainly playing a role in her symptoms. I am concerned about opioid dependence, with concomitant use of benzodiazepines. Resume home psychotropics pending pharmacy med rec      PUD (peptic ulcer disease): IV PPI for now      Obesity, morbid: Body mass index is 47.77 kg/m². Would benefit from weight loss and dietary / lifestyle modifications        Code Status: FULL      ED notes, lab results, and imaging studies reviewed.        Total time spent with patient: 50 Minutes   Time spent in the care of this patient included reviewing records, discussing with nursing, obtaining history and examining the patient, and discussing treatment plans, with >50% time spent counseling/coordinating care    Risk of deterioration: High                 Care Plan discussed with: ED provider, Patient, Nursing Staff and >50% of time spent in counseling and coordination of care    Discussed:  Care Plan and D/C Planning    Prophylaxis:  Lovenox    Disposition:  Home w/Family                 Subjective:     CHIEF COMPLAINT: abd pain     HISTORY OF PRESENT ILLNESS:     Ms. Damian Pandya is a 45 y.o. female w/ hx of UC, complicated abdominal surgical hx, depression/anxiety, chronic pain presenting w/ abd pain. Started about a week ago, R-sided, associated with a bout of bloody diarrhea and nausea and vomiting. She reports no BM for 6 days and denies passing flatus. ED workup showed unremarkable CBC and CMP. CT a/p showed small ventral hernia again containing loops small bowel with mild interval upstream dilatation of the of the small bowel loop compared to prior study. She began to actively vomit a significant amount of light green liquid on my evaluation. Ms. Damian Pandya is admitted for further evaluation and management. 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  PUD (peptic ulcer disease) 2016    stomach and colon ulcers?     Seizures ([address]     me dside effect? last sz 2013        Past Surgical History:   Procedure Laterality Date    COLONOSCOPY N/A 3/24/2017    COL[facility] performed by Bello Santillan MD at [facility] HX APPENDECTOMY  2009    HX BREAST BIOPSY Right 2007    negative pathology    HX BREAST BIOPSY Left 1999    negative pathology    HX ENDOSCOPY  2016    HX HERNIA REPAIR  9/2011    laparoscopic incisional hernia repair    HX HERNIA REPAIR  9148    umbilical hernia repair    HX HYSTERECTOMY  2009    partial hysterectomy (right ovary removed)    HX OOPHORECTOMY  2009    removed post op hematoma and right oopherectomy    HX OOPHORECTOMY  3/2016    mass removed and left oopherectomy    HX OTHER SURGICAL  2009    ileocectomy, bowel resection,     IN ABDOMEN SURGERY PROC UNLISTED  4/2010    ventral hernia repair with biologic mesh    IN COLONOSCOPY FLX DX W/COLLJ SPEC WHEN PFRMD  1/14/2011         US GUIDE BX BREAST Left 2016    neagative paythology       Social History     Tobacco Use    Smoking status: Never Smoker    Smokeless tobacco: Never Used   Substance Use Topics    Alcohol use: Yes     Alcohol/week: 1.0 standard drinks     Types: 1 Glasses of wine per week     Comment: 1 glass every 2 weeks        Family History   Problem Relation Age of Onset    Cancer Maternal Grandmother         colon ca    Breast Cancer Maternal Grandmother     Breast Cancer Paternal Grandmother     Breast Cancer Cousin         multiple; both sides          Allergies   Allergen Reactions    Sulfa (Sulfonamide Antibiotics) Hives     Childhood per parent    Toradol [Ketorolac Tromethamine] Hives    Cymbalta [Duloxetine] Seizures    Effexor [Venlafaxine] Seizures    Tramadol Seizures    Compazine [Prochlorperazine] Rash    Hydrocodone-Acetaminophen Rash    Morphine Hives and Nausea and Vomiting        Prior to Admission medications    Medication Sig Start Date End Date Taking? Authorizing Provider   cyclobenzaprine (FLEXERIL) 10 mg tablet Take 1 Tab by mouth three (3) times daily as needed for Muscle Spasm(s). 3/26/21   Comfort Sutherland MD   meloxicam (Mobic) 15 mg tablet Take 1 Tab by mouth daily.  10/4/20   Nick Romero MD   lidocaine (Lidoderm) 5 % Apply patch to the affected area for 12 hours a day and remove for 12 hours a day. 10/4/20   Jens Liu MD   bumetanide (BUMEX) 1 mg tablet Take 1 Tab by mouth two (2) times a day. 10/4/20   Jens Liu MD   diclofenac (Voltaren) 1 % gel Apply 4 g to affected area four (4) times daily. 10/4/20   Jens Liu MD   promethazine (PHENERGAN) 25 mg tablet Take 1 Tab by mouth every six (6) hours as needed for Nausea. 2/20/20   Aiyana Walker MD   butalbital-acetaminophen-caff (FIORICET) -40 mg per capsule Take 1 Cap by mouth every four (4) hours as needed for Pain. 6/14/19   Mode Pierce MD   polyethylene glycol (MIRALAX) 17 gram packet Take 1 Packet by mouth daily. Indications: constipation, If constipation last more than 24-48 hours, despite colace use. 4/19/19   Gregor Fernández MD   potassium chloride (KAON 10%) 20 mEq/15 mL solution Take 60 mEq by mouth daily. Provider, Historical   cholestyramine (QUESTRAN) 4 gram packet Take 1 Packet by mouth daily. Administer other oral medications at least 1 hour before or 4 to 6 hours after cholestyramine, due to the potential to bind to orally administered drugs [1]    Provider, Historical   citalopram (CELEXA) 40 mg tablet Take 40 mg by mouth daily (with lunch). Provider, Historical   dicyclomine (BENTYL) 20 mg tablet Take 20 mg by mouth three (3) times daily as needed (abdominal cramping). Provider, Historical   metroNIDAZOLE (METROGEL) 0.75 % gel Insert 1 Applicator into vagina nightly. Planned 5 days of treatment    Provider, Historical   pantoprazole (PROTONIX) 40 mg tablet Take 40 mg by mouth Daily (before breakfast). Provider, Historical   QUEtiapine (SEROQUEL) 200 mg tablet Take 200 mg by mouth nightly. Provider, Historical   raNITIdine (ZANTAC) 150 mg tablet Take 150 mg by mouth two (2) times a day. Patient takes with lunch and at bedtime    Provider, Historical   pravastatin (PRAVACHOL) 40 mg tablet Take 40 mg by mouth nightly.     Other, MD Codi   FERROUS FUMARATE/VIT BCOMP,C (SUPER B COMPLEX PO) Take 1 Tab by mouth daily. Provider, Historical   MULTIVITAMIN (ONE-A-DAY ESSENTIAL PO) Take 1 Tab by mouth daily. Provider, Historical   ALPRAZolam Shade Nones) 2 mg tablet Take 1 mg by mouth two (2) times a day. Patient takes in the morning and with a late lunch    Provider, Historical   ALPRAZolam (XANAX) 2 mg tablet Take 2 mg by mouth nightly. Provider, Historical   estradiol (ESTRACE) 1 mg tablet Take 1 mg by mouth daily (with lunch). Provider, Historical       Review of Systems:  (bold if positive, if negative)    Gen:  fatigueEyes:  ENT:  CVS:  Pulm:  GI:  Abdominal pain, nausea, emesis,constipationGU:  MS:  Pain, weaknessSkin:  Psych:  depression, anxiety,Endo:  Hem:  Renal:  Neuro:            Objective:      VITALS:    Vital signs reviewed; most recent are:    Visit Vitals  /84 (BP 1 Location: Left lower arm, BP Patient Position: At rest)   Pulse 81   Temp 98 °F (36.7 °C)   Resp 18   Ht 5' 7\" (1.702 m)   Wt 138.3 kg (305 lb)   SpO2 92%   BMI 47.77 kg/m²     SpO2 Readings from Last 6 Encounters:   07/20/21 92%   06/05/21 96%   03/27/21 96%   10/03/20 98%   09/22/20 95%   04/22/20 92%        No intake or output data in the 24 hours ending 07/20/21 0323         Exam:     Physical Exam:    Gen: obese. Chronically ill-appearing. NAD  HEENT:  No scleral icterus, hearing intact to voice, moist mucous membranes  Neck:  Supple, without masses. Resp:  No accessory muscle use. CTAB without wheezing, rales, rhonchi  Card: RRR. Normal S1 and S2 without murmurs, rubs, or gallops. No peripheral lower extremity edema. No JVD. Peripheral pulses in tact. Abd:  Quiet bowel sounds. Soft, TTP lower quadrants. Obese but did not appear distended. No rebound, no guarding. Difficult to appreciate hepatosplenomegaly   Musc:  No cyanosis or clubbing  Skin:  No rashes or ulcers; turgor intact.    Neuro:  Cranial nerves are grossly intact, no focal motor weakness, follows commands appropriately  Psych:  Fair insight, normal affect. Alert, oriented x 3. Answers questions appropriately       Labs:    Recent Labs     07/19/21  1858   WBC 8.1   HGB 13.3   HCT 40.8        Recent Labs     07/19/21  1858      K 4.0      CO2 26   *   BUN 11   CREA 1.04*   CA 9.4   ALB 3.5   ALT 43     No components found for: GLPOC  No results for input(s): PH, PCO2, PO2, HCO3, FIO2 in the last 72 hours. No results for input(s): INR, INREXT in the last 72 hours. Lab Results   Component Value Date/Time    Specimen Description: URINE 11/29/2013 11:30 AM    Specimen Description: URINE 04/28/2010 01:10 PM    Specimen Description: ABDOMEN 01/01/2010 09:30 PM     Lab Results   Component Value Date/Time    Culture result: MIXED UROGENITAL SANIYA ISOLATED 04/22/2020 05:34 PM    Culture result:  03/07/2019 08:50 PM     NO ROUTINE ENTERIC PATHOGENS ISOLATED INCLUDING SALMONELLA, SHIGELLA, YERSINIA, VIBRIO OR SHIGA TOXIN PRODUCING E. COLI    Culture result:  03/23/2017 11:00 AM     NO ROUTINE ENTERIC PATHOGENS ISOLATED INCLUDING SALMONELLA, SHIGELLA, YERSINIA, VIBRIO OR SHIGA TOXIN PRODUCING E. COLI     All other current labs reviewed in the computer. Imaging/Studies:    CT ABD PELV W CONT    Result Date: 7/19/2021  1. Small ventral hernia is again noted containing loops small bowel with mild interval upstream dilatation of the of the small bowel loop with compared to prior study. No evidence of high-grade obstruction.  2.  Other incidental findings as above      ___________________________________________________    Attending Physician: Grace Whitaker MD

## 2021-07-20 NOTE — ED PROVIDER NOTES
58-year-old female history of anxiety, bowel obstruction, fatty liver, gallstones, GERD, kidney stones, obesity presents to the emergency department with worsening abdominal pain. She tells me that she feels he is having a colitis flare and had some bloody diarrhea last Wednesday. She has not been able to have a bowel movement for the past 3 days. No fevers. She notices tenderness nausea and vomiting. She complains of abdominal distention as well with pain worse in the right lower quadrant. No dysuria. She has had multiple abdominal surgeries. The history is provided by the patient and medical records. Abdominal Pain   This is a recurrent problem. The current episode started more than 2 days ago. The problem occurs constantly. The problem has been rapidly worsening. The pain is associated with vomiting. The pain is located in the RLQ and suprapubic region. The quality of the pain is aching, shooting and burning. The pain is severe. Associated symptoms include diarrhea, nausea, vomiting and constipation. Pertinent negatives include no fever, no dysuria, no frequency, no headaches, no arthralgias, no myalgias, no trauma, no chest pain and no back pain. Past workup includes CT scan, surgery. Her past medical history is significant for small bowel obstruction. The patient's surgical history includes hysterectomy. 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  PUD (peptic ulcer disease) 2016    stomach and colon ulcers?     Seizures ([address]     me dside effect? last sz 2013       Past Surgical History:   Procedure Laterality Date    COLONOSCOPY N/A 3/24/2017    COLONOSCOPY performed by Amy Mccracken MD at [address]  2009    HX BREAST BIOPSY Right 2007    negative pathology    HX BREAST BIOPSY Left 1999    negative pathology    HX ENDOSCOPY  2016    HX HERNIA REPAIR  9/2011    laparoscopic incisional hernia repair    HX HERNIA REPAIR  1919    umbilical hernia repair    HX HYSTERECTOMY  2009    partial hysterectomy (right ovary removed)    HX OOPHORECTOMY  2009    removed post op hematoma and right oopherectomy    HX OOPHORECTOMY  3/2016    mass removed and left oopherectomy    HX OTHER SURGICAL  2009    ileocectomy, bowel resection,     [place] ABDOMEN SURGERY PROC UNLISTED  4/2010    ventral hernia repair with biologic mesh    [place] COLONOSCOPY FLX DX W/COLLJ SPEC WHEN PFRMD  1/14/2011         US GUIDE BX BREAST Left 2016    neagative paythology         Family History:   Problem Relation Age of Onset    Cancer Maternal Grandmother         colon ca    Breast Cancer Maternal Grandmother     Breast Cancer Paternal Grandmother     Breast Cancer Cousin         multiple; both sides       Social History     Socioeconomic History    Marital status:      Spouse name: Not on file    Number of children: Not on file    Years of education: Not on file    Highest education level: Not on file   Occupational History    Not on file   Tobacco Use    Smoking status: Never Smoker    Smokeless tobacco: Never Used   Substance and Sexual Activity    Alcohol use:  Yes     Alcohol/week: 1.0 standard drinks     Types: 1 Glasses of wine per week     Comment: 1 glass every 2 weeks    Drug use: No    Sexual activity: Not on file   Other Topics Concern    Not on file   Social History Narrative    Not on file     Social Determinants of Health     Financial Resource Strain:     Difficulty of Paying Living Expenses:    Food Insecurity:     Worried About Running Out of Food in the Last Year:     [address] in the Last Year: Transportation Needs:     Lack of Transportation (Medical):  Lack of Transportation (Non-Medical):    Physical Activity:     Days of Exercise per Week:     Minutes of Exercise per Session:    Stress:     Feeling of Stress :    Social Connections:     Frequency of Communication with Friends and Family:     Frequency of Social Gatherings with Friends and Family:     Attends Alevism Services:     Active Member of Clubs or Organizations:     Attends Club or Organization Meetings:     Marital Status:    Intimate Partner Violence:     Fear of Current or Ex-Partner:     Emotionally Abused:     Physically Abused:     Sexually Abused: ALLERGIES: Sulfa (sulfonamide antibiotics), Toradol [ketorolac tromethamine], Cymbalta [duloxetine], Effexor [venlafaxine], Tramadol, Compazine [prochlorperazine], Hydrocodone-acetaminophen, and Morphine    Review of Systems   Constitutional: Negative for fatigue and fever. HENT: Negative for sneezing and sore throat. Respiratory: Negative for cough and shortness of breath. Cardiovascular: Negative for chest pain and leg swelling. Gastrointestinal: Positive for abdominal pain, constipation, diarrhea, nausea and vomiting. Genitourinary: Negative for difficulty urinating, dysuria and frequency. Musculoskeletal: Negative for arthralgias, back pain and myalgias. Skin: Negative for color change and rash. Neurological: Negative for weakness and headaches. Psychiatric/Behavioral: Negative for agitation and behavioral problems. Vitals:    07/19/21 1700   BP: (!) 147/91   Pulse: 89   Resp: 16   Temp: 96.8 °F (36 °C)   SpO2: 95%   Weight: 138.3 kg (305 lb)   Height: 5' 7\" (1.702 m)            Physical Exam  Vitals and nursing note reviewed. Constitutional:       General: She is not in acute distress. Appearance: Normal appearance. She is well-developed. She is obese. She is not ill-appearing, toxic-appearing or diaphoretic.    HENT:      Head: Normocephalic and atraumatic. Nose: Nose normal.      Mouth/Throat:      Mouth: Mucous membranes are moist.      Pharynx: Oropharynx is clear. Eyes:      Extraocular Movements: Extraocular movements intact. Conjunctiva/sclera: Conjunctivae normal.      Pupils: Pupils are equal, round, and reactive to light. Cardiovascular:      Rate and Rhythm: Normal rate and regular rhythm. Pulses: Normal pulses. Heart sounds: Normal heart sounds. Pulmonary:      Effort: Pulmonary effort is normal. No respiratory distress. Breath sounds: Normal breath sounds. No wheezing. Chest:      Chest wall: No tenderness. Abdominal:      General: Abdomen is flat. There is distension. Palpations: Abdomen is soft. Tenderness: There is generalized abdominal tenderness and tenderness in the right lower quadrant. There is guarding. There is no rebound. Musculoskeletal:         General: No swelling, tenderness, deformity or signs of injury. Normal range of motion. Cervical back: Normal range of motion and neck supple. No rigidity. No muscular tenderness. Right lower leg: No edema. Left lower leg: No edema. Skin:     General: Skin is warm and dry. Capillary Refill: Capillary refill takes less than 2 seconds. Neurological:      General: No focal deficit present. Mental Status: She is alert and oriented to person, place, and time. Psychiatric:         Mood and Affect: Mood normal.         Behavior: Behavior normal.          MDM  Number of Diagnoses or Management Options  Diagnosis management comments: 43-year-old female presents as above with abdominal pain. Review of  aware indicates significant chronic narcotic treatment. CT scan without evidence of obstruction although there is a hernia with a small loop of bowel inside of it. Her presentation was discussed with general surgery.   Given her bloody diarrhea and concern for potential inflammatory bowel disease they recommend medicine admission. Amount and/or Complexity of Data Reviewed  Clinical lab tests: reviewed  Tests in the radiology section of CPT®: reviewed  Decide to obtain previous medical records or to obtain history from someone other than the patient: yes      ED Course as of Jul 19 2350   Mon Jul 19, 2021 2349 Discussed with general surgery, recommends medicine admission given the bloody diarrhea with concern for inflammatory bowel disease. Her surgeon will be able to see her in the morning. [JM]      ED Course User Index  [JM] Jose Ortega MD       Procedures      Perfect Serve Consult for Admission  11:51 PM    ED Room Number: AU44/83  Patient Name and age:  Olla Prader 45 y.o.  female  Working Diagnosis:   1. Bloody diarrhea    2. Abdominal pain, right lower quadrant    3. Right lower quadrant abdominal tenderness without rebound tenderness        COVID-19 Suspicion:  no  Sepsis present:  no  Reassessment needed: N/A  Code Status:  Full Code  Readmission: no  Isolation Requirements:  no  Recommended Level of Care:  med/surg  Department:[facility] ED - [phone]  Other: Patient with significant abdominal surgical history and concern for inflammatory bowel disease. Significant outpatient opiate history.

## 2021-07-21 ENCOUNTER — APPOINTMENT (OUTPATIENT)
Dept: GENERAL RADIOLOGY | Age: 51
DRG: 394 | End: 2021-07-21
Attending: INTERNAL MEDICINE
Payer: COMMERCIAL

## 2021-07-21 LAB
ALBUMIN SERPL-MCNC: 3 G/DL (ref 3.5–5)
ALBUMIN/GLOB SERPL: 0.7 {RATIO} (ref 1.1–2.2)
ALP SERPL-CCNC: 112 U/L (ref 45–117)
ALT SERPL-CCNC: 47 U/L (ref 12–78)
ANION GAP SERPL CALC-SCNC: 4 MMOL/L (ref 5–15)
AST SERPL-CCNC: 59 U/L (ref 15–37)
BASOPHILS # BLD: 0 K/UL (ref 0–0.1)
BASOPHILS NFR BLD: 0 % (ref 0–1)
BILIRUB SERPL-MCNC: 0.6 MG/DL (ref 0.2–1)
BUN SERPL-MCNC: 15 MG/DL (ref 6–20)
BUN/CREAT SERPL: 17 (ref 12–20)
CALCIUM SERPL-MCNC: 8 MG/DL (ref 8.5–10.1)
CHLORIDE SERPL-SCNC: 108 MMOL/L (ref 97–108)
CO2 SERPL-SCNC: 25 MMOL/L (ref 21–32)
CREAT SERPL-MCNC: 0.9 MG/DL (ref 0.55–1.02)
DIFFERENTIAL METHOD BLD: NORMAL
EOSINOPHIL # BLD: 0 K/UL (ref 0–0.4)
EOSINOPHIL NFR BLD: 0 % (ref 0–7)
ERYTHROCYTE [DISTWIDTH] IN BLOOD BY AUTOMATED COUNT: 13.2 % (ref 11.5–14.5)
GLOBULIN SER CALC-MCNC: 4.4 G/DL (ref 2–4)
GLUCOSE SERPL-MCNC: 91 MG/DL (ref 65–100)
HCT VFR BLD AUTO: 42.2 % (ref 35–47)
HGB BLD-MCNC: 13.3 G/DL (ref 11.5–16)
IMM GRANULOCYTES # BLD AUTO: 0 K/UL (ref 0–0.04)
IMM GRANULOCYTES NFR BLD AUTO: 0 % (ref 0–0.5)
LYMPHOCYTES # BLD: 2.1 K/UL (ref 0.8–3.5)
LYMPHOCYTES NFR BLD: 26 % (ref 12–49)
MAGNESIUM SERPL-MCNC: 2.2 MG/DL (ref 1.6–2.4)
MCH RBC QN AUTO: 29.8 PG (ref 26–34)
MCHC RBC AUTO-ENTMCNC: 31.5 G/DL (ref 30–36.5)
MCV RBC AUTO: 94.4 FL (ref 80–99)
MONOCYTES # BLD: 0.6 K/UL (ref 0–1)
MONOCYTES NFR BLD: 7 % (ref 5–13)
NEUTS SEG # BLD: 5.5 K/UL (ref 1.8–8)
NEUTS SEG NFR BLD: 67 % (ref 32–75)
NRBC # BLD: 0 K/UL (ref 0–0.01)
NRBC BLD-RTO: 0 PER 100 WBC
PHOSPHATE SERPL-MCNC: 3.4 MG/DL (ref 2.6–4.7)
PLATELET # BLD AUTO: 256 K/UL (ref 150–400)
PMV BLD AUTO: 10.8 FL (ref 8.9–12.9)
POTASSIUM SERPL-SCNC: 4.2 MMOL/L (ref 3.5–5.1)
PROT SERPL-MCNC: 7.4 G/DL (ref 6.4–8.2)
RBC # BLD AUTO: 4.47 M/UL (ref 3.8–5.2)
SODIUM SERPL-SCNC: 137 MMOL/L (ref 136–145)
WBC # BLD AUTO: 8.2 K/UL (ref 3.6–11)

## 2021-07-21 PROCEDURE — 83735 ASSAY OF MAGNESIUM: CPT

## 2021-07-21 PROCEDURE — 74011250637 HC RX REV CODE- 250/637: Performed by: INTERNAL MEDICINE

## 2021-07-21 PROCEDURE — C9113 INJ PANTOPRAZOLE SODIUM, VIA: HCPCS | Performed by: SURGERY

## 2021-07-21 PROCEDURE — 99218 HC RM OBSERVATION: CPT

## 2021-07-21 PROCEDURE — 36415 COLL VENOUS BLD VENIPUNCTURE: CPT

## 2021-07-21 PROCEDURE — 74018 RADEX ABDOMEN 1 VIEW: CPT

## 2021-07-21 PROCEDURE — 65270000029 HC RM PRIVATE

## 2021-07-21 PROCEDURE — 74011000250 HC RX REV CODE- 250: Performed by: SURGERY

## 2021-07-21 PROCEDURE — 74011250636 HC RX REV CODE- 250/636: Performed by: SURGERY

## 2021-07-21 PROCEDURE — 74011250636 HC RX REV CODE- 250/636: Performed by: INTERNAL MEDICINE

## 2021-07-21 PROCEDURE — 85025 COMPLETE CBC W/AUTO DIFF WBC: CPT

## 2021-07-21 PROCEDURE — 84100 ASSAY OF PHOSPHORUS: CPT

## 2021-07-21 PROCEDURE — 74011250637 HC RX REV CODE- 250/637: Performed by: SURGERY

## 2021-07-21 PROCEDURE — 80053 COMPREHEN METABOLIC PANEL: CPT

## 2021-07-21 RX ORDER — FACIAL-BODY WIPES
10 EACH TOPICAL DAILY
Status: DISCONTINUED | OUTPATIENT
Start: 2021-07-21 | End: 2021-07-28 | Stop reason: HOSPADM

## 2021-07-21 RX ADMIN — HYDROMORPHONE HYDROCHLORIDE 2 MG: 2 INJECTION, SOLUTION INTRAMUSCULAR; INTRAVENOUS; SUBCUTANEOUS at 00:17

## 2021-07-21 RX ADMIN — ACETAMINOPHEN 650 MG: 650 SUPPOSITORY RECTAL at 13:55

## 2021-07-21 RX ADMIN — HYDROMORPHONE HYDROCHLORIDE 2 MG: 2 INJECTION, SOLUTION INTRAMUSCULAR; INTRAVENOUS; SUBCUTANEOUS at 05:03

## 2021-07-21 RX ADMIN — LORAZEPAM 1 MG: 2 INJECTION INTRAMUSCULAR; INTRAVENOUS at 17:10

## 2021-07-21 RX ADMIN — ACETAMINOPHEN 650 MG: 650 SUPPOSITORY RECTAL at 21:49

## 2021-07-21 RX ADMIN — ACETAMINOPHEN 650 MG: 650 SUPPOSITORY RECTAL at 05:03

## 2021-07-21 RX ADMIN — SODIUM CHLORIDE 100 ML/HR: 9 INJECTION, SOLUTION INTRAVENOUS at 21:40

## 2021-07-21 RX ADMIN — HYDROMORPHONE HYDROCHLORIDE 2 MG: 2 INJECTION, SOLUTION INTRAMUSCULAR; INTRAVENOUS; SUBCUTANEOUS at 08:40

## 2021-07-21 RX ADMIN — HYDROMORPHONE HYDROCHLORIDE 2 MG: 2 INJECTION, SOLUTION INTRAMUSCULAR; INTRAVENOUS; SUBCUTANEOUS at 21:43

## 2021-07-21 RX ADMIN — Medication 10 ML: at 05:03

## 2021-07-21 RX ADMIN — ENOXAPARIN SODIUM 40 MG: 40 INJECTION SUBCUTANEOUS at 08:40

## 2021-07-21 RX ADMIN — ONDANSETRON 4 MG: 2 INJECTION INTRAMUSCULAR; INTRAVENOUS at 08:40

## 2021-07-21 RX ADMIN — DIPHENHYDRAMINE HYDROCHLORIDE 25 MG: 50 INJECTION, SOLUTION INTRAMUSCULAR; INTRAVENOUS at 06:49

## 2021-07-21 RX ADMIN — PROMETHAZINE HYDROCHLORIDE 25 MG: 25 INJECTION INTRAMUSCULAR; INTRAVENOUS at 21:41

## 2021-07-21 RX ADMIN — Medication 10 ML: at 13:56

## 2021-07-21 RX ADMIN — DIPHENHYDRAMINE HYDROCHLORIDE 25 MG: 50 INJECTION, SOLUTION INTRAMUSCULAR; INTRAVENOUS at 21:44

## 2021-07-21 RX ADMIN — LORAZEPAM 1 MG: 2 INJECTION INTRAMUSCULAR; INTRAVENOUS at 10:44

## 2021-07-21 RX ADMIN — BISACODYL 10 MG: 10 SUPPOSITORY RECTAL at 10:44

## 2021-07-21 RX ADMIN — SODIUM CHLORIDE 40 MG: 9 INJECTION INTRAMUSCULAR; INTRAVENOUS; SUBCUTANEOUS at 21:06

## 2021-07-21 RX ADMIN — HYDROMORPHONE HYDROCHLORIDE 2 MG: 2 INJECTION, SOLUTION INTRAMUSCULAR; INTRAVENOUS; SUBCUTANEOUS at 14:24

## 2021-07-21 RX ADMIN — ONDANSETRON 4 MG: 2 INJECTION INTRAMUSCULAR; INTRAVENOUS at 17:10

## 2021-07-21 RX ADMIN — LORAZEPAM 1 MG: 2 INJECTION INTRAMUSCULAR; INTRAVENOUS at 01:59

## 2021-07-21 RX ADMIN — Medication 10 ML: at 21:06

## 2021-07-21 RX ADMIN — DIPHENHYDRAMINE HYDROCHLORIDE 25 MG: 50 INJECTION, SOLUTION INTRAMUSCULAR; INTRAVENOUS at 00:31

## 2021-07-21 RX ADMIN — HYDROMORPHONE HYDROCHLORIDE 2 MG: 2 INJECTION, SOLUTION INTRAMUSCULAR; INTRAVENOUS; SUBCUTANEOUS at 17:10

## 2021-07-21 RX ADMIN — ENOXAPARIN SODIUM 40 MG: 40 INJECTION SUBCUTANEOUS at 21:06

## 2021-07-21 RX ADMIN — PROMETHAZINE HYDROCHLORIDE 25 MG: 25 INJECTION INTRAMUSCULAR; INTRAVENOUS at 14:24

## 2021-07-21 RX ADMIN — SODIUM CHLORIDE 40 MG: 9 INJECTION INTRAMUSCULAR; INTRAVENOUS; SUBCUTANEOUS at 08:40

## 2021-07-21 NOTE — PROGRESS NOTES
Problem: Falls - Risk of  Goal: *Absence of Falls  Description: Document Dereje Nataliia Fall Risk and appropriate interventions in the flowsheet. Outcome: Progressing Towards Goal  Note: Fall Risk Interventions:  Mobility Interventions: Communicate number of staff needed for ambulation/transfer         Medication Interventions: Evaluate medications/consider consulting pharmacy    Elimination Interventions: Call light in reach    History of Falls Interventions: Evaluate medications/consider consulting pharmacy         Problem: Patient Education: Go to Patient Education Activity  Goal: Patient/Family Education  Outcome: Progressing Towards Goal     Problem: General Medical Care Plan  Goal: *Vital signs within specified parameters  Outcome: Progressing Towards Goal  Goal: *Absence of infection signs and symptoms  Outcome: Progressing Towards Goal  Goal: *Optimal pain control at patient's stated goal  Outcome: Progressing Towards Goal     Problem: Pressure Injury - Risk of  Goal: *Prevention of pressure injury  Description: Document Shaheed Scale and appropriate interventions in the flowsheet.   Outcome: Progressing Towards Goal  Note: Pressure Injury Interventions:       Moisture Interventions: Absorbent underpads    Activity Interventions: Increase time out of bed    Mobility Interventions: Assess need for specialty bed    Nutrition Interventions: Document food/fluid/supplement intake    Friction and Shear Interventions: Minimize layers                Problem: Patient Education: Go to Patient Education Activity  Goal: Patient/Family Education  Outcome: Progressing Towards Goal

## 2021-07-21 NOTE — PROGRESS NOTES
Pt would like to speak with MD about abx for possible vaginal bacteria, a topical for possible yeast under her breast, and adding more lab work.

## 2021-07-21 NOTE — PROGRESS NOTES
7/21/2021 11:56 AM EMR reviewed, noted pt is now under General Surgery, Dr. Demond To service. CM will follow for needs, none identified at this time. ELIU Ludwig    RUR: n/a Under Obs   Risk Level: [x]Low []Moderate []High  Value-based purchasing: [] Yes [x] No  Bundle patient: [] Yes [x] No   Specify:     Transition of care plan:  1. Admitted for abdominal pain, ventral hernia with n/v, ng tube in place  2. GI consulted and following  3. Home at discharge, TBD on needs. 4. Outpatient follow-up.   5. Pt's family to transport

## 2021-07-21 NOTE — PROGRESS NOTES
[address] NP  [phone]           GI PROGRESS NOTE        NAME: Ivelisse Cortes   :  1970   MRN:  [id number]       Subjective:   Reports \"burning desire to vomit\" and severe lower abdominal pain. Objective:   NAD      VITALS:   Last 24hrs VS reviewed since prior progress note. Most recent are:  Visit Vitals  /82 (BP 1 Location: Left lower arm, BP Patient Position: At rest)   Pulse 84   Temp 97.9 °F (36.6 °C)   Resp 16   Ht 5' 7\" (1.702 m)   Wt 138.3 kg (305 lb)   SpO2 92%   BMI 47.77 kg/m²       Intake/Output Summary (Last 24 hours) at 2021 1557  Last data filed at 2021 1315  Gross per 24 hour   Intake 865 ml   Output 2300 ml   Net -1435 ml       PHYSICAL EXAM:  General: Alert, in no acute distress    HEENT: Anicteric sclerae. Lungs:            CTA Bilaterally. Heart:  Regular  rhythm,    Abdomen: Soft, Non distended, Generalized TTP. Hypoactive Bowel sounds, no HSM  Extremities: No c/c/e  Neurologic:  CN 2-12 gi, Alert and oriented X 3. No acute neurological distress   Psych:   Good insight. Not agitated.     Lab Data Reviewed:   Recent Labs     21  0025 21  1858   WBC 8.2 8.1   HGB 13.3 13.3   HCT 42.2 40.8    275     Recent Labs     21  0035 21  1858    137   K 4.2 4.0    104   CO2 25 26   BUN 15 11   CREA 0.90 1.04*   GLU 91 104*   PHOS 3.4  --    CA 8.0* 9.4     Recent Labs     21  0035 21  1858    125*   TP 7.4 8.1   ALB 3.0* 3.5   GLOB 4.4* 4.6*   LPSE  --  67*       ________________________________________________________________________  Patient Active Problem List   Diagnosis Code    Depression F32.9    Cholecystitis K81.9    Anemia D64.9    Ulcerative colitis (HCC) K51.90    Hx of thromboembolism of vein Z86.718    PUD (peptic ulcer disease) K27.9    Anxiety F41.9    Obesity E66.9    Fatty liver K76.0    Acute colitis K52.9    Nausea and vomiting R11.2    Obesity, morbid ([facility] E66.01    Small bowel obstruction ([facility] K56.609    Abdominal pain R10.9    Bloody diarrhea R19.7    Ventral hernia K43.9         Assessment and Plan:  Abdominal pain, Vomiting, Rectal Bleeding: CT scan showing ventral hernia containing loop of small bowel along with dilated bowel upstream.  Her last coloscopy was in 2019 with biopsies not consistent with ulcerative colitis. I suspect her symptoms are related to adhesive disease \ ventral hernia.  Her KUB this morning shows no definite evidence of bowel obstruction.     - Continue NGT to LWS  - OOB ambulate  - NPO  - Continue monitoring labs and replete electrolytes  - Surgery is following     Following             Signed By: Jeanmarie Christopher NP     7/21/2021  3:57 PM

## 2021-07-21 NOTE — PROGRESS NOTES
[facility]    Medical Progress Note      NAME: Jairo Martel   :  1970  MRM:  [id number]    Date/Time of service 2021  11:09 AM          Assessment and Plan:     Abdominal pain / Ventral hernia / nausea / vomiting - POA, persistent for 1 week. Noncontrast CT showed small ventral hernia containing loops small bowel with mild interval upstream dilatation of the of the small bowel loop compared to prior study. Complex past surgical history. She may have early SBO. Continues to have pain and bile out through NGT continue IVF. IV opioid analgesics PRN for now. IV antiemetics PRN. General surgery consulted, and they have kindly agreed to accept the patient onto their service.      Bloody diarrhea - Reported a bout of bloody diarrhea at home, but none here. She reports a hx of ulcerative colitis and is followed by Dr. Domingo Mcdaniel, but our records show no evidence of UC, including path report and CT/MRI studies. Consult GI.     Depression / anxiety / chronic pain - This contributes to her symptoms. I am concerned about opioid/benzo dependence. Resume home meds when not NPO     PUD (peptic ulcer disease) - IV PPI     Obesity, morbid  -BMI 47.77 kg/m². Would benefit from weight loss and dietary / lifestyle modifications. Outpatient REYNA testing       Subjective:     Chief Complaint:  abd pain persists    ROS:  (bold if positive, if negative)    Abd PainTolerating PT   NPO        Objective:     Last 24hrs VS reviewed since prior progress note.  Most recent are:    Visit Vitals  /83 (BP 1 Location: Left lower arm, BP Patient Position: At rest)   Pulse 78   Temp 97.7 °F (36.5 °C)   Resp 16   Ht 5' 7\" (1.702 m)   Wt 138.3 kg (305 lb)   SpO2 96%   BMI 47.77 kg/m²     SpO2 Readings from Last 6 Encounters:   21 96%   21 96%   21 96%   10/03/20 98%   20 95%   20 92%            Intake/Output Summary (Last 24 hours) at 2021 1103  Last data filed at 2021 4433  Gross per 24 hour   Intake 865 ml   Output 2200 ml   Net -1335 ml        Physical Exam:    Gen:  Morbid obese, in mild acute distress  HEENT:  Pink conjunctivae, PERRL, hearing intact to voice, moist mucous membranes  Neck:  Supple, without masses, thyroid non-tender  Resp:  No accessory muscle use, clear breath sounds without wheezes rales or rhonchi  Card:  No murmurs, normal S1, S2 without thrills, bruits or peripheral edema  Abd:  Soft, non-tender, distended, reduced bowel sounds are present, no mass  Lymph:  No cervical or inguinal adenopathy  Musc:  No cyanosis or clubbing  Skin:  No rashes or ulcers, skin turgor is good  Neuro:  Cranial nerves are grossly intact, mild motor weakness, follows commands appropriately  Psych:  Poor insight, oriented to person, place and time, alert    Telemetry reviewed:   normal sinus rhythm  __________________________________________________________________  Medications Reviewed: (see below)  Medications:     Current Facility-Administered Medications   Medication Dose Route Frequency    bisacodyL (DULCOLAX) suppository 10 mg  10 mg Rectal DAILY    pantoprazole (PROTONIX) 40 mg in 0.9% sodium chloride 10 mL injection  40 mg IntraVENous Q12H    sodium chloride (NS) flush 5-40 mL  5-40 mL IntraVENous Q8H    sodium chloride (NS) flush 5-40 mL  5-40 mL IntraVENous PRN    acetaminophen (TYLENOL) tablet 650 mg  650 mg Oral Q6H PRN    Or    acetaminophen (TYLENOL) suppository 650 mg  650 mg Rectal Q6H PRN    polyethylene glycol (MIRALAX) packet 17 g  17 g Oral DAILY PRN    0.9% sodium chloride infusion  100 mL/hr IntraVENous CONTINUOUS    ondansetron (ZOFRAN) injection 4 mg  4 mg IntraVENous Q6H PRN    naloxone (NARCAN) injection 0.4 mg  0.4 mg IntraVENous EVERY 2 MINUTES AS NEEDED    promethazine (PHENERGAN) 25 mg in NS IVPB  25 mg IntraVENous Q6H PRN    enoxaparin (LOVENOX) injection 40 mg  40 mg SubCUTAneous Q12H    LORazepam (ATIVAN) injection 1 mg  1 mg IntraVENous Q4H PRN  phenol throat spray (CHLORASEPTIC) 1 Spray  1 Spray Oral PRN    diphenhydrAMINE (BENADRYL) injection 25 mg  25 mg IntraVENous Q6H PRN    HYDROmorphone (DILAUDID) injection 2 mg  2 mg IntraVENous Q3H PRN        Lab Data Reviewed: (see below)  Lab Review:     Recent Labs     07/21/21  0025 07/19/21  1858   WBC 8.2 8.1   HGB 13.3 13.3   HCT 42.2 40.8    275     Recent Labs     07/21/21  0035 07/19/21  1858    137   K 4.2 4.0    104   CO2 25 26   GLU 91 104*   BUN 15 11   CREA 0.90 1.04*   CA 8.0* 9.4   MG 2.2  --    PHOS 3.4  --    ALB 3.0* 3.5   TBILI 0.6 0.4   ALT 47 43     Lab Results   Component Value Date/Time    Glucose (POC) 93 01/14/2018 03:07 PM    Glucose (POC) 94 05/17/2010 11:04 AM    Glucose (POC) 84 01/01/2010 06:35 PM    Glucose (POC) 120 (H) 12/30/2009 12:41 PM    Glucose (POC) 111 (H) 12/30/2009 05:20 AM    Glucose (POC) 133 (H) 12/30/2009 12:00 AM    Glucose (POC) 110 (H) 12/29/2009 06:33 PM    Glucose (POC) 128 (H) 12/29/2009 11:59 AM     No results for input(s): PH, PCO2, PO2, HCO3, FIO2 in the last 72 hours. No results for input(s): INR, INREXT in the last 72 hours. All Micro Results     Procedure Component Value Units Date/Time    URINE CULTURE HOLD SAMPLE [[id number]] Collected: 07/19/21 2158    Order Status: Completed Specimen: Urine from Serum Updated: 07/19/21 2203     Urine culture hold       Urine on hold in Microbiology dept for 2 days. If unpreserved urine is submitted, it cannot be used for addtional testing after 24 hours, recollection will be required. Other pertinent lab: none    Total time spent with patient: 30 Minutes I personally reviewed chart, notes, data and current medications in the medical record. I have personally examined and treated the patient at bedside during this period.                  Care Plan discussed with: Patient, Care Manager, Nursing Staff, Consultant/Specialist and >50% of time spent in counseling and coordination of care    Discussed:  Care Plan and D/C Planning    Prophylaxis:  Lovenox and H2B/PPI    Disposition:  Home w/Family           ___________________________________________________    Attending Physician: Devaughn Ruggiero MD

## 2021-07-22 ENCOUNTER — APPOINTMENT (OUTPATIENT)
Dept: GENERAL RADIOLOGY | Age: 51
DRG: 394 | End: 2021-07-22
Attending: SURGERY
Payer: COMMERCIAL

## 2021-07-22 PROCEDURE — 74011250637 HC RX REV CODE- 250/637: Performed by: INTERNAL MEDICINE

## 2021-07-22 PROCEDURE — 65270000029 HC RM PRIVATE

## 2021-07-22 PROCEDURE — 74011250636 HC RX REV CODE- 250/636: Performed by: INTERNAL MEDICINE

## 2021-07-22 PROCEDURE — 74011250637 HC RX REV CODE- 250/637: Performed by: SURGERY

## 2021-07-22 PROCEDURE — 74011250636 HC RX REV CODE- 250/636: Performed by: SURGERY

## 2021-07-22 PROCEDURE — 76937 US GUIDE VASCULAR ACCESS: CPT

## 2021-07-22 PROCEDURE — C9113 INJ PANTOPRAZOLE SODIUM, VIA: HCPCS | Performed by: SURGERY

## 2021-07-22 PROCEDURE — 99218 HC RM OBSERVATION: CPT

## 2021-07-22 PROCEDURE — 77030038269 HC DRN EXT URIN PURWCK BARD -A

## 2021-07-22 PROCEDURE — 2709999900 HC NON-CHARGEABLE SUPPLY

## 2021-07-22 RX ORDER — METRONIDAZOLE 7.5 MG/G
5 GEL VAGINAL DAILY
Status: DISCONTINUED | OUTPATIENT
Start: 2021-07-22 | End: 2021-07-22

## 2021-07-22 RX ORDER — METRONIDAZOLE 7.5 MG/G
5 GEL VAGINAL DAILY
Status: COMPLETED | OUTPATIENT
Start: 2021-07-22 | End: 2021-07-25

## 2021-07-22 RX ORDER — PROMETHAZINE HYDROCHLORIDE 25 MG/1
25 SUPPOSITORY RECTAL
Status: DISCONTINUED | OUTPATIENT
Start: 2021-07-22 | End: 2021-07-24

## 2021-07-22 RX ADMIN — PROMETHAZINE HYDROCHLORIDE 25 MG: 25 INJECTION INTRAMUSCULAR; INTRAVENOUS at 04:21

## 2021-07-22 RX ADMIN — ONDANSETRON 4 MG: 2 INJECTION INTRAMUSCULAR; INTRAVENOUS at 08:29

## 2021-07-22 RX ADMIN — ACETAMINOPHEN 650 MG: 650 SUPPOSITORY RECTAL at 14:23

## 2021-07-22 RX ADMIN — ENOXAPARIN SODIUM 40 MG: 40 INJECTION SUBCUTANEOUS at 21:10

## 2021-07-22 RX ADMIN — DIPHENHYDRAMINE HYDROCHLORIDE 25 MG: 50 INJECTION, SOLUTION INTRAMUSCULAR; INTRAVENOUS at 15:36

## 2021-07-22 RX ADMIN — DIPHENHYDRAMINE HYDROCHLORIDE 25 MG: 50 INJECTION, SOLUTION INTRAMUSCULAR; INTRAVENOUS at 04:29

## 2021-07-22 RX ADMIN — HYDROMORPHONE HYDROCHLORIDE 2 MG: 2 INJECTION, SOLUTION INTRAMUSCULAR; INTRAVENOUS; SUBCUTANEOUS at 00:32

## 2021-07-22 RX ADMIN — BISACODYL 10 MG: 10 SUPPOSITORY RECTAL at 08:29

## 2021-07-22 RX ADMIN — HYDROMORPHONE HYDROCHLORIDE 2 MG: 2 INJECTION, SOLUTION INTRAMUSCULAR; INTRAVENOUS; SUBCUTANEOUS at 17:52

## 2021-07-22 RX ADMIN — LORAZEPAM 1 MG: 2 INJECTION INTRAMUSCULAR; INTRAVENOUS at 00:32

## 2021-07-22 RX ADMIN — LORAZEPAM 1 MG: 2 INJECTION INTRAMUSCULAR; INTRAVENOUS at 21:10

## 2021-07-22 RX ADMIN — LORAZEPAM 1 MG: 2 INJECTION INTRAMUSCULAR; INTRAVENOUS at 15:36

## 2021-07-22 RX ADMIN — ONDANSETRON 4 MG: 2 INJECTION INTRAMUSCULAR; INTRAVENOUS at 17:52

## 2021-07-22 RX ADMIN — SODIUM CHLORIDE 100 ML/HR: 9 INJECTION, SOLUTION INTRAVENOUS at 23:24

## 2021-07-22 RX ADMIN — ENOXAPARIN SODIUM 40 MG: 40 INJECTION SUBCUTANEOUS at 08:29

## 2021-07-22 RX ADMIN — Medication 10 ML: at 06:05

## 2021-07-22 RX ADMIN — Medication 10 ML: at 21:12

## 2021-07-22 RX ADMIN — SODIUM CHLORIDE 40 MG: 9 INJECTION INTRAMUSCULAR; INTRAVENOUS; SUBCUTANEOUS at 08:29

## 2021-07-22 RX ADMIN — SODIUM CHLORIDE 40 MG: 9 INJECTION INTRAMUSCULAR; INTRAVENOUS; SUBCUTANEOUS at 21:10

## 2021-07-22 RX ADMIN — PROMETHAZINE HYDROCHLORIDE 25 MG: 25 INJECTION INTRAMUSCULAR; INTRAVENOUS at 14:17

## 2021-07-22 RX ADMIN — LORAZEPAM 1 MG: 2 INJECTION INTRAMUSCULAR; INTRAVENOUS at 06:05

## 2021-07-22 RX ADMIN — PROMETHAZINE HYDROCHLORIDE 25 MG: 25 INJECTION INTRAMUSCULAR; INTRAVENOUS at 23:25

## 2021-07-22 RX ADMIN — HYDROMORPHONE HYDROCHLORIDE 2 MG: 2 INJECTION, SOLUTION INTRAMUSCULAR; INTRAVENOUS; SUBCUTANEOUS at 08:29

## 2021-07-22 RX ADMIN — HYDROMORPHONE HYDROCHLORIDE 2 MG: 2 INJECTION, SOLUTION INTRAMUSCULAR; INTRAVENOUS; SUBCUTANEOUS at 14:22

## 2021-07-22 RX ADMIN — ONDANSETRON 4 MG: 2 INJECTION INTRAMUSCULAR; INTRAVENOUS at 00:31

## 2021-07-22 RX ADMIN — HYDROMORPHONE HYDROCHLORIDE 2 MG: 2 INJECTION, SOLUTION INTRAMUSCULAR; INTRAVENOUS; SUBCUTANEOUS at 04:21

## 2021-07-22 RX ADMIN — HYDROMORPHONE HYDROCHLORIDE 2 MG: 2 INJECTION, SOLUTION INTRAMUSCULAR; INTRAVENOUS; SUBCUTANEOUS at 21:10

## 2021-07-22 RX ADMIN — METRONIDAZOLE 5 G: 7.5 GEL VAGINAL at 21:11

## 2021-07-22 NOTE — PROGRESS NOTES
Orders received and attempted to see patient. She adamantly refused PT citing several reasons as to why she could not ambulate; plantar fascitis, bone spur, does not have the right shoes here, hernia and no abdominal support, as well as 10/10 pain to belly, heel and head. She did not appear in any acute distress. Attempted to educate patient regarding the benefits of ambulation but she continued to refuse saying [place] tomorrow, but not now. \"  Her mother was present and also trying to encourage her. PT will attempt in AM.  Notified nursing.

## 2021-07-22 NOTE — PROGRESS NOTES
Pt had <100 cc output after NG tube clamped for 4 hours. Attempted to remove NG tube per orders. Pt refusing at this time. Pt states she may get sick overnight and does not want to have to have another NG tube placed.

## 2021-07-22 NOTE — PROGRESS NOTES
[address] NP  [phone]           GI PROGRESS NOTE        NAME: Matthew Ramirez   :  1970   MRN:  [id number]       Subjective:   Reports she had two small formed BMs this afternoon. Objective:   NAD      VITALS:   Last 24hrs VS reviewed since prior progress note. Most recent are:  Visit Vitals  /69 (BP 1 Location: Left upper arm, BP Patient Position: At rest)   Pulse 86   Temp 97.9 °F (36.6 °C)   Resp 18   Ht 5' 7\" (1.702 m)   Wt 138.3 kg (305 lb)   SpO2 94%   BMI 47.77 kg/m²       Intake/Output Summary (Last 24 hours) at 2021 1606  Last data filed at 2021 1000  Gross per 24 hour   Intake 0 ml   Output 50 ml   Net -50 ml       PHYSICAL EXAM:  General: Alert, in no acute distress    HEENT: Anicteric sclerae. Lungs:            CTA Bilaterally. Heart:  Regular  rhythm,    Abdomen: Soft, obese, generalized TTP. hypoactive Bowel sounds, no HSM  Extremities: No c/c/e  Neurologic:  CN 2-12 gi, Alert and oriented X 3.   No acute neurological distress     Lab Data Reviewed:   Recent Labs     21  0025 21  1858   WBC 8.2 8.1   HGB 13.3 13.3   HCT 42.2 40.8    275     Recent Labs     21  0035 21  1858    137   K 4.2 4.0    104   CO2 25 26   BUN 15 11   CREA 0.90 1.04*   GLU 91 104*   PHOS 3.4  --    CA 8.0* 9.4     Recent Labs     21  0035 21  1858    125*   TP 7.4 8.1   ALB 3.0* 3.5   GLOB 4.4* 4.6*   LPSE  --  67*       ________________________________________________________________________  Patient Active Problem List   Diagnosis Code    Depression F32.9    Cholecystitis K81.9    Anemia D64.9    Ulcerative colitis (HCC) K51.90    Hx of thromboembolism of vein Z86.718    PUD (peptic ulcer disease) K27.9    Anxiety F41.9    Obesity E66.9    Fatty liver K76.0    Acute colitis K52.9    Nausea and vomiting R11.2    Obesity, morbid (HCC) E66.01    Small bowel obstruction (HCC) K56.609    Abdominal pain R10.9    Bloody diarrhea R19.7    Ventral hernia K43.9         Assessment and Plan:  Abdominal pain, Vomiting, Rectal Bleeding: CT scan showing ventral hernia containing loop of small bowel along with dilated bowel upstream. Oren Nellie last coloscopy was in 2019 with biopsies not consistent with ulcerative colitis.  I suspect her symptoms are related to adhesive disease \ ventral hernia.   She has had two small formed bowel movements recently.      - Dr. Marcus Sorensen has ordered NGT to be removed  - OOB ambulate  - Continue monitoring labs and replete electrolytes  - Diet per surgery    Will see again in request.        Signed By: Lucila Willis NP     7/22/2021  4:06 PM

## 2021-07-22 NOTE — PROGRESS NOTES
[facility]    Medical Progress Note      NAME: Neeta Sandoval   :  1970  MRM:  [id number]    Date/Time of service 2021  3:12 AM          Assessment and Plan:     Abdominal pain / Ventral hernia / nausea / vomiting - Surgery is attending to this issue. POA, persistent for 1 week. Noncontrast CT showed small ventral hernia containing loops small bowel with mild interval upstream dilatation of the of the small bowel loop compared to prior study. Complex past surgical history. She may have early SBO. Continues to have pain and bile out through NGT continue IVF. IV opioid analgesics PRN for now. IV antiemetics PRN. I will order a PICC line as she has poor access and may need TPN     Bloody diarrhea ./ PUD (peptic ulcer disease) - GI is managing this issue. Reported a bout of bloody diarrhea at home, but none here. She reports a hx of ulcerative colitis and is followed by Dr. Zulay Bartholomew, but our records show no evidence of UC, including path report and CT/MRI studies. IV PPI     Depression / anxiety / chronic pain - This contributes to her symptoms. I am concerned about opioid/benzo dependence. Resume home meds when not NPO     Obesity, morbid  -BMI 47.77 kg/m². Would benefit from weight loss and dietary / lifestyle modifications. Outpatient REYNA testing     Patient has no active medical issues aside from those managed by surgery and GI. I will sign off for now, but please call with any questions. Subjective:     Chief Complaint:  abd pain persists    ROS:  (bold if positive, if negative)    Abd PainTolerating PT   NPO        Objective:     Last 24hrs VS reviewed since prior progress note.  Most recent are:    Visit Vitals  BP (!) 140/81 (BP 1 Location: Left upper arm, BP Patient Position: At rest)   Pulse 76   Temp 97.7 °F (36.5 °C)   Resp 18   Ht 5' 7\" (1.702 m)   Wt 138.3 kg (305 lb)   SpO2 95%   BMI 47.77 kg/m²     SpO2 Readings from Last 6 Encounters:   21 95%   21 96% 03/27/21 96%   10/03/20 98%   09/22/20 95%   04/22/20 92%            Intake/Output Summary (Last 24 hours) at 7/22/2021 1512  Last data filed at 7/22/2021 1000  Gross per 24 hour   Intake 0 ml   Output 50 ml   Net -50 ml        Physical Exam:    Gen:  Morbid obese, in mild acute distress  HEENT:  Pink conjunctivae, PERRL, hearing intact to voice, moist mucous membranes  Neck:  Supple, without masses, thyroid non-tender  Resp:  No accessory muscle use, clear breath sounds without wheezes rales or rhonchi  Card:  No murmurs, normal S1, S2 without thrills, bruits or peripheral edema  Abd:  Soft, non-tender, distended, reduced bowel sounds are present, no mass  Lymph:  No cervical or inguinal adenopathy  Musc:  No cyanosis or clubbing  Skin:  No rashes or ulcers, skin turgor is good  Neuro:  Cranial nerves are grossly intact, mild motor weakness, follows commands appropriately  Psych:  Poor insight, oriented to person, place and time, alert    Telemetry reviewed:   normal sinus rhythm  __________________________________________________________________  Medications Reviewed: (see below)  Medications:     Current Facility-Administered Medications   Medication Dose Route Frequency    promethazine (PHENERGAN) suppository 25 mg  25 mg Rectal Q6H PRN    metroNIDAZOLE (METROGEL) 0.75 % vaginal gel 5 g  5 g Vaginal DAILY    bisacodyL (DULCOLAX) suppository 10 mg  10 mg Rectal DAILY    pantoprazole (PROTONIX) 40 mg in 0.9% sodium chloride 10 mL injection  40 mg IntraVENous Q12H    sodium chloride (NS) flush 5-40 mL  5-40 mL IntraVENous Q8H    sodium chloride (NS) flush 5-40 mL  5-40 mL IntraVENous PRN    acetaminophen (TYLENOL) tablet 650 mg  650 mg Oral Q6H PRN    Or    acetaminophen (TYLENOL) suppository 650 mg  650 mg Rectal Q6H PRN    polyethylene glycol (MIRALAX) packet 17 g  17 g Oral DAILY PRN    0.9% sodium chloride infusion  100 mL/hr IntraVENous CONTINUOUS    ondansetron (ZOFRAN) injection 4 mg  4 mg IntraVENous Q6H PRN    naloxone (NARCAN) injection 0.4 mg  0.4 mg IntraVENous EVERY 2 MINUTES AS NEEDED    promethazine (PHENERGAN) 25 mg in NS IVPB  25 mg IntraVENous Q6H PRN    enoxaparin (LOVENOX) injection 40 mg  40 mg SubCUTAneous Q12H    LORazepam (ATIVAN) injection 1 mg  1 mg IntraVENous Q4H PRN    phenol throat spray (CHLORASEPTIC) 1 Spray  1 Spray Oral PRN    diphenhydrAMINE (BENADRYL) injection 25 mg  25 mg IntraVENous Q6H PRN    HYDROmorphone (DILAUDID) injection 2 mg  2 mg IntraVENous Q3H PRN        Lab Data Reviewed: (see below)  Lab Review:     Recent Labs     07/21/21  0025 07/19/21  1858   WBC 8.2 8.1   HGB 13.3 13.3   HCT 42.2 40.8    275     Recent Labs     07/21/21  0035 07/19/21  1858    137   K 4.2 4.0    104   CO2 25 26   GLU 91 104*   BUN 15 11   CREA 0.90 1.04*   CA 8.0* 9.4   MG 2.2  --    PHOS 3.4  --    ALB 3.0* 3.5   TBILI 0.6 0.4   ALT 47 43     Lab Results   Component Value Date/Time    Glucose (POC) 93 01/14/2018 03:07 PM    Glucose (POC) 94 05/17/2010 11:04 AM    Glucose (POC) 84 01/01/2010 06:35 PM    Glucose (POC) 120 (H) 12/30/2009 12:41 PM    Glucose (POC) 111 (H) 12/30/2009 05:20 AM    Glucose (POC) 133 (H) 12/30/2009 12:00 AM    Glucose (POC) 110 (H) 12/29/2009 06:33 PM    Glucose (POC) 128 (H) 12/29/2009 11:59 AM     No results for input(s): PH, PCO2, PO2, HCO3, FIO2 in the last 72 hours. No results for input(s): INR, INREXT, INREXT in the last 72 hours. All Micro Results     Procedure Component Value Units Date/Time    URINE CULTURE HOLD SAMPLE [[id number]] Collected: 07/19/21 2158    Order Status: Completed Specimen: Urine from Serum Updated: 07/19/21 2203     Urine culture hold       Urine on hold in Microbiology dept for 2 days. If unpreserved urine is submitted, it cannot be used for addtional testing after 24 hours, recollection will be required.                 Other pertinent lab: none    Total time spent with patient: 30 Minutes I personally reviewed chart, notes, data and current medications in the medical record. I have personally examined and treated the patient at bedside during this period.                  Care Plan discussed with: Patient, Care Manager, Nursing Staff, Consultant/Specialist and >50% of time spent in counseling and coordination of care    Discussed:  Care Plan and D/C Planning    Prophylaxis:  Lovenox and H2B/PPI    Disposition:  Home w/Family           ___________________________________________________    Attending Physician: Seema Kenney MD

## 2021-07-22 NOTE — PROGRESS NOTES
3:44 PM    CM reviewed EMR and noted patient continues to require medical management. Discussed in rounds. UR: n/a Under Obs   Risk Level: [x]? Low []? Moderate []? High  Value-based purchasing: []? Yes [x]? No  Bundle patient: []? Yes [x]? No              Specify:      Transition of care plan:  1. Admitted for abdominal pain, ventral hernia with n/v, ng tube in place- under surgery  2. GI consulted and following  3. Home at discharge, TBD on needs. 4. Outpatient follow-up.   5. Pt's family to transport    [facility]

## 2021-07-22 NOTE — PROGRESS NOTES
MIDLINE PLACEMENT NOTE    Midline catheters are NOT central lines. Approved midline products are peripheral infusion devices with the tip terminating in the arm at or below the axillary vein, distal to the shoulder. The tip DOES NOT ENTER THE CENTRAL VASCULATURE. A Midline is for reliable short term (less than 30 days) vascular access. PRE-PROCEDURE VERIFICATION  Correct Procedure: yes  Correct Site:  yes  Temperature: Temp: 97.7 °F (36.5 °C), Temperature Source: Temp Source: Oral  Recent Labs     07/21/21  0035 07/21/21  0025 07/19/21  1858   BUN 15  --    < >   CREA 0.90  --    < >   PLT  --  256  --    WBC  --  8.2  --     < > = values in this interval not displayed. Allergies: Sulfa (sulfonamide antibiotics), Toradol [ketorolac tromethamine], Cymbalta [duloxetine], Effexor [venlafaxine], Tramadol, Compazine [prochlorperazine], Hydrocodone-acetaminophen, and Morphine  Education materials for Midline Care given: yes. See Patient Education activity for further details. Midline Booklet placed at bedside: yes    Midline Catheter Maintenance: For complete information reference Policy # [id number]    A. Dressing Changes       1. Assess the dressing in the first 24 hours for accumulation of blood, fluid or moisture beneath the dressing. During dressing changes, assess the external length of the catheter to determine if migration of the catheter has occurred. Periodically confirm catheter placement, tip location, patency and security of dressing. Dressing changes should be done every 7 days, and PRN using sterile technique if integrity of dressing is compromised. Apply new dressing per hospital policy. Caution: Do not use scissors to remove dressing to minimize the risk of cutting the  Catheter. B. Flushing       1. Flush each lumen of the catheter with 10 mL of sterile saline every 12 hours or after each use. In addition, lock each lumen of the catheter with sterile saline.    Note: Flush with 20 mL of sterile saline after blood therapy   Caution: DO NOT USE A SYRINGE SMALLER THAN 10 mL TO FLUSH AND CONFIRM PATENCY. Patency should be assessed with a 10 mL or larger syringe and  sterile saline. Upon confirmation of patency, administration of medication should be  given in a syringe appropriately sized for the dose. Do not infuse against resistance. C. Blood Draws:        1. Stop infusion, draw off and waste 10 ml of blood. Draw sample with 10 mL syringe or          greater. DO NOT USE VACUTAINER . Transfer with appropriate device to lab tubes. Flush        with 20 ml NS.  D. Occluded or Partially Occluded Catheter       1. Catheters that present resistance to flushing and aspiration may be partially or completely  occluded. Do not flush against resistance. PROCEDURE DETAIL:    Verbal consent was obtained and all questions were answered related to risks and benefits. A Midline was inserted as a sterile procedure using ultrasound and Modified Seldinger Technique for vascular access. The following documentation is in addition to the Midline properties in the lines/airways Doc Flow Sheet:  Lot #: [id number]  Lidocaine 1% administered intradermally :yes  Internal Catheter Total Length: 13 (cm)   External catheter length: 0 (cm)  Vein Selection for Midline:right basilic  Sterile CVC Insertion Bundle was used to place line yes  Complication Related to Insertion:no    Prior to use, line patency MUST be verified by flushing at least a 10 mL syringe. Line should flush easily without resistance, and withdrawal of brisk blood return to verify patency.     Line is okay to use: yes        (Remove Midline by:   8/20/21           )  Tegan Boyd RN

## 2021-07-23 LAB
ALBUMIN SERPL-MCNC: 2.8 G/DL (ref 3.5–5)
ALBUMIN/GLOB SERPL: 0.7 {RATIO} (ref 1.1–2.2)
ALP SERPL-CCNC: 103 U/L (ref 45–117)
ALT SERPL-CCNC: 34 U/L (ref 12–78)
ANION GAP SERPL CALC-SCNC: 5 MMOL/L (ref 5–15)
AST SERPL-CCNC: 27 U/L (ref 15–37)
BILIRUB SERPL-MCNC: 0.4 MG/DL (ref 0.2–1)
BUN SERPL-MCNC: 14 MG/DL (ref 6–20)
BUN/CREAT SERPL: 21 (ref 12–20)
CALCIUM SERPL-MCNC: 7.9 MG/DL (ref 8.5–10.1)
CHLORIDE SERPL-SCNC: 106 MMOL/L (ref 97–108)
CO2 SERPL-SCNC: 27 MMOL/L (ref 21–32)
CREAT SERPL-MCNC: 0.66 MG/DL (ref 0.55–1.02)
ERYTHROCYTE [DISTWIDTH] IN BLOOD BY AUTOMATED COUNT: 13 % (ref 11.5–14.5)
GLOBULIN SER CALC-MCNC: 4 G/DL (ref 2–4)
GLUCOSE SERPL-MCNC: 80 MG/DL (ref 65–100)
HCT VFR BLD AUTO: 35.9 % (ref 35–47)
HGB BLD-MCNC: 11.2 G/DL (ref 11.5–16)
MAGNESIUM SERPL-MCNC: 2.1 MG/DL (ref 1.6–2.4)
MCH RBC QN AUTO: 29.2 PG (ref 26–34)
MCHC RBC AUTO-ENTMCNC: 31.2 G/DL (ref 30–36.5)
MCV RBC AUTO: 93.7 FL (ref 80–99)
NRBC # BLD: 0 K/UL (ref 0–0.01)
NRBC BLD-RTO: 0 PER 100 WBC
PHOSPHATE SERPL-MCNC: 2.6 MG/DL (ref 2.6–4.7)
PLATELET # BLD AUTO: 199 K/UL (ref 150–400)
PMV BLD AUTO: 10.7 FL (ref 8.9–12.9)
POTASSIUM SERPL-SCNC: 3.2 MMOL/L (ref 3.5–5.1)
PROT SERPL-MCNC: 6.8 G/DL (ref 6.4–8.2)
RBC # BLD AUTO: 3.83 M/UL (ref 3.8–5.2)
SODIUM SERPL-SCNC: 138 MMOL/L (ref 136–145)
WBC # BLD AUTO: 7.2 K/UL (ref 3.6–11)

## 2021-07-23 PROCEDURE — 74011250636 HC RX REV CODE- 250/636: Performed by: INTERNAL MEDICINE

## 2021-07-23 PROCEDURE — 97161 PT EVAL LOW COMPLEX 20 MIN: CPT

## 2021-07-23 PROCEDURE — 65270000029 HC RM PRIVATE

## 2021-07-23 PROCEDURE — 74011250636 HC RX REV CODE- 250/636: Performed by: SURGERY

## 2021-07-23 PROCEDURE — 85027 COMPLETE CBC AUTOMATED: CPT

## 2021-07-23 PROCEDURE — 84100 ASSAY OF PHOSPHORUS: CPT

## 2021-07-23 PROCEDURE — C9113 INJ PANTOPRAZOLE SODIUM, VIA: HCPCS | Performed by: SURGERY

## 2021-07-23 PROCEDURE — 77030038269 HC DRN EXT URIN PURWCK BARD -A

## 2021-07-23 PROCEDURE — 74011250637 HC RX REV CODE- 250/637: Performed by: INTERNAL MEDICINE

## 2021-07-23 PROCEDURE — 97530 THERAPEUTIC ACTIVITIES: CPT

## 2021-07-23 PROCEDURE — 80053 COMPREHEN METABOLIC PANEL: CPT

## 2021-07-23 PROCEDURE — 83735 ASSAY OF MAGNESIUM: CPT

## 2021-07-23 PROCEDURE — 36415 COLL VENOUS BLD VENIPUNCTURE: CPT

## 2021-07-23 PROCEDURE — 74011250637 HC RX REV CODE- 250/637: Performed by: SURGERY

## 2021-07-23 RX ADMIN — ONDANSETRON 4 MG: 2 INJECTION INTRAMUSCULAR; INTRAVENOUS at 12:14

## 2021-07-23 RX ADMIN — PROMETHAZINE HYDROCHLORIDE 25 MG: 25 INJECTION INTRAMUSCULAR; INTRAVENOUS at 16:42

## 2021-07-23 RX ADMIN — SODIUM CHLORIDE 100 ML/HR: 9 INJECTION, SOLUTION INTRAVENOUS at 13:00

## 2021-07-23 RX ADMIN — ENOXAPARIN SODIUM 40 MG: 40 INJECTION SUBCUTANEOUS at 21:45

## 2021-07-23 RX ADMIN — Medication 10 ML: at 13:42

## 2021-07-23 RX ADMIN — ONDANSETRON 4 MG: 2 INJECTION INTRAMUSCULAR; INTRAVENOUS at 21:44

## 2021-07-23 RX ADMIN — SODIUM CHLORIDE 40 MG: 9 INJECTION INTRAMUSCULAR; INTRAVENOUS; SUBCUTANEOUS at 09:03

## 2021-07-23 RX ADMIN — LORAZEPAM 1 MG: 2 INJECTION INTRAMUSCULAR; INTRAVENOUS at 21:44

## 2021-07-23 RX ADMIN — LORAZEPAM 1 MG: 2 INJECTION INTRAMUSCULAR; INTRAVENOUS at 16:42

## 2021-07-23 RX ADMIN — METRONIDAZOLE 5 G: 7.5 GEL VAGINAL at 21:52

## 2021-07-23 RX ADMIN — HYDROMORPHONE HYDROCHLORIDE 2 MG: 2 INJECTION, SOLUTION INTRAMUSCULAR; INTRAVENOUS; SUBCUTANEOUS at 13:39

## 2021-07-23 RX ADMIN — HYDROMORPHONE HYDROCHLORIDE 2 MG: 2 INJECTION, SOLUTION INTRAMUSCULAR; INTRAVENOUS; SUBCUTANEOUS at 16:42

## 2021-07-23 RX ADMIN — HYDROMORPHONE HYDROCHLORIDE 2 MG: 2 INJECTION, SOLUTION INTRAMUSCULAR; INTRAVENOUS; SUBCUTANEOUS at 04:21

## 2021-07-23 RX ADMIN — ONDANSETRON 4 MG: 2 INJECTION INTRAMUSCULAR; INTRAVENOUS at 01:14

## 2021-07-23 RX ADMIN — BISACODYL 10 MG: 10 SUPPOSITORY RECTAL at 09:03

## 2021-07-23 RX ADMIN — DIPHENHYDRAMINE HYDROCHLORIDE 25 MG: 50 INJECTION, SOLUTION INTRAMUSCULAR; INTRAVENOUS at 10:35

## 2021-07-23 RX ADMIN — HYDROMORPHONE HYDROCHLORIDE 2 MG: 2 INJECTION, SOLUTION INTRAMUSCULAR; INTRAVENOUS; SUBCUTANEOUS at 01:14

## 2021-07-23 RX ADMIN — SODIUM CHLORIDE 40 MG: 9 INJECTION INTRAMUSCULAR; INTRAVENOUS; SUBCUTANEOUS at 21:45

## 2021-07-23 RX ADMIN — ENOXAPARIN SODIUM 40 MG: 40 INJECTION SUBCUTANEOUS at 09:03

## 2021-07-23 RX ADMIN — PROMETHAZINE HYDROCHLORIDE 25 MG: 25 INJECTION INTRAMUSCULAR; INTRAVENOUS at 07:25

## 2021-07-23 RX ADMIN — HYDROMORPHONE HYDROCHLORIDE 2 MG: 2 INJECTION, SOLUTION INTRAMUSCULAR; INTRAVENOUS; SUBCUTANEOUS at 19:49

## 2021-07-23 RX ADMIN — LORAZEPAM 1 MG: 2 INJECTION INTRAMUSCULAR; INTRAVENOUS at 12:14

## 2021-07-23 RX ADMIN — HYDROMORPHONE HYDROCHLORIDE 2 MG: 2 INJECTION, SOLUTION INTRAMUSCULAR; INTRAVENOUS; SUBCUTANEOUS at 23:06

## 2021-07-23 RX ADMIN — HYDROMORPHONE HYDROCHLORIDE 2 MG: 2 INJECTION, SOLUTION INTRAMUSCULAR; INTRAVENOUS; SUBCUTANEOUS at 07:25

## 2021-07-23 RX ADMIN — POLYETHYLENE GLYCOL 3350 17 G: 17 POWDER, FOR SOLUTION ORAL at 12:14

## 2021-07-23 RX ADMIN — HYDROMORPHONE HYDROCHLORIDE 2 MG: 2 INJECTION, SOLUTION INTRAMUSCULAR; INTRAVENOUS; SUBCUTANEOUS at 10:35

## 2021-07-23 NOTE — PROGRESS NOTES
Assessment / Plan:   Small bowel obstruction +BF overnight,    C/o vaginal itching, h/o BV. Start Metronidazole x 5   Clamp trial  PT    Clement Palm MD  [address] [facility]  Office:  [phone]  Fax:  [phone]        General Surgery Daily Progress Note      Patient: Anastasia Muñoz MRN: [id number]  SSN: [id number]    YOB: 1970  Age: 45 y.o. Sex: female      Admit Date: 7/19/2021 for sbo    Subjective:   Patient reports overnight bowel funciton, epiodes of nausea.  .    Current Facility-Administered Medications   Medication Dose Route Frequency    promethazine (PHENERGAN) suppository 25 mg  25 mg Rectal Q6H PRN    metroNIDAZOLE (METROGEL) 0.75 % vaginal gel 5 g  5 g Vaginal DAILY    bisacodyL (DULCOLAX) suppository 10 mg  10 mg Rectal DAILY    pantoprazole (PROTONIX) 40 mg in 0.9% sodium chloride 10 mL injection  40 mg IntraVENous Q12H    sodium chloride (NS) flush 5-40 mL  5-40 mL IntraVENous Q8H    sodium chloride (NS) flush 5-40 mL  5-40 mL IntraVENous PRN    acetaminophen (TYLENOL) tablet 650 mg  650 mg Oral Q6H PRN    Or    acetaminophen (TYLENOL) suppository 650 mg  650 mg Rectal Q6H PRN    polyethylene glycol (MIRALAX) packet 17 g  17 g Oral DAILY PRN    0.9% sodium chloride infusion  100 mL/hr IntraVENous CONTINUOUS    ondansetron (ZOFRAN) injection 4 mg  4 mg IntraVENous Q6H PRN    naloxone (NARCAN) injection 0.4 mg  0.4 mg IntraVENous EVERY 2 MINUTES AS NEEDED    promethazine (PHENERGAN) 25 mg in NS IVPB  25 mg IntraVENous Q6H PRN    enoxaparin (LOVENOX) injection 40 mg  40 mg SubCUTAneous Q12H    LORazepam (ATIVAN) injection 1 mg  1 mg IntraVENous Q4H PRN    phenol throat spray (CHLORASEPTIC) 1 Spray  1 Spray Oral PRN    diphenhydrAMINE (BENADRYL) injection 25 mg  25 mg IntraVENous Q6H PRN    HYDROmorphone (DILAUDID) injection 2 mg  2 mg IntraVENous Q3H PRN        Objective:   07/22 1901 - 07/23 0700  In: 10   Out: -   07/21 0701 - 07/22 1900  In: 0 Out: 150   Patient Vitals for the past 8 hrs:   BP Temp Pulse Resp SpO2   07/22/21 2328 (!) 150/71 98.3 °F (36.8 °C) 71 -- 95 %   07/22/21 1942 (!) 142/89 98.2 °F (36.8 °C) 70 18 94 %       Physical Exam:  General: Alert, cooperative, no distress, appears stated age. Neck:  Supple, symmetrical, trachea midline, no adenopathy, thyroid: no                           enlargement/tenderness/nodules, no carotid bruit and no JVD. Lungs: Clear to auscultation bilaterally. Heart:  Regular rate and rhythm, S1, S2 normal, no murmur, click, rub or gallop. Abdomen: Soft, non tender. Bowel sounds normal. No masses,  No organomegaly. Extremities: Extremities normal, atraumatic, no cyanosis or edema. Skin:  Skin color, texture, turgor normal. No rashes or lesions    Labs:   Recent Labs     07/21/21  0025   WBC 8.2   HGB 13.3   HCT 42.2        Recent Labs     07/21/21  0035      K 4.2      CO2 25   GLU 91   BUN 15   CREA 0.90   CA 8.0*   MG 2.2   PHOS 3.4   ALB 3.0*   TBILI 0.6   ALT 47     ·     Active Problems:    Depression (7/1/2010)      Ulcerative colitis ([address] (7/25/2016)      PUD (peptic ulcer disease) ()      Overview: stomach and colon ulcers?       Anxiety ()      Obesity, morbid ([address] (2/8/2018)      Small bowel obstruction ([facility] [address] (4/13/2019)      Abdominal pain (7/20/2021)      Bloody diarrhea (7/20/2021)      Ventral hernia (7/20/2021)        Problem List Items Addressed This Visit        Digestive    Bloody diarrhea - Primary      Other Visit Diagnoses     Abdominal pain, right lower quadrant        Right lower quadrant abdominal tenderness without rebound tenderness

## 2021-07-23 NOTE — PROGRESS NOTES
1:42 PM    RUR:  15%  Risk Level: [x]? ?Low []? ? Moderate []? ? High  Value-based purchasing: []?? Yes [x]? ? No  Bundle patient: []?? Yes [x]? ? No              Specify:      Transition of care plan:  1. Admitted for abdominal pain, ventral hernia with n/v, ng tube discontinued on 7/22/21  2. GI consulted and following  3. Home at discharge, TBD on needs- HHPT vs None  4. Outpatient follow-up.   5. Pt's family to transport    [facility]

## 2021-07-23 NOTE — PROGRESS NOTES
2030-MD said NGT to stay overnight and to begin clear liquids  2130-Patient insists to be hooked back up to suction because she felt nauseous and that she had emesis. There wasn't any measurable emesis as she had put it into a tissue and put it in her water pitcher. I placed her on low suction emesis. NGT placement verified.  Patient also requests ice chips  2330- 100ml green emesis in canister  0115-NGT Clamped,TOTAL- 250ml green emesis in canister

## 2021-07-23 NOTE — PROGRESS NOTES
Problem: General Medical Care Plan  Goal: *Vital signs within specified parameters  Outcome: Progressing Towards Goal  Goal: *Absence of infection signs and symptoms  Outcome: Progressing Towards Goal  Goal: *Optimal pain control at patient's stated goal  Outcome: Progressing Towards Goal     Problem: Pressure Injury - Risk of  Goal: *Prevention of pressure injury  Description: Document Shaheed Scale and appropriate interventions in the flowsheet.   Outcome: Progressing Towards Goal  Note: Pressure Injury Interventions:       Moisture Interventions: Apply protective barrier, creams and emollients    Activity Interventions: PT/OT evaluation    Mobility Interventions: HOB 30 degrees or less    Nutrition Interventions: Document food/fluid/supplement intake    Friction and Shear Interventions: Lift team/patient mobility team

## 2021-07-23 NOTE — PROGRESS NOTES
Bedside and Verbal shift change report given to Jovany Saleem RN (oncoming nurse) by Tye Salgado (offgoing nurse). Report included the following information SBAR, Kardex, Intake/Output, MAR, Accordion and Recent Results.

## 2021-07-23 NOTE — PROGRESS NOTES
Problem: Mobility Impaired (Adult and Pediatric)  Goal: *Acute Goals and Plan of Care (Insert Text)  Description: FUNCTIONAL STATUS PRIOR TO ADMISSION: Patient was independent and active without use of DME.    HOME SUPPORT PRIOR TO ADMISSION: The patient lived alone with no local support. Physical Therapy Goals  Initiated 7/23/2021  1. Patient will move from supine to sit and sit to supine  in bed with independence within 7 day(s). 2.  Patient will transfer from bed to chair and chair to bed with modified independence using the least restrictive device within 7 day(s). 3.  Patient will perform sit to stand with modified independence within 7 day(s). 4.  Patient will ambulate with modified independence for 150 feet with the least restrictive device within 7 day(s). Outcome: Progressing Towards Goal   PHYSICAL THERAPY EVALUATION  Patient: Kris Kc (1 y.o. female)  Date: 7/23/2021  Primary Diagnosis: Abdominal pain [R10.9]  Small bowel obstruction ([address] [K56.609]        Precautions: Fall       ASSESSMENT  Based on the objective data described below, the patient presents with abdominal pain and c/o constipation. Today upon entry patient up to [facility] . Initially patient resistant to working with PT and expressing multiple needs from nursing and nursing tech. I explain role of PT in hospital and patient went on to describe R foot plantar fascitis, bone spurs with limited WB and not supposed to be walking on her foot and is supposed to be on bed rest over the weekend. Has a brace that she is supposed to be using. Patient verbalizing multiple reasons why she can't participate in PT. \"I can't focus on using a walker and moving around when I am impacted with 7 days of hard stool\". \"I can't walk right now because of all the pressure due to the impaction\"  At this time patient willing for me to assist her with hygiene and transfer from [facility] to bed.   Patient able to take small steps with FF WB R LE (self limiting) and leaning forward with hands on bed for support. PT expressing a need for a RW to provide support to LE and reduce strain on R foot. Patient with multiple requests for nursing to attend to her IV, need for medication, c/o nausea. Patient able to get herself into bed mod I. Pillow elevation R foot. At this point nursing enters to provide medications. Patient continues to perseverate on her conditions that limit her from doing anything at this time. Patient with self limiting behaviors and limits OOB activity despite education from PT that walking will assist GI motility. PT left bariatric RW in the room and educated patient on using device. HHPT vs none at discharge. Current Level of Function Impacting Discharge (mobility/balance): SBA/supervision    Functional Outcome Measure: The patient scored 60/100 on the Barthel outcome measure. Other factors to consider for discharge:      Patient will benefit from skilled therapy intervention to address the above noted impairments. PLAN :  Recommendations and Planned Interventions: transfer training, gait training, therapeutic exercises, neuromuscular re-education, patient and family training/education, and therapeutic activities      Frequency/Duration: Patient will be followed by physical therapy:  5 times a week to address goals. Recommendation for discharge: (in order for the patient to meet his/her long term goals)  To be determined: HH vs None    This discharge recommendation:  Has not yet been discussed the attending provider and/or case management    IF patient discharges home will need the following DME: patient owns DME required for discharge         SUBJECTIVE:   Patient stated I can't walk right now.     OBJECTIVE DATA SUMMARY:   HISTORY:    Past Medical History:   Diagnosis Date    Anxiety     Bowel obstruction ([facility]     Fatty liver     Gall stones     GERD (gastroesophageal reflux disease)     Hematoma     abdomen on the right overy    Hernia of unspecified site of abdominal cavity without mention of obstruction or gangrene     History of vascular access device 07/22/2021    4.5 fr mIDLINE RIGHT BASILIC 13 CM ACCESS    Hx of thromboembolism of vein     right upper brachiel vein    Kidney stones     Obesity     REYNA? PUD (peptic ulcer disease) 2016    stomach and colon ulcers? Seizures ([address]     me dside effect? last sz 2013     Past Surgical History:   Procedure Laterality Date    COLONOSCOPY N/A 3/24/2017    COLONOSCOPY performed by Madi Simons MD at [facility] ENDOSCOPY    HX APPENDECTOMY  2009    HX BREAST BIOPSY Right 2007    negative pathology    HX BREAST BIOPSY Left 1999    negative pathology    HX ENDOSCOPY  2016    HX HERNIA REPAIR  9/2011    laparoscopic incisional hernia repair    HX HERNIA REPAIR  4481    umbilical hernia repair    HX HYSTERECTOMY  2009    partial hysterectomy (right ovary removed)    HX OOPHORECTOMY  2009    removed post op hematoma and right oopherectomy    HX OOPHORECTOMY  3/2016    mass removed and left oopherectomy    HX OTHER SURGICAL  2009    ileocectomy, bowel resection,     KS ABDOMEN SURGERY PROC UNLISTED  4/2010    ventral hernia repair with biologic mesh    KS COLONOSCOPY FLX DX W/COLLJ SPEC WHEN PFRMD  1/14/2011         US GUIDE BX BREAST Left 2016    neagative paythology       Personal factors and/or co morbidities impacting plan of care: see above    Home Situation  Home Environment: Apartment  # Steps to Enter: 0  One/Two Story Residence: One story  Living Alone: Yes  Support Systems: Family member(s)  Patient Expects to be Discharged to[de-identified] Apartment  Current DME Used/Available at Home: Brace/Splint, Crutches, Grab bars, Shower chair, Compression garments  Tub or Shower Type: Shower    EXAMINATION/PRESENTATION/DECISION MAKING:   Critical Behavior:  Neurologic State: Alert  Orientation Level: Oriented X4  Cognition: Appropriate decision making     Hearing:   Auditory  Auditory Impairment: None  Skin:  intact  Edema: LE's  Range Of Motion:  AROM: Generally decreased, functional                       Strength:    Strength: Generally decreased, functional                    Tone & Sensation:   Tone: Normal              Sensation: Impaired (Neuropathy b/l LEs)               Coordination:  Coordination: Within functional limits  Vision:      Functional Mobility:  Bed Mobility:        Sit to Supine: Modified independent     Transfers:  Sit to Stand: Stand-by assistance  Stand to Sit: Supervision        Bed to Chair: Supervision              Balance:   Sitting: Intact  Standing: Intact; With support  Ambulation/Gait Training:  Distance (ft): 2 Feet (ft)     Ambulation - Level of Assistance: Stand-by assistance     Gait Description (WDL): Exceptions to WDL  Gait Abnormalities: Antalgic; Other; Toe walking  Right Side Weight Bearing:  (self limiting only toe touch due to pain/bone spur)     Base of Support: Shift to left     Speed/Zulema: Slow                        Stairs:did not occur                Functional Measure:  Barthel Index:    Bathin  Bladder: 5  Bowels: 10  Groomin  Dressing: 10  Feeding: 10  Mobility: 0  Stairs: 0  Toilet Use: 5  Transfer (Bed to Chair and Back): 10  Total: 60/100       The Barthel ADL Index: Guidelines  1. The index should be used as a record of what a patient does, not as a record of what a patient could do. 2. The main aim is to establish degree of independence from any help, physical or verbal, however minor and for whatever reason. 3. The need for supervision renders the patient not independent. 4. A patient's performance should be established using the best available evidence. Asking the patient, friends/relatives and nurses are the usual sources, but direct observation and common sense are also important. However direct testing is not needed.   5. Usually the patient's performance over the preceding 24-48 hours is important, but occasionally longer periods will be relevant. 6. Middle categories imply that the patient supplies over 50 per cent of the effort. 7. Use of aids to be independent is allowed. Yosvany Valdez., Barthel, D.W. (8592). Functional evaluation: the Barthel Index. [address] [facility] (14)2. [place] [facility] INDU Martínez, Charley Paz., Jaycee Herrera., [place], [address] Measuring the change indisability after inpatient rehabilitation; comparison of the responsiveness of the Barthel Index and Functional [place] Measure. Journal of Neurology, Neurosurgery, and Psychiatry, 66(4), 363-133. Kate Lowry, N.J.A, KOJO Duncan, & Ofelia Kidd M.A. (2004.) Assessment of post-stroke quality of life in cost-effectiveness studies: The usefulness of the Barthel Index and the EuroQoL-5D. Quality of Life Research, 15, 073-51            Physical Therapy Evaluation Charge Determination   History Examination Presentation Decision-Making   MEDIUM  Complexity : 1-2 comorbidities / personal factors will impact the outcome/ POC  LOW Complexity : 1-2 Standardized tests and measures addressing body structure, function, activity limitation and / or participation in recreation  MEDIUM Complexity : Evolving with changing characteristics  Other outcome measures Barthel  LOW       Based on the above components, the patient evaluation is determined to be of the following complexity level: LOW     Pain Ratin/10 abdominal pain    Activity Tolerance:   Fair and Self limiting behavior    After treatment patient left in no apparent distress:   Supine in bed, Heels elevated for pressure relief, and Call bell within reach    COMMUNICATION/EDUCATION:   The patients plan of care was discussed with: Registered nurse. Fall prevention education was provided and the patient/caregiver indicated understanding. and Patient/family agree to work toward stated goals and plan of care.     Thank you for this referral.  Amy Romana Holster, DPT   Time Calculation: 38 mins

## 2021-07-24 PROCEDURE — 74011250637 HC RX REV CODE- 250/637: Performed by: SURGERY

## 2021-07-24 PROCEDURE — 74011250636 HC RX REV CODE- 250/636: Performed by: INTERNAL MEDICINE

## 2021-07-24 PROCEDURE — C9113 INJ PANTOPRAZOLE SODIUM, VIA: HCPCS | Performed by: SURGERY

## 2021-07-24 PROCEDURE — 74011000250 HC RX REV CODE- 250: Performed by: SURGERY

## 2021-07-24 PROCEDURE — 65270000029 HC RM PRIVATE

## 2021-07-24 PROCEDURE — 74011250637 HC RX REV CODE- 250/637: Performed by: INTERNAL MEDICINE

## 2021-07-24 PROCEDURE — 74011250636 HC RX REV CODE- 250/636: Performed by: SURGERY

## 2021-07-24 RX ORDER — FACIAL-BODY WIPES
10 EACH TOPICAL DAILY PRN
Status: DISCONTINUED | OUTPATIENT
Start: 2021-07-24 | End: 2021-07-24

## 2021-07-24 RX ADMIN — HYDROMORPHONE HYDROCHLORIDE 2 MG: 2 INJECTION, SOLUTION INTRAMUSCULAR; INTRAVENOUS; SUBCUTANEOUS at 05:08

## 2021-07-24 RX ADMIN — BISACODYL 10 MG: 10 SUPPOSITORY RECTAL at 08:41

## 2021-07-24 RX ADMIN — SODIUM CHLORIDE 40 MG: 9 INJECTION INTRAMUSCULAR; INTRAVENOUS; SUBCUTANEOUS at 21:06

## 2021-07-24 RX ADMIN — HYDROMORPHONE HYDROCHLORIDE 2 MG: 2 INJECTION, SOLUTION INTRAMUSCULAR; INTRAVENOUS; SUBCUTANEOUS at 08:40

## 2021-07-24 RX ADMIN — DIPHENHYDRAMINE HYDROCHLORIDE 25 MG: 50 INJECTION, SOLUTION INTRAMUSCULAR; INTRAVENOUS at 14:48

## 2021-07-24 RX ADMIN — LORAZEPAM 1 MG: 2 INJECTION INTRAMUSCULAR; INTRAVENOUS at 20:41

## 2021-07-24 RX ADMIN — ENOXAPARIN SODIUM 40 MG: 40 INJECTION SUBCUTANEOUS at 08:41

## 2021-07-24 RX ADMIN — PROMETHAZINE HYDROCHLORIDE 25 MG: 25 INJECTION INTRAMUSCULAR; INTRAVENOUS at 04:46

## 2021-07-24 RX ADMIN — ACETAMINOPHEN 650 MG: 325 TABLET ORAL at 04:06

## 2021-07-24 RX ADMIN — HYDROMORPHONE HYDROCHLORIDE 2 MG: 2 INJECTION, SOLUTION INTRAMUSCULAR; INTRAVENOUS; SUBCUTANEOUS at 18:17

## 2021-07-24 RX ADMIN — HYDROMORPHONE HYDROCHLORIDE 2 MG: 2 INJECTION, SOLUTION INTRAMUSCULAR; INTRAVENOUS; SUBCUTANEOUS at 02:01

## 2021-07-24 RX ADMIN — HYDROMORPHONE HYDROCHLORIDE 2 MG: 2 INJECTION, SOLUTION INTRAMUSCULAR; INTRAVENOUS; SUBCUTANEOUS at 12:00

## 2021-07-24 RX ADMIN — SODIUM CHLORIDE 40 MG: 9 INJECTION INTRAMUSCULAR; INTRAVENOUS; SUBCUTANEOUS at 08:40

## 2021-07-24 RX ADMIN — SODIUM CHLORIDE 100 ML/HR: 9 INJECTION, SOLUTION INTRAVENOUS at 01:16

## 2021-07-24 RX ADMIN — ENOXAPARIN SODIUM 40 MG: 40 INJECTION SUBCUTANEOUS at 21:06

## 2021-07-24 RX ADMIN — DIPHENHYDRAMINE HYDROCHLORIDE 25 MG: 50 INJECTION, SOLUTION INTRAMUSCULAR; INTRAVENOUS at 04:06

## 2021-07-24 RX ADMIN — LORAZEPAM 1 MG: 2 INJECTION INTRAMUSCULAR; INTRAVENOUS at 14:48

## 2021-07-24 RX ADMIN — HYDROMORPHONE HYDROCHLORIDE 2 MG: 2 INJECTION, SOLUTION INTRAMUSCULAR; INTRAVENOUS; SUBCUTANEOUS at 21:06

## 2021-07-24 RX ADMIN — HYDROMORPHONE HYDROCHLORIDE 2 MG: 2 INJECTION, SOLUTION INTRAMUSCULAR; INTRAVENOUS; SUBCUTANEOUS at 14:48

## 2021-07-24 RX ADMIN — LORAZEPAM 1 MG: 2 INJECTION INTRAMUSCULAR; INTRAVENOUS at 08:40

## 2021-07-24 RX ADMIN — METRONIDAZOLE 5 G: 7.5 GEL VAGINAL at 22:59

## 2021-07-24 RX ADMIN — ONDANSETRON 4 MG: 2 INJECTION INTRAMUSCULAR; INTRAVENOUS at 08:40

## 2021-07-24 RX ADMIN — ONDANSETRON 4 MG: 2 INJECTION INTRAMUSCULAR; INTRAVENOUS at 14:48

## 2021-07-24 RX ADMIN — PROMETHAZINE HYDROCHLORIDE 25 MG: 25 INJECTION INTRAMUSCULAR; INTRAVENOUS at 18:51

## 2021-07-24 RX ADMIN — ONDANSETRON 4 MG: 2 INJECTION INTRAMUSCULAR; INTRAVENOUS at 21:06

## 2021-07-24 RX ADMIN — Medication 10 ML: at 14:49

## 2021-07-24 RX ADMIN — ONDANSETRON 4 MG: 2 INJECTION INTRAMUSCULAR; INTRAVENOUS at 03:00

## 2021-07-24 RX ADMIN — PROMETHAZINE HYDROCHLORIDE 25 MG: 25 INJECTION INTRAMUSCULAR; INTRAVENOUS at 12:00

## 2021-07-24 NOTE — PROGRESS NOTES
Problem: Falls - Risk of  Goal: *Absence of Falls  Description: Document Angeles Berger Fall Risk and appropriate interventions in the flowsheet. Outcome: Progressing Towards Goal  Note: Fall Risk Interventions:  Mobility Interventions: Communicate number of staff needed for ambulation/transfer         Medication Interventions: Evaluate medications/consider consulting pharmacy    Elimination Interventions: Call light in reach    History of Falls Interventions: Bed/chair exit alarm         Problem: Patient Education: Go to Patient Education Activity  Goal: Patient/Family Education  Outcome: Progressing Towards Goal     Problem: General Medical Care Plan  Goal: *Vital signs within specified parameters  Outcome: Progressing Towards Goal  Goal: *Absence of infection signs and symptoms  Outcome: Progressing Towards Goal  Goal: *Optimal pain control at patient's stated goal  Outcome: Progressing Towards Goal     Problem: Pressure Injury - Risk of  Goal: *Prevention of pressure injury  Description: Document Shaheed Scale and appropriate interventions in the flowsheet.   Outcome: Progressing Towards Goal  Note: Pressure Injury Interventions:       Moisture Interventions: Absorbent underpads    Activity Interventions: Increase time out of bed    Mobility Interventions: Assess need for specialty bed    Nutrition Interventions: Document food/fluid/supplement intake    Friction and Shear Interventions: Minimize layers                Problem: Patient Education: Go to Patient Education Activity  Goal: Patient/Family Education  Outcome: Progressing Towards Goal     Problem: Patient Education: Go to Patient Education Activity  Goal: Patient/Family Education  Outcome: Progressing Towards Goal

## 2021-07-24 NOTE — PROGRESS NOTES
Physical Therapy - 0539    Patient just received pain and nausea medication per nurse and PT cleared to attempt tx. Patient declined despite multiple attempts to persuade. Despite verbalizing understanding as to how mobility with PT helps with GI motility, patient made decision to decline PT even though made aware PT may not be able to offer tx again later today. Patient did get OOB and ambulate halls with nursing at 4:30 am today. Will follow up on Monday 7/25/21. If patient requests to ambulate she can be assisted by mobility team or nursing over the weekend. Thank you.   Jamel Pedraza, PT, DPT

## 2021-07-24 NOTE — PROGRESS NOTES
Assessment / Plan:   Small bowel obstruction NGT out this morning. C/o vaginal itching, h/o BV. Start Metronidazole x 5 days (Start 7.22)  Full liquids, continue bowel regimen  PT    Sánchez Sebastian MD  [facility]  Office:  [phone]  Fax:  [phone]        General Surgery Daily Progress Note      Patient: Milly De Paz MRN: [id number]  SSN: [id number]    YOB: 1970  Age: 45 y.o. Sex: female      Admit Date: 7/19/2021 for sbo    Subjective:   NG out, multipe BM. Hardy Anthony Current Facility-Administered Medications   Medication Dose Route Frequency    promethazine (PHENERGAN) suppository 25 mg  25 mg Rectal Q6H PRN    metroNIDAZOLE (METROGEL) 0.75 % vaginal gel 5 g  5 g Vaginal DAILY    bisacodyL (DULCOLAX) suppository 10 mg  10 mg Rectal DAILY    pantoprazole (PROTONIX) 40 mg in 0.9% sodium chloride 10 mL injection  40 mg IntraVENous Q12H    sodium chloride (NS) flush 5-40 mL  5-40 mL IntraVENous Q8H    sodium chloride (NS) flush 5-40 mL  5-40 mL IntraVENous PRN    acetaminophen (TYLENOL) tablet 650 mg  650 mg Oral Q6H PRN    Or    acetaminophen (TYLENOL) suppository 650 mg  650 mg Rectal Q6H PRN    polyethylene glycol (MIRALAX) packet 17 g  17 g Oral DAILY PRN    0.9% sodium chloride infusion  100 mL/hr IntraVENous CONTINUOUS    ondansetron (ZOFRAN) injection 4 mg  4 mg IntraVENous Q6H PRN    naloxone (NARCAN) injection 0.4 mg  0.4 mg IntraVENous EVERY 2 MINUTES AS NEEDED    promethazine (PHENERGAN) 25 mg in NS IVPB  25 mg IntraVENous Q6H PRN    enoxaparin (LOVENOX) injection 40 mg  40 mg SubCUTAneous Q12H    LORazepam (ATIVAN) injection 1 mg  1 mg IntraVENous Q4H PRN    phenol throat spray (CHLORASEPTIC) 1 Spray  1 Spray Oral PRN    diphenhydrAMINE (BENADRYL) injection 25 mg  25 mg IntraVENous Q6H PRN    HYDROmorphone (DILAUDID) injection 2 mg  2 mg IntraVENous Q3H PRN        Objective:   No intake/output data recorded. 07/22 0701 - 07/23 1900  In: 2026. 7 [P.O.:200; I.V.:1216.7]  Out: 950 [Urine:700]  Patient Vitals for the past 8 hrs:   BP Temp Pulse Resp SpO2   07/23/21 1851 (!) 152/85 98.1 °F (36.7 °C) 70 18 98 %   07/23/21 1636 131/84 98.6 °F (37 °C) 73 16 97 %       Physical Exam:  General: Alert, cooperative, no distress, appears stated age. Neck:  Supple, symmetrical, trachea midline, no adenopathy, thyroid: no                           enlargement/tenderness/nodules, no carotid bruit and no JVD. Lungs: Clear to auscultation bilaterally. Heart:  Regular rate and rhythm, S1, S2 normal, no murmur, click, rub or gallop. Abdomen: Soft, non tender. Bowel sounds normal. No masses,  No organomegaly. Extremities: Extremities normal, atraumatic, no cyanosis or edema. Skin:  Skin color, texture, turgor normal. No rashes or lesions    Labs:   Recent Labs     07/23/21  0435   WBC 7.2   HGB 11.2*   HCT 35.9        Recent Labs     07/23/21  0435      K 3.2*      CO2 27   GLU 80   BUN 14   CREA 0.66   CA 7.9*   MG 2.1   PHOS 2.6   ALB 2.8*   TBILI 0.4   ALT 34       Active Problems:    Depression (7/1/2010)      Ulcerative colitis ([facility] [address] (7/25/2016)      PUD (peptic ulcer disease) ()      Overview: stomach and colon ulcers?       Anxiety ()      Obesity, morbid ([facility] [address] (2/8/2018)      Small bowel obstruction ([facility] [address] (4/13/2019)      Abdominal pain (7/20/2021)      Bloody diarrhea (7/20/2021)      Ventral hernia (7/20/2021)        Problem List Items Addressed This Visit        Digestive    Bloody diarrhea - Primary      Other Visit Diagnoses     Abdominal pain, right lower quadrant        Right lower quadrant abdominal tenderness without rebound tenderness

## 2021-07-24 NOTE — PROGRESS NOTES
General Surgery Daily Progress Note    Patient: Alexander Tyler MRN: [id number]  SSN: [id number]    YOB: 1970  Age: 45 y.o. Sex: female      Admit Date: 7/19/2021    POD * No surgery found *    Procedure: * No surgery found *    Subjective:   Very confusing talking to patient, as she goes back and forth  On dates and story  Reports some nausea but not a lot after NGT out  Passing flatus    Current Facility-Administered Medications   Medication Dose Route Frequency    promethazine (PHENERGAN) suppository 25 mg  25 mg Rectal Q6H PRN    metroNIDAZOLE (METROGEL) 0.75 % vaginal gel 5 g  5 g Vaginal DAILY    bisacodyL (DULCOLAX) suppository 10 mg  10 mg Rectal DAILY    pantoprazole (PROTONIX) 40 mg in 0.9% sodium chloride 10 mL injection  40 mg IntraVENous Q12H    sodium chloride (NS) flush 5-40 mL  5-40 mL IntraVENous Q8H    sodium chloride (NS) flush 5-40 mL  5-40 mL IntraVENous PRN    acetaminophen (TYLENOL) tablet 650 mg  650 mg Oral Q6H PRN    Or    acetaminophen (TYLENOL) suppository 650 mg  650 mg Rectal Q6H PRN    polyethylene glycol (MIRALAX) packet 17 g  17 g Oral DAILY PRN    0.9% sodium chloride infusion  100 mL/hr IntraVENous CONTINUOUS    ondansetron (ZOFRAN) injection 4 mg  4 mg IntraVENous Q6H PRN    naloxone (NARCAN) injection 0.4 mg  0.4 mg IntraVENous EVERY 2 MINUTES AS NEEDED    promethazine (PHENERGAN) 25 mg in NS IVPB  25 mg IntraVENous Q6H PRN    enoxaparin (LOVENOX) injection 40 mg  40 mg SubCUTAneous Q12H    LORazepam (ATIVAN) injection 1 mg  1 mg IntraVENous Q4H PRN    phenol throat spray (CHLORASEPTIC) 1 Spray  1 Spray Oral PRN    diphenhydrAMINE (BENADRYL) injection 25 mg  25 mg IntraVENous Q6H PRN    HYDROmorphone (DILAUDID) injection 2 mg  2 mg IntraVENous Q3H PRN        Objective:   No intake/output data recorded. 07/22 1901 - 07/24 0700  In: 2026.7 [P.O.:200;  I.V.:1216.7]  Out: 1170 [Urine:920]  Patient Vitals for the past 8 hrs:   BP Temp Pulse Resp SpO2   07/24/21 1207 120/82 97.7 °F (36.5 °C) 74 18 97 %   07/24/21 0727 137/80 98.3 °F (36.8 °C) 71 16 96 %       Physical Exam:  General: Alert, cooperative, NAD  Lungs: Unlabored  Abdomen: Soft, slightly TTP lower abdomen, morbidly obese  Extremities: Warm, moves all, no edema  Skin:  Warm and dry, no rash    Labs:   Recent Labs     07/23/21  0435   WBC 7.2   HGB 11.2*   HCT 35.9        Recent Labs     07/23/21  0435      K 3.2*      CO2 27   GLU 80   BUN 14   CREA 0.66   CA 7.9*   MG 2.1   PHOS 2.6   ALB 2.8*   TBILI 0.4   ALT 34       Assessment / Plan:     POD * No surgery found *    Procedure: * No surgery found *  COntinue fulls today  Hopefully resolving SBO        Emily Alegre MD

## 2021-07-24 NOTE — PROGRESS NOTES
Resting well this shift. The PW external catheter dc'd and oob to [facility] with x1 standby assist. VSS. Requests IV pain medication q3h. No n/v. Acceping clear liqs and advamced to full liqs. See new order.

## 2021-07-24 NOTE — PROGRESS NOTES
Awake, requested to walk in [place] since it isn't so busy at night. OOB and used walker and standby assist to walk ~ 120 feet. C/o nausea, HA, and sinus drainage after ambulating. Tylenol, Benadryl and Phenegran given as ordered.

## 2021-07-25 PROCEDURE — 74011250636 HC RX REV CODE- 250/636: Performed by: INTERNAL MEDICINE

## 2021-07-25 PROCEDURE — 74011250637 HC RX REV CODE- 250/637: Performed by: SURGERY

## 2021-07-25 PROCEDURE — 74011000250 HC RX REV CODE- 250: Performed by: SURGERY

## 2021-07-25 PROCEDURE — 74011250636 HC RX REV CODE- 250/636: Performed by: SURGERY

## 2021-07-25 PROCEDURE — 65270000029 HC RM PRIVATE

## 2021-07-25 PROCEDURE — C9113 INJ PANTOPRAZOLE SODIUM, VIA: HCPCS | Performed by: SURGERY

## 2021-07-25 PROCEDURE — 77030038269 HC DRN EXT URIN PURWCK BARD -A

## 2021-07-25 RX ADMIN — SODIUM CHLORIDE 40 MG: 9 INJECTION INTRAMUSCULAR; INTRAVENOUS; SUBCUTANEOUS at 08:37

## 2021-07-25 RX ADMIN — HYDROMORPHONE HYDROCHLORIDE 2 MG: 2 INJECTION, SOLUTION INTRAMUSCULAR; INTRAVENOUS; SUBCUTANEOUS at 13:58

## 2021-07-25 RX ADMIN — HYDROMORPHONE HYDROCHLORIDE 2 MG: 2 INJECTION, SOLUTION INTRAMUSCULAR; INTRAVENOUS; SUBCUTANEOUS at 23:01

## 2021-07-25 RX ADMIN — PROMETHAZINE HYDROCHLORIDE 25 MG: 25 INJECTION INTRAMUSCULAR; INTRAVENOUS at 10:44

## 2021-07-25 RX ADMIN — HYDROMORPHONE HYDROCHLORIDE 2 MG: 2 INJECTION, SOLUTION INTRAMUSCULAR; INTRAVENOUS; SUBCUTANEOUS at 20:08

## 2021-07-25 RX ADMIN — PROMETHAZINE HYDROCHLORIDE 25 MG: 25 INJECTION INTRAMUSCULAR; INTRAVENOUS at 17:23

## 2021-07-25 RX ADMIN — PROMETHAZINE HYDROCHLORIDE 25 MG: 25 INJECTION INTRAMUSCULAR; INTRAVENOUS at 01:06

## 2021-07-25 RX ADMIN — DIPHENHYDRAMINE HYDROCHLORIDE 25 MG: 50 INJECTION, SOLUTION INTRAMUSCULAR; INTRAVENOUS at 00:34

## 2021-07-25 RX ADMIN — ONDANSETRON 4 MG: 2 INJECTION INTRAMUSCULAR; INTRAVENOUS at 20:09

## 2021-07-25 RX ADMIN — LORAZEPAM 1 MG: 2 INJECTION INTRAMUSCULAR; INTRAVENOUS at 08:51

## 2021-07-25 RX ADMIN — SODIUM CHLORIDE 40 MG: 9 INJECTION INTRAMUSCULAR; INTRAVENOUS; SUBCUTANEOUS at 20:09

## 2021-07-25 RX ADMIN — ENOXAPARIN SODIUM 40 MG: 40 INJECTION SUBCUTANEOUS at 20:09

## 2021-07-25 RX ADMIN — Medication 10 ML: at 07:31

## 2021-07-25 RX ADMIN — HYDROMORPHONE HYDROCHLORIDE 2 MG: 2 INJECTION, SOLUTION INTRAMUSCULAR; INTRAVENOUS; SUBCUTANEOUS at 07:31

## 2021-07-25 RX ADMIN — LORAZEPAM 1 MG: 2 INJECTION INTRAMUSCULAR; INTRAVENOUS at 04:25

## 2021-07-25 RX ADMIN — Medication 10 ML: at 20:10

## 2021-07-25 RX ADMIN — HYDROMORPHONE HYDROCHLORIDE 2 MG: 2 INJECTION, SOLUTION INTRAMUSCULAR; INTRAVENOUS; SUBCUTANEOUS at 03:44

## 2021-07-25 RX ADMIN — ONDANSETRON 4 MG: 2 INJECTION INTRAMUSCULAR; INTRAVENOUS at 04:25

## 2021-07-25 RX ADMIN — ONDANSETRON 4 MG: 2 INJECTION INTRAMUSCULAR; INTRAVENOUS at 13:58

## 2021-07-25 RX ADMIN — SODIUM CHLORIDE 100 ML/HR: 9 INJECTION, SOLUTION INTRAVENOUS at 00:09

## 2021-07-25 RX ADMIN — LORAZEPAM 1 MG: 2 INJECTION INTRAMUSCULAR; INTRAVENOUS at 00:33

## 2021-07-25 RX ADMIN — ENOXAPARIN SODIUM 40 MG: 40 INJECTION SUBCUTANEOUS at 08:37

## 2021-07-25 RX ADMIN — HYDROMORPHONE HYDROCHLORIDE 2 MG: 2 INJECTION, SOLUTION INTRAMUSCULAR; INTRAVENOUS; SUBCUTANEOUS at 00:08

## 2021-07-25 RX ADMIN — HYDROMORPHONE HYDROCHLORIDE 2 MG: 2 INJECTION, SOLUTION INTRAMUSCULAR; INTRAVENOUS; SUBCUTANEOUS at 10:44

## 2021-07-25 RX ADMIN — HYDROMORPHONE HYDROCHLORIDE 2 MG: 2 INJECTION, SOLUTION INTRAMUSCULAR; INTRAVENOUS; SUBCUTANEOUS at 17:10

## 2021-07-25 RX ADMIN — METRONIDAZOLE 5 G: 7.5 GEL VAGINAL at 22:11

## 2021-07-25 RX ADMIN — LORAZEPAM 1 MG: 2 INJECTION INTRAMUSCULAR; INTRAVENOUS at 20:09

## 2021-07-25 RX ADMIN — BISACODYL 10 MG: 10 SUPPOSITORY RECTAL at 08:37

## 2021-07-25 RX ADMIN — SODIUM CHLORIDE 100 ML/HR: 9 INJECTION, SOLUTION INTRAVENOUS at 10:44

## 2021-07-25 RX ADMIN — PROMETHAZINE HYDROCHLORIDE 25 MG: 25 INJECTION INTRAMUSCULAR; INTRAVENOUS at 23:02

## 2021-07-25 NOTE — PROGRESS NOTES
General Surgery Daily Progress Note    Patient: Ivelisse Cortes MRN: [id number]  SSN: [id number]    YOB: 1970  Age: 45 y.o.   Sex: female      Admit Date: 7/19/2021    POD * No surgery found *    Procedure: * No surgery found *    Subjective:   Reports some BM  Taking in PO better than yesterday    Current Facility-Administered Medications   Medication Dose Route Frequency    metroNIDAZOLE (METROGEL) 0.75 % vaginal gel 5 g  5 g Vaginal DAILY    bisacodyL (DULCOLAX) suppository 10 mg  10 mg Rectal DAILY    pantoprazole (PROTONIX) 40 mg in 0.9% sodium chloride 10 mL injection  40 mg IntraVENous Q12H    sodium chloride (NS) flush 5-40 mL  5-40 mL IntraVENous Q8H    sodium chloride (NS) flush 5-40 mL  5-40 mL IntraVENous PRN    acetaminophen (TYLENOL) tablet 650 mg  650 mg Oral Q6H PRN    Or    acetaminophen (TYLENOL) suppository 650 mg  650 mg Rectal Q6H PRN    polyethylene glycol (MIRALAX) packet 17 g  17 g Oral DAILY PRN    0.9% sodium chloride infusion  100 mL/hr IntraVENous CONTINUOUS    ondansetron (ZOFRAN) injection 4 mg  4 mg IntraVENous Q6H PRN    naloxone (NARCAN) injection 0.4 mg  0.4 mg IntraVENous EVERY 2 MINUTES AS NEEDED    promethazine (PHENERGAN) 25 mg in NS IVPB  25 mg IntraVENous Q6H PRN    enoxaparin (LOVENOX) injection 40 mg  40 mg SubCUTAneous Q12H    LORazepam (ATIVAN) injection 1 mg  1 mg IntraVENous Q4H PRN    phenol throat spray (CHLORASEPTIC) 1 Spray  1 Spray Oral PRN    diphenhydrAMINE (BENADRYL) injection 25 mg  25 mg IntraVENous Q6H PRN    HYDROmorphone (DILAUDID) injection 2 mg  2 mg IntraVENous Q3H PRN        Objective:   07/25 0701 - 07/25 1900  In: 480 [P.O.:480]  Out: -   07/23 1901 - 07/25 0700  In: 1460 [P.O.:460; I.V.:1000]  Out: 2152 [Urine:1070]  Patient Vitals for the past 8 hrs:   BP Temp Pulse Resp SpO2   07/25/21 0835 (!) 155/76 98.3 °F (36.8 °C) 98 18 96 %   07/25/21 0419 (!) 112/56 98.4 °F (36.9 °C) 91 18 93 %       Physical Exam:  General: Alert, cooperative, NAD  Lungs: Unlabored  Abdomen: Soft, minimally TTP, morbidly obese  Extremities: Warm, moves all, no edema  Skin:  Warm and dry, no rash    Labs:   Recent Labs     07/23/21  0435   WBC 7.2   HGB 11.2*   HCT 35.9        Recent Labs     07/23/21 0435      K 3.2*      CO2 27   GLU 80   BUN 14   CREA 0.66   CA 7.9*   MG 2.1   PHOS 2.6   ALB 2.8*   TBILI 0.4   ALT 34       Assessment / Plan:     POD * No surgery found *    Procedure: * No surgery found *  Advance to regular diet  Reports she has ambulated and states she jenkins snot remember declining PT  Having small hard BM        Lisa Lee MD

## 2021-07-26 PROCEDURE — C9113 INJ PANTOPRAZOLE SODIUM, VIA: HCPCS | Performed by: SURGERY

## 2021-07-26 PROCEDURE — 97530 THERAPEUTIC ACTIVITIES: CPT

## 2021-07-26 PROCEDURE — 74011000250 HC RX REV CODE- 250: Performed by: SURGERY

## 2021-07-26 PROCEDURE — 97116 GAIT TRAINING THERAPY: CPT

## 2021-07-26 PROCEDURE — 77030038269 HC DRN EXT URIN PURWCK BARD -A

## 2021-07-26 PROCEDURE — 74011250637 HC RX REV CODE- 250/637: Performed by: SURGERY

## 2021-07-26 PROCEDURE — 65270000029 HC RM PRIVATE

## 2021-07-26 PROCEDURE — 74011250636 HC RX REV CODE- 250/636: Performed by: INTERNAL MEDICINE

## 2021-07-26 PROCEDURE — 74011250636 HC RX REV CODE- 250/636: Performed by: SURGERY

## 2021-07-26 RX ORDER — POLYETHYLENE GLYCOL 3350 17 G/17G
17 POWDER, FOR SOLUTION ORAL DAILY
Status: DISCONTINUED | OUTPATIENT
Start: 2021-07-26 | End: 2021-07-28 | Stop reason: HOSPADM

## 2021-07-26 RX ADMIN — ENOXAPARIN SODIUM 40 MG: 40 INJECTION SUBCUTANEOUS at 08:51

## 2021-07-26 RX ADMIN — LORAZEPAM 1 MG: 2 INJECTION INTRAMUSCULAR; INTRAVENOUS at 21:01

## 2021-07-26 RX ADMIN — Medication 10 ML: at 04:54

## 2021-07-26 RX ADMIN — HYDROMORPHONE HYDROCHLORIDE 2 MG: 2 INJECTION, SOLUTION INTRAMUSCULAR; INTRAVENOUS; SUBCUTANEOUS at 04:53

## 2021-07-26 RX ADMIN — SODIUM CHLORIDE 100 ML/HR: 9 INJECTION, SOLUTION INTRAVENOUS at 01:57

## 2021-07-26 RX ADMIN — HYDROMORPHONE HYDROCHLORIDE 2 MG: 2 INJECTION, SOLUTION INTRAMUSCULAR; INTRAVENOUS; SUBCUTANEOUS at 23:26

## 2021-07-26 RX ADMIN — Medication 10 ML: at 23:02

## 2021-07-26 RX ADMIN — HYDROMORPHONE HYDROCHLORIDE 2 MG: 2 INJECTION, SOLUTION INTRAMUSCULAR; INTRAVENOUS; SUBCUTANEOUS at 14:35

## 2021-07-26 RX ADMIN — LORAZEPAM 1 MG: 2 INJECTION INTRAMUSCULAR; INTRAVENOUS at 00:30

## 2021-07-26 RX ADMIN — LORAZEPAM 1 MG: 2 INJECTION INTRAMUSCULAR; INTRAVENOUS at 06:52

## 2021-07-26 RX ADMIN — HYDROMORPHONE HYDROCHLORIDE 2 MG: 2 INJECTION, SOLUTION INTRAMUSCULAR; INTRAVENOUS; SUBCUTANEOUS at 17:05

## 2021-07-26 RX ADMIN — PROMETHAZINE HYDROCHLORIDE 25 MG: 25 INJECTION INTRAMUSCULAR; INTRAVENOUS at 14:35

## 2021-07-26 RX ADMIN — PROMETHAZINE HYDROCHLORIDE 25 MG: 25 INJECTION INTRAMUSCULAR; INTRAVENOUS at 20:30

## 2021-07-26 RX ADMIN — POLYETHYLENE GLYCOL 3350 17 G: 17 POWDER, FOR SOLUTION ORAL at 11:50

## 2021-07-26 RX ADMIN — ONDANSETRON 4 MG: 2 INJECTION INTRAMUSCULAR; INTRAVENOUS at 23:02

## 2021-07-26 RX ADMIN — DIPHENHYDRAMINE HYDROCHLORIDE 25 MG: 50 INJECTION, SOLUTION INTRAMUSCULAR; INTRAVENOUS at 14:35

## 2021-07-26 RX ADMIN — ONDANSETRON 4 MG: 2 INJECTION INTRAMUSCULAR; INTRAVENOUS at 06:52

## 2021-07-26 RX ADMIN — HYDROMORPHONE HYDROCHLORIDE 2 MG: 2 INJECTION, SOLUTION INTRAMUSCULAR; INTRAVENOUS; SUBCUTANEOUS at 20:29

## 2021-07-26 RX ADMIN — ONDANSETRON 4 MG: 2 INJECTION INTRAMUSCULAR; INTRAVENOUS at 11:51

## 2021-07-26 RX ADMIN — LORAZEPAM 1 MG: 2 INJECTION INTRAMUSCULAR; INTRAVENOUS at 17:05

## 2021-07-26 RX ADMIN — SODIUM CHLORIDE 40 MG: 9 INJECTION INTRAMUSCULAR; INTRAVENOUS; SUBCUTANEOUS at 20:30

## 2021-07-26 RX ADMIN — ENOXAPARIN SODIUM 40 MG: 40 INJECTION SUBCUTANEOUS at 20:30

## 2021-07-26 RX ADMIN — ONDANSETRON 4 MG: 2 INJECTION INTRAMUSCULAR; INTRAVENOUS at 17:05

## 2021-07-26 RX ADMIN — HYDROMORPHONE HYDROCHLORIDE 2 MG: 2 INJECTION, SOLUTION INTRAMUSCULAR; INTRAVENOUS; SUBCUTANEOUS at 11:51

## 2021-07-26 RX ADMIN — HYDROMORPHONE HYDROCHLORIDE 2 MG: 2 INJECTION, SOLUTION INTRAMUSCULAR; INTRAVENOUS; SUBCUTANEOUS at 01:56

## 2021-07-26 RX ADMIN — SODIUM CHLORIDE 40 MG: 9 INJECTION INTRAMUSCULAR; INTRAVENOUS; SUBCUTANEOUS at 08:51

## 2021-07-26 RX ADMIN — LORAZEPAM 1 MG: 2 INJECTION INTRAMUSCULAR; INTRAVENOUS at 11:50

## 2021-07-26 RX ADMIN — PROMETHAZINE HYDROCHLORIDE 25 MG: 25 INJECTION INTRAMUSCULAR; INTRAVENOUS at 04:54

## 2021-07-26 RX ADMIN — HYDROMORPHONE HYDROCHLORIDE 2 MG: 2 INJECTION, SOLUTION INTRAMUSCULAR; INTRAVENOUS; SUBCUTANEOUS at 08:51

## 2021-07-26 RX ADMIN — Medication 10 ML: at 14:35

## 2021-07-26 RX ADMIN — ONDANSETRON 4 MG: 2 INJECTION INTRAMUSCULAR; INTRAVENOUS at 01:56

## 2021-07-26 RX ADMIN — BISACODYL 10 MG: 10 SUPPOSITORY RECTAL at 08:52

## 2021-07-26 NOTE — PROGRESS NOTES
Comprehensive Nutrition Assessment    Type and Reason for Visit: Initial, RD nutrition re-screen/LOS    Nutrition Recommendations/Plan:   -Continue Cardiac diet w/ low fat as tolerated and encourage PO intake >/=75% of meals  -Continue IVF per MD    Nutrition Assessment:  45 yr old FEMALE admitted d/t abdominal pain from small bowel obstruction. Per MD notes symptoms started about a week ago, R-sided, associated with a bout of bloody diarrhea and nausea and vomiting. She reports no BM for 6 days and denies passing flatus. Per MD notes in ER CT a/p showed small ventral hernia again containing loops small bowel with mild interval upstream dilatation of the of the small bowel loop and pt began to actively vomit a significant amount of light green liquid. Pt started on Regular diet 7/25/21 and tolerating meals well with good PO intake. Noted SBS in AM per MD note. Malnutrition Assessment:  Malnutrition Status:  No malnutrition      Nutrition History and Allergies: PMHx: UC, complicated abdominal surgical hx, depression/anxiety, chronic pain presenting w/ abd pain, n/v, found to have possible early SBO. NKFA. Estimated Daily Nutrient Needs:  Energy (kcal): 1760-8330 (1.2-1.3); Weight Used for Energy Requirements: Current  Protein (g): 111-138 (0.8-1); Weight Used for Protein Requirements: Current  Fluid (ml/day): 0071-2692; Method Used for Fluid Requirements: 1 ml/kcal      Nutrition Related Findings:  Dulcolax, 0.9% NaCl infusing at 100 mL/hr, Zofran, miralax, Phenergan; Last BM 7/25/21; Potassium 3.2L      Wounds:    None       Current Nutrition Therapies:  ADULT DIET Regular; Low Fat/Low Chol/High Fiber/2 gm Na;  Low Fat (Less than or Equal to 50 gm/day)  DIET NPO    Anthropometric Measures:  · Height:  5' 7\" (170.2 cm)  · Current Body Wt:  138.3 kg (305 lb)   · Admission Body Wt:  305 lb    · Ideal Body Wt:  135 lbs:  225.9 %   · BMI Category:  Obese class 3 (BMI 40.0 or greater)       Nutrition Diagnosis: · Altered GI function related to other (specify) (small bowel obstruction) as evidenced by GI abnormality      Nutrition Interventions:   Food and/or Nutrient Delivery: Continue current diet, IV fluid delivery  Nutrition Education and Counseling: No recommendations at this time  Coordination of Nutrition Care: Continue to monitor while inpatient    Goals:  PO nutrition intake will meet >75% of patient estimated nutritional needs within the next 7 days.        Nutrition Monitoring and Evaluation:   Behavioral-Environmental Outcomes: None identified  Food/Nutrient Intake Outcomes: Food and nutrient intake, IVF intake  Physical Signs/Symptoms Outcomes: GI status    Discharge Planning:    Continue current diet     Electronically signed by Lo Tillman RD on 7/26/2021 at 3:56 PM    Contact: [phone]

## 2021-07-26 NOTE — PROGRESS NOTES
General Surgery Daily Progress Note    Patient: Anastasia Muñoz MRN: [id number]  SSN: [id number]    YOB: 1970  Age: 45 y.o. Sex: female      Admit Date: 7/19/2021    POD * No surgery found *    Procedure: * No surgery found *    Subjective:   Reports some BM  Taking in PO better than yesterday    7/26 Passing \"foam\" from suppository only. Appetite ok. No pain. Current Facility-Administered Medications   Medication Dose Route Frequency    polyethylene glycol (MIRALAX) packet 17 g  17 g Oral DAILY    bisacodyL (DULCOLAX) suppository 10 mg  10 mg Rectal DAILY    pantoprazole (PROTONIX) 40 mg in 0.9% sodium chloride 10 mL injection  40 mg IntraVENous Q12H    sodium chloride (NS) flush 5-40 mL  5-40 mL IntraVENous Q8H    sodium chloride (NS) flush 5-40 mL  5-40 mL IntraVENous PRN    acetaminophen (TYLENOL) tablet 650 mg  650 mg Oral Q6H PRN    Or    acetaminophen (TYLENOL) suppository 650 mg  650 mg Rectal Q6H PRN    0.9% sodium chloride infusion  100 mL/hr IntraVENous CONTINUOUS    ondansetron (ZOFRAN) injection 4 mg  4 mg IntraVENous Q6H PRN    naloxone (NARCAN) injection 0.4 mg  0.4 mg IntraVENous EVERY 2 MINUTES AS NEEDED    promethazine (PHENERGAN) 25 mg in NS IVPB  25 mg IntraVENous Q6H PRN    enoxaparin (LOVENOX) injection 40 mg  40 mg SubCUTAneous Q12H    LORazepam (ATIVAN) injection 1 mg  1 mg IntraVENous Q4H PRN    phenol throat spray (CHLORASEPTIC) 1 Spray  1 Spray Oral PRN    diphenhydrAMINE (BENADRYL) injection 25 mg  25 mg IntraVENous Q6H PRN    HYDROmorphone (DILAUDID) injection 2 mg  2 mg IntraVENous Q3H PRN        Objective:   No intake/output data recorded.   07/24 1901 - 07/26 0700  In: 3453 [P.O.:2840; I.V.:1000]  Out: 1750 [Urine:1750]  Patient Vitals for the past 8 hrs:   BP Temp Pulse Resp SpO2   07/26/21 0925 (!) 140/72 98.3 °F (36.8 °C) 86 16 92 %       Physical Exam:  General: Alert, cooperative, NAD  Lungs: Unlabored  Abdomen: Soft, minimally TTP, morbidly obese  Extremities: Warm, moves all, no edema  Skin:  Warm and dry, no rash    Labs:   No results for input(s): WBC, HGB, HCT, PLT, HGBEXT, HCTEXT, PLTEXT, HGBEXT, HCTEXT, PLTEXT in the last 72 hours. No results for input(s): NA, K, CL, CO2, GLU, BUN, CREA, CA, MG, PHOS, ALB, TBIL, TBILI, ALT in the last 72 hours. No lab exists for component: SGOT    Assessment / Plan:     POD * No surgery found *    Procedure: * No surgery found *  Advance to regular diet  Reports she has ambulated and states she does not remember declining PT  Having small hard BM    Mita Castro MD    SBS in AM.  If unremarkable, home tomorrow afternoon.

## 2021-07-26 NOTE — PROGRESS NOTES
7/26/2021   5:03 PM  PT note reviewed, recommending HH at DC, CM met w/ pt to discuss, she would like to check her insurance for co-pay, left list for pt to review. CM will follow and assist  RACHELLE Herrera       4:13 PM  CM discussed pt w/ Dr Jenell Fabry, continuing to require medical management for SBO, plan for small bowel series tomorrow 7/27. Transitions of Care Plan:   RUR 11 %  LOS 6 Days  1. Medical management continues, plan for small bowel series tomorrow, advancing diet   2. CM to follow through for treatment/response  3. [facility]   4. DC when stable to home w/ family assistance and HH  5. Outpatient f/u surgery  6.  Family to transport  RACHELLE Herrera

## 2021-07-26 NOTE — PROGRESS NOTES
Problem: Mobility Impaired (Adult and Pediatric)  Goal: *Acute Goals and Plan of Care (Insert Text)  Description: FUNCTIONAL STATUS PRIOR TO ADMISSION: Patient was independent and active without use of DME.    HOME SUPPORT PRIOR TO ADMISSION: The patient lived alone with no local support. Physical Therapy Goals  Initiated 7/23/2021  1. Patient will move from supine to sit and sit to supine  in bed with independence within 7 day(s). 2.  Patient will transfer from bed to chair and chair to bed with modified independence using the least restrictive device within 7 day(s). 3.  Patient will perform sit to stand with modified independence within 7 day(s). 4.  Patient will ambulate with modified independence for 150 feet with the least restrictive device within 7 day(s). Outcome: Progressing Towards Goal   PHYSICAL THERAPY TREATMENT  Patient: Myrtle Gutierrez (79 y.o. female)  Date: 7/26/2021  Diagnosis: Abdominal pain [R10.9]  Small bowel obstruction ([address] [T25.674] <principal problem not specified>       Precautions:  fall  Chart, physical therapy assessment, plan of care and goals were reviewed. ASSESSMENT  Patient continues with skilled PT services and is progressing towards goals. Patient this morning with anxious affect, at first declining PT 2/2 anxiety, lack of bowel movement, recent administration of suppository and expecting her medications. With maximal encouragement and education regarding importance of mobilizing for BM and confirmation with nurse that patient is not due for any medications at this time, patient agreeable to ambulate in the halls with PT. Patient ambulating in halls CGA/SBA with RW and antalgic gait secondary to self reported R Plantar fasciitis and L \"bad knee. \" Patient requiring 1, 1 minute standing rest break in halls secondary to pain. Spoke with nursing regarding patient's medication schedule.  Recommend HHPT upon d/c as patient with reduced functional mobility at this time, requiring RW for ambulation with significant gait deviations. Current Level of Function Impacting Discharge (mobility/balance): ambulation in [place] SBA up to 50ft with RW    Other factors to consider for discharge: none additional         PLAN :  Patient continues to benefit from skilled intervention to address the above impairments. Continue treatment per established plan of care. to address goals. Recommendation for discharge: (in order for the patient to meet his/her long term goals)  Physical therapy at least 2 days/week in the home     This discharge recommendation:  Has not yet been discussed the attending provider and/or case management    IF patient discharges home will need the following DME: patient owns DME required for discharge       SUBJECTIVE:   Patient stated i'm expecting my mother and I want to be relaxed and ready for when she comes.     OBJECTIVE DATA SUMMARY:   Patient received supine in bed and with max encouragement was agreeable to participate in PT treatment. Patient was cleared by nursing to participate in PT session. Critical Behavior:  Neurologic State: Alert  Orientation Level: Oriented X4  Cognition: Appropriate decision making, Appropriate for age attention/concentration, Appropriate safety awareness, Follows commands     Functional Mobility Training:  Bed Mobility:     Supine to Sit: Modified independent     Scooting: Modified independent        Transfers:  Sit to Stand: Stand-by assistance  Stand to Sit: Supervision        Bed to Chair: Supervision                    Balance:  Sitting: Intact; Without support  Standing: Intact; Without support  Standing - Dynamic : Fair  Ambulation/Gait Training:  Distance (ft): 50 Feet (ft)  Assistive Device: Walker, rolling;Gait belt  Ambulation - Level of Assistance: Stand-by assistance        Gait Abnormalities: Antalgic; Step to gait (Toe touch walking R)  Right Side Weight Bearing:  (self limiting only toe touch due to pain/bone spur)     Base of Support: Shift to left; Widened     Speed/Zulema: Slow                      Pain Rating:  Patient with complaints of L knee pain during ambulation requiring a 1 minute standing rest break before preceding. Spoke with nursing regarding medication with patient due shortly. Activity Tolerance:   Fair and requires rest breaks    After treatment patient left in no apparent distress:   Sitting in chair, Heels elevated for pressure relief, and Call bell within reach    COMMUNICATION/COLLABORATION:   The patients plan of care was discussed with: Registered nurse and Certified nursing assistant/patient care technician.      Mike Rodriguez PT, DPT   Time Calculation: 46 mins

## 2021-07-27 ENCOUNTER — APPOINTMENT (OUTPATIENT)
Dept: GENERAL RADIOLOGY | Age: 51
DRG: 394 | End: 2021-07-27
Attending: SURGERY
Payer: COMMERCIAL

## 2021-07-27 PROCEDURE — C9113 INJ PANTOPRAZOLE SODIUM, VIA: HCPCS | Performed by: SURGERY

## 2021-07-27 PROCEDURE — 77030038269 HC DRN EXT URIN PURWCK BARD -A

## 2021-07-27 PROCEDURE — 74011250636 HC RX REV CODE- 250/636: Performed by: SURGERY

## 2021-07-27 PROCEDURE — 74011250637 HC RX REV CODE- 250/637: Performed by: INTERNAL MEDICINE

## 2021-07-27 PROCEDURE — 74011250636 HC RX REV CODE- 250/636: Performed by: INTERNAL MEDICINE

## 2021-07-27 PROCEDURE — 74011250637 HC RX REV CODE- 250/637: Performed by: SURGERY

## 2021-07-27 PROCEDURE — 74250 X-RAY XM SM INT 1CNTRST STD: CPT

## 2021-07-27 PROCEDURE — 65270000029 HC RM PRIVATE

## 2021-07-27 RX ORDER — PANTOPRAZOLE SODIUM 40 MG/1
40 TABLET, DELAYED RELEASE ORAL
Status: DISCONTINUED | OUTPATIENT
Start: 2021-07-27 | End: 2021-07-28 | Stop reason: HOSPADM

## 2021-07-27 RX ORDER — PROMETHAZINE HYDROCHLORIDE 25 MG/1
25 TABLET ORAL
Status: DISCONTINUED | OUTPATIENT
Start: 2021-07-27 | End: 2021-07-28 | Stop reason: HOSPADM

## 2021-07-27 RX ORDER — ACETAMINOPHEN 325 MG/1
TABLET ORAL
Status: CANCELLED | OUTPATIENT
Start: 2021-07-27

## 2021-07-27 RX ORDER — LORAZEPAM 1 MG/1
1 TABLET ORAL
Status: DISCONTINUED | OUTPATIENT
Start: 2021-07-27 | End: 2021-07-28

## 2021-07-27 RX ORDER — POTASSIUM CHLORIDE 750 MG/1
10 TABLET, FILM COATED, EXTENDED RELEASE ORAL 2 TIMES DAILY
Status: DISCONTINUED | OUTPATIENT
Start: 2021-07-27 | End: 2021-07-28 | Stop reason: HOSPADM

## 2021-07-27 RX ORDER — ONDANSETRON 4 MG/1
4 TABLET, ORALLY DISINTEGRATING ORAL
Status: DISCONTINUED | OUTPATIENT
Start: 2021-07-27 | End: 2021-07-28 | Stop reason: HOSPADM

## 2021-07-27 RX ORDER — HYDROMORPHONE HYDROCHLORIDE 2 MG/1
4 TABLET ORAL
Status: DISCONTINUED | OUTPATIENT
Start: 2021-07-27 | End: 2021-07-28

## 2021-07-27 RX ADMIN — LORAZEPAM 1 MG: 2 INJECTION INTRAMUSCULAR; INTRAVENOUS at 11:57

## 2021-07-27 RX ADMIN — SODIUM CHLORIDE 100 ML/HR: 9 INJECTION, SOLUTION INTRAVENOUS at 15:25

## 2021-07-27 RX ADMIN — ONDANSETRON 4 MG: 2 INJECTION INTRAMUSCULAR; INTRAVENOUS at 05:38

## 2021-07-27 RX ADMIN — LORAZEPAM 1 MG: 2 INJECTION INTRAMUSCULAR; INTRAVENOUS at 15:49

## 2021-07-27 RX ADMIN — ONDANSETRON 4 MG: 2 INJECTION INTRAMUSCULAR; INTRAVENOUS at 11:57

## 2021-07-27 RX ADMIN — PANTOPRAZOLE SODIUM 40 MG: 40 TABLET, DELAYED RELEASE ORAL at 17:08

## 2021-07-27 RX ADMIN — HYDROMORPHONE HYDROCHLORIDE 2 MG: 2 INJECTION, SOLUTION INTRAMUSCULAR; INTRAVENOUS; SUBCUTANEOUS at 15:25

## 2021-07-27 RX ADMIN — HYDROMORPHONE HYDROCHLORIDE 4 MG: 2 TABLET ORAL at 19:37

## 2021-07-27 RX ADMIN — POTASSIUM CHLORIDE 10 MEQ: 750 TABLET, FILM COATED, EXTENDED RELEASE ORAL at 17:08

## 2021-07-27 RX ADMIN — SODIUM CHLORIDE 100 ML/HR: 9 INJECTION, SOLUTION INTRAVENOUS at 02:51

## 2021-07-27 RX ADMIN — PROMETHAZINE HYDROCHLORIDE 25 MG: 25 INJECTION INTRAMUSCULAR; INTRAVENOUS at 02:43

## 2021-07-27 RX ADMIN — LORAZEPAM 1 MG: 2 INJECTION INTRAMUSCULAR; INTRAVENOUS at 02:43

## 2021-07-27 RX ADMIN — HYDROMORPHONE HYDROCHLORIDE 2 MG: 2 INJECTION, SOLUTION INTRAMUSCULAR; INTRAVENOUS; SUBCUTANEOUS at 05:38

## 2021-07-27 RX ADMIN — ONDANSETRON 4 MG: 4 TABLET, ORALLY DISINTEGRATING ORAL at 19:37

## 2021-07-27 RX ADMIN — ACETAMINOPHEN 650 MG: 325 TABLET ORAL at 17:08

## 2021-07-27 RX ADMIN — HYDROMORPHONE HYDROCHLORIDE 2 MG: 2 INJECTION, SOLUTION INTRAMUSCULAR; INTRAVENOUS; SUBCUTANEOUS at 02:43

## 2021-07-27 RX ADMIN — PROMETHAZINE HYDROCHLORIDE 25 MG: 25 INJECTION INTRAMUSCULAR; INTRAVENOUS at 15:27

## 2021-07-27 RX ADMIN — HYDROMORPHONE HYDROCHLORIDE 2 MG: 2 INJECTION, SOLUTION INTRAMUSCULAR; INTRAVENOUS; SUBCUTANEOUS at 11:57

## 2021-07-27 RX ADMIN — ENOXAPARIN SODIUM 40 MG: 40 INJECTION SUBCUTANEOUS at 11:57

## 2021-07-27 RX ADMIN — SODIUM CHLORIDE 40 MG: 9 INJECTION INTRAMUSCULAR; INTRAVENOUS; SUBCUTANEOUS at 11:57

## 2021-07-27 RX ADMIN — POLYETHYLENE GLYCOL 3350 17 G: 17 POWDER, FOR SOLUTION ORAL at 11:58

## 2021-07-27 NOTE — ROUTINE PROCESS
7/27/2021 3:45 PM Met with pt to discuss home health. Choice of agency provided, pt selected [facility] as preference. Home health referral sent to [facility] via [address]. Pt requested to know costs of copays for home health. CM requested this information from [facility]. CM will follow. 7/27/2021  11:41 AM Attempted to meet with pt to discuss home health. Pt off the floor for testing. CM will follow up at a later time as able. ELIU Sutherland     Care Management Interventions  PCP Verified by CM:  Yes Bhavana Dias  Transition of Care Consult (CM Consult): [address]: Yes  MyChart Signup: No  Discharge Durable Medical Equipment: No  Physical Therapy Consult: Yes  Occupational Therapy Consult: No  Speech Therapy Consult: No  Current Support Network: Lives Alone  The Patient and/or Patient Representative was Provided with a Choice of Provider and Agrees with the Discharge Plan?: Yes  Freedom of Choice List was Provided with Basic Dialogue that Supports the Patient's Individualized Plan of Care/Goals, Treatment Preferences and Shares the Quality Data Associated with the Providers?: Yes  Discharge Location  Discharge Placement: Home with home health

## 2021-07-27 NOTE — PROGRESS NOTES
Spiritual Care Assessment/Progress Note  [address]      NAME: Matthew Ramirez      MRN: [id number]  AGE: 45 y.o.  SEX: female  Samaritan Affiliation: Yarsani   Language: English     7/27/2021     Total Time (in minutes): 34     Spiritual Assessment begun in [facility] 4M POST SURG ORT 2 through conversation with:         [x]Patient        [] Family    [] Friend(s)              Spiritual beliefs: (Please include comment if needed)     [x] Identifies with a nino tradition:  Yarsani      [x] Supported by a nino community:            [] Claims no spiritual orientation:           [] Seeking spiritual identity:                [] Adheres to an individual form of spirituality:           [] Not able to assess:                           Identified resources for coping:      [x] Prayer                               [] Music                  [] Guided Imagery     [x] Family/friends                 [] Pet visits     [] Devotional reading                         [] Unknown     [] Other:                                   Interventions offered during this visit: (See comments for more details)    Patient Interventions: Affirmation of emotions/emotional suffering, Affirmation of nino, Catharsis/review of pertinent events in supportive environment, Iconic (affirming the presence of God/Higher Power), Coping skills reviewed/reinforced, Prayer (actual), Prayer (assurance of), Initial/Spiritual assessment, patient floor           Plan of Care:     [] Support spiritual and/or cultural needs    [] Support AMD and/or advance care planning process      [] Support grieving process   [] Coordinate Rites and/or Rituals    [] Coordination with community clergy   [] No spiritual needs identified at this time   [] Detailed Plan of Care below (See Comments)  [] Make referral to Music Therapy  [] Make referral to Pet Therapy     [] Make referral to Addiction services  [] Make referral to [facility]  [] Make referral to Spiritual Care Partner  [] No future visits requested        [x] Follow up upon further referrals     Comments: Initial spiritual assessment in 4 Post Surg. Miss Poly Mcdaniel had worked herself up and appeared to be in distress. Assisted her in confirming that her nurse knows she needs some assistance. Provided calming presence as we waited for the nurse to arrive. She shared she has good support from her mother. She is Voodoo and became tearful as she spoke of her nino. Encouraged the tears as well as gentle breathing of the spirit. .. in with the spirit. ... out with there rest... [facility] and shared together in prayer. Provided spiritual presence as nurse arrived. Confirmed her call bell was working. Provided assurance of prayers as  entered the room. Contact Spiritual Care for any further referrals.

## 2021-07-27 NOTE — PROGRESS NOTES
General Surgery Daily Progress Note    Patient: Cassia Leiva MRN: [id number]  SSN: [id number]    YOB: 1970  Age: 45 y.o. Sex: female      Admit Date: 7/19/2021    POD * No surgery found *    Procedure: * No surgery found *    Subjective:   Reports some BM  Taking in PO better than yesterday    7/26 Passing \"foam\" from suppository only. Appetite ok. No pain. 7/27 SBFT unremarkable. Continued bowel function. Current Facility-Administered Medications   Medication Dose Route Frequency    potassium chloride SR (KLOR-CON 10) tablet 10 mEq  10 mEq Oral BID    polyethylene glycol (MIRALAX) packet 17 g  17 g Oral DAILY    bisacodyL (DULCOLAX) suppository 10 mg  10 mg Rectal DAILY    pantoprazole (PROTONIX) 40 mg in 0.9% sodium chloride 10 mL injection  40 mg IntraVENous Q12H    sodium chloride (NS) flush 5-40 mL  5-40 mL IntraVENous Q8H    sodium chloride (NS) flush 5-40 mL  5-40 mL IntraVENous PRN    acetaminophen (TYLENOL) tablet 650 mg  650 mg Oral Q6H PRN    Or    acetaminophen (TYLENOL) suppository 650 mg  650 mg Rectal Q6H PRN    0.9% sodium chloride infusion  100 mL/hr IntraVENous CONTINUOUS    ondansetron (ZOFRAN) injection 4 mg  4 mg IntraVENous Q6H PRN    naloxone (NARCAN) injection 0.4 mg  0.4 mg IntraVENous EVERY 2 MINUTES AS NEEDED    promethazine (PHENERGAN) 25 mg in NS IVPB  25 mg IntraVENous Q6H PRN    enoxaparin (LOVENOX) injection 40 mg  40 mg SubCUTAneous Q12H    LORazepam (ATIVAN) injection 1 mg  1 mg IntraVENous Q4H PRN    phenol throat spray (CHLORASEPTIC) 1 Spray  1 Spray Oral PRN    diphenhydrAMINE (BENADRYL) injection 25 mg  25 mg IntraVENous Q6H PRN    HYDROmorphone (DILAUDID) injection 2 mg  2 mg IntraVENous Q3H PRN        Objective:   No intake/output data recorded. 07/25 1901 - 07/27 0700  In: 2461.7 [P.O.:1220;  I.V.:1241.7]  Out: 1800 [Urine:1800]  Patient Vitals for the past 8 hrs:   BP Temp Pulse Resp SpO2   07/27/21 1151 117/80 98.5 °F (36.9 °C) 86 16 99 %       Physical Exam:  General: Alert, cooperative, NAD  Lungs: Unlabored  Abdomen: Soft, minimally TTP, morbidly obese  Extremities: Warm, moves all, no edema  Skin:  Warm and dry, no rash    Labs:   No results for input(s): WBC, HGB, HCT, PLT, HGBEXT, HCTEXT, PLTEXT, HGBEXT, HCTEXT, PLTEXT in the last 72 hours. No results for input(s): NA, K, CL, CO2, GLU, BUN, CREA, CA, MG, PHOS, ALB, TBIL, TBILI, ALT in the last 72 hours. No lab exists for component: SGOT    Assessment / Plan:     POD * No surgery found *    Procedure: * No surgery found *  Advance to regular diet  Reports she has ambulated and states she does not remember declining PT  Having small hard BM    Marissa Ortiz MD    SBS in AM.  If unremarkable, home tomorrow afternoon. 7/27 SBS unremarkable, continue to manage non-operatively. Replete K.   If K appropriate in AM, home in AM.

## 2021-07-27 NOTE — ROUTINE PROCESS
0700  Bedside and Verbal shift change report given to Vikram Castillo (oncoming nurse) by Kel Turner (offgoing nurse). Report included the following information SBAR, Kardex, ED Summary, Procedure Summary, Intake/Output, MAR and Recent Results.

## 2021-07-28 ENCOUNTER — APPOINTMENT (OUTPATIENT)
Dept: GENERAL RADIOLOGY | Age: 51
DRG: 394 | End: 2021-07-28
Attending: SURGERY
Payer: COMMERCIAL

## 2021-07-28 ENCOUNTER — HOME HEALTH ADMISSION (OUTPATIENT)
Dept: HOME HEALTH SERVICES | Facility: HOME HEALTH | Age: 51
End: 2021-07-28
Payer: COMMERCIAL

## 2021-07-28 VITALS
OXYGEN SATURATION: 95 % | TEMPERATURE: 98.3 F | BODY MASS INDEX: 45.99 KG/M2 | HEART RATE: 80 BPM | SYSTOLIC BLOOD PRESSURE: 149 MMHG | WEIGHT: 293 LBS | DIASTOLIC BLOOD PRESSURE: 90 MMHG | RESPIRATION RATE: 17 BRPM | HEIGHT: 67 IN

## 2021-07-28 LAB
ANION GAP SERPL CALC-SCNC: 3 MMOL/L (ref 5–15)
BUN SERPL-MCNC: 5 MG/DL (ref 6–20)
BUN/CREAT SERPL: 6 (ref 12–20)
CALCIUM SERPL-MCNC: 7.9 MG/DL (ref 8.5–10.1)
CHLORIDE SERPL-SCNC: 108 MMOL/L (ref 97–108)
CO2 SERPL-SCNC: 31 MMOL/L (ref 21–32)
CREAT SERPL-MCNC: 0.81 MG/DL (ref 0.55–1.02)
GLUCOSE SERPL-MCNC: 86 MG/DL (ref 65–100)
POTASSIUM SERPL-SCNC: 2.8 MMOL/L (ref 3.5–5.1)
SODIUM SERPL-SCNC: 142 MMOL/L (ref 136–145)

## 2021-07-28 PROCEDURE — 74011250637 HC RX REV CODE- 250/637: Performed by: SURGERY

## 2021-07-28 PROCEDURE — 74018 RADEX ABDOMEN 1 VIEW: CPT

## 2021-07-28 PROCEDURE — 80048 BASIC METABOLIC PNL TOTAL CA: CPT

## 2021-07-28 PROCEDURE — 36415 COLL VENOUS BLD VENIPUNCTURE: CPT

## 2021-07-28 RX ORDER — POLYETHYLENE GLYCOL 3350 17 G/17G
17 POWDER, FOR SOLUTION ORAL DAILY
Qty: 28 PACKET | Refills: 2 | Status: SHIPPED | OUTPATIENT
Start: 2021-07-28 | End: 2022-09-26

## 2021-07-28 RX ORDER — OXYCODONE AND ACETAMINOPHEN 10; 325 MG/1; MG/1
1 TABLET ORAL
Status: DISCONTINUED | OUTPATIENT
Start: 2021-07-28 | End: 2021-07-28 | Stop reason: HOSPADM

## 2021-07-28 RX ORDER — ALPRAZOLAM 0.5 MG/1
2 TABLET ORAL 2 TIMES DAILY
Status: DISCONTINUED | OUTPATIENT
Start: 2021-07-28 | End: 2021-07-28 | Stop reason: HOSPADM

## 2021-07-28 RX ORDER — OXYCODONE AND ACETAMINOPHEN 10; 325 MG/1; MG/1
1 TABLET ORAL
Qty: 12 TABLET | Refills: 0 | Status: SHIPPED | OUTPATIENT
Start: 2021-07-28 | End: 2021-07-31

## 2021-07-28 RX ADMIN — PANTOPRAZOLE SODIUM 40 MG: 40 TABLET, DELAYED RELEASE ORAL at 06:44

## 2021-07-28 RX ADMIN — OXYCODONE AND ACETAMINOPHEN 1 TABLET: 10; 325 TABLET ORAL at 13:23

## 2021-07-28 RX ADMIN — POLYETHYLENE GLYCOL 3350 17 G: 17 POWDER, FOR SOLUTION ORAL at 09:00

## 2021-07-28 RX ADMIN — POTASSIUM CHLORIDE 10 MEQ: 750 TABLET, FILM COATED, EXTENDED RELEASE ORAL at 09:00

## 2021-07-28 RX ADMIN — ALPRAZOLAM 2 MG: 0.5 TABLET ORAL at 00:43

## 2021-07-28 RX ADMIN — OXYCODONE AND ACETAMINOPHEN 1 TABLET: 10; 325 TABLET ORAL at 06:44

## 2021-07-28 RX ADMIN — PROMETHAZINE HYDROCHLORIDE 25 MG: 25 TABLET ORAL at 10:33

## 2021-07-28 RX ADMIN — OXYCODONE AND ACETAMINOPHEN 1 TABLET: 10; 325 TABLET ORAL at 00:43

## 2021-07-28 NOTE — CONSULTS
Nutrition Education    · Verbally reviewed information with Patient  · Educated on high protein/high fiber; sources of healthy fats, low sodium   · Written educational materials provided. · Contact name and number provided. · Refer to Patient Education activity for more details.     Electronically signed by Olamide Ibarra MS, TALAT on 7/28/2021 at 1:32 PM  Contact Number: [phone]

## 2021-07-28 NOTE — PROGRESS NOTES
Nurse handed patient a copy of discharge instructions which have been read and explained to patient and family member. New medications were read and explained to patient patient verbalized understanding. Patient aware that prescriptions have been electronically sent to their pharmacy. Opportunity for questions and clarification offered. Removed patient's IV access with no complications. Vital signs stable. Patient sent with all belongings.

## 2021-07-28 NOTE — PROGRESS NOTES
Patient is CHERELLE for plain films. Consult placed for dietician, diet plan. If plain films show progression of contrast, D/C to home after dietician consult. Rx for PRN pain meds, miralax written.      Nguyễn Torres MD

## 2021-07-28 NOTE — DISCHARGE INSTRUCTIONS
Patient Education        Bowel Blockage (Intestinal Obstruction): Care Instructions  Your Care Instructions  A bowel blockage, also called an intestinal obstruction, can prevent gas, fluids, or solids from moving through the intestines normally. It can cause constipation and, rarely, diarrhea. You may have pain, nausea, vomiting, and cramping. Most of the time, complete blockages require a stay in the hospital and possibly surgery. But if your bowel is only partly blocked, your doctor may tell you to wait until it clears on its own and you are able to pass gas and stool. If so, there are things you can do at home to help make you feel better. If you have had surgery for a bowel blockage, there are things you can do at home to make sure you heal well. You can also make some changes to keep your bowel from becoming blocked again. Follow-up care is a key part of your treatment and safety. Be sure to make and go to all appointments, and call your doctor if you are having problems. It's also a good idea to know your test results and keep a list of the medicines you take. How can you care for yourself at home? If your doctor has told you to wait at home for a blockage to clear on its own:  · Follow your doctor's instructions. These may include eating a liquid diet to avoid complete blockage. · Be safe with medicines. Take your medicines exactly as prescribed. Call your doctor if you think you are having a problem with your medicine. · Put a heating pad set on low on your belly to relieve mild cramps and pain. To prevent another blockage  · Try to eat smaller amounts of food more often. For example, have 5 or 6 small meals throughout the day instead of 2 or 3 large meals. Follow dietician recommendations. · Chew your food very well. Try to chew each bite about 20 times or until it is liquid. · Avoid high-fiber foods and raw fruits and vegetables with skins, husks, strings, or seeds.  These can form a ball of undigested material that can cause a blockage if a part of your bowel is scarred or narrowed. · Check with your doctor before you eat whole-grain products or use a fiber supplement such as Citrucel or Metamucil. · To help you have regular bowel movements, eat at regular times, do not strain during a bowel movement, and drink plenty of water. If you have kidney, heart, or liver disease and have to limit fluids, talk with your doctor before you increase the amount of fluids you drink. · Drink high-calorie liquid formulas if your doctor says to. Severe symptoms may make it hard for your body to take in vitamins and minerals. · Get regular exercise. It helps you digest your food better. Get at least 30 minutes of physical activity on most days of the week. Walking is a good choice. When should you call for help? Call your doctor now or seek immediate medical care if:    · You have a fever.     · You are vomiting.     · You have new or worse belly pain.     · You cannot pass stools or gas. Watch closely for changes in your health, and be sure to contact your doctor if you have any problems. Where can you learn more? Go to http://www.gray.com/  Enter B082 in the search box to learn more about \"Bowel Blockage (Intestinal Obstruction): Care Instructions. \"  Current as of: April 15, 2020               Content Version: 12.8  © 5619-2801 [employer], [employer]orporated. Care instructions adapted under license by [employer] (which disclaims liability or warranty for this information). If you have questions about a medical condition or this instruction, always ask your healthcare professional. [place] [address] any warranty or liability for your use of this information.

## 2021-07-28 NOTE — PROGRESS NOTES
7/28/2021 10:34 AM Spoke with pt, confirmed plan for home health to see her tomorrow or Friday at home and they will contact her directly to schedule this visit. Pt was understanding but requested [facility] call her prior to her discharge as when she gets home she will be going to bed. JOVITA called and relayed pt's request to Anahi with [facility] who will contact pt to schedule appointment. 7/28/2021  9:18 AM Discharge order noted. Home health PT order received. Confirmed with Anahi in intake at [facility] they can accept and she will contact pt to discuss copays. [facility] is aware pt will discharge home today. Pt's family will transport pt home. ELIU Liao    Care Management Interventions  PCP Verified by CM:  Yes Luz Maria Mejia )  Transition of Care Consult (CM Consult): [address]: Yes  MyChart Signup: No  Discharge Durable Medical Equipment: No  Physical Therapy Consult: Yes  Occupational Therapy Consult: No  Speech Therapy Consult: No  Current Support Network: Lives Alone  The Patient and/or Patient Representative was Provided with a Choice of Provider and Agrees with the Discharge Plan?: Yes  Freedom of Choice List was Provided with Basic Dialogue that Supports the Patient's Individualized Plan of Care/Goals, Treatment Preferences and Shares the Quality Data Associated with the Providers?: Yes  Discharge Location  Discharge Placement: Home with home health

## 2021-07-30 ENCOUNTER — HOME CARE VISIT (OUTPATIENT)
Dept: SCHEDULING | Facility: HOME HEALTH | Age: 51
End: 2021-07-30
Payer: COMMERCIAL

## 2021-07-30 PROCEDURE — 400013 HH SOC

## 2021-07-30 PROCEDURE — G0151 HHCP-SERV OF PT,EA 15 MIN: HCPCS

## 2021-07-31 VITALS
SYSTOLIC BLOOD PRESSURE: 122 MMHG | RESPIRATION RATE: 12 BRPM | OXYGEN SATURATION: 97 % | DIASTOLIC BLOOD PRESSURE: 78 MMHG | HEART RATE: 91 BPM | TEMPERATURE: 98.2 F

## 2021-07-31 NOTE — CASE COMMUNICATION
Tabitha Tenorio patient to you for next week; orders are: 3w2,2w2;  I will send you my summary when completed;  [facility]

## 2021-08-02 ENCOUNTER — HOME CARE VISIT (OUTPATIENT)
Dept: SCHEDULING | Facility: HOME HEALTH | Age: 51
End: 2021-08-02
Payer: COMMERCIAL

## 2021-08-02 VITALS
RESPIRATION RATE: 16 BRPM | TEMPERATURE: 97.7 F | SYSTOLIC BLOOD PRESSURE: 142 MMHG | DIASTOLIC BLOOD PRESSURE: 80 MMHG | HEART RATE: 92 BPM | OXYGEN SATURATION: 97 %

## 2021-08-02 PROCEDURE — G0157 HHC PT ASSISTANT EA 15: HCPCS

## 2021-08-04 ENCOUNTER — HOME CARE VISIT (OUTPATIENT)
Dept: SCHEDULING | Facility: HOME HEALTH | Age: 51
End: 2021-08-04
Payer: COMMERCIAL

## 2021-08-04 PROCEDURE — G0157 HHC PT ASSISTANT EA 15: HCPCS

## 2021-08-04 NOTE — DISCHARGE SUMMARY
Discharge Summary    Patient: Gregory Galdamez               Sex: female          DOA: 7/19/2021  5:03 PM       YOB: 1970      Age:  45 y.o.        LOS:  LOS: 8 days                Discharge Date:  7/28/2021    Admission Diagnoses: Abdominal pain [R10.9]  Small bowel obstruction ([address] [K56.609]    Discharge Diagnoses:  Same    Procedure:  n/a    Discharge Condition: [facility] Course: Resolution of mild obstruction with nasogastric decompression. Diet modification discussed and  given by dietician. Discharge to home in stable condition. Consults: Hospitalist, Dietician    Significant Diagnostic Studies: See full electronic record. Discharge Medications:   See full electronic record. Activity/Diet/Wound Care: See patient administered discharge instructions.     Follow-up: 2 weeks    Ro Claudio MD  [facility]  Office:  [phone]  Fax:  [phone]

## 2021-08-06 ENCOUNTER — HOME CARE VISIT (OUTPATIENT)
Dept: HOME HEALTH SERVICES | Facility: HOME HEALTH | Age: 51
End: 2021-08-06
Payer: COMMERCIAL

## 2021-08-08 VITALS
RESPIRATION RATE: 16 BRPM | TEMPERATURE: 98.1 F | OXYGEN SATURATION: 97 % | SYSTOLIC BLOOD PRESSURE: 124 MMHG | DIASTOLIC BLOOD PRESSURE: 72 MMHG | HEART RATE: 100 BPM

## 2021-08-09 ENCOUNTER — HOME CARE VISIT (OUTPATIENT)
Dept: SCHEDULING | Facility: HOME HEALTH | Age: 51
End: 2021-08-09
Payer: COMMERCIAL

## 2021-08-09 VITALS
SYSTOLIC BLOOD PRESSURE: 118 MMHG | TEMPERATURE: 97.9 F | DIASTOLIC BLOOD PRESSURE: 72 MMHG | RESPIRATION RATE: 16 BRPM | HEART RATE: 95 BPM | OXYGEN SATURATION: 98 %

## 2021-08-09 PROCEDURE — G0157 HHC PT ASSISTANT EA 15: HCPCS

## 2021-08-11 ENCOUNTER — HOME CARE VISIT (OUTPATIENT)
Dept: SCHEDULING | Facility: HOME HEALTH | Age: 51
End: 2021-08-11
Payer: COMMERCIAL

## 2021-08-11 VITALS
RESPIRATION RATE: 16 BRPM | OXYGEN SATURATION: 97 % | HEART RATE: 99 BPM | TEMPERATURE: 98.1 F | DIASTOLIC BLOOD PRESSURE: 78 MMHG | SYSTOLIC BLOOD PRESSURE: 124 MMHG

## 2021-08-11 PROCEDURE — G0157 HHC PT ASSISTANT EA 15: HCPCS

## 2021-08-12 ENCOUNTER — HOME CARE VISIT (OUTPATIENT)
Dept: SCHEDULING | Facility: HOME HEALTH | Age: 51
End: 2021-08-12
Payer: COMMERCIAL

## 2021-08-12 VITALS
SYSTOLIC BLOOD PRESSURE: 122 MMHG | RESPIRATION RATE: 16 BRPM | OXYGEN SATURATION: 97 % | HEART RATE: 90 BPM | DIASTOLIC BLOOD PRESSURE: 64 MMHG | TEMPERATURE: 97.5 F

## 2021-08-12 PROCEDURE — G0157 HHC PT ASSISTANT EA 15: HCPCS

## 2021-08-16 ENCOUNTER — TRANSCRIBE ORDER (OUTPATIENT)
Dept: SCHEDULING | Age: 51
End: 2021-08-16

## 2021-08-16 DIAGNOSIS — D18.03 HEMANGIOMA OF INTRA-ABDOMINAL STRUCTURES: Primary | ICD-10-CM

## 2021-08-16 DIAGNOSIS — D73.4 CYST OF SPLEEN: ICD-10-CM

## 2021-08-17 ENCOUNTER — HOSPITAL ENCOUNTER (OUTPATIENT)
Dept: ULTRASOUND IMAGING | Age: 51
Discharge: HOME | End: 2021-08-17
Attending: FAMILY MEDICINE
Payer: COMMERCIAL

## 2021-08-17 DIAGNOSIS — D73.4 CYST OF SPLEEN: ICD-10-CM

## 2021-08-17 DIAGNOSIS — D18.03 HEMANGIOMA OF INTRA-ABDOMINAL STRUCTURES: ICD-10-CM

## 2021-08-17 PROCEDURE — 76700 US EXAM ABDOM COMPLETE: CPT

## 2021-08-18 ENCOUNTER — HOME CARE VISIT (OUTPATIENT)
Dept: HOME HEALTH SERVICES | Facility: HOME HEALTH | Age: 51
End: 2021-08-18
Payer: COMMERCIAL

## 2021-08-18 ENCOUNTER — HOME CARE VISIT (OUTPATIENT)
Dept: SCHEDULING | Facility: HOME HEALTH | Age: 51
End: 2021-08-18
Payer: COMMERCIAL

## 2021-08-18 VITALS
OXYGEN SATURATION: 97 % | SYSTOLIC BLOOD PRESSURE: 120 MMHG | HEART RATE: 91 BPM | RESPIRATION RATE: 16 BRPM | DIASTOLIC BLOOD PRESSURE: 72 MMHG | TEMPERATURE: 97.9 F

## 2021-08-18 PROCEDURE — G0157 HHC PT ASSISTANT EA 15: HCPCS

## 2021-08-19 ENCOUNTER — HOME CARE VISIT (OUTPATIENT)
Dept: SCHEDULING | Facility: HOME HEALTH | Age: 51
End: 2021-08-19
Payer: COMMERCIAL

## 2021-08-19 PROCEDURE — G0157 HHC PT ASSISTANT EA 15: HCPCS

## 2021-08-20 VITALS
RESPIRATION RATE: 16 BRPM | SYSTOLIC BLOOD PRESSURE: 124 MMHG | OXYGEN SATURATION: 98 % | DIASTOLIC BLOOD PRESSURE: 76 MMHG | TEMPERATURE: 98.2 F | HEART RATE: 95 BPM

## 2021-08-25 ENCOUNTER — HOME CARE VISIT (OUTPATIENT)
Dept: SCHEDULING | Facility: HOME HEALTH | Age: 51
End: 2021-08-25
Payer: COMMERCIAL

## 2021-08-25 VITALS
SYSTOLIC BLOOD PRESSURE: 120 MMHG | OXYGEN SATURATION: 98 % | RESPIRATION RATE: 16 BRPM | TEMPERATURE: 97.3 F | HEART RATE: 75 BPM | DIASTOLIC BLOOD PRESSURE: 68 MMHG

## 2021-08-25 PROCEDURE — G0157 HHC PT ASSISTANT EA 15: HCPCS

## 2021-08-27 ENCOUNTER — HOME CARE VISIT (OUTPATIENT)
Dept: SCHEDULING | Facility: HOME HEALTH | Age: 51
End: 2021-08-27
Payer: COMMERCIAL

## 2021-08-27 VITALS
RESPIRATION RATE: 12 BRPM | DIASTOLIC BLOOD PRESSURE: 86 MMHG | HEART RATE: 84 BPM | TEMPERATURE: 97.4 F | OXYGEN SATURATION: 96 % | SYSTOLIC BLOOD PRESSURE: 122 MMHG

## 2021-08-27 PROCEDURE — G0151 HHCP-SERV OF PT,EA 15 MIN: HCPCS

## 2021-10-08 PROCEDURE — 99284 EMERGENCY DEPT VISIT MOD MDM: CPT

## 2021-10-09 ENCOUNTER — APPOINTMENT (OUTPATIENT)
Dept: CT IMAGING | Age: 51
End: 2021-10-09
Attending: EMERGENCY MEDICINE
Payer: COMMERCIAL

## 2021-10-09 ENCOUNTER — HOSPITAL ENCOUNTER (EMERGENCY)
Age: 51
Discharge: HOME | End: 2021-10-09
Attending: EMERGENCY MEDICINE
Payer: COMMERCIAL

## 2021-10-09 VITALS
HEART RATE: 84 BPM | TEMPERATURE: 97.1 F | HEIGHT: 67 IN | SYSTOLIC BLOOD PRESSURE: 101 MMHG | BODY MASS INDEX: 45.52 KG/M2 | DIASTOLIC BLOOD PRESSURE: 63 MMHG | RESPIRATION RATE: 17 BRPM | WEIGHT: 290 LBS | OXYGEN SATURATION: 100 %

## 2021-10-09 DIAGNOSIS — S30.1XXA CONTUSION OF ABDOMINAL WALL, INITIAL ENCOUNTER: Primary | ICD-10-CM

## 2021-10-09 LAB
ALBUMIN SERPL-MCNC: 3.3 G/DL (ref 3.5–5)
ALBUMIN/GLOB SERPL: 0.7 {RATIO} (ref 1.1–2.2)
ALP SERPL-CCNC: 130 U/L (ref 45–117)
ALT SERPL-CCNC: 42 U/L (ref 12–78)
ANION GAP SERPL CALC-SCNC: 9 MMOL/L (ref 5–15)
AST SERPL-CCNC: 33 U/L (ref 15–37)
BASOPHILS # BLD: 0 K/UL (ref 0–0.1)
BASOPHILS NFR BLD: 0 % (ref 0–1)
BILIRUB SERPL-MCNC: 0.2 MG/DL (ref 0.2–1)
BUN SERPL-MCNC: 13 MG/DL (ref 6–20)
BUN/CREAT SERPL: 13 (ref 12–20)
CALCIUM SERPL-MCNC: 9.3 MG/DL (ref 8.5–10.1)
CHLORIDE SERPL-SCNC: 103 MMOL/L (ref 97–108)
CO2 SERPL-SCNC: 26 MMOL/L (ref 21–32)
COMMENT, HOLDF: NORMAL
CREAT SERPL-MCNC: 1 MG/DL (ref 0.55–1.02)
DIFFERENTIAL METHOD BLD: ABNORMAL
EOSINOPHIL # BLD: 0.2 K/UL (ref 0–0.4)
EOSINOPHIL NFR BLD: 2 % (ref 0–7)
ERYTHROCYTE [DISTWIDTH] IN BLOOD BY AUTOMATED COUNT: 14.6 % (ref 11.5–14.5)
GLOBULIN SER CALC-MCNC: 4.9 G/DL (ref 2–4)
GLUCOSE SERPL-MCNC: 85 MG/DL (ref 65–100)
HCT VFR BLD AUTO: 37.6 % (ref 35–47)
HGB BLD-MCNC: 12.1 G/DL (ref 11.5–16)
IMM GRANULOCYTES # BLD AUTO: 0 K/UL (ref 0–0.04)
IMM GRANULOCYTES NFR BLD AUTO: 1 % (ref 0–0.5)
LYMPHOCYTES # BLD: 2.7 K/UL (ref 0.8–3.5)
LYMPHOCYTES NFR BLD: 34 % (ref 12–49)
MCH RBC QN AUTO: 30.3 PG (ref 26–34)
MCHC RBC AUTO-ENTMCNC: 32.2 G/DL (ref 30–36.5)
MCV RBC AUTO: 94.2 FL (ref 80–99)
MONOCYTES # BLD: 0.5 K/UL (ref 0–1)
MONOCYTES NFR BLD: 7 % (ref 5–13)
NEUTS SEG # BLD: 4.5 K/UL (ref 1.8–8)
NEUTS SEG NFR BLD: 56 % (ref 32–75)
NRBC # BLD: 0 K/UL (ref 0–0.01)
NRBC BLD-RTO: 0 PER 100 WBC
PLATELET # BLD AUTO: 281 K/UL (ref 150–400)
PMV BLD AUTO: 11.1 FL (ref 8.9–12.9)
POTASSIUM SERPL-SCNC: 4 MMOL/L (ref 3.5–5.1)
PROT SERPL-MCNC: 8.2 G/DL (ref 6.4–8.2)
RBC # BLD AUTO: 3.99 M/UL (ref 3.8–5.2)
SAMPLES BEING HELD,HOLD: NORMAL
SODIUM SERPL-SCNC: 138 MMOL/L (ref 136–145)
WBC # BLD AUTO: 8 K/UL (ref 3.6–11)

## 2021-10-09 PROCEDURE — 80053 COMPREHEN METABOLIC PANEL: CPT

## 2021-10-09 PROCEDURE — 85025 COMPLETE CBC W/AUTO DIFF WBC: CPT

## 2021-10-09 PROCEDURE — 74177 CT ABD & PELVIS W/CONTRAST: CPT

## 2021-10-09 PROCEDURE — 36415 COLL VENOUS BLD VENIPUNCTURE: CPT

## 2021-10-09 PROCEDURE — 74011000636 HC RX REV CODE- 636: Performed by: RADIOLOGY

## 2021-10-09 RX ORDER — CEPHALEXIN 500 MG/1
500 CAPSULE ORAL 4 TIMES DAILY
Qty: 28 CAPSULE | Refills: 0 | Status: SHIPPED | OUTPATIENT
Start: 2021-10-09 | End: 2021-10-09 | Stop reason: SDUPTHER

## 2021-10-09 RX ORDER — CEPHALEXIN 500 MG/1
500 CAPSULE ORAL 4 TIMES DAILY
Qty: 28 CAPSULE | Refills: 0 | Status: SHIPPED | OUTPATIENT
Start: 2021-10-09 | End: 2021-10-16

## 2021-10-09 RX ADMIN — IOPAMIDOL 100 ML: 755 INJECTION, SOLUTION INTRAVENOUS at 06:03

## 2021-10-09 NOTE — ED TRIAGE NOTES
Pt arrives with a concern that she has toren her umbilical hernia repair after falling and hitting her abdomen on a night stand. Pt notes some bleeding on her belly button.

## 2021-10-09 NOTE — ED PROVIDER NOTES
57-year-old female with a history of multiple abdominal surgeries presents after a fall where she tripped and landed on her abdomen. She has had a history of remote ventral hernia repair. She complains of pain around her umbilicus and excoriations and abrasions. She denies any loss of consciousness. Denies any other injuries. She does not have any fevers or chills. She denies any nausea or vomiting. She rates her pain 8 out of 10. She had some bleeding around her bellybutton. 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  PUD (peptic ulcer disease) 2016    stomach and colon ulcers?     Seizures ([address]     me dside effect? last sz 2013       Past Surgical History:   Procedure Laterality Date    COLONOSCOPY N/A 3/24/2017    COLONOSCOPY performed by Shannen Jha MD at [address] HX APPENDECTOMY  2009    HX BREAST BIOPSY Right 2007    negative pathology    HX BREAST BIOPSY Left 1999    negative pathology    HX ENDOSCOPY  2016    HX HERNIA REPAIR  9/2011    laparoscopic incisional hernia repair    HX HERNIA REPAIR  1344    umbilical hernia repair    HX HYSTERECTOMY  2009    partial hysterectomy (right ovary removed)    HX OOPHORECTOMY  2009    removed post op hematoma and right oopherectomy    HX OOPHORECTOMY  3/2016    mass removed and left oopherectomy    HX OTHER SURGICAL  2009    ileocectomy, bowel resection,     SC ABDOMEN SURGERY PROC UNLISTED  4/2010    ventral hernia repair with biologic mesh    SC COLONOSCOPY FLX DX W/COLLJ SPEC WHEN PFRMD  1/14/2011         US GUIDE BX BREAST Left 2016    neagative paythology         Family History:   Problem Relation Age of Onset    Cancer Maternal Grandmother         colon ca    Breast Cancer Maternal Grandmother     Breast Cancer Paternal Grandmother     Breast Cancer Cousin         multiple; both sides       Social History     Socioeconomic History    Marital status:      Spouse name: Not on file    Number of children: Not on file    Years of education: Not on file    Highest education level: Not on file   Occupational History    Not on file   Tobacco Use    Smoking status: Never Smoker    Smokeless tobacco: Never Used   Substance and Sexual Activity    Alcohol use: Yes     Alcohol/week: 1.0 standard drinks     Types: 1 Glasses of wine per week     Comment: 1 glass every 2 weeks    Drug use: No    Sexual activity: Not on file   Other Topics Concern     Service Not Asked    Blood Transfusions Not Asked    Caffeine Concern Not Asked    Occupational Exposure Not Asked    Hobby Hazards Not Asked    Sleep Concern Not Asked    Stress Concern Not Asked    Weight Concern Not Asked    Special Diet Not Asked    Back Care Not Asked    Exercise Not Asked    Bike Helmet Not Asked   [address] Not Asked    Self-Exams Not Asked   Social History Narrative    Not on file     Social Determinants of Health     Financial Resource Strain:     Difficulty of Paying Living Expenses:    Food Insecurity:     Worried About Running Out of Food in the Last Year:     [address] in the Last Year:    Transportation Needs:     Lack of Transportation (Medical):      Lack of Transportation (Non-Medical):    Physical Activity:     Days of Exercise per Week:     Minutes of Exercise per Session:    Stress:     Feeling of Stress :    Social Connections:     Frequency of Communication with Friends and Family:     Frequency of Social Gatherings with Friends and Family:  Attends Gnosticism Services:     Active Member of Clubs or Organizations:     Attends Club or Organization Meetings:     Marital Status:    Intimate Partner Violence:     Fear of Current or Ex-Partner:     Emotionally Abused:     Physically Abused:     Sexually Abused: ALLERGIES: Sulfa (sulfonamide antibiotics), Toradol [ketorolac tromethamine], Cymbalta [duloxetine], Effexor [venlafaxine], Tramadol, Compazine [prochlorperazine], Hydrocodone-acetaminophen, and Morphine    Review of Systems   Gastrointestinal: Positive for abdominal pain. All other systems reviewed and are negative. Vitals:    10/09/21 0003 10/09/21 0214   BP: 129/81 (!) 142/75   Pulse: 91 79   Resp: 20 17   Temp: 97.1 °F (36.2 °C)    SpO2: 98% 100%   Weight: 131.5 kg (290 lb)    Height: 5' 7\" (1.702 m)             Physical Exam  Vitals and nursing note reviewed. Constitutional:       General: She is not in acute distress. HENT:      Head: Normocephalic and atraumatic. Mouth/Throat:      Mouth: Mucous membranes are moist.   Eyes:      General: No scleral icterus. Conjunctiva/sclera: Conjunctivae normal.      Pupils: Pupils are equal, round, and reactive to light. Neck:      Trachea: No tracheal deviation. Cardiovascular:      Rate and Rhythm: Normal rate and regular rhythm. Pulmonary:      Effort: Pulmonary effort is normal. No respiratory distress. Breath sounds: No wheezing or rales. Abdominal:      General: There is no distension. Palpations: Abdomen is soft. Tenderness: There is abdominal tenderness in the periumbilical area. There is no guarding. Genitourinary:     Comments: deferred  Musculoskeletal:         General: No deformity. Cervical back: Neck supple. Skin:     General: Skin is warm and dry. Neurological:      General: No focal deficit present. Mental Status: She is alert.    Psychiatric:         Mood and Affect: Mood normal.          MDM Procedures          6:36 AM  Patient re-evaluated. All questions answered. Patient appropriate for discharge. Given return precautions and follow up instructions. LABORATORY TESTS:  Labs Reviewed   CBC WITH AUTOMATED DIFF - Abnormal; Notable for the following components:       Result Value    RDW 14.6 (*)     IMMATURE GRANULOCYTES 1 (*)     All other components within normal limits   METABOLIC PANEL, COMPREHENSIVE - Abnormal; Notable for the following components:    GFR est non-AA 58 (*)     Alk. phosphatase 130 (*)     Albumin 3.3 (*)     Globulin 4.9 (*)     A-G Ratio 0.7 (*)     All other components within normal limits   SAMPLES BEING HELD       IMAGING RESULTS:  CT ABD PELV W CONT   Final Result   No acute intra-abdominal pathology          MEDICATIONS GIVEN:  Medications   iopamidoL (ISOVUE-370) 76 % injection 100 mL (100 mL IntraVENous Given 10/9/21 0603)       IMPRESSION:  1. Contusion of abdominal wall, initial encounter        PLAN:  1. Current Discharge Medication List      START taking these medications    Details   cephALEXin (Keflex) 500 mg capsule Take 1 Capsule by mouth four (4) times daily for 7 days. Fill this prescription if you develop redness, fevers  Qty: 28 Capsule, Refills: 0  Start date: 10/9/2021, End date: 10/16/2021           2. Follow-up Information     Follow up With Specialties Details Why Contact Info    Orlando Garcia MD Family Medicine Schedule an appointment as soon as possible for a visit   [address] [facility]      Dayanna Roldan MD General Surgery Schedule an appointment as soon as possible for a visit   [address] [facility]  [phone]      [facility] EMERGENCY DEPT Emergency Medicine  If symptoms worsen or new concerns [address] [place] [address]  [phone]        3. Return to ED for new or worsening symptoms       Parminder Client.  Justino Pearl MD        Please note that this dictation was completed with [employer], the [employer] voice recognition software. Quite often unanticipated grammatical, syntax, homophones, and other interpretive errors are inadvertently transcribed by the computer software. Please disregard these errors. Please excuse any errors that have escaped final proofreading.

## 2021-10-25 ENCOUNTER — TRANSCRIBE ORDER (OUTPATIENT)
Dept: SCHEDULING | Age: 51
End: 2021-10-25

## 2021-10-25 DIAGNOSIS — N60.19: Primary | ICD-10-CM

## 2021-10-27 ENCOUNTER — HOSPITAL ENCOUNTER (OUTPATIENT)
Dept: MAMMOGRAPHY | Age: 51
Discharge: HOME | End: 2021-10-27
Attending: FAMILY MEDICINE
Payer: COMMERCIAL

## 2021-10-27 DIAGNOSIS — N60.19: ICD-10-CM

## 2021-10-27 PROCEDURE — 77063 BREAST TOMOSYNTHESIS BI: CPT

## 2021-11-03 ENCOUNTER — TRANSCRIBE ORDER (OUTPATIENT)
Dept: SCHEDULING | Age: 51
End: 2021-11-03

## 2021-11-03 DIAGNOSIS — N60.19: Primary | ICD-10-CM

## 2022-03-16 ENCOUNTER — TRANSCRIBE ORDER (OUTPATIENT)
Dept: SCHEDULING | Age: 52
End: 2022-03-16

## 2022-03-16 DIAGNOSIS — E66.9 OBESE: Primary | ICD-10-CM

## 2022-03-16 DIAGNOSIS — E66.3 OVERWEIGHT: ICD-10-CM

## 2022-03-19 PROBLEM — K52.9 ACUTE COLITIS: Status: ACTIVE | Noted: 2017-03-22

## 2022-03-19 PROBLEM — K43.9 VENTRAL HERNIA: Status: ACTIVE | Noted: 2021-07-20

## 2022-03-19 PROBLEM — E66.01 OBESITY, MORBID (HCC): Status: ACTIVE | Noted: 2018-02-08

## 2022-03-19 PROBLEM — R11.2 NAUSEA AND VOMITING: Status: ACTIVE | Noted: 2017-03-22

## 2022-03-19 PROBLEM — R10.9 ABDOMINAL PAIN: Status: ACTIVE | Noted: 2021-07-20

## 2022-03-20 PROBLEM — K56.609 SMALL BOWEL OBSTRUCTION (HCC): Status: ACTIVE | Noted: 2019-04-13

## 2022-03-20 PROBLEM — R19.7 BLOODY DIARRHEA: Status: ACTIVE | Noted: 2021-07-20

## 2022-04-12 ENCOUNTER — TELEPHONE (OUTPATIENT)
Dept: CARDIAC REHAB | Age: 52
End: 2022-04-12

## 2022-04-12 NOTE — TELEPHONE ENCOUNTER
From the central scheduling office:  NUTR CAT[employer] @ [address] [[id number]] Copay: $0.00    Provider: Dane Pruett, [address] Department: [address] [place] [address]   Referral #: [id number] Referral Status: New Request   Referring Provider: Stanislaw Gong Patient Type:     Notes:   sched w/pt, pt wanted to get it sched today states unable to get ahold of Shu Stein, was given our # to sched and unable to wait till he is in office, after asking pt questions it changed to wellness and I do not know why, pt states we have the correct insurance on file, hjcraiglow 3/16/22 459pm  Specialty Services Required   Disposition Notes:     Made On:  Change Notes:  Change Notes:  Change Notes:  Change Notes:  Change Notes: 3/16/2022 4:55 PM  3/16/2022 5:00 PM  3/16/2022 5:01 PM  3/23/2022 5:15 PM  3/24/2022 4:44 PM  4/5/2022 9:56 AM By:  By:  By:  By:  By:  By: Quince Kerbs, [address], Hosea Krabbe, [facility]   Referral Status New Request

## 2022-04-18 ENCOUNTER — APPOINTMENT (OUTPATIENT)
Dept: CARDIAC REHAB | Age: 52
End: 2022-04-18
Attending: FAMILY MEDICINE

## 2022-07-05 ENCOUNTER — APPOINTMENT (OUTPATIENT)
Dept: CT IMAGING | Age: 52
End: 2022-07-05
Attending: STUDENT IN AN ORGANIZED HEALTH CARE EDUCATION/TRAINING PROGRAM
Payer: COMMERCIAL

## 2022-07-05 ENCOUNTER — HOSPITAL ENCOUNTER (EMERGENCY)
Age: 52
Discharge: HOME | End: 2022-07-06
Attending: EMERGENCY MEDICINE
Payer: COMMERCIAL

## 2022-07-05 DIAGNOSIS — K52.9 CHRONIC DIARRHEA: ICD-10-CM

## 2022-07-05 DIAGNOSIS — R10.9 CHRONIC ABDOMINAL PAIN: ICD-10-CM

## 2022-07-05 DIAGNOSIS — G89.29 CHRONIC ABDOMINAL PAIN: ICD-10-CM

## 2022-07-05 DIAGNOSIS — R11.2 NAUSEA AND VOMITING, UNSPECIFIED VOMITING TYPE: Primary | ICD-10-CM

## 2022-07-05 LAB
ALBUMIN SERPL-MCNC: 3.6 G/DL (ref 3.5–5)
ALBUMIN/GLOB SERPL: 0.9 {RATIO} (ref 1.1–2.2)
ALP SERPL-CCNC: 116 U/L (ref 45–117)
ALT SERPL-CCNC: 34 U/L (ref 12–78)
ANION GAP SERPL CALC-SCNC: 8 MMOL/L (ref 5–15)
APPEARANCE UR: CLEAR
AST SERPL-CCNC: 26 U/L (ref 15–37)
BASOPHILS # BLD: 0 K/UL (ref 0–0.1)
BASOPHILS NFR BLD: 1 % (ref 0–1)
BILIRUB SERPL-MCNC: 0.4 MG/DL (ref 0.2–1)
BILIRUB UR QL: NEGATIVE
BUN SERPL-MCNC: 12 MG/DL (ref 6–20)
BUN/CREAT SERPL: 16 (ref 12–20)
CALCIUM SERPL-MCNC: 9.6 MG/DL (ref 8.5–10.1)
CHLORIDE SERPL-SCNC: 110 MMOL/L (ref 97–108)
CO2 SERPL-SCNC: 23 MMOL/L (ref 21–32)
COLOR UR: ABNORMAL
COVID-19 RAPID TEST, COVR: NOT DETECTED
CREAT SERPL-MCNC: 0.74 MG/DL (ref 0.55–1.02)
DIFFERENTIAL METHOD BLD: NORMAL
EOSINOPHIL # BLD: 0.1 K/UL (ref 0–0.4)
EOSINOPHIL NFR BLD: 1 % (ref 0–7)
ERYTHROCYTE [DISTWIDTH] IN BLOOD BY AUTOMATED COUNT: 12.8 % (ref 11.5–14.5)
GLOBULIN SER CALC-MCNC: 4 G/DL (ref 2–4)
GLUCOSE SERPL-MCNC: 86 MG/DL (ref 65–100)
GLUCOSE UR STRIP.AUTO-MCNC: NEGATIVE MG/DL
HCG UR QL: NEGATIVE
HCT VFR BLD AUTO: 40.5 % (ref 35–47)
HGB BLD-MCNC: 13 G/DL (ref 11.5–16)
HGB UR QL STRIP: NEGATIVE
IMM GRANULOCYTES # BLD AUTO: 0 K/UL (ref 0–0.04)
IMM GRANULOCYTES NFR BLD AUTO: 0 % (ref 0–0.5)
KETONES UR QL STRIP.AUTO: 15 MG/DL
LEUKOCYTE ESTERASE UR QL STRIP.AUTO: NEGATIVE
LIPASE SERPL-CCNC: 192 U/L (ref 73–393)
LYMPHOCYTES # BLD: 2.7 K/UL (ref 0.8–3.5)
LYMPHOCYTES NFR BLD: 34 % (ref 12–49)
MAGNESIUM SERPL-MCNC: 2.1 MG/DL (ref 1.6–2.4)
MCH RBC QN AUTO: 30 PG (ref 26–34)
MCHC RBC AUTO-ENTMCNC: 32.1 G/DL (ref 30–36.5)
MCV RBC AUTO: 93.5 FL (ref 80–99)
MONOCYTES # BLD: 0.5 K/UL (ref 0–1)
MONOCYTES NFR BLD: 7 % (ref 5–13)
NEUTS SEG # BLD: 4.5 K/UL (ref 1.8–8)
NEUTS SEG NFR BLD: 57 % (ref 32–75)
NITRITE UR QL STRIP.AUTO: NEGATIVE
NRBC # BLD: 0 K/UL (ref 0–0.01)
NRBC BLD-RTO: 0 PER 100 WBC
PH UR STRIP: 5 [PH] (ref 5–8)
PLATELET # BLD AUTO: 247 K/UL (ref 150–400)
PMV BLD AUTO: 11 FL (ref 8.9–12.9)
POTASSIUM SERPL-SCNC: 4.2 MMOL/L (ref 3.5–5.1)
PROT SERPL-MCNC: 7.6 G/DL (ref 6.4–8.2)
PROT UR STRIP-MCNC: NEGATIVE MG/DL
RBC # BLD AUTO: 4.33 M/UL (ref 3.8–5.2)
SODIUM SERPL-SCNC: 141 MMOL/L (ref 136–145)
SOURCE, COVRS: NORMAL
SP GR UR REFRACTOMETRY: 1.03 (ref 1–1.03)
UROBILINOGEN UR QL STRIP.AUTO: 0.2 EU/DL (ref 0.2–1)
WBC # BLD AUTO: 7.9 K/UL (ref 3.6–11)

## 2022-07-05 PROCEDURE — 87635 SARS-COV-2 COVID-19 AMP PRB: CPT

## 2022-07-05 PROCEDURE — 81003 URINALYSIS AUTO W/O SCOPE: CPT

## 2022-07-05 PROCEDURE — 96374 THER/PROPH/DIAG INJ IV PUSH: CPT

## 2022-07-05 PROCEDURE — 74011250636 HC RX REV CODE- 250/636: Performed by: STUDENT IN AN ORGANIZED HEALTH CARE EDUCATION/TRAINING PROGRAM

## 2022-07-05 PROCEDURE — 85025 COMPLETE CBC W/AUTO DIFF WBC: CPT

## 2022-07-05 PROCEDURE — 87177 OVA AND PARASITES SMEARS: CPT

## 2022-07-05 PROCEDURE — 36415 COLL VENOUS BLD VENIPUNCTURE: CPT

## 2022-07-05 PROCEDURE — 80053 COMPREHEN METABOLIC PANEL: CPT

## 2022-07-05 PROCEDURE — 87506 IADNA-DNA/RNA PROBE TQ 6-11: CPT

## 2022-07-05 PROCEDURE — 81025 URINE PREGNANCY TEST: CPT

## 2022-07-05 PROCEDURE — 83735 ASSAY OF MAGNESIUM: CPT

## 2022-07-05 PROCEDURE — 89055 LEUKOCYTE ASSESSMENT FECAL: CPT

## 2022-07-05 PROCEDURE — 74177 CT ABD & PELVIS W/CONTRAST: CPT

## 2022-07-05 PROCEDURE — 99285 EMERGENCY DEPT VISIT HI MDM: CPT

## 2022-07-05 PROCEDURE — 96361 HYDRATE IV INFUSION ADD-ON: CPT

## 2022-07-05 PROCEDURE — 96376 TX/PRO/DX INJ SAME DRUG ADON: CPT

## 2022-07-05 PROCEDURE — 83690 ASSAY OF LIPASE: CPT

## 2022-07-05 RX ORDER — HYDROMORPHONE HYDROCHLORIDE 1 MG/ML
1 INJECTION, SOLUTION INTRAMUSCULAR; INTRAVENOUS; SUBCUTANEOUS ONCE
Status: COMPLETED | OUTPATIENT
Start: 2022-07-05 | End: 2022-07-05

## 2022-07-05 RX ADMIN — HYDROMORPHONE HYDROCHLORIDE 1 MG: 1 INJECTION, SOLUTION INTRAMUSCULAR; INTRAVENOUS; SUBCUTANEOUS at 23:54

## 2022-07-05 RX ADMIN — SODIUM CHLORIDE 1000 ML: 9 INJECTION, SOLUTION INTRAVENOUS at 22:13

## 2022-07-05 RX ADMIN — HYDROMORPHONE HYDROCHLORIDE 1 MG: 1 INJECTION, SOLUTION INTRAMUSCULAR; INTRAVENOUS; SUBCUTANEOUS at 22:12

## 2022-07-05 NOTE — Clinical Note
[address]  [facility] EMERGENCY DEPT  [address] [id number] [phone]    Work/School Note    Date: 7/5/2022    To Whom It May concern:    Leigh Ann Linton was seen and treated today in the emergency room by the following provider(s):  Attending Provider: Jorge Gomez MD.      Leigh Ann Linton is excused from work/school on 7/6/2022 through 7/8/2022. She is medically clear to return to work/school on 7/9/2022.          Sincerely,          Meenakshi Lopez MD

## 2022-07-06 VITALS
WEIGHT: 270 LBS | DIASTOLIC BLOOD PRESSURE: 66 MMHG | HEART RATE: 90 BPM | OXYGEN SATURATION: 95 % | SYSTOLIC BLOOD PRESSURE: 108 MMHG | TEMPERATURE: 97.9 F | HEIGHT: 67 IN | RESPIRATION RATE: 16 BRPM | BODY MASS INDEX: 42.38 KG/M2

## 2022-07-06 LAB
ATRIAL RATE: 79 BPM
CALCULATED P AXIS, ECG09: 37 DEGREES
CALCULATED R AXIS, ECG10: -2 DEGREES
CALCULATED T AXIS, ECG11: 2 DEGREES
CAMPYLOBACTER SPECIES, DNA: NEGATIVE
DIAGNOSIS, 93000: NORMAL
ENTEROTOXIGEN E COLI, DNA: NEGATIVE
P SHIGELLOIDES DNA STL QL NAA+PROBE: NEGATIVE
P-R INTERVAL, ECG05: 128 MS
Q-T INTERVAL, ECG07: 408 MS
QRS DURATION, ECG06: 84 MS
QTC CALCULATION (BEZET), ECG08: 467 MS
SALMONELLA SPECIES, DNA: NEGATIVE
SHIGA TOXIN PRODUCING, DNA: NEGATIVE
SHIGELLA SP+EIEC IPAH STL QL NAA+PROBE: NEGATIVE
VENTRICULAR RATE, ECG03: 79 BPM
VIBRIO SPECIES, DNA: NEGATIVE
WBC #/AREA STL HPF: NORMAL /HPF (ref 0–4)
Y. ENTEROCOLITICA, DNA: NEGATIVE

## 2022-07-06 PROCEDURE — 93005 ELECTROCARDIOGRAM TRACING: CPT

## 2022-07-06 PROCEDURE — 74011250637 HC RX REV CODE- 250/637: Performed by: EMERGENCY MEDICINE

## 2022-07-06 PROCEDURE — 74011000636 HC RX REV CODE- 636: Performed by: RADIOLOGY

## 2022-07-06 RX ORDER — OXYCODONE AND ACETAMINOPHEN 5; 325 MG/1; MG/1
2 TABLET ORAL
Status: COMPLETED | OUTPATIENT
Start: 2022-07-06 | End: 2022-07-06

## 2022-07-06 RX ADMIN — IOPAMIDOL 100 ML: 755 INJECTION, SOLUTION INTRAVENOUS at 00:08

## 2022-07-06 RX ADMIN — OXYCODONE HYDROCHLORIDE AND ACETAMINOPHEN 2 TABLET: 5; 325 TABLET ORAL at 01:33

## 2022-07-06 NOTE — ED TRIAGE NOTES
Patient reports diarrhea, fever and abdominal pain since last Tuesday. [facility] come to ER. Per patient has hx of intestinal blockage.    Patient states she had k+ of 2.9  Patient came with sterile stool sample

## 2022-07-06 NOTE — ED PROVIDER NOTES
Patient is a 45year old female with PMH of anxiety, bowel obstruction, fatty liver, gallstones, GERD, hernia, DVT, PUD, plantar fasciitis, and arthritis who presents to ED c/o abdominal pain which started 8 days prior. Patient reports she started with abdominal pain, nausea, vomiting, decreased appetite, and diarrhea 8 days prior. Reports symptoms have been constant. She was initially seen at Urgent Care on 7/3 and had POC labs drawn at that time that showed a potassium of 2.9. patient was referred to ED at that time, but reports she did not have a ride. Patient reports similar symptoms previously when she had a bowel obstruction. She denies fever, chills, blood in stool, urinary symptoms, chest pain, shortness of breath, difficulty breathing. 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  PUD (peptic ulcer disease) 2016    stomach and colon ulcers?     Seizures ([address]     me dside effect? last sz 2013       Past Surgical History:   Procedure Laterality Date    COLONOSCOPY N/A 3/24/2017    COLONOSCOPY performed by Marjan Alexandra MD at [address] [place] [address] HX APPENDECTOMY  2009    HX BREAST BIOPSY Right 2007    negative pathology    HX BREAST BIOPSY Left 1999    negative pathology    HX ENDOSCOPY  2016    HX HERNIA REPAIR  9/2011    laparoscopic incisional hernia repair    HX HERNIA REPAIR  2489    umbilical hernia repair    HX HYSTERECTOMY  2009    partial hysterectomy (right ovary removed)    HX OOPHORECTOMY  2009    removed post op hematoma and right oopherectomy    HX OOPHORECTOMY  3/2016    mass removed and left oopherectomy    HX OTHER SURGICAL  2009    ileocectomy, bowel resection,     [place] ABDOMEN SURGERY PROC UNLISTED  4/2010    ventral hernia repair with biologic mesh    [place] COLONOSCOPY FLX DX W/COLLJ SPEC WHEN PFRMD  1/14/2011         US GUIDE BX BREAST Left 2016    neagative paythology         Family History:   Problem Relation Age of Onset    Cancer Maternal Grandmother         colon ca    Breast Cancer Maternal Grandmother     Breast Cancer Paternal Grandmother     Breast Cancer Cousin         multiple; both sides       Social History     Socioeconomic History    Marital status:      Spouse name: Not on file    Number of children: Not on file    Years of education: Not on file    Highest education level: Not on file   Occupational History    Not on file   Tobacco Use    Smoking status: Never Smoker    Smokeless tobacco: Never Used   Substance and Sexual Activity    Alcohol use: Yes     Alcohol/week: 1.0 standard drink     Types: 1 Glasses of wine per week     Comment: 1 glass every 2 weeks    Drug use: No    Sexual activity: Not on file   Other Topics Concern     Service Not Asked    Blood Transfusions Not Asked    Caffeine Concern Not Asked    Occupational Exposure Not Asked    Hobby Hazards Not Asked    Sleep Concern Not Asked    Stress Concern Not Asked    Weight Concern Not Asked    Special Diet Not Asked    Back Care Not Asked    Exercise Not Asked    Bike Helmet Not Asked   [address] Not Asked    Self-Exams Not Asked   Social History Narrative    Not on file     Social Determinants of Health     Financial Resource Strain:     Difficulty of Paying Living Expenses: Not on file   Food Insecurity:     Worried About Running Out of Food in the Last Year: Not on file    Ammy of Food in the Last Year: Not on file   Transportation Needs:     Lack of Transportation (Medical):  Not on file    Lack of Transportation (Non-Medical): Not on file   Physical Activity:     Days of Exercise per Week: Not on file    Minutes of Exercise per Session: Not on file   Stress:     Feeling of Stress : Not on file   Social Connections:     Frequency of Communication with Friends and Family: Not on file    Frequency of Social Gatherings with Friends and Family: Not on file    Attends Confucianism Services: Not on file    Active Member of [employer] or Organizations: Not on file    Attends Club or Organization Meetings: Not on file    Marital Status: Not on file   Intimate Partner Violence:     Fear of Current or Ex-Partner: Not on file    Emotionally Abused: Not on file    Physically Abused: Not on file    Sexually Abused: Not on file   Housing Stability:     Unable to Pay for Housing in the Last Year: Not on file    Number of [place] in the Last Year: Not on file    Unstable Housing in the Last Year: Not on file         ALLERGIES: Sulfa (sulfonamide antibiotics), Toradol [ketorolac tromethamine], Cymbalta [duloxetine], Effexor [venlafaxine], Tramadol, Compazine [prochlorperazine], Hydrocodone-acetaminophen, and Morphine    Review of Systems   Constitutional: Positive for appetite change. Negative for activity change, chills and fever. HENT: Negative for congestion and sore throat. Eyes: Negative for pain and visual disturbance. Respiratory: Negative for cough and shortness of breath. Cardiovascular: Negative for chest pain, palpitations and leg swelling. Gastrointestinal: Positive for abdominal pain, diarrhea, nausea and vomiting. Negative for abdominal distention, blood in stool and constipation. Genitourinary: Negative for decreased urine volume, dysuria, flank pain, frequency and urgency. Musculoskeletal: Negative for back pain and neck pain. Skin: Negative for rash and wound. Allergic/Immunologic: Negative for immunocompromised state.    Neurological: Negative for dizziness, syncope, weakness, light-headedness, numbness and headaches. Psychiatric/Behavioral: Negative for confusion. All other systems reviewed and are negative. Vitals:    07/05/22 2003   BP: 125/81   Pulse: 92   Resp: 16   Temp: 97.9 °F (36.6 °C)   SpO2: 97%   Weight: 122.5 kg (270 lb)   Height: 5' 7\" (1.702 m)            Physical Exam  Vitals and nursing note reviewed. Constitutional:       General: She is not in acute distress. Appearance: Normal appearance. She is well-developed. She is not toxic-appearing. HENT:      Head: Normocephalic and atraumatic. Nose: Nose normal.      Mouth/Throat:      Mouth: Mucous membranes are moist.   Eyes:      General: Lids are normal.      Extraocular Movements: Extraocular movements intact. Conjunctiva/sclera: Conjunctivae normal.   Cardiovascular:      Rate and Rhythm: Normal rate and regular rhythm. Pulses: Normal pulses. Heart sounds: Normal heart sounds, S1 normal and S2 normal.   Pulmonary:      Effort: Pulmonary effort is normal. No accessory muscle usage. Breath sounds: Normal breath sounds. Abdominal:      General: Abdomen is protuberant. Bowel sounds are normal.      Palpations: Abdomen is soft. Tenderness: There is abdominal tenderness. There is no right CVA tenderness or left CVA tenderness. Comments: Generalized tenderness   Musculoskeletal:      Cervical back: Normal range of motion and neck supple. Comments: Knee immobilizer noted to left leg   Skin:     General: Skin is warm and dry. Capillary Refill: Capillary refill takes less than 2 seconds. Neurological:      General: No focal deficit present. Mental Status: She is alert and oriented to person, place, and time. Mental status is at baseline. Psychiatric:         Attention and Perception: Attention normal.         Mood and Affect: Mood and affect normal.         Speech: Speech normal.         Behavior: Behavior is cooperative. Thought Content:  Thought content normal.         Cognition and Memory: Cognition normal.         Judgment: Judgment normal.          MDM  Number of Diagnoses or Management Options  Diagnosis management comments: Patient with history of multiple bowel obstructions presents with 8 day history of abdominal pain with nausea, vomiting and diarrhea. Also recently seen at urgent care and found to have hypokalemia with K 2.9. VSS. CBC and CMP unremarkable. K4.2, Mg 2.1 CT abdomen pelvis ordered and pending. Patient given dilaudid with minor improvement of symptoms. CT abdomen pelvis ordered and pending. Amount and/or Complexity of Data Reviewed  Clinical lab tests: reviewed  Tests in the radiology section of CPT®: reviewed  Discuss the patient with other providers: yes (Dr. Donald Marsh, ED attending )      ED Course as of 07/05/22 2348 Tue Jul 05, 2022 2306 CBC WITH AUTOMATED DIFF:    WBC 7.9   RBC 4.33   HGB 13.0   HCT 40.5   MCV 93.5   MCH 30.0   MCHC 32.1   RDW 12.8   PLATELET 992   MPV 47.4   NRBC 0.0   ABSOLUTE NRBC 0.00   NEUTROPHILS 57   LYMPHOCYTES 34   MONOCYTES 7   EOSINOPHILS 1   BASOPHILS 1   IMMATURE GRANULOCYTES 0   ABS. NEUTROPHILS 4.5   ABS. LYMPHOCYTES 2.7   ABS. MONOCYTES 0.5   ABS. EOSINOPHILS 0.1   ABS. BASOPHILS 0.0   ABS. IMM. GRANS. 0.0   DF AUTOMATED [KG]   2306 COMPREHENSIVE METABOLIC PANEL(!):    Sodium 141   Potassium 4.2   Chloride 110(!)   CO2 23   Anion gap 8   Glucose 86   BUN 12   Creatinine 0.74   BUN/Creatinine ratio 16   GFR est AA >60   GFR est non-AA >60   Calcium 9.6   Bilirubin, total 0.4   ALT 34   AST 26   Alk.  phosphatase 116   Protein, total 7.6   Albumin 3.6   Globulin 4.0   A-G Ratio 0.9(!) [KG]   2335 MAGNESIUM:    Magnesium 2.1 [KG]      ED Course User Index  [KG] MIKE Glez       Procedures

## 2022-07-06 NOTE — ED PROVIDER NOTES
ED ATTENDING PHYSICIAN NOTE      This patient was seen with an APC. I have evaluated and participated in the care of this patient. I have reviewed and agree with all pertinent clinical information including history, physical exam, labs, radiographic studies and the medical treatment plan. I have also reviewed and agree with the medications, allergies and past medical history sections for this patient. Time of Addendum: 0110  Pt seen with APC: Girish Poster Decision Making:  Pertinent Labs & Imaging studies reviewed. Pt reports chronic abd pain and diarrhea. Has an extensive pmhx. Pt very worried about her K and Mg. On my eval, pt very well appearing, comfortable text on her cell phone. Afebrile, hemodynamically stable w/o resp distress. Abd is nontender w/o any guarding. Her labs today show normal K and Mg. Labs otherwise stable. UA not suggestive of infection. EKG SR w/o ischemic changes. CTAP showing nonobstructing ventral hernia w/o evidence of obstruction but no acute findings. Pt told me that \"only dilaudid 2mg works for pain\". Was previously given multiple doses of dilaudid. She repeatedly asked for additional doses despite appearing w/o any distress and observed to be texting on her cell phone. I declined to provide additional IV narcotic pain medication. Pt very concerned about her K and Mg. I advised her to f/u PCP to have labs rechecked in 1wk (already on supplements at home) and GI for chronic abd pain and diarrhea. Patient given specific return precautions and explained signs/symptoms for which to come back to ED immediately but otherwise advised to f/u w/ PCP over next 2-3days. Diagnosis:  1. Nausea and vomiting, unspecified vomiting type    2. Chronic abdominal pain    3.  Chronic diarrhea        Vitals    Vitals:    07/05/22 2003 07/06/22 0102 07/06/22 0117 07/06/22 0132   BP: 125/81 120/76  108/66   Pulse: 92   90   Resp: 16   16   Temp: 97.9 °F (36.6 °C)   97.9 °F (36.6 °C) SpO2: 97%  95% 95%   Weight: 122.5 kg (270 lb)      Height: 5' 7\" (1.702 m)          EKG Interpretation  SR, narrow QRS, nl intervals, no CHITO/STD/TWI  EKG tracing interpreted by ED physician    Procedures      Admission on 07/05/2022, Discharged on 07/06/2022   Component Date Value Ref Range Status    WBC 07/05/2022 7.9  3.6 - 11.0 K/uL Final    RBC 07/05/2022 4.33  3.80 - 5.20 M/uL Final    HGB 07/05/2022 13.0  11.5 - 16.0 g/dL Final    HCT 07/05/2022 40.5  35.0 - 47.0 % Final    MCV 07/05/2022 93.5  80.0 - 99.0 FL Final    MCH 07/05/2022 30.0  26.0 - 34.0 PG Final    MCHC 07/05/2022 32.1  30.0 - 36.5 g/dL Final    RDW 07/05/2022 12.8  11.5 - 14.5 % Final    PLATELET 80/34/5727 783  150 - 400 K/uL Final    MPV 07/05/2022 11.0  8.9 - 12.9 FL Final    NRBC 07/05/2022 0.0  0  WBC Final    ABSOLUTE NRBC 07/05/2022 0.00  0.00 - 0.01 K/uL Final    NEUTROPHILS 07/05/2022 57  32 - 75 % Final    LYMPHOCYTES 07/05/2022 34  12 - 49 % Final    MONOCYTES 07/05/2022 7  5 - 13 % Final    EOSINOPHILS 07/05/2022 1  0 - 7 % Final    BASOPHILS 07/05/2022 1  0 - 1 % Final    IMMATURE GRANULOCYTES 07/05/2022 0  0.0 - 0.5 % Final    ABS. NEUTROPHILS 07/05/2022 4.5  1.8 - 8.0 K/UL Final    ABS. LYMPHOCYTES 07/05/2022 2.7  0.8 - 3.5 K/UL Final    ABS. MONOCYTES 07/05/2022 0.5  0.0 - 1.0 K/UL Final    ABS. EOSINOPHILS 07/05/2022 0.1  0.0 - 0.4 K/UL Final    ABS. BASOPHILS 07/05/2022 0.0  0.0 - 0.1 K/UL Final    ABS. IMM.  GRANS. 07/05/2022 0.0  0.00 - 0.04 K/UL Final    DF 07/05/2022 AUTOMATED    Final    Sodium 07/05/2022 141  136 - 145 mmol/L Final    Potassium 07/05/2022 4.2  3.5 - 5.1 mmol/L Final    Chloride 07/05/2022 110* 97 - 108 mmol/L Final    CO2 07/05/2022 23  21 - 32 mmol/L Final    Anion gap 07/05/2022 8  5 - 15 mmol/L Final    Glucose 07/05/2022 86  65 - 100 mg/dL Final    BUN 07/05/2022 12  6 - 20 MG/DL Final    Creatinine 07/05/2022 0.74  0.55 - 1.02 MG/DL Final    BUN/Creatinine ratio 07/05/2022 16  12 - 20   Final    GFR est AA 07/05/2022 >60  >60 ml/min/1.73m2 Final    GFR est non-AA 07/05/2022 >60  >60 ml/min/1.73m2 Final    Estimated GFR is calculated using the IDMS-traceable Modification of Diet in Renal Disease (MDRD) Study equation, reported for both  Americans (GFRAA) and non- Americans (GFRNA), and normalized to 1.73m2 body surface area. The physician must decide which value applies to the patient.  Calcium 07/05/2022 9.6  8.5 - 10.1 MG/DL Final    Bilirubin, total 07/05/2022 0.4  0.2 - 1.0 MG/DL Final    ALT (SGPT) 07/05/2022 34  12 - 78 U/L Final    AST (SGOT) 07/05/2022 26  15 - 37 U/L Final    Alk. phosphatase 07/05/2022 116  45 - 117 U/L Final    Protein, total 07/05/2022 7.6  6.4 - 8.2 g/dL Final    Albumin 07/05/2022 3.6  3.5 - 5.0 g/dL Final    Globulin 07/05/2022 4.0  2.0 - 4.0 g/dL Final    A-G Ratio 07/05/2022 0.9* 1.1 - 2.2   Final    Lipase 07/05/2022 192  73 - 393 U/L Final    Color 07/05/2022 YELLOW/STRAW    Final    Color Reference Range: Straw, Yellow or Dark Yellow    Appearance 07/05/2022 CLEAR  CLEAR   Final    Specific gravity 07/05/2022 1.030  1.003 - 1.030   Final    pH (UA) 07/05/2022 5.0  5.0 - 8.0   Final    Protein 07/05/2022 Negative  NEG mg/dL Final    Glucose 07/05/2022 Negative  NEG mg/dL Final    Ketone 07/05/2022 15* NEG mg/dL Final    Bilirubin 07/05/2022 Negative  NEG   Final    Blood 07/05/2022 Negative  NEG   Final    Urobilinogen 07/05/2022 0.2  0.2 - 1.0 EU/dL Final    Nitrites 07/05/2022 Negative  NEG   Final    Leukocyte Esterase 07/05/2022 Negative  NEG   Final    Specimen source 07/05/2022 Nasopharyngeal    Final    COVID-19 rapid test 07/05/2022 Not detected  NOTD   Final    Comment: Rapid Abbott ID Now       Rapid NAAT:  The specimen is NEGATIVE for SARS-CoV-2, the novel coronavirus associated with COVID-19.        Negative results should be treated as presumptive and, if inconsistent with clinical signs and symptoms or necessary for patient management, should be tested with an alternative molecular assay. Negative results do not preclude SARS-CoV-2 infection and should not be used as the sole basis for patient management decisions. This test has been authorized by the FDA under an Emergency Use Authorization (EUA) for use by authorized laboratories.    Fact sheet for Healthcare Providers: ConventionMozaik Mediadate.co.nz  Fact sheet for Patients: Coferondate.co.nz       Methodology: Isothermal Nucleic Acid Amplification      Ventricular Rate 07/06/2022 79  BPM Final    Atrial Rate 07/06/2022 79  BPM Final    P-R Interval 07/06/2022 128  ms Final    QRS Duration 07/06/2022 84  ms Final    Q-T Interval 07/06/2022 408  ms Final    QTC Calculation (Bezet) 07/06/2022 467  ms Final    Calculated P Axis 07/06/2022 37  degrees Final    Calculated R Axis 07/06/2022 -2  degrees Final    Calculated T Axis 07/06/2022 2  degrees Final    Diagnosis 07/06/2022    Final                    Value:Normal sinus rhythm  Poor R-wave Progression  Abnormal ECG  When compared with ECG of 03-JUN-2018 23:01,  ST no longer depressed in Lateral leads  Confirmed by Tasha Krause MD., Lisa ([id number]) on 7/6/2022 11:37:05 PM      Shigella species, DNA 07/05/2022 Negative  NEG   Final    Campylobacter species, DNA 07/05/2022 Negative  NEG   Final    Vibrio species, DNA 07/05/2022 Negative  NEG   Final    Enterotoxigen E Coli, DNA 07/05/2022 Negative  NEG   Final    Shiga toxin producing, DNA 07/05/2022 Negative  NEG   Final    Salmonella species, DNA 07/05/2022 Negative  NEG   Final    P. shigelloides, DNA 07/05/2022 Negative  NEG   Final    Y. enterocolitica, DNA 07/05/2022 Negative  NEG   Final    White blood cells, stool 07/05/2022 0 TO 4  0 - 4 /HPF Final    Magnesium 07/05/2022 2.1  1.6 - 2.4 mg/dL Final    Pregnancy test,urine (POC) 07/05/2022 Negative  NEG   Final       CT ABD PELV W CONT   Final Result   Small recurrent nonobstructing midline ventral hernia inferior to the hernia   repair contains small bowel.

## 2022-07-06 NOTE — ED NOTES
Ultrasound IV by Mesha Reyes RN :  Procedure Note    Patient meets criteria for US IV insertion. Ultrasound IV education provided to patient. Opportunities for questions given. IV ultrasound technique used for PIV placement:  20gauge 1.75in BD Nexiva   R upper forearm location. 1X Attempt(s). Flushed with ease; vigorous blood return. Procedure tolerated well.     Mesha Reyes RN

## 2022-07-12 LAB
O+P SPEC MICRO: NORMAL
O+P STL CONC: NORMAL
SPECIMEN SOURCE: NORMAL

## 2022-08-02 ENCOUNTER — OFFICE VISIT (OUTPATIENT)
Dept: CARDIOLOGY CLINIC | Age: 52
End: 2022-08-02
Payer: COMMERCIAL

## 2022-08-02 VITALS
SYSTOLIC BLOOD PRESSURE: 120 MMHG | DIASTOLIC BLOOD PRESSURE: 70 MMHG | OXYGEN SATURATION: 97 % | HEART RATE: 82 BPM | WEIGHT: 270 LBS | BODY MASS INDEX: 42.38 KG/M2 | HEIGHT: 67 IN

## 2022-08-02 DIAGNOSIS — R06.02 SOB (SHORTNESS OF BREATH): Primary | ICD-10-CM

## 2022-08-02 DIAGNOSIS — K51.919 ULCERATIVE COLITIS WITH COMPLICATION, UNSPECIFIED LOCATION (HCC): ICD-10-CM

## 2022-08-02 DIAGNOSIS — Z00.00 ANNUAL PHYSICAL EXAM: ICD-10-CM

## 2022-08-02 DIAGNOSIS — R94.31 ABNORMAL EKG: ICD-10-CM

## 2022-08-02 PROCEDURE — 99204 OFFICE O/P NEW MOD 45 MIN: CPT | Performed by: INTERNAL MEDICINE

## 2022-08-02 PROCEDURE — 93000 ELECTROCARDIOGRAM COMPLETE: CPT | Performed by: INTERNAL MEDICINE

## 2022-08-02 NOTE — PROGRESS NOTES
Per Dr. Alexey Phillips- echo; 2 day nuclear lexiscan stress test (accommodate to 1 day) and follow up. Patient will see her PCP about FMLA papers tomorrow. Notified Nuclear ladies to try to accommodate the 2 day testing into one.

## 2022-08-02 NOTE — PROGRESS NOTES
Sophia Perez MD, MS, [facility]            HISTORY OF PRESENT ILLNESS:    Janny Ferrari is a 45 y.o. female referred for abnormal EKG. Chronic lymphedema. 3 abnormal EKGs - 2 @ PCP, third @ [facility] 7/5. EKGs reviewed - SR, TWI V1-V3, PRWP, iRBBB. Low potassium - K 2.9 7/3 could not keep liquid potassium done, went to ED. ED visit showed normal potassium. K 7/31 3.3. Taking oral imodium and K supplement. Colonoscopy + endoscopy  sessile polyps in stomach, removed. Something in duodenum, biopsied, ?celiac disease. Is not wearing compression socks. Uses compressive devices. ++ SOB    Discussed EKG - overall not acutely concerned - would focus on weight loss for knee surgeyr, GI caldwell.  Advised to stop bumex bc it is likely causing her low K which she is chasing with J suppl. Continue compression device for lymphedema, weight loss. Will get echo and juan nuc set up in near future.       SUMMARY:   Problem List  Date Reviewed: 8/2/2022            Codes Class Noted    Abnormal EKG ICD-10-CM: R94.31  ICD-9-CM: 794.31  8/2/2022        SOB (shortness of breath) ICD-10-CM: R06.02  ICD-9-CM: 786.05  8/2/2022        Abdominal pain ICD-10-CM: R10.9  ICD-9-CM: 789.00  7/20/2021        Bloody diarrhea ICD-10-CM: R19.7  ICD-9-CM: 787.91  7/20/2021        Ventral hernia ICD-10-CM: K43.9  ICD-9-CM: 553.20  7/20/2021        Small bowel obstruction ([facility] ICD-10-CM: N58.592  ICD-9-CM: 560.9  4/13/2019        Obesity, morbid ([facility] ICD-10-CM: E66.01  ICD-9-CM: 278.01  2/8/2018        Acute colitis ICD-10-CM: K52.9  ICD-9-CM: 558.9  3/22/2017        Nausea and vomiting ICD-10-CM: R11.2  ICD-9-CM: 787.01  3/22/2017        Anemia ICD-10-CM: D64.9  ICD-9-CM: 285.9  7/25/2016        Ulcerative colitis ([facility] ICD-10-CM: K51.90  ICD-9-CM: 556.9  7/25/2016        Hx of thromboembolism of vein ICD-10-CM: V93.650  ICD-9-CM: V12.51  Unknown    Overview Signed 7/25/2016  3:50 AM by Ravinder Barajas MD     right upper brachiel vein             PUD (peptic ulcer disease) (Chronic) ICD-10-CM: K27.9  ICD-9-CM: 533.90  Unknown    Overview Signed 7/25/2016  3:50 AM by Rafaela Boyer MD     stomach and colon ulcers? Anxiety (Chronic) ICD-10-CM: F41.9  ICD-9-CM: 300.00  Unknown        Obesity (Chronic) ICD-10-CM: E66.9  ICD-9-CM: 278.00  Unknown    Overview Signed 7/25/2016  3:50 AM by Rafaela Boyer MD     REYNA? Fatty liver ICD-10-CM: K76.0  ICD-9-CM: 571.8  Unknown        Cholecystitis ICD-10-CM: K81.9  ICD-9-CM: 575.10  7/16/2016        Depression (Chronic) ICD-10-CM: F32. A  ICD-9-CM: 903  7/1/2010           Current Outpatient Medications on File Prior to Visit   Medication Sig    bumetanide (BUMEX) 1 mg tablet Take 1 mg by mouth two (2) times daily as needed for Other (edema). 1 tab. 1-2 x day as needed    atorvastatin (LIPITOR) 40 mg tablet Take 40 mg by mouth daily. ibuprofen (MOTRIN) 800 mg tablet Take 800 mg by mouth every eight (8) hours as needed for Pain.    potassium chloride (KAON 10%) 20 mEq/15 mL solution Take 20 mEq by mouth daily. polyethylene glycol (MIRALAX) 17 gram packet Take 1 Packet by mouth daily. Indications: constipation    escitalopram oxalate (LEXAPRO) 20 mg tablet Take 20 mg by mouth daily. promethazine (PHENERGAN) 25 mg tablet Take 1 Tab by mouth every six (6) hours as needed for Nausea. pravastatin (PRAVACHOL) 40 mg tablet Take 40 mg by mouth nightly. MULTIVITAMIN (ONE-A-DAY ESSENTIAL PO) Take 1 Tab by mouth daily. ALPRAZolam (XANAX) 2 mg tablet Take 1 mg by mouth two (2) times a day. Patient takes in the morning and with a late lunch    estradiol (ESTRACE) 1 mg tablet Take 1 mg by mouth nightly. No current facility-administered medications on file prior to visit. CARDIOLOGY STUDIES TO DATE:  No results found for this visit on 08/02/22.        CARDIAC ROS:   positive for dyspnea    Past Medical History:   Diagnosis Date    Anxiety     Bowel obstruction ([address]     Fatty liver     Gall stones     GERD (gastroesophageal reflux disease)     Hematoma     abdomen on the right overy    Hernia of unspecified site of abdominal cavity without mention of obstruction or gangrene     History of vascular access device 07/22/2021    4.5 fr mIDLINE RIGHT BASILIC 13 CM ACCESS    Hx of thromboembolism of vein     right upper brachiel vein    Kidney stones     Obesity     REYNA? PUD (peptic ulcer disease) 2016    stomach and colon ulcers? Seizures ([address]     me dside effect? last sz 2013       Family History   Problem Relation Age of Onset    Cancer Maternal Grandmother         colon ca    Breast Cancer Maternal Grandmother     Breast Cancer Paternal Grandmother     Breast Cancer Cousin         multiple; both sides       Social History     Socioeconomic History    Marital status:      Spouse name: Not on file    Number of children: Not on file    Years of education: Not on file    Highest education level: Not on file   Occupational History    Not on file   Tobacco Use    Smoking status: Never    Smokeless tobacco: Never   Substance and Sexual Activity    Alcohol use:  Yes     Alcohol/week: 1.0 standard drink     Types: 1 Glasses of wine per week     Comment: 1 glass every 2 weeks    Drug use: No    Sexual activity: Not on file   Other Topics Concern     Service Not Asked    Blood Transfusions Not Asked    Caffeine Concern Not Asked    Occupational Exposure Not Asked    Hobby Hazards Not Asked    Sleep Concern Not Asked    Stress Concern Not Asked    Weight Concern Not Asked    Special Diet Not Asked    Back Care Not Asked    Exercise Not Asked    Bike Helmet Not Asked    Seat Belt Not Asked    Self-Exams Not Asked   Social History Narrative    Not on file     Social Determinants of Health     Financial Resource Strain: Not on file   Food Insecurity: Not on file   Transportation Needs: Not on file   Physical Activity: Not on file   Stress: Not on file Social Connections: Not on file   Intimate Partner Violence: Not on file   Housing Stability: Not on file        GENERAL ROS:  A comprehensive review of systems was negative except for that written in the HPI. Visit Vitals  /70   Pulse 82   Ht 5' 7\" (1.702 m)   Wt 270 lb (122.5 kg)   SpO2 97%   BMI 42.29 kg/m²       Wt Readings from Last 3 Encounters:   08/02/22 270 lb (122.5 kg)   07/05/22 270 lb (122.5 kg)   10/09/21 290 lb (131.5 kg)            BP Readings from Last 3 Encounters:   08/02/22 120/70   07/06/22 108/66   10/09/21 101/63       PHYSICAL EXAM  General appearance: alert, cooperative, no distress, appears stated age  Neck: supple, symmetrical, trachea midline, no adenopathy, thyroid: not enlarged, symmetric, no tenderness/mass/nodules, no carotid bruit, and no JVD  Lungs: clear to auscultation bilaterally  Heart: regular rate and rhythm, S1, S2 normal, no murmur, click, rub or gallop  Extremities: mild LE edema    No results found for: CHOL, CHOLX, CHLST, CHOLV, 311074, HDL, HDLP, LDL, LDLC, DLDLP, TGLX, TRIGL, TRIGP, CHHD, CHHDX    ASSESSMENT    ICD-10-CM ICD-9-CM    1. SOB (shortness of breath)  R06.02 786.05 ECHO ADULT FOLLOW-UP OR LIMITED      NUCLEAR CARDIAC STRESS TEST      2. Ulcerative colitis with complication, unspecified location ([facility]  K51.919 556.9       3. Abnormal EKG  R94.31 794.31       4. Annual physical exam  Z00.00 V70.0 AMB POC EKG ROUTINE W/ 12 LEADS, INTER & REP          Encounter Diagnoses   Name Primary? SOB (shortness of breath) Yes    Ulcerative colitis with complication, unspecified location ([facility]     Abnormal EKG     Annual physical exam      Orders Placed This Encounter    AMB POC EKG ROUTINE W/ 12 LEADS, INTER & REP         Follow-up and Dispositions    Return in about 4 weeks (around 8/30/2022) for with echo same day, with stress same day.          Emilia Barnett MD  8/2/2022        [address]  [address]  [address]  [phone] (W) ([phone] Jorge Cyr)    [address]  [address]  [place], [address] [facility]  [phone] (P)  [phone] (F)    ATTENTION:   This medical record was transcribed using an electronic medical records/speech recognition system. Although proofread, it may and can contain electronic, spelling and other errors. Corrections may be executed at a later time. Please feel free to contact us for any clarifications as needed.
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
## 2022-09-06 ENCOUNTER — PATIENT MESSAGE (OUTPATIENT)
Dept: CARDIOLOGY CLINIC | Age: 52
End: 2022-09-06

## 2022-09-06 ENCOUNTER — TELEPHONE (OUTPATIENT)
Dept: CARDIOLOGY CLINIC | Age: 52
End: 2022-09-06

## 2022-09-06 NOTE — TELEPHONE ENCOUNTER
Patient is calling because she needs to speak with the nurse about her schedule apts.     [phone]

## 2022-09-07 ENCOUNTER — TELEPHONE (OUTPATIENT)
Dept: CARDIOLOGY CLINIC | Age: 52
End: 2022-09-07

## 2022-09-07 NOTE — TELEPHONE ENCOUNTER
----- Message from Tereza Calderon RN sent at 9/7/2022  8:59 AM EDT -----  Regarding: FW: Sindhu to reach you    ----- Message -----  From: Manny Ponce  Sent: 9/7/2022   1:24 AM EDT  To: RQETD Nurse Pool  Subject: Sohailid to reach you                               [facility]! [facility]! Finally a way I can connect to you  Siri called the main number [phone] -  for over 2 wks, to connect w/you. Reason: keep getting voicemails confirming this Fri 9am Appt & never get a conf# for the 8-9am test thats being done! So Friday call centertold me [employer] of [address] down & could not confirm this Fris tests & that may have to [facility] Fri Apps - begged them not to & reassured I received corporate email about [address] being down & Id call/follow back up with [facility] ASA    Yesterday: Tues. - [employer] of [address] comfirms they cover ALL testing being performed Fri. (Also confirmed I have met my deductible AND my out of pocket for the entire 2022 year - so [employer] would end up paying 100% of all of Fris charges)! !    # So will you please ensure that the 2 part Fri. Appts are verified & still scheduled ? ?   (As of 4 PM yest/ Tues) everything was confirmed with [facility] for this Friday. BUT then I received  your 5:30pm voicemail - which is puzzling ?? [address] is back upI re-verified all services are covered at 100%    Could you please reach out to me [phone], today/Wed.  & confirm my 2 Fri AM Appts for 8am & 9am? Also, run down the list with me, of when to stop eating & drinking? (lost the lime green papers you gave me back on 8/2)    Looking forward to hearing from you to reconfirm. Hernán Gimenez!!   Ana Corn      1970  Last 4: 9648  Last 4 Insurance: [id number]  Cell: [phone]

## 2022-09-07 NOTE — TELEPHONE ENCOUNTER
Left message for patient to return call. Patient appointments on 9/7 are confirmed. No auth needed. Patient misplaced her nuc instructions and wanted to go over them again which will be done when patient returns call to writer.

## 2022-09-07 NOTE — TELEPHONE ENCOUNTER
----- Message from Bryan Chambers RN sent at 9/7/2022 12:40 PM EDT -----  Regarding: FW: Sindhu to reach you    ----- Message -----  From: Leigh Ann Linton  Sent: 9/7/2022  12:19 PM EDT  To: ES Nurse Pool  Subject: Enoc Dears to reach you                               Happy Wednesday, [facility]! I just got out of a super long appointment, and saw your missed calls. Im so sorry! I forgot to let you know that I had a specialty appointment this morning, and had no idea it would end up taking several hours! Just called the main number to try to get transferred to you, but of course it was 12 PM noon and you had already left for lunch. I am now home elevating my legs, and can take your call: I do have PT coming here at 3pm, so Im hoping you can call me back before PT gets here, so we arent rushed on the phone! I hope you are enjoying your lunch, and get a chance to speak with you when you return! Thanks!   Zeinab Laws  [phone]

## 2022-09-07 NOTE — TELEPHONE ENCOUNTER
Pt returned a call to the nurse to go over nuclear instructions, offered to go over nuclear instructions but pt stated the nurse specifically wanted to go over instructions, please advise        Pt # [phone]

## 2022-09-07 NOTE — TELEPHONE ENCOUNTER
----- Message from [address] sent at 9/7/2022 12:19 PM EDT -----  Regarding: Glenis Sleight to reach you  Happy Wednesday, [place] [facility]! I just got out of a super long appointment, and saw your missed calls. Im so sorry! I forgot to let you know that I had a specialty appointment this morning, and had no idea it would end up taking several hours! Just called the main number to try to get transferred to you, but of course it was 12 PM noon and you had already left for lunch. I am now home elevating my legs, and can take your call: I do have PT coming here at 3pm, so Im hoping you can call me back before PT gets here, so we arent rushed on the phone! I hope you are enjoying your lunch, and get a chance to speak with you when you return! Thanks!   Klaudia Loredo  [phone]

## 2022-09-09 ENCOUNTER — ANCILLARY PROCEDURE (OUTPATIENT)
Dept: CARDIOLOGY CLINIC | Age: 52
End: 2022-09-09

## 2022-09-09 ENCOUNTER — ANCILLARY PROCEDURE (OUTPATIENT)
Dept: CARDIOLOGY CLINIC | Age: 52
End: 2022-09-09
Payer: COMMERCIAL

## 2022-09-09 VITALS — BODY MASS INDEX: 42.38 KG/M2 | WEIGHT: 270 LBS | HEIGHT: 67 IN

## 2022-09-09 VITALS
HEIGHT: 67 IN | BODY MASS INDEX: 42.38 KG/M2 | DIASTOLIC BLOOD PRESSURE: 78 MMHG | WEIGHT: 270 LBS | SYSTOLIC BLOOD PRESSURE: 130 MMHG

## 2022-09-09 DIAGNOSIS — R06.02 SOB (SHORTNESS OF BREATH): ICD-10-CM

## 2022-09-09 LAB
ECHO AO ASC DIAM: 3.2 CM
ECHO AO ASCENDING AORTA INDEX: 1.39 CM/M2
ECHO AO ROOT DIAM: 3.2 CM
ECHO AO ROOT INDEX: 1.39 CM/M2
ECHO AV AREA PEAK VELOCITY: 2.3 CM2
ECHO AV AREA VTI: 2.6 CM2
ECHO AV AREA/BSA PEAK VELOCITY: 1 CM2/M2
ECHO AV AREA/BSA VTI: 1.1 CM2/M2
ECHO AV MEAN GRADIENT: 3 MMHG
ECHO AV MEAN VELOCITY: 0.8 M/S
ECHO AV PEAK GRADIENT: 5 MMHG
ECHO AV PEAK VELOCITY: 1.2 M/S
ECHO AV VELOCITY RATIO: 0.67
ECHO AV VTI: 23.6 CM
ECHO EST RA PRESSURE: 3 MMHG
ECHO LA DIAMETER INDEX: 1.7 CM/M2
ECHO LA DIAMETER: 3.9 CM
ECHO LA TO AORTIC ROOT RATIO: 1.22
ECHO LA VOL 2C: 65 ML (ref 22–52)
ECHO LA VOL 4C: 49 ML (ref 22–52)
ECHO LA VOL BP: 58 ML (ref 22–52)
ECHO LA VOL/BSA BIPLANE: 25 ML/M2 (ref 16–34)
ECHO LA VOLUME AREA LENGTH: 63 ML
ECHO LA VOLUME INDEX A2C: 28 ML/M2 (ref 16–34)
ECHO LA VOLUME INDEX A4C: 21 ML/M2 (ref 16–34)
ECHO LA VOLUME INDEX AREA LENGTH: 27 ML/M2 (ref 16–34)
ECHO LV E' LATERAL VELOCITY: 13 CM/S
ECHO LV E' SEPTAL VELOCITY: 9 CM/S
ECHO LV EDV A2C: 110 ML
ECHO LV EDV A4C: 117 ML
ECHO LV EDV BP: 114 ML (ref 56–104)
ECHO LV EDV INDEX A4C: 51 ML/M2
ECHO LV EDV INDEX BP: 50 ML/M2
ECHO LV EDV NDEX A2C: 48 ML/M2
ECHO LV EJECTION FRACTION A2C: 56 %
ECHO LV EJECTION FRACTION A4C: 62 %
ECHO LV EJECTION FRACTION BIPLANE: 57 % (ref 55–100)
ECHO LV ESV A2C: 49 ML
ECHO LV ESV A4C: 45 ML
ECHO LV ESV BP: 48 ML (ref 19–49)
ECHO LV ESV INDEX A2C: 21 ML/M2
ECHO LV ESV INDEX A4C: 20 ML/M2
ECHO LV ESV INDEX BP: 21 ML/M2
ECHO LV FRACTIONAL SHORTENING: 30 % (ref 28–44)
ECHO LV INTERNAL DIMENSION DIASTOLE INDEX: 2.35 CM/M2
ECHO LV INTERNAL DIMENSION DIASTOLIC: 5.4 CM (ref 3.9–5.3)
ECHO LV INTERNAL DIMENSION SYSTOLIC INDEX: 1.65 CM/M2
ECHO LV INTERNAL DIMENSION SYSTOLIC: 3.8 CM
ECHO LV IVSD: 0.9 CM (ref 0.6–0.9)
ECHO LV MASS 2D: 180.1 G (ref 67–162)
ECHO LV MASS INDEX 2D: 78.3 G/M2 (ref 43–95)
ECHO LV POSTERIOR WALL DIASTOLIC: 0.9 CM (ref 0.6–0.9)
ECHO LV RELATIVE WALL THICKNESS RATIO: 0.33
ECHO LVOT AREA: 3.5 CM2
ECHO LVOT AV VTI INDEX: 0.75
ECHO LVOT DIAM: 2.1 CM
ECHO LVOT MEAN GRADIENT: 1 MMHG
ECHO LVOT PEAK GRADIENT: 2 MMHG
ECHO LVOT PEAK VELOCITY: 0.8 M/S
ECHO LVOT STROKE VOLUME INDEX: 26.8 ML/M2
ECHO LVOT SV: 61.6 ML
ECHO LVOT VTI: 17.8 CM
ECHO MV A VELOCITY: 0.71 M/S
ECHO MV AREA PHT: 4.7 CM2
ECHO MV E DECELERATION TIME (DT): 162.3 MS
ECHO MV E VELOCITY: 0.89 M/S
ECHO MV E/A RATIO: 1.25
ECHO MV E/E' LATERAL: 6.85
ECHO MV E/E' RATIO (AVERAGED): 8.37
ECHO MV E/E' SEPTAL: 9.89
ECHO MV PRESSURE HALF TIME (PHT): 47.1 MS
ECHO RIGHT VENTRICULAR SYSTOLIC PRESSURE (RVSP): 21 MMHG
ECHO RV FREE WALL PEAK S': 13 CM/S
ECHO RV INTERNAL DIMENSION: 3.6 CM
ECHO RV TAPSE: 1.8 CM (ref 1.7–?)
ECHO TV REGURGITANT MAX VELOCITY: 2.14 M/S
ECHO TV REGURGITANT PEAK GRADIENT: 18 MMHG

## 2022-09-09 PROCEDURE — 78452 HT MUSCLE IMAGE SPECT MULT: CPT | Performed by: INTERNAL MEDICINE

## 2022-09-09 PROCEDURE — A9500 TC99M SESTAMIBI: HCPCS | Performed by: INTERNAL MEDICINE

## 2022-09-09 PROCEDURE — 93015 CV STRESS TEST SUPVJ I&R: CPT | Performed by: INTERNAL MEDICINE

## 2022-09-09 PROCEDURE — 93306 TTE W/DOPPLER COMPLETE: CPT | Performed by: INTERNAL MEDICINE

## 2022-09-09 RX ORDER — TETRAKIS(2-METHOXYISOBUTYLISOCYANIDE)COPPER(I) TETRAFLUOROBORATE 1 MG/ML
24.8 INJECTION, POWDER, LYOPHILIZED, FOR SOLUTION INTRAVENOUS ONCE
Status: COMPLETED | OUTPATIENT
Start: 2022-09-09 | End: 2022-09-09

## 2022-09-09 RX ORDER — TETRAKIS(2-METHOXYISOBUTYLISOCYANIDE)COPPER(I) TETRAFLUOROBORATE 1 MG/ML
8.7 INJECTION, POWDER, LYOPHILIZED, FOR SOLUTION INTRAVENOUS ONCE
Status: COMPLETED | OUTPATIENT
Start: 2022-09-09 | End: 2022-09-09

## 2022-09-09 RX ADMIN — TETRAKIS(2-METHOXYISOBUTYLISOCYANIDE)COPPER(I) TETRAFLUOROBORATE 24.8 MILLICURIE: 1 INJECTION, POWDER, LYOPHILIZED, FOR SOLUTION INTRAVENOUS at 10:57

## 2022-09-09 RX ADMIN — TETRAKIS(2-METHOXYISOBUTYLISOCYANIDE)COPPER(I) TETRAFLUOROBORATE 8.7 MILLICURIE: 1 INJECTION, POWDER, LYOPHILIZED, FOR SOLUTION INTRAVENOUS at 09:05

## 2022-09-09 NOTE — PROGRESS NOTES
Good news, your echocardiogram was normal.    It showed a structurally normal heart with normal function. There were no valve abnormalities. Please call with office should you have any further questions.     Dr. Adore Davis

## 2022-09-12 ENCOUNTER — TELEPHONE (OUTPATIENT)
Dept: CARDIOLOGY CLINIC | Age: 52
End: 2022-09-12

## 2022-09-12 LAB
NUC STRESS EJECTION FRACTION: 56 %
STRESS BASELINE DIAS BP: 82 MMHG
STRESS BASELINE HR: 67 BPM
STRESS BASELINE ST DEPRESSION: 0 MM
STRESS BASELINE SYS BP: 124 MMHG
STRESS O2 SAT PEAK: 100 %
STRESS O2 SAT REST: 98 %
STRESS PEAK DIAS BP: 70 MMHG
STRESS PEAK SYS BP: 120 MMHG
STRESS PERCENT HR ACHIEVED: 53 %
STRESS POST PEAK HR: 89 BPM
STRESS RATE PRESSURE PRODUCT: NORMAL BPM*MMHG
STRESS ST DEPRESSION: 0 MM
STRESS TARGET HR: 168 BPM

## 2022-09-12 NOTE — TELEPHONE ENCOUNTER
These results were conveyed to patient. See telephone call documentation prior to this one    ----- Message from Krystyna Jane MD sent at 9/9/2022  1:06 PM EDT -----  Good news, your echocardiogram was normal.    It showed a structurally normal heart with normal function. There were no valve abnormalities. Please call with office should you have any further questions.     Dr. Thorne Clamp

## 2022-09-12 NOTE — TELEPHONE ENCOUNTER
Patent was called she is not requesting results but asking that when results are available to please call her as her My Chart is not working properly; she has contacted IT. Patient reported she has a follow up appt scheduled for 9/19 as a back up in the event there is something that needs follow up. Notified patient that her echo results were available and conveyed Dr. Ngoc Lindsay results note.

## 2022-09-13 NOTE — PROGRESS NOTES
Hi, good news-your stress test showed normal blood flow to the heart muscle.   Your heart function was normal.    Dr. Patrick Cuenca

## 2022-09-14 ENCOUNTER — TELEPHONE (OUTPATIENT)
Dept: CARDIOLOGY CLINIC | Age: 52
End: 2022-09-14

## 2022-09-14 NOTE — TELEPHONE ENCOUNTER
Called patient to discuss nuclear test results were normal. Patient has follow up scheduled for next week. Awaiting return call.

## 2022-09-15 ENCOUNTER — TELEPHONE (OUTPATIENT)
Dept: CARDIOLOGY CLINIC | Age: 52
End: 2022-09-15

## 2022-09-15 NOTE — TELEPHONE ENCOUNTER
----- Message from Renata Nickerson MD sent at 9/13/2022  4:13 PM EDT -----  Hi, good news-your stress test showed normal blood flow to the heart muscle.   Your heart function was normal.    Dr. Tg Vargas

## 2022-09-15 NOTE — TELEPHONE ENCOUNTER
Patient returned my call from yesterday afternoon. Results of echo and nuc were both normal.  Patient requested to cancel that appointment for 9/19 that was made as a back up.

## 2022-09-23 ENCOUNTER — APPOINTMENT (OUTPATIENT)
Dept: GENERAL RADIOLOGY | Age: 52
End: 2022-09-23
Attending: EMERGENCY MEDICINE
Payer: COMMERCIAL

## 2022-09-23 ENCOUNTER — APPOINTMENT (OUTPATIENT)
Dept: CT IMAGING | Age: 52
End: 2022-09-23
Attending: EMERGENCY MEDICINE
Payer: COMMERCIAL

## 2022-09-23 ENCOUNTER — HOSPITAL ENCOUNTER (OUTPATIENT)
Age: 52
Setting detail: OBSERVATION
Discharge: HOME | End: 2022-09-26
Attending: EMERGENCY MEDICINE | Admitting: FAMILY MEDICINE
Payer: COMMERCIAL

## 2022-09-23 DIAGNOSIS — R10.84 ABDOMINAL PAIN, GENERALIZED: Primary | ICD-10-CM

## 2022-09-23 DIAGNOSIS — R11.2 NAUSEA AND VOMITING, UNSPECIFIED VOMITING TYPE: ICD-10-CM

## 2022-09-23 DIAGNOSIS — R19.7 DIARRHEA, UNSPECIFIED TYPE: ICD-10-CM

## 2022-09-23 PROBLEM — K56.609 SMALL BOWEL OBSTRUCTION (HCC): Status: RESOLVED | Noted: 2019-04-13 | Resolved: 2022-09-23

## 2022-09-23 PROBLEM — E66.01 OBESITY, MORBID (HCC): Status: RESOLVED | Noted: 2018-02-08 | Resolved: 2022-09-23

## 2022-09-23 PROBLEM — K21.9 GERD (GASTROESOPHAGEAL REFLUX DISEASE): Status: ACTIVE | Noted: 2022-09-23

## 2022-09-23 PROBLEM — K52.9 ACUTE COLITIS: Status: RESOLVED | Noted: 2017-03-22 | Resolved: 2022-09-23

## 2022-09-23 PROBLEM — R63.4 WEIGHT LOSS: Status: ACTIVE | Noted: 2022-09-23

## 2022-09-23 PROBLEM — R94.31 ABNORMAL EKG: Status: RESOLVED | Noted: 2022-08-02 | Resolved: 2022-09-23

## 2022-09-23 PROBLEM — R06.02 SOB (SHORTNESS OF BREATH): Status: RESOLVED | Noted: 2022-08-02 | Resolved: 2022-09-23

## 2022-09-23 LAB
ALBUMIN SERPL-MCNC: 3.4 G/DL (ref 3.5–5)
ALBUMIN/GLOB SERPL: 0.8 {RATIO} (ref 1.1–2.2)
ALP SERPL-CCNC: 104 U/L (ref 45–117)
ALT SERPL-CCNC: 42 U/L (ref 12–78)
ANION GAP SERPL CALC-SCNC: 8 MMOL/L (ref 5–15)
AST SERPL-CCNC: 33 U/L (ref 15–37)
BASOPHILS # BLD: 0 K/UL (ref 0–0.1)
BASOPHILS NFR BLD: 0 % (ref 0–1)
BILIRUB SERPL-MCNC: 0.7 MG/DL (ref 0.2–1)
BUN SERPL-MCNC: 9 MG/DL (ref 6–20)
BUN/CREAT SERPL: 10 (ref 12–20)
CALCIUM SERPL-MCNC: 9.5 MG/DL (ref 8.5–10.1)
CHLORIDE SERPL-SCNC: 108 MMOL/L (ref 97–108)
CO2 SERPL-SCNC: 23 MMOL/L (ref 21–32)
COMMENT, HOLDF: NORMAL
CREAT SERPL-MCNC: 0.93 MG/DL (ref 0.55–1.02)
DIFFERENTIAL METHOD BLD: NORMAL
EOSINOPHIL # BLD: 0 K/UL (ref 0–0.4)
EOSINOPHIL NFR BLD: 1 % (ref 0–7)
ERYTHROCYTE [DISTWIDTH] IN BLOOD BY AUTOMATED COUNT: 13.2 % (ref 11.5–14.5)
GLOBULIN SER CALC-MCNC: 4.3 G/DL (ref 2–4)
GLUCOSE SERPL-MCNC: 94 MG/DL (ref 65–100)
HCT VFR BLD AUTO: 40.6 % (ref 35–47)
HGB BLD-MCNC: 13 G/DL (ref 11.5–16)
IMM GRANULOCYTES # BLD AUTO: 0 K/UL (ref 0–0.04)
IMM GRANULOCYTES NFR BLD AUTO: 0 % (ref 0–0.5)
LIPASE SERPL-CCNC: 111 U/L (ref 73–393)
LYMPHOCYTES # BLD: 2.2 K/UL (ref 0.8–3.5)
LYMPHOCYTES NFR BLD: 29 % (ref 12–49)
MAGNESIUM SERPL-MCNC: 2.1 MG/DL (ref 1.6–2.4)
MCH RBC QN AUTO: 29.3 PG (ref 26–34)
MCHC RBC AUTO-ENTMCNC: 32 G/DL (ref 30–36.5)
MCV RBC AUTO: 91.4 FL (ref 80–99)
MONOCYTES # BLD: 0.5 K/UL (ref 0–1)
MONOCYTES NFR BLD: 6 % (ref 5–13)
NEUTS SEG # BLD: 4.8 K/UL (ref 1.8–8)
NEUTS SEG NFR BLD: 64 % (ref 32–75)
NRBC # BLD: 0 K/UL (ref 0–0.01)
NRBC BLD-RTO: 0 PER 100 WBC
PLATELET # BLD AUTO: 265 K/UL (ref 150–400)
PMV BLD AUTO: 11.1 FL (ref 8.9–12.9)
POTASSIUM SERPL-SCNC: 3.3 MMOL/L (ref 3.5–5.1)
PROT SERPL-MCNC: 7.7 G/DL (ref 6.4–8.2)
RBC # BLD AUTO: 4.44 M/UL (ref 3.8–5.2)
SAMPLES BEING HELD,HOLD: NORMAL
SODIUM SERPL-SCNC: 139 MMOL/L (ref 136–145)
WBC # BLD AUTO: 7.5 K/UL (ref 3.6–11)

## 2022-09-23 PROCEDURE — 96372 THER/PROPH/DIAG INJ SC/IM: CPT

## 2022-09-23 PROCEDURE — 74177 CT ABD & PELVIS W/CONTRAST: CPT

## 2022-09-23 PROCEDURE — 99285 EMERGENCY DEPT VISIT HI MDM: CPT

## 2022-09-23 PROCEDURE — 96361 HYDRATE IV INFUSION ADD-ON: CPT

## 2022-09-23 PROCEDURE — 36415 COLL VENOUS BLD VENIPUNCTURE: CPT

## 2022-09-23 PROCEDURE — 74011250636 HC RX REV CODE- 250/636: Performed by: EMERGENCY MEDICINE

## 2022-09-23 PROCEDURE — 83690 ASSAY OF LIPASE: CPT

## 2022-09-23 PROCEDURE — 96375 TX/PRO/DX INJ NEW DRUG ADDON: CPT

## 2022-09-23 PROCEDURE — 74011250637 HC RX REV CODE- 250/637: Performed by: INTERNAL MEDICINE

## 2022-09-23 PROCEDURE — 74011000636 HC RX REV CODE- 636: Performed by: EMERGENCY MEDICINE

## 2022-09-23 PROCEDURE — 96376 TX/PRO/DX INJ SAME DRUG ADON: CPT

## 2022-09-23 PROCEDURE — 83735 ASSAY OF MAGNESIUM: CPT

## 2022-09-23 PROCEDURE — 74011250636 HC RX REV CODE- 250/636: Performed by: INTERNAL MEDICINE

## 2022-09-23 PROCEDURE — 80053 COMPREHEN METABOLIC PANEL: CPT

## 2022-09-23 PROCEDURE — G0378 HOSPITAL OBSERVATION PER HR: HCPCS

## 2022-09-23 PROCEDURE — 85025 COMPLETE CBC W/AUTO DIFF WBC: CPT

## 2022-09-23 PROCEDURE — 96374 THER/PROPH/DIAG INJ IV PUSH: CPT

## 2022-09-23 PROCEDURE — 99218 HC RM OBSERVATION: CPT

## 2022-09-23 RX ORDER — ENOXAPARIN SODIUM 100 MG/ML
30 INJECTION SUBCUTANEOUS EVERY 12 HOURS
Status: DISCONTINUED | OUTPATIENT
Start: 2022-09-23 | End: 2022-09-26 | Stop reason: HOSPADM

## 2022-09-23 RX ORDER — PROMETHAZINE HYDROCHLORIDE 25 MG/1
25 SUPPOSITORY RECTAL
Status: DISCONTINUED | OUTPATIENT
Start: 2022-09-23 | End: 2022-09-26 | Stop reason: HOSPADM

## 2022-09-23 RX ORDER — ONDANSETRON 2 MG/ML
8 INJECTION INTRAMUSCULAR; INTRAVENOUS ONCE
Status: COMPLETED | OUTPATIENT
Start: 2022-09-23 | End: 2022-09-23

## 2022-09-23 RX ORDER — ESCITALOPRAM OXALATE 10 MG/1
20 TABLET ORAL DAILY
Status: DISCONTINUED | OUTPATIENT
Start: 2022-09-24 | End: 2022-09-24

## 2022-09-23 RX ORDER — HYDROMORPHONE HYDROCHLORIDE 2 MG/1
1 TABLET ORAL
Status: DISCONTINUED | OUTPATIENT
Start: 2022-09-23 | End: 2022-09-26 | Stop reason: HOSPADM

## 2022-09-23 RX ORDER — FENTANYL 12.5 UG/1
1 PATCH TRANSDERMAL
Status: DISCONTINUED | OUTPATIENT
Start: 2022-09-23 | End: 2022-09-26 | Stop reason: HOSPADM

## 2022-09-23 RX ORDER — POTASSIUM CHLORIDE 7.45 MG/ML
10 INJECTION INTRAVENOUS
Status: DISPENSED | OUTPATIENT
Start: 2022-09-23 | End: 2022-09-23

## 2022-09-23 RX ORDER — POLYETHYLENE GLYCOL 3350 17 G/17G
17 POWDER, FOR SOLUTION ORAL DAILY PRN
Status: DISCONTINUED | OUTPATIENT
Start: 2022-09-23 | End: 2022-09-26 | Stop reason: HOSPADM

## 2022-09-23 RX ORDER — ONDANSETRON 4 MG/1
4 TABLET, ORALLY DISINTEGRATING ORAL
Status: DISCONTINUED | OUTPATIENT
Start: 2022-09-23 | End: 2022-09-26 | Stop reason: HOSPADM

## 2022-09-23 RX ORDER — PROMETHAZINE HYDROCHLORIDE 25 MG/1
25 TABLET ORAL
Status: DISCONTINUED | OUTPATIENT
Start: 2022-09-23 | End: 2022-09-26 | Stop reason: HOSPADM

## 2022-09-23 RX ORDER — ONDANSETRON 2 MG/ML
4 INJECTION INTRAMUSCULAR; INTRAVENOUS
Status: DISCONTINUED | OUTPATIENT
Start: 2022-09-23 | End: 2022-09-26 | Stop reason: HOSPADM

## 2022-09-23 RX ORDER — ACETAMINOPHEN 650 MG/1
650 SUPPOSITORY RECTAL
Status: DISCONTINUED | OUTPATIENT
Start: 2022-09-23 | End: 2022-09-26 | Stop reason: HOSPADM

## 2022-09-23 RX ORDER — ALPRAZOLAM 0.5 MG/1
1 TABLET ORAL 2 TIMES DAILY
Status: DISCONTINUED | OUTPATIENT
Start: 2022-09-23 | End: 2022-09-25

## 2022-09-23 RX ORDER — POTASSIUM CHLORIDE 750 MG/1
10 TABLET, FILM COATED, EXTENDED RELEASE ORAL 2 TIMES DAILY
Status: DISCONTINUED | OUTPATIENT
Start: 2022-09-23 | End: 2022-09-26 | Stop reason: HOSPADM

## 2022-09-23 RX ORDER — ACETAMINOPHEN 325 MG/1
650 TABLET ORAL
Status: DISCONTINUED | OUTPATIENT
Start: 2022-09-23 | End: 2022-09-26 | Stop reason: HOSPADM

## 2022-09-23 RX ORDER — POTASSIUM CHLORIDE, DEXTROSE MONOHYDRATE AND SODIUM CHLORIDE 300; 5; 900 MG/100ML; G/100ML; MG/100ML
INJECTION, SOLUTION INTRAVENOUS CONTINUOUS
Status: DISCONTINUED | OUTPATIENT
Start: 2022-09-23 | End: 2022-09-25

## 2022-09-23 RX ORDER — POTASSIUM CHLORIDE AND SODIUM CHLORIDE 900; 300 MG/100ML; MG/100ML
INJECTION, SOLUTION INTRAVENOUS CONTINUOUS
Status: DISCONTINUED | OUTPATIENT
Start: 2022-09-23 | End: 2022-09-23

## 2022-09-23 RX ORDER — ONDANSETRON 2 MG/ML
4 INJECTION INTRAMUSCULAR; INTRAVENOUS ONCE
Status: DISCONTINUED | OUTPATIENT
Start: 2022-09-23 | End: 2022-09-23

## 2022-09-23 RX ADMIN — IOPAMIDOL 100 ML: 755 INJECTION, SOLUTION INTRAVENOUS at 19:44

## 2022-09-23 RX ADMIN — ENOXAPARIN SODIUM 30 MG: 100 INJECTION SUBCUTANEOUS at 21:00

## 2022-09-23 RX ADMIN — SODIUM CHLORIDE 1000 ML: 9 INJECTION, SOLUTION INTRAVENOUS at 17:48

## 2022-09-23 RX ADMIN — POTASSIUM CHLORIDE 10 MEQ: 7.46 INJECTION, SOLUTION INTRAVENOUS at 20:17

## 2022-09-23 RX ADMIN — POTASSIUM CHLORIDE, DEXTROSE MONOHYDRATE AND SODIUM CHLORIDE: 300; 5; 900 INJECTION, SOLUTION INTRAVENOUS at 23:07

## 2022-09-23 RX ADMIN — ONDANSETRON 8 MG: 2 INJECTION INTRAMUSCULAR; INTRAVENOUS at 19:27

## 2022-09-23 NOTE — ED TRIAGE NOTES
Pt reports since July 1st has had ulcerative colitis flare ups. Has been unable to keep fluids down since 9/19/22. Has been having diarrhea as well. Hx of hypokalemia.      Denies chest pain and SOB

## 2022-09-23 NOTE — H&P
[address]  [address] [facility], [facility]  [phone]    Hospitalist Admission Note      NAME:  Daylin Casiano   :   1970   MRN:  [id number]     PCP:  Angelica Conte MD     Date/Time of service:  2022 7:54 PM          Subjective:     CHIEF COMPLAINT: abd pain     HISTORY OF PRESENT ILLNESS:     Ms. Alexandra Oleary is a 45 y.o.  female who presented to the Emergency Department complaining of abd pain. She is a poor historian. Her speech is pressured, tangential and anxious. She states current pain going on for 3 months or more. Associated with weight loss, nausea and diarrhea. She states her PCP told her to go to ER [facility]. ER doctor spoke with PCP, who states he sent her for psychosocial issues. ER workup is unremarkable. Mildly low K.  CT abd is non acute. We will admit her for observation. Past Medical History:   Diagnosis Date    Anxiety     Bowel obstruction (HCC)     Fatty liver     Gall stones     GERD (gastroesophageal reflux disease)     Hematoma     abdomen on the right overy    Hernia of unspecified site of abdominal cavity without mention of obstruction or gangrene     History of vascular access device 2021    4.5 fr mIDLINE RIGHT BASILIC 13 CM ACCESS    Hx of thromboembolism of vein     right upper brachiel vein    Kidney stones     Obesity     REYNA? PUD (peptic ulcer disease) 2016    stomach and colon ulcers?     Seizures ([address]     me dside effect? last 2013        Past Surgical History:   Procedure Laterality Date    COLONOSCOPY N/A 3/24/2017    COLONOSCOPY performed by Geraldene Merlin, MD at [facility]    HX APPENDECTOMY      HX BREAST BIOPSY Right     negative pathology    HX BREAST BIOPSY Left     negative pathology    HX ENDOSCOPY  2016    HX HERNIA REPAIR  2011    laparoscopic incisional hernia repair    HX HERNIA REPAIR  950    umbilical hernia repair    HX HYSTERECTOMY      partial hysterectomy (right ovary removed)    HX OOPHORECTOMY  2009    removed post op hematoma and right oopherectomy    HX OOPHORECTOMY  3/2016    mass removed and left oopherectomy    HX OTHER SURGICAL  2009    ileocectomy, bowel resection,     MT ABDOMEN SURGERY PROC UNLISTED  4/2010    ventral hernia repair with biologic mesh    MT COLONOSCOPY FLX DX W/COLLJ SPEC WHEN PFRMD  1/14/2011         US GUIDE BX BREAST Left 2016    neagative paythology       Social History     Tobacco Use    Smoking status: Never    Smokeless tobacco: Never   Substance Use Topics    Alcohol use: Yes     Alcohol/week: 1.0 standard drink     Types: 1 Glasses of wine per week     Comment: 1 glass every 2 weeks        Family History   Problem Relation Age of Onset    Cancer Maternal Grandmother         colon ca    Breast Cancer Maternal Grandmother     Breast Cancer Paternal Grandmother     Breast Cancer Cousin         multiple; both sides      Allergies   Allergen Reactions    Sulfa (Sulfonamide Antibiotics) Hives     Childhood per parent    Toradol [Ketorolac Tromethamine] Hives    Cymbalta [Duloxetine] Seizures    Effexor [Venlafaxine] Seizures    Tramadol Seizures    Compazine [Prochlorperazine] Rash    Hydrocodone-Acetaminophen Rash    Morphine Hives and Nausea and Vomiting        Prior to Admission medications    Medication Sig Start Date End Date Taking? Authorizing Provider   bumetanide (BUMEX) 1 mg tablet Take 1 mg by mouth two (2) times daily as needed for Other (edema). 1 tab. 1-2 x day as needed 7/30/21   Christiano Espinoza MD   atorvastatin (LIPITOR) 40 mg tablet Take 40 mg by mouth daily. 7/30/21   Christiano Espinoza MD   ibuprofen (MOTRIN) 800 mg tablet Take 800 mg by mouth every eight (8) hours as needed for Pain. 7/30/21   Nette Santos MD   potassium chloride (KAON 10%) 20 mEq/15 mL solution Take 20 mEq by mouth daily. 7/30/21   Nette Santos MD   polyethylene glycol (MIRALAX) 17 gram packet Take 1 Packet by mouth daily. Indications: constipation 7/28/21   Kodak Resendiz MD   escitalopram oxalate (LEXAPRO) 20 mg tablet Take 20 mg by mouth daily. Provider, Historical   promethazine (PHENERGAN) 25 mg tablet Take 1 Tab by mouth every six (6) hours as needed for Nausea. 2/20/20   Harriet Toribio MD   pravastatin (PRAVACHOL) 40 mg tablet Take 40 mg by mouth nightly. Other, MD Codi   MULTIVITAMIN (ONE-A-DAY ESSENTIAL PO) Take 1 Tab by mouth daily. Provider, Historical   ALPRAZolam Earlean Bethany) 2 mg tablet Take 1 mg by mouth two (2) times a day. Patient takes in the morning and with a late lunch    Provider, Historical   estradiol (ESTRACE) 1 mg tablet Take 1 mg by mouth nightly. Provider, Historical       Review of Systems:  (bold if positive, if negative)    Gen:  weight loss,Eyes:  ENT:  CVS:  Pulm:  GI:  Abdominal pain, nausea, , diarrheaGU:  MS:  Skin:  Psych:   Insomnia, depression, anxiety,Endo:  Hem:  Renal:  Neuro:        Objective:      VITALS:    Vital signs reviewed; most recent are:    Visit Vitals  /72   Pulse 86   Temp 97.9 °F (36.6 °C)   Resp 16   Ht 5' 7\" (1.702 m)   Wt 113.9 kg (251 lb)   SpO2 97%   BMI 39.31 kg/m²     SpO2 Readings from Last 6 Encounters:   09/23/22 97%   08/02/22 97%   07/06/22 95%   10/09/21 100%   08/27/21 96%   08/25/21 98%        No intake or output data in the 24 hours ending 09/23/22 1954     Exam:     Physical Exam:    Gen:  Obese, in no acute distress  HEENT:  Pink conjunctivae, PERRL, hearing intact to voice, moist mucous membranes  Neck:  Supple, without masses, thyroid non-tender  Resp:  No accessory muscle use, clear breath sounds without wheezes rales or rhonchi  Card:  No murmurs, normal S1, S2 without thrills, bruits or peripheral edema  Abd:  Soft, non-tender, non-distended, normoactive bowel sounds are present, no mass  Lymph:  No cervical or inguinal adenopathy  Musc:  No cyanosis or clubbing  Skin:  No rashes or ulcers, skin turgor is good  Neuro:  Cranial nerves are grossly intact, no focal motor weakness, follows commands   Psych: Moderate insight, oriented to person, place and time, alert     Labs:    Recent Labs     09/23/22  1501   WBC 7.5   HGB 13.0   HCT 40.6        Recent Labs     09/23/22  1501      K 3.3*      CO2 23   GLU 94   BUN 9   CREA 0.93   CA 9.5   MG 2.1   ALB 3.4*   TBILI 0.7   ALT 42     Lab Results   Component Value Date/Time    Glucose (POC) 93 01/14/2018 03:07 PM    Glucose (POC) 94 05/17/2010 11:04 AM    Glucose (POC) 120 (H) 12/30/2009 12:41 PM    Glucose (POC) 111 (H) 12/30/2009 05:20 AM     No results for input(s): PH, PCO2, PO2, HCO3, FIO2 in the last 72 hours. No results for input(s): INR, INREXT in the last 72 hours. All Micro Results       Procedure Component Value Units Date/Time    ENTERIC BACTERIA PANEL, DNA [[id number]]     Order Status: Sent Specimen: Stool     OVA & PARASITES, STOOL [[id number]]     Order Status: Sent Specimen: Stool     URINE CULTURE HOLD SAMPLE [[id number]]     Order Status: Sent Specimen: Urine             I have reviewed previous records       Assessment and Plan:      Abdominal pain / hx Ulcerative colitis / Hx PUD (peptic ulcer disease) / Nausea and vomiting / Diarrhea / Fatty liver / HX Ventral hernia / Hx GERD (gastroesophageal reflux disease) - POA, unclear cause of the pain. Her statement that she has not been able to drink any fluid for 4 days is inconsistent with a normal BMP showing no dehydration at all. She had an unremarkable upper and lower endoscopy 6 weeks ago. CT unremarkable. Check stool studies. Check inflammatory markers. GI consult. Add PPI. Clear diet. ADAT. She is begging for narcotics. I'll do a low dose fentanyl patch, low dose PO liquid dilaudid, and Prn tylenol and motrin. Anxiety and Depression - She is currently in a severe manic state. Her speech is pressures, tangential, anxious and she continued a single sentence for over 10 minutes during my interview.  She perseverates about medical issues that occurred in the distant past. Not on any psych meds. Likely contributes to her sensation of the abd pain. I will consult psych. I strongly suspect her GI complaints are a somatization of her untreated psychological issues. Weight loss / Obesity - She weighed 305 lb 14 months ago. Now 251 lb. Unintensional.  GI can comment, I still think it is more psychological anorexia than physiological disease. Telemetry reviewed:   normal sinus rhythm    Risk of deterioration: high      Total time spent with patient: 48 Minutes I personally reviewed chart, notes, data and current medications in the medical record. I have personally examined and treated the patient at bedside during this period. To assist coordination of care and communication with nursing and staff, this note may be preliminary early in the day, but finalized by end of the day.                  Care Plan discussed with: Patient, Nursing Staff, and >50% of time spent in counseling and coordination of care    Discussed:  Care Plan and D/C Planning       ___________________________________________________    Attending Physician: Allene Aschoff, MD

## 2022-09-23 NOTE — ED PROVIDER NOTES
This is a 58-year-old female who presents to emergency room for chronic abdominal pain, nausea vomiting and diarrhea. Patient states this is been ongoing since July 1. Patient has multiple admissions for vomiting diarrhea and abdominal pain. Patient stating she was sent here by her doctor for evaluation and admission due to not being able to keep anything down. Pain is generalized to the abdomen. No chest pain or shortness of breath. No fevers or chills. Patient states she has Bentyl, Zofran and promethazine which are not helping. She states she is constantly having diarrhea. She has a history of hypokalemia. Patient reporting no alleviating factors. The history is provided by the patient. No  was used. Past Medical History:   Diagnosis Date    Anxiety     Bowel obstruction (HCC)     Fatty liver     Gall stones     GERD (gastroesophageal reflux disease)     Hematoma     abdomen on the right overy    Hernia of unspecified site of abdominal cavity without mention of obstruction or gangrene     History of vascular access device 07/22/2021    4.5 fr mIDLINE RIGHT BASILIC 13 CM ACCESS    Hx of thromboembolism of vein     right upper brachiel vein    Kidney stones     Obesity     REYNA? PUD (peptic ulcer disease) 2016    stomach and colon ulcers?     Seizures ([address]     me dside effect? last sz 2013       Past Surgical History:   Procedure Laterality Date    COLONOSCOPY N/A 3/24/2017    COLONOSCOPY performed by Katt Austin MD at [facility] ENDOS[facility]    HX APPENDECTOMY  2009    HX BREAST BIOPSY Right 2007    negative pathology    HX BREAST BIOPSY Left 1999    negative pathology    HX ENDOSCOPY  2016    HX HERNIA REPAIR  9/2011    laparoscopic incisional hernia repair    HX HERNIA REPAIR  4977    umbilical hernia repair    HX HYSTERECTOMY  2009    partial hysterectomy (right ovary removed)    HX OOPHORECTOMY  2009    removed post op hematoma and right oopherectomy    HX OOPHORECTOMY  3/2016 mass removed and left oopherectomy    HX OTHER SURGICAL  2009    ileocectomy, bowel resection,     [place] ABDOMEN SURGERY PROC UNLISTED  4/2010    ventral hernia repair with biologic mesh    IL COLONOSCOPY FLX DX W/COLLJ SPEC WHEN PFRMD  1/14/2011         US GUIDE BX BREAST Left 2016    neagative paythology         Family History:   Problem Relation Age of Onset    Cancer Maternal Grandmother         colon ca    Breast Cancer Maternal Grandmother     Breast Cancer Paternal Grandmother     Breast Cancer Cousin         multiple; both sides       Social History     Socioeconomic History    Marital status:      Spouse name: Not on file    Number of children: Not on file    Years of education: Not on file    Highest education level: Not on file   Occupational History    Not on file   Tobacco Use    Smoking status: Never    Smokeless tobacco: Never   Substance and Sexual Activity    Alcohol use:  Yes     Alcohol/week: 1.0 standard drink     Types: 1 Glasses of wine per week     Comment: 1 glass every 2 weeks    Drug use: No    Sexual activity: Not on file   Other Topics Concern     Service Not Asked    Blood Transfusions Not Asked    Caffeine Concern Not Asked    Occupational Exposure Not Asked    Hobby Hazards Not Asked    Sleep Concern Not Asked    Stress Concern Not Asked    Weight Concern Not Asked    Special Diet Not Asked    Back Care Not Asked    Exercise Not Asked    Bike Helmet Not Asked    Seat Belt Not Asked    Self-Exams Not Asked   Social History Narrative    Not on file     Social Determinants of Health     Financial Resource Strain: Not on file   Food Insecurity: Not on file   Transportation Needs: Not on file   Physical Activity: Not on file   Stress: Not on file   Social Connections: Not on file   Intimate Partner Violence: Not on file   Housing Stability: Not on file         ALLERGIES: Sulfa (sulfonamide antibiotics), Toradol [ketorolac tromethamine], Cymbalta [duloxetine], Effexor [venlafaxine], Tramadol, Compazine [prochlorperazine], Hydrocodone-acetaminophen, and Morphine    Review of Systems   HENT:  Negative for congestion, rhinorrhea and sore throat. Respiratory:  Negative for cough and shortness of breath. All other systems reviewed and are negative. Vitals:    09/23/22 1500   BP: 135/72   Pulse: 86   Resp: 16   Temp: 97.9 °F (36.6 °C)   SpO2: 97%   Weight: 113.9 kg (251 lb)   Height: 5' 7\" (1.702 m)            Physical Exam  Vitals and nursing note reviewed. Constitutional:       Appearance: Normal appearance. She is normal weight. HENT:      Head: Normocephalic and atraumatic. Nose: Nose normal.   Eyes:      Conjunctiva/sclera: Conjunctivae normal.      Pupils: Pupils are equal, round, and reactive to light. Cardiovascular:      Rate and Rhythm: Normal rate and regular rhythm. Pulmonary:      Effort: Pulmonary effort is normal.      Breath sounds: Normal breath sounds. Abdominal:      General: There is no distension. Palpations: There is no mass. Tenderness: There is abdominal tenderness. There is no right CVA tenderness, left CVA tenderness, guarding or rebound. Hernia: No hernia is present. Comments: Patient reporting generalized tenderness with palpation. Her abdomen is soft. There are no peritoneal signs. Musculoskeletal:         General: Normal range of motion. Cervical back: Normal range of motion and neck supple. Right lower leg: No edema. Left lower leg: No edema. Skin:     General: Skin is warm and dry. Capillary Refill: Capillary refill takes less than 2 seconds. Findings: No erythema. Neurological:      General: No focal deficit present. Mental Status: She is alert and oriented to person, place, and time. Psychiatric:         Mood and Affect: Mood is anxious. Thought Content:  Thought content normal.        MDM  Number of Diagnoses or Management Options  Abdominal pain, generalized  Diarrhea, unspecified type  Nausea and vomiting, unspecified vomiting type  Diagnosis management comments: 2year-old female presenting to the emergency room for reported nausea vomiting diarrhea, inability to tolerate liquids abdominal pain. Her abdomen is soft. Patient reports pain with palpation. Her lungs are clear. She is afebrile. She is not tachycardic tachypneic or hypoxic. She is in no acute distress. Plan: Labs. Initially ordered an x-ray as patient has had multiple abdominal CTs. Patient is declining x-ray and wants yet another CT. I have informed her that she increases increased risk of cancer from the multiple CTs. Patient states she is aware and still wants the CAT scan. 6:45 PM  Patient's labs are unremarkable except for slightly low potassium of 3.3. I have explained this to the patient. Patient has called her primary care provider who I have spoken to. He feels patient should be admitted. He asks if hospitalist to evaluate. Procedures      7:45 PM  Pt has asked for pain medication multiple times . I have informed patient as she is not in a bed with monitor I am not comfortable with giving pain medicine. Pt has reported no relief with bentyl and has allergy to toradol. Recent Results (from the past 12 hour(s))   SAMPLES BEING HELD    Collection Time: 09/23/22  3:01 PM   Result Value Ref Range    SAMPLES BEING HELD  1BLU, 1LAV, 1SST, 1RED, 1DK GRN, 1PST     COMMENT        Add-on orders for these samples will be processed based on acceptable specimen integrity and analyte stability, which may vary by analyte.    CBC WITH AUTOMATED DIFF    Collection Time: 09/23/22  3:01 PM   Result Value Ref Range    WBC 7.5 3.6 - 11.0 K/uL    RBC 4.44 3.80 - 5.20 M/uL    HGB 13.0 11.5 - 16.0 g/dL    HCT 40.6 35.0 - 47.0 %    MCV 91.4 80.0 - 99.0 FL    MCH 29.3 26.0 - 34.0 PG    MCHC 32.0 30.0 - 36.5 g/dL    RDW 13.2 11.5 - 14.5 %    PLATELET 186 850 - 789 K/uL    MPV 11.1 8.9 - 12.9 FL    NRBC 0.0 0  WBC    ABSOLUTE NRBC 0.00 0.00 - 0.01 K/uL    NEUTROPHILS 64 32 - 75 %    LYMPHOCYTES 29 12 - 49 %    MONOCYTES 6 5 - 13 %    EOSINOPHILS 1 0 - 7 %    BASOPHILS 0 0 - 1 %    IMMATURE GRANULOCYTES 0 0.0 - 0.5 %    ABS. NEUTROPHILS 4.8 1.8 - 8.0 K/UL    ABS. LYMPHOCYTES 2.2 0.8 - 3.5 K/UL    ABS. MONOCYTES 0.5 0.0 - 1.0 K/UL    ABS. EOSINOPHILS 0.0 0.0 - 0.4 K/UL    ABS. BASOPHILS 0.0 0.0 - 0.1 K/UL    ABS. IMM. GRANS. 0.0 0.00 - 0.04 K/UL    DF AUTOMATED     METABOLIC PANEL, COMPREHENSIVE    Collection Time: 09/23/22  3:01 PM   Result Value Ref Range    Sodium 139 136 - 145 mmol/L    Potassium 3.3 (L) 3.5 - 5.1 mmol/L    Chloride 108 97 - 108 mmol/L    CO2 23 21 - 32 mmol/L    Anion gap 8 5 - 15 mmol/L    Glucose 94 65 - 100 mg/dL    BUN 9 6 - 20 MG/DL    Creatinine 0.93 0.55 - 1.02 MG/DL    BUN/Creatinine ratio 10 (L) 12 - 20      GFR est AA >60 >60 ml/min/1.73m2    GFR est non-AA >60 >60 ml/min/1.73m2    Calcium 9.5 8.5 - 10.1 MG/DL    Bilirubin, total 0.7 0.2 - 1.0 MG/DL    ALT (SGPT) 42 12 - 78 U/L    AST (SGOT) 33 15 - 37 U/L    Alk. phosphatase 104 45 - 117 U/L    Protein, total 7.7 6.4 - 8.2 g/dL    Albumin 3.4 (L) 3.5 - 5.0 g/dL    Globulin 4.3 (H) 2.0 - 4.0 g/dL    A-G Ratio 0.8 (L) 1.1 - 2.2     MAGNESIUM    Collection Time: 09/23/22  3:01 PM   Result Value Ref Range    Magnesium 2.1 1.6 - 2.4 mg/dL   LIPASE    Collection Time: 09/23/22  3:01 PM   Result Value Ref Range    Lipase 111 73 - 393 U/L     The following have been ordered and reviewed:    CT ABD PELV W CONT    Result Date: 9/23/2022  EXAM: CT ABD PELV W CONT INDICATION: Abdominal pain, vomiting COMPARISON: 7/5/2022 CONTRAST: 100 mL of Isovue-370. ORAL CONTRAST: None TECHNIQUE: Following the uneventful intravenous administration of contrast, thin axial images were obtained through the abdomen and pelvis. Coronal and sagittal reconstructions were generated.  CT dose reduction was achieved through use of a standardized protocol tailored for this examination and automatic exposure control for dose modulation. FINDINGS: LOWER THORAX: No significant abnormality in the incidentally imaged lower chest. LIVER: Subcentimeter hypodense lesion in the tip of the liver is of indeterminate etiology and likely too small to characterize by any imaging modality. BILIARY TREE: Status post cholecystectomy. CBD is not dilated. SPLEEN: within normal limits. PANCREAS: No mass or ductal dilatation. ADRENALS: Unremarkable. KIDNEYS: No mass, calculus, or hydronephrosis. STOMACH: Unremarkable. SMALL BOWEL: No dilatation or wall thickening. COLON: No dilatation or wall thickening. Status post partial colectomy. APPENDIX: Surgically absent. PERITONEUM: No ascites or pneumoperitoneum. RETROPERITONEUM: No lymphadenopathy or aortic aneurysm. REPRODUCTIVE ORGANS: The uterus is surgically absent. URINARY BLADDER: No mass or calculus. BONES: No destructive bone lesion. ABDOMINAL WALL: Ventral hernia repair. Unchanged small recurrent midline hernia inferior to the repair site containing small bowel without evidence of obstruction. ADDITIONAL COMMENTS: N/A     Stable small recurrent midline hernia inferior to the repair site containing small bowel without evidence of obstruction. No acute abnormality is identified. 8:54 PM  Patient was seen and evaluated by the hospitalist.  He will admit the patient for observation. Pt case including HPI, PE, and all available lab and radiology results has been discussed with attending physician. Opportunity to evaluate patient has been provided to ER attending.

## 2022-09-24 LAB
ALBUMIN SERPL-MCNC: 2.6 G/DL (ref 3.5–5)
ALBUMIN/GLOB SERPL: 0.7 {RATIO} (ref 1.1–2.2)
ALP SERPL-CCNC: 88 U/L (ref 45–117)
ALT SERPL-CCNC: 33 U/L (ref 12–78)
AMPHET UR QL SCN: NEGATIVE
ANION GAP SERPL CALC-SCNC: 4 MMOL/L (ref 5–15)
APPEARANCE UR: CLEAR
AST SERPL-CCNC: 33 U/L (ref 15–37)
BACTERIA URNS QL MICRO: ABNORMAL /HPF
BARBITURATES UR QL SCN: NEGATIVE
BENZODIAZ UR QL: POSITIVE
BILIRUB SERPL-MCNC: 0.5 MG/DL (ref 0.2–1)
BILIRUB UR QL: NEGATIVE
BUN SERPL-MCNC: 9 MG/DL (ref 6–20)
BUN/CREAT SERPL: 11 (ref 12–20)
CALCIUM SERPL-MCNC: 8.3 MG/DL (ref 8.5–10.1)
CANNABINOIDS UR QL SCN: POSITIVE
CHLORIDE SERPL-SCNC: 116 MMOL/L (ref 97–108)
CO2 SERPL-SCNC: 22 MMOL/L (ref 21–32)
COCAINE UR QL SCN: NEGATIVE
COLOR UR: ABNORMAL
COMMENT, HOLDF: NORMAL
CREAT SERPL-MCNC: 0.85 MG/DL (ref 0.55–1.02)
CRP SERPL-MCNC: 0.54 MG/DL (ref 0–0.6)
DRUG SCRN COMMENT,DRGCM: ABNORMAL
EPITH CASTS URNS QL MICRO: ABNORMAL /LPF
ERYTHROCYTE [DISTWIDTH] IN BLOOD BY AUTOMATED COUNT: 13.5 % (ref 11.5–14.5)
ERYTHROCYTE [SEDIMENTATION RATE] IN BLOOD: 39 MM/HR (ref 0–30)
GLOBULIN SER CALC-MCNC: 3.6 G/DL (ref 2–4)
GLUCOSE SERPL-MCNC: 108 MG/DL (ref 65–100)
GLUCOSE UR STRIP.AUTO-MCNC: NEGATIVE MG/DL
HCT VFR BLD AUTO: 34.4 % (ref 35–47)
HGB BLD-MCNC: 10.8 G/DL (ref 11.5–16)
HGB UR QL STRIP: NEGATIVE
HYALINE CASTS URNS QL MICRO: ABNORMAL /LPF (ref 0–5)
KETONES UR QL STRIP.AUTO: ABNORMAL MG/DL
LEUKOCYTE ESTERASE UR QL STRIP.AUTO: NEGATIVE
MAGNESIUM SERPL-MCNC: 2.1 MG/DL (ref 1.6–2.4)
MCH RBC QN AUTO: 29.4 PG (ref 26–34)
MCHC RBC AUTO-ENTMCNC: 31.4 G/DL (ref 30–36.5)
MCV RBC AUTO: 93.7 FL (ref 80–99)
METHADONE UR QL: NEGATIVE
NITRITE UR QL STRIP.AUTO: NEGATIVE
NRBC # BLD: 0 K/UL (ref 0–0.01)
NRBC BLD-RTO: 0 PER 100 WBC
OPIATES UR QL: NEGATIVE
PCP UR QL: NEGATIVE
PH UR STRIP: 5 [PH] (ref 5–8)
PLATELET # BLD AUTO: 190 K/UL (ref 150–400)
PMV BLD AUTO: 10.6 FL (ref 8.9–12.9)
POTASSIUM SERPL-SCNC: 3.8 MMOL/L (ref 3.5–5.1)
PROCALCITONIN SERPL-MCNC: <0.05 NG/ML
PROT SERPL-MCNC: 6.2 G/DL (ref 6.4–8.2)
PROT UR STRIP-MCNC: NEGATIVE MG/DL
RBC # BLD AUTO: 3.67 M/UL (ref 3.8–5.2)
RBC #/AREA URNS HPF: ABNORMAL /HPF (ref 0–5)
SAMPLES BEING HELD,HOLD: NORMAL
SODIUM SERPL-SCNC: 142 MMOL/L (ref 136–145)
SP GR UR REFRACTOMETRY: >1.03 (ref 1–1.03)
UR CULT HOLD, URHOLD: NORMAL
UROBILINOGEN UR QL STRIP.AUTO: 0.2 EU/DL (ref 0.2–1)
WBC # BLD AUTO: 5.9 K/UL (ref 3.6–11)
WBC URNS QL MICRO: ABNORMAL /HPF (ref 0–4)
YEAST URNS QL MICRO: PRESENT

## 2022-09-24 PROCEDURE — 84145 PROCALCITONIN (PCT): CPT

## 2022-09-24 PROCEDURE — 74011250636 HC RX REV CODE- 250/636: Performed by: INTERNAL MEDICINE

## 2022-09-24 PROCEDURE — 96372 THER/PROPH/DIAG INJ SC/IM: CPT

## 2022-09-24 PROCEDURE — 36415 COLL VENOUS BLD VENIPUNCTURE: CPT

## 2022-09-24 PROCEDURE — 74011250637 HC RX REV CODE- 250/637: Performed by: INTERNAL MEDICINE

## 2022-09-24 PROCEDURE — 86140 C-REACTIVE PROTEIN: CPT

## 2022-09-24 PROCEDURE — 83735 ASSAY OF MAGNESIUM: CPT

## 2022-09-24 PROCEDURE — 87086 URINE CULTURE/COLONY COUNT: CPT

## 2022-09-24 PROCEDURE — 81001 URINALYSIS AUTO W/SCOPE: CPT

## 2022-09-24 PROCEDURE — 77030038269 HC DRN EXT URIN PURWCK BARD -A

## 2022-09-24 PROCEDURE — 96376 TX/PRO/DX INJ SAME DRUG ADON: CPT

## 2022-09-24 PROCEDURE — 80307 DRUG TEST PRSMV CHEM ANLYZR: CPT

## 2022-09-24 PROCEDURE — 2709999900 HC NON-CHARGEABLE SUPPLY

## 2022-09-24 PROCEDURE — 99218 HC RM OBSERVATION: CPT

## 2022-09-24 PROCEDURE — G0378 HOSPITAL OBSERVATION PER HR: HCPCS

## 2022-09-24 PROCEDURE — 80053 COMPREHEN METABOLIC PANEL: CPT

## 2022-09-24 PROCEDURE — 85652 RBC SED RATE AUTOMATED: CPT

## 2022-09-24 PROCEDURE — 96375 TX/PRO/DX INJ NEW DRUG ADDON: CPT

## 2022-09-24 PROCEDURE — 85027 COMPLETE CBC AUTOMATED: CPT

## 2022-09-24 RX ORDER — PROCHLORPERAZINE EDISYLATE 5 MG/ML
10 INJECTION INTRAMUSCULAR; INTRAVENOUS
Status: DISCONTINUED | OUTPATIENT
Start: 2022-09-24 | End: 2022-09-24 | Stop reason: SDUPTHER

## 2022-09-24 RX ORDER — DIPHENHYDRAMINE HYDROCHLORIDE 50 MG/ML
25 INJECTION, SOLUTION INTRAMUSCULAR; INTRAVENOUS
Status: DISCONTINUED | OUTPATIENT
Start: 2022-09-24 | End: 2022-09-26 | Stop reason: HOSPADM

## 2022-09-24 RX ORDER — DIAZEPAM 10 MG/2ML
10 INJECTION INTRAMUSCULAR
Status: DISCONTINUED | OUTPATIENT
Start: 2022-09-24 | End: 2022-09-24

## 2022-09-24 RX ORDER — HYDROMORPHONE HYDROCHLORIDE 1 MG/ML
1 INJECTION, SOLUTION INTRAMUSCULAR; INTRAVENOUS; SUBCUTANEOUS
Status: DISCONTINUED | OUTPATIENT
Start: 2022-09-24 | End: 2022-09-26 | Stop reason: HOSPADM

## 2022-09-24 RX ORDER — ONDANSETRON 2 MG/ML
4 INJECTION INTRAMUSCULAR; INTRAVENOUS
Status: DISCONTINUED | OUTPATIENT
Start: 2022-09-24 | End: 2022-09-24 | Stop reason: SDUPTHER

## 2022-09-24 RX ORDER — CHOLESTYRAMINE 4 G/4.8G
4 POWDER, FOR SUSPENSION ORAL
Status: DISCONTINUED | OUTPATIENT
Start: 2022-09-25 | End: 2022-09-26 | Stop reason: HOSPADM

## 2022-09-24 RX ADMIN — ENOXAPARIN SODIUM 30 MG: 100 INJECTION SUBCUTANEOUS at 21:18

## 2022-09-24 RX ADMIN — DIPHENHYDRAMINE HYDROCHLORIDE 25 MG: 50 INJECTION INTRAMUSCULAR; INTRAVENOUS at 21:18

## 2022-09-24 RX ADMIN — HYDROMORPHONE HYDROCHLORIDE 1 MG: 1 INJECTION, SOLUTION INTRAMUSCULAR; INTRAVENOUS; SUBCUTANEOUS at 00:35

## 2022-09-24 RX ADMIN — HYDROMORPHONE HYDROCHLORIDE 1 MG: 1 INJECTION, SOLUTION INTRAMUSCULAR; INTRAVENOUS; SUBCUTANEOUS at 05:08

## 2022-09-24 RX ADMIN — DIAZEPAM 10 MG: 5 INJECTION, SOLUTION INTRAMUSCULAR; INTRAVENOUS at 05:08

## 2022-09-24 RX ADMIN — ONDANSETRON 4 MG: 2 INJECTION INTRAMUSCULAR; INTRAVENOUS at 08:09

## 2022-09-24 RX ADMIN — ONDANSETRON 4 MG: 2 INJECTION INTRAMUSCULAR; INTRAVENOUS at 00:35

## 2022-09-24 RX ADMIN — ALPRAZOLAM 1 MG: 0.5 TABLET ORAL at 18:52

## 2022-09-24 RX ADMIN — ALPRAZOLAM 1 MG: 0.5 TABLET ORAL at 08:58

## 2022-09-24 RX ADMIN — POTASSIUM CHLORIDE 10 MEQ: 750 TABLET, FILM COATED, EXTENDED RELEASE ORAL at 18:52

## 2022-09-24 RX ADMIN — HYDROMORPHONE HYDROCHLORIDE 1 MG: 1 INJECTION, SOLUTION INTRAMUSCULAR; INTRAVENOUS; SUBCUTANEOUS at 13:37

## 2022-09-24 RX ADMIN — ENOXAPARIN SODIUM 30 MG: 100 INJECTION SUBCUTANEOUS at 08:58

## 2022-09-24 RX ADMIN — POTASSIUM CHLORIDE, DEXTROSE MONOHYDRATE AND SODIUM CHLORIDE: 300; 5; 900 INJECTION, SOLUTION INTRAVENOUS at 08:57

## 2022-09-24 RX ADMIN — HYDROMORPHONE HYDROCHLORIDE 1 MG: 1 INJECTION, SOLUTION INTRAMUSCULAR; INTRAVENOUS; SUBCUTANEOUS at 19:45

## 2022-09-24 RX ADMIN — HYDROMORPHONE HYDROCHLORIDE 1 MG: 1 INJECTION, SOLUTION INTRAMUSCULAR; INTRAVENOUS; SUBCUTANEOUS at 08:58

## 2022-09-24 RX ADMIN — ONDANSETRON 4 MG: 2 INJECTION INTRAMUSCULAR; INTRAVENOUS at 16:26

## 2022-09-24 NOTE — PROGRESS NOTES
Patient refusing PO meds but took PO Xanax. Patient states she will only take meds via IV. Patient refuses to provide a stool sample stating she hasn't been eating or drinking anything to provide a stool sample. Explained to patient she has fluids running at this time and is being hydrated. Patient presents with hypervigilance towards her presentation for this visit to the hospital, and is not cooperative with nursing staff \"until I see the GI doctor because I know the GI doctor talked to my primary care doctor and I'm not doing anything to mess up my care. My primary care doctor told me to come to the ER and he and the GI doctor are talking and making plans for my care. \"   Noted to patient that there is a physician's order in the system to obtain a stool sample. Patient still declines despite providing this information. Patient also states that she has not had any bowel movements while in hospital and does not think she will be able to produce one. In Hospitalist note, primary care doctor sent patient to ER for psychosocial issues   Psych consult pending at 14:00 today.   Latasha France, MSN-S, BSN, RN
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
## 2022-09-24 NOTE — PROGRESS NOTES
Dear Dr Emil Evangelista,    The Lovenox dose/schedule was changed to reflect the new Enoxaparin Routine Prophylaxis Dosing guidelines which is based on the patient's weight and renal function. The dose/schedule was changed from 40mg sq q24h to 30mg sq bid. Thank you,    Mendez Blackman. Mamadou, Pharm D.         Contact: [phone]
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
## 2022-09-24 NOTE — PROGRESS NOTES
Ultrasound IV by Georgia Medina RN :  Procedure Note    Patient meets criteria for US IV insertion. Ultrasound IV education provided to patient. Opportunities for questions given. Ultrasound used for PIV placement:  20gauge 6 cm Arrow Endurance Extended Dwell(may stay in place for 29 days)  L forearm location. 1 X Attempt(s). Flushed with ease; vigorous blood return. Procedure tolerated well. Primary RN aware of IV placement and added to LDA.       Georgia Medina RN

## 2022-09-24 NOTE — ROUTINE PROCESS
TRANSFER - OUT REPORT:    Verbal report given to receiving RN (name) on Latonya Angel  being transferred to [facility] (unit) for routine progression of care       Report consisted of patients Situation, Background, Assessment and   Recommendations(SBAR). Information from the following report(s) SBAR, ED Summary, Intake/Output, MAR, and Recent Results was reviewed with the receiving nurse. Lines:   Peripheral IV 09/23/22 Right Arm (Active)   Site Assessment Clean, dry, & intact 09/23/22 1506   Phlebitis Assessment 0 09/23/22 1506   Infiltration Assessment 0 09/23/22 1506   Dressing Status Clean, dry, & intact 09/23/22 1506   Dressing Type Transparent;Tape 09/23/22 1506   Hub Color/Line Status Pink;Patent; Flushed 09/23/22 1506   Action Taken Blood drawn 09/23/22 1506        Opportunity for questions and clarification was provided.       Patient transported with:   Registered Nurse

## 2022-09-24 NOTE — PROGRESS NOTES
10:56 AM- Pt remains on Med. Surg- CM notified by RN that pt was brought in for a social admission- pt in need of inpatient psych- psych consult pending for 2:00 PM today- CM will follow up afterwards regarding dispo.     Lakisha Virgen, MSW, [address]

## 2022-09-24 NOTE — PROGRESS NOTES
Lai Villanueva [facility] 79  [address]  [phone] [address] Adult  Hospitalist Group                                                                                          Hospitalist Progress Note  Royal Teena MD        Date of Service:  2022  NAME:  London Cowart  :  1970  MRN:  [id number]      Admission Summary:   58-year-old female presented to the ED with complaints of abdominal discomfort, diarrhea    Interval history / Subjective:     Patient still reports having diarrhea. Also complains of left knee pain. Discussed previous medical history. Became tearful during interview     Assessment & Plan:     Abdominal pain / Hx PUD (peptic ulcer disease) / Nausea and vomiting / Diarrhea / Fatty liver / HX Ventral hernia / Hx GERD (gastroesophageal reflux disease)   - POA, unclear cause of the pain.    -Her statement that she has not been able to drink any fluid for 4 days is inconsistent with a normal BMP showing no dehydration at all. S  -he had an unremarkable upper and lower endoscopy 6 weeks ago. Of note, patient reports a history of ulcerative colitis but there was no mention of this on colonoscopy report  -CT unremarkable. Stool studies pending. Should not be difficult to obtain given patient's history of copious diarrhea  -GI consult. Continue PPI. Clear diet. ADAT. Continue low-dose fentanyl patch and low-dose p.o. liquid Dilaudid. As needed Tylenol     Anxiety and Depression   - Her speech is pressured, tangential, anxious  She perseverates about medical issues that occurred in the distant past.   -Currently taking Xanax twice a day. States that she took her self off Lexapro 6 months ago. -She does appear quite anxious, will consult psychiatry     Weight loss / Obesity - She weighed 305 lb 14 months ago.   Now 251 lb. states she has been limiting herself to 1000-calorie diet in order to lose weight so she can have knee surgery. Hypokalemia  -Continue to replete as needed  -Magnesium okay    Chronic lymphedema  -Elevate legs    Code status: Full code  DVT prophylaxis: [facility] Problems  Date Reviewed: 9/23/2022            Codes Class Noted POA    GERD (gastroesophageal reflux disease) ICD-10-CM: K21.9  ICD-9-CM: 530.81  9/23/2022 Yes        Weight loss ICD-10-CM: R63.4  ICD-9-CM: 783.21  9/23/2022 Yes        * (Principal) Abdominal pain ICD-10-CM: R10.9  ICD-9-CM: 789.00  7/20/2021 Yes        Ventral hernia ICD-10-CM: K43.9  ICD-9-CM: 553.20  7/20/2021 Yes        Nausea and vomiting ICD-10-CM: R11.2  ICD-9-CM: 787.01  3/22/2017 Yes        Ulcerative colitis ([facility] ICD-10-CM: K51.90  ICD-9-CM: 556.9  7/25/2016 Yes        PUD (peptic ulcer disease) (Chronic) ICD-10-CM: K27.9  ICD-9-CM: 533.90  Unknown Yes    Overview Signed 7/25/2016  3:50 AM by Brigid Marshall MD     stomach and colon ulcers? Anxiety (Chronic) ICD-10-CM: F41.9  ICD-9-CM: 300.00  Unknown Yes        Obesity (Chronic) ICD-10-CM: E66.9  ICD-9-CM: 278.00  Unknown Yes    Overview Signed 7/25/2016  3:50 AM by Brigid Marshall MD     REYNA? Fatty liver ICD-10-CM: K76.0  ICD-9-CM: 571.8  Unknown Yes        Depression (Chronic) ICD-10-CM: F32. A  ICD-9-CM: 689  7/1/2010 Yes             Review of Systems:   A comprehensive review of systems was negative except for that written in the HPI. Vital Signs:    Last 24hrs VS reviewed since prior progress note.  Most recent are:  Visit Vitals  /76 (BP 1 Location: Left lower arm, BP Patient Position: At rest)   Pulse 60   Temp 97.7 °F (36.5 °C)   Resp 18   Ht 5' 7\" (1.702 m)   Wt 113.9 kg (251 lb)   SpO2 97%   BMI 39.31 kg/m²         Intake/Output Summary (Last 24 hours) at 9/24/2022 1325  Last data filed at 9/23/2022 2320  Gross per 24 hour   Intake --   Output 350 ml   Net -350 ml        Physical Examination:     Gen:  Obese, in no acute distress  HEENT:  [place] conjunctivae, PERRL, hearing intact to voice, moist mucous membranes  Neck:  Supple, without masses, thyroid non-tender  Resp:  No accessory muscle use, clear breath sounds without wheezes rales or rhonchi  Card:  No murmurs, normal S1, S2 without thrills, bruits or peripheral edema  Abd:  Soft, non-tender, non-distended, normoactive bowel sounds are present, no mass  Lymph:  No cervical or inguinal adenopathy  Musc:  No cyanosis or clubbing  Skin:  No rashes or ulcers, skin turgor is good  Neuro:  Cranial nerves are grossly intact, no focal motor weakness, follows commands   Psych: Moderate insight, oriented to person, place and time, alert       Data Review:    Review and/or order of clinical lab test      Labs:     Recent Labs     09/24/22  0815 09/23/22  1501   WBC 5.9 7.5   HGB 10.8* 13.0   HCT 34.4* 40.6    265     Recent Labs     09/24/22  0815 09/23/22  1501    139   K 3.8 3.3*   * 108   CO2 22 23   BUN 9 9   CREA 0.85 0.93   * 94   CA 8.3* 9.5   MG 2.1 2.1     Recent Labs     09/24/22  0815 09/23/22  1501   ALT 33 42   AP 88 104   TBILI 0.5 0.7   TP 6.2* 7.7   ALB 2.6* 3.4*   GLOB 3.6 4.3*   LPSE  --  111     No results for input(s): INR, PTP, APTT, INREXT in the last 72 hours. No results for input(s): FE, TIBC, PSAT, FERR in the last 72 hours. No results found for: FOL, RBCF   No results for input(s): PH, PCO2, PO2 in the last 72 hours. No results for input(s): CPK, CKNDX, TROIQ in the last 72 hours.     No lab exists for component: CPKMB  No results found for: CHOL, CHOLX, CHLST, CHOLV, HDL, HDLP, LDL, LDLC, DLDLP, TGLX, TRIGL, TRIGP, CHHD, [facility]  Lab Results   Component Value Date/Time    Glucose (POC) 93 01/14/2018 03:07 PM    Glucose (POC) 94 05/17/2010 11:04 AM    Glucose (POC) 84 01/01/2010 06:35 PM    Glucose (POC) 120 (H) 12/30/2009 12:41 PM    Glucose (POC) 111 (H) 12/30/2009 05:20 AM    Glucose (POC) 133 (H) 12/30/2009 12:00 AM    Glucose (POC) 110 (H) 12/29/2009 06:33 PM    Glucose (POC) 128 (H) 12/29/2009 11:59 AM     Lab Results   Component Value Date/Time    Color YELLOW/STRAW 09/24/2022 12:56 AM    Appearance CLEAR 09/24/2022 12:56 AM    Specific gravity >1.030 (H) 09/24/2022 12:56 AM    Specific gravity 1.030 07/05/2022 09:54 PM    pH (UA) 5.0 09/24/2022 12:56 AM    Protein Negative 09/24/2022 12:56 AM    Glucose Negative 09/24/2022 12:56 AM    Ketone TRACE (A) 09/24/2022 12:56 AM    Bilirubin Negative 09/24/2022 12:56 AM    Urobilinogen 0.2 09/24/2022 12:56 AM    Nitrites Negative 09/24/2022 12:56 AM    Leukocyte Esterase Negative 09/24/2022 12:56 AM    Epithelial cells MANY (A) 09/24/2022 12:56 AM    Bacteria 2+ (A) 09/24/2022 12:56 AM    WBC 0-4 09/24/2022 12:56 AM    RBC 0-5 09/24/2022 12:56 AM         Medications Reviewed:     Current Facility-Administered Medications   Medication Dose Route Frequency    HYDROmorphone (DILAUDID) injection 1 mg  1 mg IntraVENous Q4H PRN    promethazine (PHENERGAN) suppository 25 mg  25 mg Rectal Q6H PRN    ALPRAZolam (XANAX) tablet 1 mg  1 mg Oral BID    potassium chloride SR (KLOR-CON 10) tablet 10 mEq  10 mEq Oral BID    promethazine (PHENERGAN) tablet 25 mg  25 mg Oral Q6H PRN    acetaminophen (TYLENOL) tablet 650 mg  650 mg Oral Q6H PRN    Or    acetaminophen (TYLENOL) suppository 650 mg  650 mg Rectal Q6H PRN    polyethylene glycol (MIRALAX) packet 17 g  17 g Oral DAILY PRN    ondansetron (ZOFRAN ODT) tablet 4 mg  4 mg Oral Q8H PRN    Or    ondansetron (ZOFRAN) injection 4 mg  4 mg IntraVENous Q6H PRN    enoxaparin (LOVENOX) injection 30 mg  30 mg SubCUTAneous Q12H    dextrose 5% - 0.9% NaCl with KCl 40 mEq/L infusion   IntraVENous CONTINUOUS    fentaNYL (DURAGESIC) 12 mcg/hr patch 1 Patch  1 Patch TransDERmal Q72H    HYDROmorphone (DILAUDID) tablet 1 mg  1 mg Oral Q6H PRN     ______________________________________________________________________  EXPECTED LENGTH OF STAY: - - -  ACTUAL LENGTH OF STAY:          0 Rhys Singh MD

## 2022-09-25 LAB
ANION GAP SERPL CALC-SCNC: 5 MMOL/L (ref 5–15)
BACTERIA SPEC CULT: NORMAL
BASOPHILS # BLD: 0 K/UL (ref 0–0.1)
BASOPHILS NFR BLD: 0 % (ref 0–1)
BUN SERPL-MCNC: 6 MG/DL (ref 6–20)
BUN/CREAT SERPL: 8 (ref 12–20)
CALCIUM SERPL-MCNC: 8.6 MG/DL (ref 8.5–10.1)
CC UR VC: NORMAL
CHLORIDE SERPL-SCNC: 110 MMOL/L (ref 97–108)
CO2 SERPL-SCNC: 25 MMOL/L (ref 21–32)
CREAT SERPL-MCNC: 0.72 MG/DL (ref 0.55–1.02)
DIFFERENTIAL METHOD BLD: ABNORMAL
EOSINOPHIL # BLD: 0.1 K/UL (ref 0–0.4)
EOSINOPHIL NFR BLD: 1 % (ref 0–7)
ERYTHROCYTE [DISTWIDTH] IN BLOOD BY AUTOMATED COUNT: 13.3 % (ref 11.5–14.5)
GLUCOSE SERPL-MCNC: 103 MG/DL (ref 65–100)
HCT VFR BLD AUTO: 35.1 % (ref 35–47)
HEMOCCULT STL QL: NEGATIVE
HGB BLD-MCNC: 11 G/DL (ref 11.5–16)
IMM GRANULOCYTES # BLD AUTO: 0 K/UL (ref 0–0.04)
IMM GRANULOCYTES NFR BLD AUTO: 0 % (ref 0–0.5)
LYMPHOCYTES # BLD: 1.7 K/UL (ref 0.8–3.5)
LYMPHOCYTES NFR BLD: 34 % (ref 12–49)
MCH RBC QN AUTO: 29.6 PG (ref 26–34)
MCHC RBC AUTO-ENTMCNC: 31.3 G/DL (ref 30–36.5)
MCV RBC AUTO: 94.4 FL (ref 80–99)
MONOCYTES # BLD: 0.4 K/UL (ref 0–1)
MONOCYTES NFR BLD: 7 % (ref 5–13)
NEUTS SEG # BLD: 2.9 K/UL (ref 1.8–8)
NEUTS SEG NFR BLD: 58 % (ref 32–75)
NRBC # BLD: 0 K/UL (ref 0–0.01)
NRBC BLD-RTO: 0 PER 100 WBC
PLATELET # BLD AUTO: 195 K/UL (ref 150–400)
PMV BLD AUTO: 11.3 FL (ref 8.9–12.9)
POTASSIUM SERPL-SCNC: 3.6 MMOL/L (ref 3.5–5.1)
RBC # BLD AUTO: 3.72 M/UL (ref 3.8–5.2)
SERVICE CMNT-IMP: NORMAL
SODIUM SERPL-SCNC: 140 MMOL/L (ref 136–145)
WBC # BLD AUTO: 5 K/UL (ref 3.6–11)

## 2022-09-25 PROCEDURE — 74011250637 HC RX REV CODE- 250/637: Performed by: INTERNAL MEDICINE

## 2022-09-25 PROCEDURE — 96372 THER/PROPH/DIAG INJ SC/IM: CPT

## 2022-09-25 PROCEDURE — 82272 OCCULT BLD FECES 1-3 TESTS: CPT

## 2022-09-25 PROCEDURE — 85025 COMPLETE CBC W/AUTO DIFF WBC: CPT

## 2022-09-25 PROCEDURE — 96374 THER/PROPH/DIAG INJ IV PUSH: CPT

## 2022-09-25 PROCEDURE — 96376 TX/PRO/DX INJ SAME DRUG ADON: CPT

## 2022-09-25 PROCEDURE — G0378 HOSPITAL OBSERVATION PER HR: HCPCS

## 2022-09-25 PROCEDURE — 36415 COLL VENOUS BLD VENIPUNCTURE: CPT

## 2022-09-25 PROCEDURE — 74011250636 HC RX REV CODE- 250/636: Performed by: INTERNAL MEDICINE

## 2022-09-25 PROCEDURE — 80048 BASIC METABOLIC PNL TOTAL CA: CPT

## 2022-09-25 PROCEDURE — 77030038269 HC DRN EXT URIN PURWCK BARD -A

## 2022-09-25 PROCEDURE — 89055 LEUKOCYTE ASSESSMENT FECAL: CPT

## 2022-09-25 PROCEDURE — 99218 HC RM OBSERVATION: CPT

## 2022-09-25 PROCEDURE — 87506 IADNA-DNA/RNA PROBE TQ 6-11: CPT

## 2022-09-25 PROCEDURE — 74011250637 HC RX REV CODE- 250/637: Performed by: FAMILY MEDICINE

## 2022-09-25 PROCEDURE — 87177 OVA AND PARASITES SMEARS: CPT

## 2022-09-25 RX ORDER — ESTRADIOL 1 MG/1
1 TABLET ORAL
Status: DISCONTINUED | OUTPATIENT
Start: 2022-09-25 | End: 2022-09-26 | Stop reason: HOSPADM

## 2022-09-25 RX ORDER — ALPRAZOLAM 0.5 MG/1
2 TABLET ORAL
Status: DISCONTINUED | OUTPATIENT
Start: 2022-09-25 | End: 2022-09-26 | Stop reason: HOSPADM

## 2022-09-25 RX ORDER — ALPRAZOLAM 0.5 MG/1
1 TABLET ORAL
Status: DISCONTINUED | OUTPATIENT
Start: 2022-09-25 | End: 2022-09-25

## 2022-09-25 RX ORDER — ATORVASTATIN CALCIUM 20 MG/1
40 TABLET, FILM COATED ORAL
Status: DISCONTINUED | OUTPATIENT
Start: 2022-09-25 | End: 2022-09-26 | Stop reason: HOSPADM

## 2022-09-25 RX ORDER — PSEUDOEPHEDRINE HCL 30 MG
30 TABLET ORAL
Status: DISCONTINUED | OUTPATIENT
Start: 2022-09-25 | End: 2022-09-26 | Stop reason: HOSPADM

## 2022-09-25 RX ADMIN — CHOLESTYRAMINE 4 G: 4 POWDER, FOR SUSPENSION ORAL at 12:28

## 2022-09-25 RX ADMIN — POTASSIUM CHLORIDE, DEXTROSE MONOHYDRATE AND SODIUM CHLORIDE: 300; 5; 900 INJECTION, SOLUTION INTRAVENOUS at 00:47

## 2022-09-25 RX ADMIN — ONDANSETRON 4 MG: 2 INJECTION INTRAMUSCULAR; INTRAVENOUS at 22:06

## 2022-09-25 RX ADMIN — HYDROMORPHONE HYDROCHLORIDE 1 MG: 1 INJECTION, SOLUTION INTRAMUSCULAR; INTRAVENOUS; SUBCUTANEOUS at 00:50

## 2022-09-25 RX ADMIN — DIPHENHYDRAMINE HYDROCHLORIDE 25 MG: 50 INJECTION INTRAMUSCULAR; INTRAVENOUS at 00:53

## 2022-09-25 RX ADMIN — POTASSIUM CHLORIDE 10 MEQ: 750 TABLET, FILM COATED, EXTENDED RELEASE ORAL at 09:23

## 2022-09-25 RX ADMIN — DIPHENHYDRAMINE HYDROCHLORIDE 25 MG: 50 INJECTION INTRAMUSCULAR; INTRAVENOUS at 11:34

## 2022-09-25 RX ADMIN — ENOXAPARIN SODIUM 30 MG: 100 INJECTION SUBCUTANEOUS at 09:23

## 2022-09-25 RX ADMIN — DIPHENHYDRAMINE HYDROCHLORIDE 25 MG: 50 INJECTION INTRAMUSCULAR; INTRAVENOUS at 22:06

## 2022-09-25 RX ADMIN — ALPRAZOLAM 2 MG: 0.5 TABLET ORAL at 22:07

## 2022-09-25 RX ADMIN — ACETAMINOPHEN 650 MG: 325 TABLET ORAL at 15:50

## 2022-09-25 RX ADMIN — ACETAMINOPHEN 650 MG: 325 TABLET ORAL at 22:07

## 2022-09-25 RX ADMIN — ONDANSETRON 4 MG: 2 INJECTION INTRAMUSCULAR; INTRAVENOUS at 14:52

## 2022-09-25 RX ADMIN — POTASSIUM CHLORIDE 10 MEQ: 750 TABLET, FILM COATED, EXTENDED RELEASE ORAL at 17:37

## 2022-09-25 RX ADMIN — ONDANSETRON 4 MG: 2 INJECTION INTRAMUSCULAR; INTRAVENOUS at 07:07

## 2022-09-25 RX ADMIN — PSEUDOEPHEDRINE HCL 30 MG: 30 TABLET, FILM COATED ORAL at 22:07

## 2022-09-25 RX ADMIN — HYDROMORPHONE HYDROCHLORIDE 1 MG: 1 INJECTION, SOLUTION INTRAMUSCULAR; INTRAVENOUS; SUBCUTANEOUS at 07:07

## 2022-09-25 RX ADMIN — ATORVASTATIN CALCIUM 40 MG: 20 TABLET, FILM COATED ORAL at 22:06

## 2022-09-25 RX ADMIN — HYDROMORPHONE HYDROCHLORIDE 1 MG: 1 INJECTION, SOLUTION INTRAMUSCULAR; INTRAVENOUS; SUBCUTANEOUS at 20:04

## 2022-09-25 RX ADMIN — ALPRAZOLAM 1 MG: 0.5 TABLET ORAL at 09:22

## 2022-09-25 RX ADMIN — DIPHENHYDRAMINE HYDROCHLORIDE 25 MG: 50 INJECTION INTRAMUSCULAR; INTRAVENOUS at 15:50

## 2022-09-25 RX ADMIN — ACETAMINOPHEN 650 MG: 325 TABLET ORAL at 00:50

## 2022-09-25 RX ADMIN — HYDROMORPHONE HYDROCHLORIDE 1 MG: 1 INJECTION, SOLUTION INTRAMUSCULAR; INTRAVENOUS; SUBCUTANEOUS at 11:24

## 2022-09-25 RX ADMIN — ENOXAPARIN SODIUM 30 MG: 100 INJECTION SUBCUTANEOUS at 22:07

## 2022-09-25 RX ADMIN — HYDROMORPHONE HYDROCHLORIDE 1 MG: 1 INJECTION, SOLUTION INTRAMUSCULAR; INTRAVENOUS; SUBCUTANEOUS at 15:50

## 2022-09-25 RX ADMIN — DIPHENHYDRAMINE HYDROCHLORIDE 25 MG: 50 INJECTION INTRAMUSCULAR; INTRAVENOUS at 07:07

## 2022-09-25 RX ADMIN — ONDANSETRON 4 MG: 2 INJECTION INTRAMUSCULAR; INTRAVENOUS at 00:50

## 2022-09-25 RX ADMIN — ACETAMINOPHEN 650 MG: 325 TABLET ORAL at 07:07

## 2022-09-25 RX ADMIN — ESTRADIOL 1 MG: 1 TABLET ORAL at 22:06

## 2022-09-25 NOTE — PROGRESS NOTES
Ultrasound IV by Carmen Heaton RN :  Procedure Note    Patient meets criteria for US IV insertion. Ultrasound IV education provided to patient. Opportunities for questions given. Ultrasound used for PIV placement:  20 gauge  BD Nexiva  R forearm location. 1 X Attempt(s). Flushed with ease; vigorous blood return. Procedure tolerated well. Primary RN aware of IV placement and added to LDA.       Carmen Heaton RN

## 2022-09-25 NOTE — PROGRESS NOTES
[facility]ai Villanueva [facility] 79  [address]  [phone] [address] Adult  Hospitalist Group                                                                                          Hospitalist Progress Note  Torey Mathis MD        Date of Service:  2022  NAME:  Tracie Escalera  :  1970  MRN:  [id number]      Admission Summary:   59-year-old female presented to the ED with complaints of abdominal discomfort, diarrhea    Interval history / Subjective:   Still reports having diarrhea. Assessment & Plan:     Chronic diarrhea/abdominal pain  -Likely bile salt induced diarrhea  --he had an unremarkable upper and lower endoscopy 6 weeks ago. Of note, patient reports a history of ulcerative colitis but there was no mention of this on colonoscopy report  -CT unremarkable. No blood in stool. Enteric bacteria panel, ova parasites pending  -GI consult. Continue PPI. Low-fat diet   -continue low-dose fentanyl patch and low-dose p.o. liquid Dilaudid. As needed Tylenol     Anxiety and Depression   - Her speech is pressured, tangential, anxious  She perseverates about medical issues that occurred in the distant past.   -Currently taking Xanax. States that she took herself off Lexapro 6 months ago. -She does appear quite anxious, evaluated by psychiatry     Weight loss / Obesity - She weighed 305 lb 14 months ago. Now 251 lb. states she has been limiting herself to 1000-calorie diet in order to lose weight so she can have knee surgery.       Hypokalemia  -Continue to replete as needed  -Magnesium okay    Chronic lymphedema  -Elevate legs  -Compression socks    Code status: Full code  DVT prophylaxis: [facility] Problems  Date Reviewed: 2022            Codes Class Noted POA    GERD (gastroesophageal reflux disease) ICD-10-CM: K21.9  ICD-9-CM: 530.81  2022 Yes        Weight loss ICD-10-CM: R63.4  ICD-9-CM: 783.21 9/23/2022 Yes        * (Principal) Abdominal pain ICD-10-CM: R10.9  ICD-9-CM: 789.00  7/20/2021 Yes        Ventral hernia ICD-10-CM: K43.9  ICD-9-CM: 553.20  7/20/2021 Yes        Nausea and vomiting ICD-10-CM: R11.2  ICD-9-CM: 787.01  3/22/2017 Yes        Ulcerative colitis ([address] ICD-10-CM: K51.90  ICD-9-CM: 556.9  7/25/2016 Yes        PUD (peptic ulcer disease) (Chronic) ICD-10-CM: K27.9  ICD-9-CM: 533.90  Unknown Yes    Overview Signed 7/25/2016  3:50 AM by Hector Louise MD     stomach and colon ulcers? Anxiety (Chronic) ICD-10-CM: F41.9  ICD-9-CM: 300.00  Unknown Yes        Obesity (Chronic) ICD-10-CM: E66.9  ICD-9-CM: 278.00  Unknown Yes    Overview Signed 7/25/2016  3:50 AM by Hector Louise MD     REYNA? Fatty liver ICD-10-CM: K76.0  ICD-9-CM: 571.8  Unknown Yes        Depression (Chronic) ICD-10-CM: F32. A  ICD-9-CM: 531  7/1/2010 Yes           Review of Systems:   A comprehensive review of systems was negative except for that written in the HPI. Vital Signs:    Last 24hrs VS reviewed since prior progress note.  Most recent are:  Visit Vitals  BP (!) 169/83 (BP 1 Location: Left lower arm, BP Patient Position: At rest)   Pulse 77   Temp 98.4 °F (36.9 °C)   Resp 16   Ht 5' 7\" (1.702 m)   Wt 113.9 kg (251 lb)   SpO2 94%   BMI 39.31 kg/m²         Intake/Output Summary (Last 24 hours) at 9/25/2022 1418  Last data filed at 9/25/2022 0854  Gross per 24 hour   Intake --   Output 1100 ml   Net -1100 ml          Physical Examination:     Gen:  Obese, in no acute distress  HEENT:  [place] conjunctivae, PERRL, hearing intact to voice, moist mucous membranes  Neck:  Supple, without masses, thyroid non-tender  Resp:  No accessory muscle use, clear breath sounds without wheezes rales or rhonchi  Card:  No murmurs, normal S1, S2 without thrills, bruits or peripheral edema  Abd:  Soft, non-tender, non-distended, normoactive bowel sounds are present, no mass  Lymph:  No cervical or inguinal adenopathy  Musc:  No cyanosis or clubbing  Skin:  No rashes or ulcers, skin turgor is good  Neuro:  Cranial nerves are grossly intact, no focal motor weakness, follows commands   Psych: Moderate insight, oriented to person, place and time, alert       Data Review:    Review and/or order of clinical lab test      Labs:     Recent Labs     09/25/22  0004 09/24/22 0815   WBC 5.0 5.9   HGB 11.0* 10.8*   HCT 35.1 34.4*    190       Recent Labs     09/25/22  0004 09/24/22  0815 09/23/22  1501    142 139   K 3.6 3.8 3.3*   * 116* 108   CO2 25 22 23   BUN 6 9 9   CREA 0.72 0.85 0.93   * 108* 94   CA 8.6 8.3* 9.5   MG  --  2.1 2.1       Recent Labs     09/24/22  0815 09/23/22  1501   ALT 33 42   AP 88 104   TBILI 0.5 0.7   TP 6.2* 7.7   ALB 2.6* 3.4*   GLOB 3.6 4.3*   LPSE  --  111       No results for input(s): INR, PTP, APTT, INREXT, INREXT in the last 72 hours. No results for input(s): FE, TIBC, PSAT, FERR in the last 72 hours. No results found for: FOL, RBCF   No results for input(s): PH, PCO2, PO2 in the last 72 hours. No results for input(s): CPK, CKNDX, TROIQ in the last 72 hours.     No lab exists for component: CPKMB  No results found for: CHOL, CHOLX, CHLST, CHOLV, HDL, HDLP, LDL, LDLC, DLDLP, TGLX, TRIGL, TRIGP, CHHD, [facility]  Lab Results   Component Value Date/Time    Glucose (POC) 93 01/14/2018 03:07 PM    Glucose (POC) 94 05/17/2010 11:04 AM    Glucose (POC) 84 01/01/2010 06:35 PM    Glucose (POC) 120 (H) 12/30/2009 12:41 PM    Glucose (POC) 111 (H) 12/30/2009 05:20 AM    Glucose (POC) 133 (H) 12/30/2009 12:00 AM    Glucose (POC) 110 (H) 12/29/2009 06:33 PM    Glucose (POC) 128 (H) 12/29/2009 11:59 AM     Lab Results   Component Value Date/Time    Color YELLOW/STRAW 09/24/2022 12:56 AM    Appearance CLEAR 09/24/2022 12:56 AM    Specific gravity >1.030 (H) 09/24/2022 12:56 AM    Specific gravity 1.030 07/05/2022 09:54 PM    pH (UA) 5.0 09/24/2022 12:56 AM    Protein Negative 09/24/2022 12:56 AM    Glucose Negative 09/24/2022 12:56 AM    Ketone TRACE (A) 09/24/2022 12:56 AM    Bilirubin Negative 09/24/2022 12:56 AM    Urobilinogen 0.2 09/24/2022 12:56 AM    Nitrites Negative 09/24/2022 12:56 AM    Leukocyte Esterase Negative 09/24/2022 12:56 AM    Epithelial cells MANY (A) 09/24/2022 12:56 AM    Bacteria 2+ (A) 09/24/2022 12:56 AM    WBC 0-4 09/24/2022 12:56 AM    RBC 0-5 09/24/2022 12:56 AM         Medications Reviewed:     Current Facility-Administered Medications   Medication Dose Route Frequency    ALPRAZolam (XANAX) tablet 1 mg  1 mg Oral BID PRN    HYDROmorphone (DILAUDID) injection 1 mg  1 mg IntraVENous Q4H PRN    cholestyramine-aspartame (QUESTRAN LIGHT) packet 4 g  4 g Oral ACL    diphenhydrAMINE (BENADRYL) injection 25 mg  25 mg IntraVENous Q4H PRN    promethazine (PHENERGAN) suppository 25 mg  25 mg Rectal Q6H PRN    potassium chloride SR (KLOR-CON 10) tablet 10 mEq  10 mEq Oral BID    promethazine (PHENERGAN) tablet 25 mg  25 mg Oral Q6H PRN    acetaminophen (TYLENOL) tablet 650 mg  650 mg Oral Q6H PRN    Or    acetaminophen (TYLENOL) suppository 650 mg  650 mg Rectal Q6H PRN    polyethylene glycol (MIRALAX) packet 17 g  17 g Oral DAILY PRN    ondansetron (ZOFRAN ODT) tablet 4 mg  4 mg Oral Q8H PRN    Or    ondansetron (ZOFRAN) injection 4 mg  4 mg IntraVENous Q6H PRN    enoxaparin (LOVENOX) injection 30 mg  30 mg SubCUTAneous Q12H    dextrose 5% - 0.9% NaCl with KCl 40 mEq/L infusion   IntraVENous CONTINUOUS    fentaNYL (DURAGESIC) 12 mcg/hr patch 1 Patch  1 Patch TransDERmal Q72H    HYDROmorphone (DILAUDID) tablet 1 mg  1 mg Oral Q6H PRN     ______________________________________________________________________  EXPECTED LENGTH OF STAY: - - -  ACTUAL LENGTH OF STAY:          0                 Jenelle Melendez MD

## 2022-09-25 NOTE — ROUTINE PROCESS
Bedside and Verbal shift change report given to Leighton July, [address] [facility] (oncoming nurse) by Anand Renteria (offgoing nurse). Report included the following information SBAR, Kardex, Intake/Output, MAR, and Recent Results.

## 2022-09-26 VITALS
OXYGEN SATURATION: 96 % | TEMPERATURE: 97.7 F | WEIGHT: 251 LBS | RESPIRATION RATE: 16 BRPM | HEART RATE: 78 BPM | HEIGHT: 67 IN | DIASTOLIC BLOOD PRESSURE: 89 MMHG | SYSTOLIC BLOOD PRESSURE: 137 MMHG | BODY MASS INDEX: 39.39 KG/M2

## 2022-09-26 PROBLEM — R19.7 DIARRHEA: Status: ACTIVE | Noted: 2022-09-26

## 2022-09-26 LAB
ANION GAP SERPL CALC-SCNC: 6 MMOL/L (ref 5–15)
BUN SERPL-MCNC: 4 MG/DL (ref 6–20)
BUN/CREAT SERPL: 5 (ref 12–20)
CALCIUM SERPL-MCNC: 9.4 MG/DL (ref 8.5–10.1)
CAMPYLOBACTER SPECIES, DNA: NEGATIVE
CHLORIDE SERPL-SCNC: 106 MMOL/L (ref 97–108)
CO2 SERPL-SCNC: 27 MMOL/L (ref 21–32)
CREAT SERPL-MCNC: 0.82 MG/DL (ref 0.55–1.02)
ENTEROTOXIGEN E COLI, DNA: NEGATIVE
GLUCOSE SERPL-MCNC: 88 MG/DL (ref 65–100)
P SHIGELLOIDES DNA STL QL NAA+PROBE: NEGATIVE
POTASSIUM SERPL-SCNC: 3.8 MMOL/L (ref 3.5–5.1)
SALMONELLA SPECIES, DNA: NEGATIVE
SHIGA TOXIN PRODUCING, DNA: NEGATIVE
SHIGELLA SP+EIEC IPAH STL QL NAA+PROBE: NEGATIVE
SODIUM SERPL-SCNC: 139 MMOL/L (ref 136–145)
VIBRIO SPECIES, DNA: NEGATIVE
WBC #/AREA STL HPF: NORMAL /HPF (ref 0–4)
Y. ENTEROCOLITICA, DNA: NEGATIVE

## 2022-09-26 PROCEDURE — 96372 THER/PROPH/DIAG INJ SC/IM: CPT

## 2022-09-26 PROCEDURE — 74011250636 HC RX REV CODE- 250/636: Performed by: INTERNAL MEDICINE

## 2022-09-26 PROCEDURE — 99218 HC RM OBSERVATION: CPT

## 2022-09-26 PROCEDURE — 74011250637 HC RX REV CODE- 250/637: Performed by: INTERNAL MEDICINE

## 2022-09-26 PROCEDURE — G0378 HOSPITAL OBSERVATION PER HR: HCPCS

## 2022-09-26 PROCEDURE — 80048 BASIC METABOLIC PNL TOTAL CA: CPT

## 2022-09-26 PROCEDURE — 96375 TX/PRO/DX INJ NEW DRUG ADDON: CPT

## 2022-09-26 PROCEDURE — 96376 TX/PRO/DX INJ SAME DRUG ADON: CPT

## 2022-09-26 PROCEDURE — 36415 COLL VENOUS BLD VENIPUNCTURE: CPT

## 2022-09-26 PROCEDURE — 65270000029 HC RM PRIVATE

## 2022-09-26 RX ORDER — CHOLESTYRAMINE 4 G/4.8G
4 POWDER, FOR SUSPENSION ORAL
Qty: 30 PACKET | Refills: 0 | Status: SHIPPED | OUTPATIENT
Start: 2022-09-27

## 2022-09-26 RX ADMIN — ACETAMINOPHEN 650 MG: 325 TABLET ORAL at 06:18

## 2022-09-26 RX ADMIN — ONDANSETRON 4 MG: 2 INJECTION INTRAMUSCULAR; INTRAVENOUS at 16:32

## 2022-09-26 RX ADMIN — ONDANSETRON 4 MG: 2 INJECTION INTRAMUSCULAR; INTRAVENOUS at 11:32

## 2022-09-26 RX ADMIN — ONDANSETRON 4 MG: 2 INJECTION INTRAMUSCULAR; INTRAVENOUS at 06:17

## 2022-09-26 RX ADMIN — HYDROMORPHONE HYDROCHLORIDE 1 MG: 1 INJECTION, SOLUTION INTRAMUSCULAR; INTRAVENOUS; SUBCUTANEOUS at 06:17

## 2022-09-26 RX ADMIN — CHOLESTYRAMINE 4 G: 4 POWDER, FOR SUSPENSION ORAL at 11:27

## 2022-09-26 RX ADMIN — DIPHENHYDRAMINE HYDROCHLORIDE 25 MG: 50 INJECTION INTRAMUSCULAR; INTRAVENOUS at 06:17

## 2022-09-26 RX ADMIN — ENOXAPARIN SODIUM 30 MG: 100 INJECTION SUBCUTANEOUS at 09:27

## 2022-09-26 RX ADMIN — HYDROMORPHONE HYDROCHLORIDE 1 MG: 1 INJECTION, SOLUTION INTRAMUSCULAR; INTRAVENOUS; SUBCUTANEOUS at 00:05

## 2022-09-26 RX ADMIN — HYDROMORPHONE HYDROCHLORIDE 1 MG: 1 INJECTION, SOLUTION INTRAMUSCULAR; INTRAVENOUS; SUBCUTANEOUS at 10:25

## 2022-09-26 RX ADMIN — POTASSIUM CHLORIDE 10 MEQ: 750 TABLET, FILM COATED, EXTENDED RELEASE ORAL at 09:27

## 2022-09-26 RX ADMIN — ACETAMINOPHEN 650 MG: 325 TABLET ORAL at 09:27

## 2022-09-26 RX ADMIN — HYDROMORPHONE HYDROCHLORIDE 1 MG: 1 INJECTION, SOLUTION INTRAMUSCULAR; INTRAVENOUS; SUBCUTANEOUS at 14:58

## 2022-09-26 NOTE — PROGRESS NOTES
Bedside and Verbal shift change report given to [address]  (oncoming nurse) by Maggie Tran  (offgoing nurse). Report included the following information SBAR, Kardex, Procedure Summary, Intake/Output, and MAR.

## 2022-09-26 NOTE — PROGRESS NOTES
CARE MANAGEMENT NOTE      CM called to Pt's room due to concerns over OBS status. CM entered room, Pt was holding the state OBS letter provided by Case Management Specialist. Pt voiced concerns over being responsible for 20% co-pay. Pt states that her admitting MD stated that she would be admitted as an OBS overnight then the following MD would determine whether she could be discharged or switched to inpatient. Pt reports that since she was still in the hospital she had assumed that she was switched to inpatient. Pt became tearful during interaction. CM informed Pt that Pt had been switched to inpatient today. Pt still concerned that she would be charged 20% through the weekend. CM received clarification that once Pt was switched to inpatient it back dated to admission. Pt voiced understanding. CM encouraged Pt to clarify out of pocket max with insurance. CM also encouraged Pt to apply for financial assistance from [employer] if hospital co pay was not affordable.  Pt voiced understanding.     _____________________________________  Rajiv Alas [facility]   Available via [facility]  9/26/2022   3:31 PM

## 2022-09-26 NOTE — PROGRESS NOTES
09/26/22    State Observation Letter was verbally explained to patient and she Declined to sign letter needed some additional questions answered. Requested to speak to [place] sent email.

## 2022-09-26 NOTE — DISCHARGE SUMMARY
Foot and Ankle Surgery  Consult Note    History:  Patient seen bedside for worsening L heel wound, osteomyelitis. This patient is known to service and was admitted on 10/15 for diabetic foot infection with osteomyelitis. Podiatry was consulted at the time and performed incision and drainage with bone debridement and biopsy. Since discharge, patient has not followed up with either podiatry or infectious disease regarding his L foot infection. Patient states he stopped taking his antibiotic course after DC due to cost. Patient does not have insurance and states he has had issues qualifying for any kind of aide due to recent income from his previous job as a . Patient presented to ED after noticing worsening of the L foot wound with concern for recurrent infection. Patient denies any N/V/D/F/C/SOB/CP    No other complaints at this time. Past Medical History:   Diagnosis Date    Charcot foot due to diabetes mellitus ([address]     Diabetes mellitus ([facility]     Hypertension        Past Surgical History:   Procedure Laterality Date    ANKLE FUSION  02/2020    FOOT DEBRIDEMENT Left 10/17/2020    LEFT FOOT  INCISION AND DRAINAGE, BONE DEBRIDEMENT AND BIOPSY performed by Lilly Vasquez DPM at [facility] OR       History reviewed. No pertinent family history. Allergies   Allergen Reactions    Vancomycin Other (See Comments)     Red man syndrome    Semaglutide Itching and Rash     Pink itch rash           I reviewed the patient's past medical and surgical history, Medications and Allergies.       Physical Exam:  Vitals:    11/10/20 0722 11/10/20 0920 11/10/20 1127 11/10/20 1335   BP: (!) 160/96 (!) 129/55 (!) 130/54 128/73   Pulse: 85 102 95 98   Resp: 16 16 16 16   Temp: 97.6 °F (36.4 °C)   97.6 °F (36.4 °C)   TempSrc:       SpO2: 100% 100% 98% 96%   Weight:       Height:           CONSTITUTIONAL:  awake, alert, cooperative, no apparent distress, and appears stated age    Vascular: Lai Villanueva [facility] 79  [address] [facility], [id number] [place] [address]  [phone] [facility] Adult  Hospitalist Group     Discharge Summary       PATIENT ID: Chelsey Tenorio  MRN: [id number]   YOB: 1970    DATE OF ADMISSION: 9/23/2022  4:59 PM    DATE OF DISCHARGE: 09/26/22   PRIMARY CARE PROVIDER: Oksana Velasquez MD     DISCHARGING PROVIDER: Lele Bunn MD      CONSULTATIONS: IP CONSULT TO GASTROENTEROLOGY  IP CONSULT TO PSYCHIATRY    PROCEDURES/SURGERIES: * No surgery found *    ADMITTING [address] [place] [address] COURSE:   Chronic diarrhea/abdominal pain  -Likely bile salt induced diarrhea  -Seems to be improving with Questran  --he had an unremarkable upper and lower endoscopy 6 weeks ago. Of note, patient reports a history of ulcerative colitis but there was no mention of this on colonoscopy report  -CT unremarkable. No blood in stool. Enteric bacteria panel, ova parasites pending  -GI evaluated. Continue PPI. Low-fat diet        Anxiety and Depression   - Her speech is pressured, tangential, anxious  She perseverates about medical issues that occurred in the distant past.   -Currently taking Xanax. States that she took herself off Lexapro 6 months ago. -She does appear quite anxious, evaluated by psychiatry     Weight loss / Obesity - She weighed 305 lb 14 months ago. Now 251 lb. states she has been limiting herself to 1000-calorie diet in order to lose weight so she can have knee surgery.        Hypokalemia  -Continue to replete as needed  -Magnesium okay     Chronic lymphedema  -Elevate legs  -Compression socks      PENDING TEST RESULTS:   At the time of discharge the following test results are still pending: none    FOLLOW UP APPOINTMENTS:    Follow-up Information       Follow up With Specialties Details Why Contact Info    Oksana Velasquez MD Family Medicine Call in 3 day(s) Hospital discharge follow up [address] [address]  [phone]                 DIET: low fat    ACTIVITY: as tolerated       DISCHARGE MEDICATIONS:  Current Discharge Medication List        START taking these medications    Details   cholestyramine-aspartame (QUESTRAN LIGHT) 4 gram packet Take 1 Packet by mouth Daily (before lunch). Qty: 30 Packet, Refills: 0  Start date: 9/27/2022           CONTINUE these medications which have NOT CHANGED    Details   bumetanide (BUMEX) 1 mg tablet Take 1 mg by mouth two (2) times daily as needed for Other (edema). 1 tab. 1-2 x day as needed      atorvastatin (LIPITOR) 40 mg tablet Take 40 mg by mouth daily. potassium chloride (KAON 10%) 20 mEq/15 mL solution Take 20 mEq by mouth daily. promethazine (PHENERGAN) 25 mg tablet Take 1 Tab by mouth every six (6) hours as needed for Nausea. Qty: 12 Tab, Refills: 0      MULTIVITAMIN (ONE-A-DAY ESSENTIAL PO) Take 1 Tab by mouth daily. ALPRAZolam (XANAX) 2 mg tablet Take 1 mg by mouth two (2) times a day. Patient takes in the morning and with a late lunch      estradiol (ESTRACE) 1 mg tablet Take 1 mg by mouth nightly. STOP taking these medications       ibuprofen (MOTRIN) 800 mg tablet Comments:   Reason for Stopping:         polyethylene glycol (MIRALAX) 17 gram packet Comments:   Reason for Stopping:         escitalopram oxalate (LEXAPRO) 20 mg tablet Comments:   Reason for Stopping:         pravastatin (PRAVACHOL) 40 mg tablet Comments:   Reason for Stopping:                 NOTIFY YOUR PHYSICIAN FOR ANY OF THE FOLLOWING:   Fever over 101 degrees for 24 hours. Chest pain, shortness of breath, fever, chills, nausea, vomiting, diarrhea, change in mentation, falling, weakness, bleeding. Severe pain or pain not relieved by medications. Or, any other signs or symptoms that you may have questions about.     DISPOSITION:   x Home With:   OT  PT  HH  RN       Long term SNF/Inpatient Rehab    Independent/assisted living    Hospice    Other:         PHYSICAL Dorsalis Pedis:  present 1+                                              Posterior Tibialis:  present 1+  Capillary Refill Time:  Immediate return  Hair growth:  Symmetrical and bilateral   Skin:  Supple, Not atrophic  Edema: 1+  Neurologic:  Sensation:  Loss of protective sensation noted consistent with peripheral neuropathy  Musculoskeletal:  Equinis: present                                 Muscle Tendons Lower extremity 5/5           Patient has Charcot deformity of B/L LE, with a previous ankle fusion on the R. Pain on palpation of R ankle. Derm: L foot wounds pictured below. L plantar heel wound does probe to bone with mild purulent drainage, dorsal foot wound is granular and does not probe to bone. No open lesions of R foot       Ortho: Some limitations of ankle joint dorsiflexion with knees extended and with knees flexed consistent with equinus deformity of the ankle. CBC with Differential:    Lab Results   Component Value Date    WBC 14.7 11/10/2020    RBC 4.72 11/10/2020    HGB 12.0 11/10/2020    HCT 37.6 11/10/2020     11/10/2020    MCV 79.7 11/10/2020    MCH 25.4 11/10/2020    MCHC 31.9 11/10/2020    RDW 16.0 11/10/2020    LYMPHOPCT 11.4 11/10/2020    MONOPCT 9.9 11/10/2020    BASOPCT 0.4 11/10/2020    MONOSABS 1.45 11/10/2020    LYMPHSABS 1.67 11/10/2020    EOSABS 0.03 11/10/2020    BASOSABS 0.06 11/10/2020     Hemoglobin/Hematocrit:    Lab Results   Component Value Date    HGB 12.0 11/10/2020    HCT 37.6 11/10/2020       Assessment:  -L foot full thickness wound to level of bone  -L calcaneus, osteomyelitis  -Recurrent L diabetic foot infection  -Diabetes with neuropathy  -Charcot deformity of B/L LE  -R ankle pain      Plan:  -Patient assessed and evaluated  -Charts and labs reviewed  -ID following, Zosyn/dapto at this time.  Cultures taken in ED.  -Vascular following, appreciate input  -L foot x-ray shows possible acute on chronic OM of the EXAMINATION AT DISCHARGE:  General:          Alert, cooperative, no distress, appears stated age. HEENT:           Atraumatic, anicteric sclerae, pink conjunctivae                          No oral ulcers, mucosa moist, throat clear, dentition fair  Neck:               Supple, symmetrical  Lungs:             Clear to auscultation bilaterally. No Wheezing or Rhonchi. No rales. Heart:              Regular  rhythm,  No  murmur   No edema  Abdomen:        Soft, non-tender. Not distended. Bowel sounds normal  Extremities:     No cyanosis. No clubbing,                            Skin turgor normal, Capillary refill normal  Skin:                Not pale. Not Jaundiced  No rashes   Psych:             Not anxious or agitated. Neurologic:      Alert, moves all extremities, answers questions appropriately and responds to commands       CHRONIC MEDICAL DIAGNOSES:  Problem List as of 9/26/2022 Date Reviewed: 9/23/2022            Codes Class Noted - Resolved    Diarrhea ICD-10-CM: R19.7  ICD-9-CM: 787.91  9/26/2022 - Present        GERD (gastroesophageal reflux disease) ICD-10-CM: K21.9  ICD-9-CM: 530.81  9/23/2022 - Present        Weight loss ICD-10-CM: R63.4  ICD-9-CM: 783.21  9/23/2022 - Present        * (Principal) Abdominal pain ICD-10-CM: R10.9  ICD-9-CM: 789.00  7/20/2021 - Present        Ventral hernia ICD-10-CM: K43.9  ICD-9-CM: 553.20  7/20/2021 - Present        Nausea and vomiting ICD-10-CM: R11.2  ICD-9-CM: 787.01  3/22/2017 - Present        PUD (peptic ulcer disease) (Chronic) ICD-10-CM: K27.9  ICD-9-CM: 533.90  Unknown - Present    Overview Signed 7/25/2016  3:50 AM by Celia Polanco MD     stomach and colon ulcers? Anxiety (Chronic) ICD-10-CM: F41.9  ICD-9-CM: 300.00  Unknown - Present        Obesity (Chronic) ICD-10-CM: E66.9  ICD-9-CM: 278.00  Unknown - Present    Overview Signed 7/25/2016  3:50 AM by Celia Polanco MD     REYNA?              Fatty liver ICD-10-CM: K76.0  ICD-9-CM: 571.8 Unknown - Present        Depression (Chronic) ICD-10-CM: F32. A  ICD-9-CM: 370  7/1/2010 - Present        RESOLVED: Abnormal EKG ICD-10-CM: R94.31  ICD-9-CM: 794.31  8/2/2022 - 9/23/2022        RESOLVED: SOB (shortness of breath) ICD-10-CM: R06.02  ICD-9-CM: 786.05  8/2/2022 - 9/23/2022        RESOLVED: Bloody diarrhea ICD-10-CM: R19.7  ICD-9-CM: 787.91  7/20/2021 - 9/23/2022        RESOLVED: Small bowel obstruction ([facility] ICD-10-CM: C08.526  ICD-9-CM: 560.9  4/13/2019 - 9/23/2022        RESOLVED: Obesity, morbid ([facility] ICD-10-CM: E66.01  ICD-9-CM: 278.01  2/8/2018 - 9/23/2022        RESOLVED: Acute colitis ICD-10-CM: K52.9  ICD-9-CM: 558.9  3/22/2017 - 9/23/2022        RESOLVED: Anemia ICD-10-CM: D64.9  ICD-9-CM: 285.9  7/25/2016 - 9/23/2022        RESOLVED: Ulcerative colitis ([facility] ICD-10-CM: K51.90  ICD-9-CM: 556.9  7/25/2016 - 9/26/2022        RESOLVED: Hx of thromboembolism of vein ICD-10-CM: Z86.718  ICD-9-CM: V12.51  Unknown - 9/23/2022    Overview Signed 7/25/2016  3:50 AM by Halle Magdaleno MD     right upper brachiel vein             RESOLVED: Cholecystitis ICD-10-CM: K81.9  ICD-9-CM: 575.10  7/16/2016 - 9/23/2022        RESOLVED: DVT (deep venous thrombosis) ([facility] ICD-10-CM: I82.409  ICD-9-CM: 453.40  7/1/2010 - 7/25/2016    Overview Signed 7/1/2010  8:44 AM by Klarissa Bran     right lower extremity (RLE)                Greater than 30 minutes were spent with the patient on counseling and coordination of care    Signed:   Ada Houser MD  9/26/2022  4:47 PM

## 2022-09-26 NOTE — PROGRESS NOTES
[address] NP  [phone]           GI PROGRESS NOTE        NAME: Noemi Banks   :  1970   MRN:  [id number]       Subjective:   Reports having about 4 lose stools today. Improving since starting Colestid. Objective:   NAD      VITALS:   Last 24hrs VS reviewed since prior progress note. Most recent are:  Visit Vitals  /88 (BP 1 Location: Left lower arm, BP Patient Position: Sitting)   Pulse 71   Temp 97.7 °F (36.5 °C)   Resp 16   Ht 5' 7\" (1.702 m)   Wt 113.9 kg (251 lb)   SpO2 97%   BMI 39.31 kg/m²       Intake/Output Summary (Last 24 hours) at 2022 1519  Last data filed at 2022 1332  Gross per 24 hour   Intake --   Output 2300 ml   Net -2300 ml       PHYSICAL EXAM:  General: Alert, in no acute distress    HEENT: Anicteric sclerae. Lungs:            CTA Bilaterally. Heart:  Regular  rhythm,    Abdomen: Soft, Non distended, Non tender.  (+)Bowel sounds, no HSM  Extremities: No c/c/e  Neurologic:  CN 2-12 gi, Alert and oriented X 3. No acute neurological distress   Psych:   Good insight. Not anxious nor agitated. Lab Data Reviewed:   Recent Labs     22  0004 22  0815   WBC 5.0 5.9   HGB 11.0* 10.8*   HCT 35.1 34.4*    190     Recent Labs     22  0909 22  0004    140   K 3.8 3.6    110*   CO2 27 25   BUN 4* 6   CREA 0.82 0.72   GLU 88 103*   CA 9.4 8.6     Recent Labs     22  0815   AP 88   TP 6.2*   ALB 2.6*   GLOB 3.6       ________________________________________________________________________  Patient Active Problem List   Diagnosis Code    Depression F32. A    Ulcerative colitis ([address] K51.90    PUD (peptic ulcer disease) K27.9    Anxiety F41.9    Obesity E66.9    Fatty liver K76.0    Nausea and vomiting R11.2    Abdominal pain R10.9    Ventral hernia K43.9    GERD (gastroesophageal reflux disease) K21.9    Weight loss R63.4    Diarrhea R19.7         Assessment and Plan:  Diarrhea:   I had lengthy and detailed discussion with her today. We discussed that none of her past work up has been indicative of ulcerative colitis. Discussed that she likely has bile salt induced diarrhea. She voiced understanding. She reports she has been improving with Colestid. - Low Fat diet  - Continue cholestyramine  - Loperamide 2-4 mg before meals  PRN up to 16 mg total.    Ok to discharge from GI standpoint. Please call with questions or concerns.           Signed By: Renan Carcamo NP     9/26/2022  3:19 PM

## 2022-09-26 NOTE — PROGRESS NOTES
Lai Villanueva [facility] 79  [address]  [phone] [address] Adult  Hospitalist Group                                                                                          Hospitalist Progress Note  Royal Teena MD        Date of Service:  2022  NAME:  London Cowart  :  1970  MRN:  [id number]      Admission Summary:   23-year-old female presented to the ED with complaints of abdominal discomfort, diarrhea    Interval history / Subjective:   Still reports having diarrhea. Assessment & Plan:     Chronic diarrhea/abdominal pain  -Likely bile salt induced diarrhea  -Seems to be improving with Questran  --he had an unremarkable upper and lower endoscopy 6 weeks ago. Of note, patient reports a history of ulcerative colitis but there was no mention of this on colonoscopy report  -CT unremarkable. No blood in stool. Enteric bacteria panel, ova parasites pending  -GI consult. Continue PPI. Low-fat diet   -continue low-dose fentanyl patch and low-dose p.o. liquid Dilaudid. As needed Tylenol     Anxiety and Depression   - Her speech is pressured, tangential, anxious  She perseverates about medical issues that occurred in the distant past.   -Currently taking Xanax. States that she took herself off Lexapro 6 months ago. -She does appear quite anxious, evaluated by psychiatry     Weight loss / Obesity - She weighed 305 lb 14 months ago. Now 251 lb. states she has been limiting herself to 1000-calorie diet in order to lose weight so she can have knee surgery.       Hypokalemia  -Continue to replete as needed  -Magnesium okay    Chronic lymphedema  -Elevate legs  -Compression socks    Code status: Full code  DVT prophylaxis: [facility] Problems  Date Reviewed: 2022            Codes Class Noted POA    GERD (gastroesophageal reflux disease) ICD-10-CM: K21.9  ICD-9-CM: 530.81  2022 Yes        Weight loss ICD-10-CM: R63.4  ICD-9-CM: 783.21  9/23/2022 Yes        * (Principal) Abdominal pain ICD-10-CM: R10.9  ICD-9-CM: 789.00  7/20/2021 Yes        Ventral hernia ICD-10-CM: K43.9  ICD-9-CM: 553.20  7/20/2021 Yes        Nausea and vomiting ICD-10-CM: R11.2  ICD-9-CM: 787.01  3/22/2017 Yes        Ulcerative colitis ([facility] ICD-10-CM: K51.90  ICD-9-CM: 556.9  7/25/2016 Yes        PUD (peptic ulcer disease) (Chronic) ICD-10-CM: K27.9  ICD-9-CM: 533.90  Unknown Yes    Overview Signed 7/25/2016  3:50 AM by Aissatou Allen MD     stomach and colon ulcers? Anxiety (Chronic) ICD-10-CM: F41.9  ICD-9-CM: 300.00  Unknown Yes        Obesity (Chronic) ICD-10-CM: E66.9  ICD-9-CM: 278.00  Unknown Yes    Overview Signed 7/25/2016  3:50 AM by Aissatou Allen MD     REYAN? Fatty liver ICD-10-CM: K76.0  ICD-9-CM: 571.8  Unknown Yes        Depression (Chronic) ICD-10-CM: F32. A  ICD-9-CM: 310  7/1/2010 Yes         Review of Systems:   A comprehensive review of systems was negative except for that written in the HPI. Vital Signs:    Last 24hrs VS reviewed since prior progress note.  Most recent are:  Visit Vitals  /88 (BP 1 Location: Left lower arm, BP Patient Position: Sitting)   Pulse 71   Temp 97.7 °F (36.5 °C)   Resp 16   Ht 5' 7\" (1.702 m)   Wt 113.9 kg (251 lb)   SpO2 97%   BMI 39.31 kg/m²         Intake/Output Summary (Last 24 hours) at 9/26/2022 1412  Last data filed at 9/26/2022 1332  Gross per 24 hour   Intake --   Output 2300 ml   Net -2300 ml          Physical Examination:     Gen:  Obese, in no acute distress  HEENT:  Pink conjunctivae, PERRL, hearing intact to voice, moist mucous membranes  Neck:  Supple, without masses, thyroid non-tender  Resp:  No accessory muscle use, clear breath sounds without wheezes rales or rhonchi  Card:  No murmurs, normal S1, S2 without thrills, bruits or peripheral edema  Abd:  Soft, non-tender, non-distended, normoactive bowel sounds are present, no mass  Lymph: No cervical or inguinal adenopathy  Musc:  No cyanosis or clubbing  Skin:  No rashes or ulcers, skin turgor is good  Neuro:  Cranial nerves are grossly intact, no focal motor weakness, follows commands   Psych: Moderate insight, oriented to person, place and time, alert       Data Review:    Review and/or order of clinical lab test      Labs:     Recent Labs     09/25/22  0004 09/24/22  0815   WBC 5.0 5.9   HGB 11.0* 10.8*   HCT 35.1 34.4*    190       Recent Labs     09/26/22  0909 09/25/22  0004 09/24/22  0815 09/23/22  1501    140 142 139   K 3.8 3.6 3.8 3.3*    110* 116* 108   CO2 27 25 22 23   BUN 4* 6 9 9   CREA 0.82 0.72 0.85 0.93   GLU 88 103* 108* 94   CA 9.4 8.6 8.3* 9.5   MG  --   --  2.1 2.1       Recent Labs     09/24/22  0815 09/23/22  1501   ALT 33 42   AP 88 104   TBILI 0.5 0.7   TP 6.2* 7.7   ALB 2.6* 3.4*   GLOB 3.6 4.3*   LPSE  --  111       No results for input(s): INR, PTP, APTT, INREXT, INREXT in the last 72 hours. No results for input(s): FE, TIBC, PSAT, FERR in the last 72 hours. No results found for: FOL, RBCF   No results for input(s): PH, PCO2, PO2 in the last 72 hours. No results for input(s): CPK, CKNDX, TROIQ in the last 72 hours.     No lab exists for component: CPKMB  No results found for: CHOL, CHOLX, CHLST, CHOLV, HDL, HDLP, LDL, LDLC, DLDLP, TGLX, TRIGL, TRIGP, CHHD, [facility]  Lab Results   Component Value Date/Time    Glucose (POC) 93 01/14/2018 03:07 PM    Glucose (POC) 94 05/17/2010 11:04 AM    Glucose (POC) 84 01/01/2010 06:35 PM    Glucose (POC) 120 (H) 12/30/2009 12:41 PM    Glucose (POC) 111 (H) 12/30/2009 05:20 AM    Glucose (POC) 133 (H) 12/30/2009 12:00 AM    Glucose (POC) 110 (H) 12/29/2009 06:33 PM    Glucose (POC) 128 (H) 12/29/2009 11:59 AM     Lab Results   Component Value Date/Time    Color YELLOW/STRAW 09/24/2022 12:56 AM    Appearance CLEAR 09/24/2022 12:56 AM    Specific gravity >1.030 (H) 09/24/2022 12:56 AM    Specific gravity 1.030 07/05/2022 09:54 PM    pH (UA) 5.0 09/24/2022 12:56 AM    Protein Negative 09/24/2022 12:56 AM    Glucose Negative 09/24/2022 12:56 AM    Ketone TRACE (A) 09/24/2022 12:56 AM    Bilirubin Negative 09/24/2022 12:56 AM    Urobilinogen 0.2 09/24/2022 12:56 AM    Nitrites Negative 09/24/2022 12:56 AM    Leukocyte Esterase Negative 09/24/2022 12:56 AM    Epithelial cells MANY (A) 09/24/2022 12:56 AM    Bacteria 2+ (A) 09/24/2022 12:56 AM    WBC 0-4 09/24/2022 12:56 AM    RBC 0-5 09/24/2022 12:56 AM         Medications Reviewed:     Current Facility-Administered Medications   Medication Dose Route Frequency    ALPRAZolam (XANAX) tablet 2 mg  2 mg Oral QHS    atorvastatin (LIPITOR) tablet 40 mg  40 mg Oral QHS    estradioL (ESTRACE) tablet 1 mg  1 mg Oral QHS    pseudoephedrine (SUDAFED) tablet 30 mg  30 mg Oral BID PRN    HYDROmorphone (DILAUDID) injection 1 mg  1 mg IntraVENous Q4H PRN    cholestyramine-aspartame (QUESTRAN LIGHT) packet 4 g  4 g Oral ACL    diphenhydrAMINE (BENADRYL) injection 25 mg  25 mg IntraVENous Q4H PRN    promethazine (PHENERGAN) suppository 25 mg  25 mg Rectal Q6H PRN    potassium chloride SR (KLOR-CON 10) tablet 10 mEq  10 mEq Oral BID    promethazine (PHENERGAN) tablet 25 mg  25 mg Oral Q6H PRN    acetaminophen (TYLENOL) tablet 650 mg  650 mg Oral Q6H PRN    Or    acetaminophen (TYLENOL) suppository 650 mg  650 mg Rectal Q6H PRN    polyethylene glycol (MIRALAX) packet 17 g  17 g Oral DAILY PRN    ondansetron (ZOFRAN ODT) tablet 4 mg  4 mg Oral Q8H PRN    Or    ondansetron (ZOFRAN) injection 4 mg  4 mg IntraVENous Q6H PRN    enoxaparin (LOVENOX) injection 30 mg  30 mg SubCUTAneous Q12H    fentaNYL (DURAGESIC) 12 mcg/hr patch 1 Patch  1 Patch TransDERmal Q72H    HYDROmorphone (DILAUDID) tablet 1 mg  1 mg Oral Q6H PRN     ______________________________________________________________________  EXPECTED LENGTH OF STAY: - - -  ACTUAL LENGTH OF STAY:          0                 Deedee Back MD

## 2022-09-26 NOTE — PROGRESS NOTES
4:25 PM- Pt remains on Med. Surg- stable for d/c. CM had an at length conversation with pt regarding d/c- pt has several questions- CM advised her to speak with RN and attending. CM inquired about ride- pt states her Mom will drive her home- CM offered to provide transportation assistance (get her set up through [facility] privately paid)- pt laughed and said \"absolutely not\"- pt states her Mom will come back and drop her off at home, states no further questions or concerns with CM. RN updated. Care Management Interventions  PCP Verified by CM:  Yes  Mode of Transport at Discharge: Self  Transition of Care Consult (CM Consult): Discharge Planning  MyChart Signup: No  Discharge Durable Medical Equipment: No  Health Maintenance Reviewed: Yes  Physical Therapy Consult: No  Occupational Therapy Consult: No  Speech Therapy Consult: No  Support Systems: Parent(s)  Confirm Follow Up Transport: Family  The Patient and/or Patient Representative was Provided with a Choice of Provider and Agrees with the Discharge Plan?: Yes  Freedom of Choice List was Provided with Basic Dialogue that Supports the Patient's Individualized Plan of Care/Goals, Treatment Preferences and Shares the Quality Data Associated with the Providers?: Yes  Discharge Location  Patient Expects to be Discharged to[de-identified] Home with outpatient services     NANCY Cabrera, [address]

## 2022-09-28 ENCOUNTER — TRANSCRIBE ORDER (OUTPATIENT)
Dept: SCHEDULING | Age: 52
End: 2022-09-28

## 2022-09-28 DIAGNOSIS — Z12.31 VISIT FOR SCREENING MAMMOGRAM: Primary | ICD-10-CM

## 2022-10-01 LAB
O+P SPEC MICRO: NORMAL
O+P STL CONC: NORMAL
SPECIMEN SOURCE: NORMAL

## 2022-12-29 ENCOUNTER — HOSPITAL ENCOUNTER (OUTPATIENT)
Dept: MAMMOGRAPHY | Age: 52
Discharge: HOME | End: 2022-12-29
Attending: FAMILY MEDICINE
Payer: COMMERCIAL

## 2022-12-29 DIAGNOSIS — Z12.31 VISIT FOR SCREENING MAMMOGRAM: ICD-10-CM

## 2022-12-29 PROCEDURE — 77063 BREAST TOMOSYNTHESIS BI: CPT

## 2023-06-22 NOTE — PROGRESS NOTES
[facility] records  Dr Raghu Meng oV 12-21-12  Chronic edema refer from Dr Nick Boyce in Cards weight 222#   Creat 0.93  [place] 39.9 rening 5.74   Refractory edema without primary renal, cardiac or liver etiology  Change to aldactone therapy from bumex endotracheal tube

## 2023-08-29 ENCOUNTER — HOSPITAL ENCOUNTER (OUTPATIENT)
Facility: HOSPITAL | Age: 53
Discharge: HOME | End: 2023-09-01
Payer: COMMERCIAL

## 2023-08-29 DIAGNOSIS — L03.311 CELLULITIS OF ABDOMINAL WALL: ICD-10-CM

## 2023-08-29 PROCEDURE — 76700 US EXAM ABDOM COMPLETE: CPT

## 2023-08-29 PROCEDURE — 76856 US EXAM PELVIC COMPLETE: CPT

## 2023-09-18 ENCOUNTER — HOSPITAL ENCOUNTER (OUTPATIENT)
Facility: HOSPITAL | Age: 53
Discharge: HOME | End: 2023-09-21
Payer: COMMERCIAL

## 2023-09-18 VITALS
HEART RATE: 63 BPM | HEIGHT: 66 IN | SYSTOLIC BLOOD PRESSURE: 122 MMHG | OXYGEN SATURATION: 100 % | DIASTOLIC BLOOD PRESSURE: 80 MMHG | RESPIRATION RATE: 18 BRPM | WEIGHT: 235 LBS | TEMPERATURE: 98.1 F | BODY MASS INDEX: 37.77 KG/M2

## 2023-09-18 DIAGNOSIS — L03.311 CELLULITIS OF ABDOMINAL WALL: ICD-10-CM

## 2023-09-18 PROCEDURE — 36415 COLL VENOUS BLD VENIPUNCTURE: CPT

## 2023-09-18 PROCEDURE — 87205 SMEAR GRAM STAIN: CPT

## 2023-09-18 PROCEDURE — 49406 IMAGE CATH FLUID PERI/RETRO: CPT

## 2023-09-18 PROCEDURE — 87070 CULTURE OTHR SPECIMN AEROBIC: CPT

## 2023-09-18 RX ORDER — LIDOCAINE HYDROCHLORIDE 10 MG/ML
INJECTION, SOLUTION INFILTRATION; PERINEURAL
Status: DISPENSED
Start: 2023-09-18 | End: 2023-09-18

## 2023-09-18 RX ORDER — SODIUM BICARBONATE 42 MG/ML
INJECTION, SOLUTION INTRAVENOUS
Status: DISPENSED
Start: 2023-09-18 | End: 2023-09-18

## 2023-09-18 NOTE — PROGRESS NOTES
Pt discharge instructions were given to pt by PA and RN. Opportunities for questions and concerns were provided. Pt verbalized understanding of medication use and follow-up. Pt wheeled off unit in no signs of distress, steady gait noted as pt ambulated to pv.

## 2023-09-18 NOTE — PROGRESS NOTES
Brenda RAMIREZ NP at bedside reaching out to  [facility] office to clarify plan of care.

## 2023-09-18 NOTE — PROGRESS NOTES
Pt asked if she could take home anxiety medication after speaking with [facility] and understanding there is no need for sedation. NP will allow pt to take scheduled home meds. Pt then refused taking home meds stating she will \"wait and take it at home when I really need it\".

## 2023-09-18 NOTE — PROGRESS NOTES
Pt arrives ambulatory to angio department accompanied by MUKUND LOONEY [facility] for US possible drainage of abscess procedure. All assessments completed and consent was reviewed. Education given was regarding procedure, possible  sedation, post-procedure care and  management/follow-up. Opportunity for questions was provided and all questions and concerns were addressed. Pt is extremely anxious and perseverating about multiple medical procedures and reviewing all medication given to her from each procedure. Pt showing staff multiple pictures for previous surgeries back in May. Pt requesting for staff to review pictures again. This RN using different techniques to try and calm patient. Pt is teary-eyed explaining she would like to be with her mother who just left to [place]. Warm blankets and calming conversation provided.  services were offered and pt refused. Pt asking for sedation so she can \"calm down\". Hope NP called to bedside for pt evaluation.

## 2023-09-18 NOTE — PROGRESS NOTES
[address], [address] [facility], [address]- Small amount of fluid obtained and sent for cultures,    Pt tolerated procedure well. Pt asked that we take a photo of the area on her personal cell phone. Pt ready for dc.

## 2023-09-21 LAB
BACTERIA SPEC CULT: ABNORMAL
GRAM STN SPEC: ABNORMAL
GRAM STN SPEC: ABNORMAL
SERVICE CMNT-IMP: ABNORMAL

## 2023-10-17 ENCOUNTER — TRANSCRIBE ORDERS (OUTPATIENT)
Facility: HOSPITAL | Age: 53
End: 2023-10-17

## 2023-10-17 DIAGNOSIS — M81.0 OSTEOPOROSIS, UNSPECIFIED OSTEOPOROSIS TYPE, UNSPECIFIED PATHOLOGICAL FRACTURE PRESENCE: Primary | ICD-10-CM

## 2023-10-23 ENCOUNTER — HOSPITAL ENCOUNTER (OUTPATIENT)
Facility: HOSPITAL | Age: 53
Discharge: HOME | End: 2023-10-26
Payer: COMMERCIAL

## 2023-10-23 DIAGNOSIS — M81.0 OSTEOPOROSIS, UNSPECIFIED OSTEOPOROSIS TYPE, UNSPECIFIED PATHOLOGICAL FRACTURE PRESENCE: ICD-10-CM

## 2023-10-23 PROCEDURE — 77080 DXA BONE DENSITY AXIAL: CPT

## 2023-11-24 ENCOUNTER — TRANSCRIBE ORDERS (OUTPATIENT)
Facility: HOSPITAL | Age: 53
End: 2023-11-24

## 2023-11-24 DIAGNOSIS — Z12.31 ENCOUNTER FOR MAMMOGRAM TO ESTABLISH BASELINE MAMMOGRAM: Primary | ICD-10-CM

## 2023-12-29 ENCOUNTER — HOSPITAL ENCOUNTER (OUTPATIENT)
Facility: HOSPITAL | Age: 53
Discharge: HOME | End: 2023-12-29
Payer: COMMERCIAL

## 2023-12-29 VITALS — WEIGHT: 235 LBS | HEIGHT: 66 IN | BODY MASS INDEX: 37.77 KG/M2

## 2023-12-29 DIAGNOSIS — Z12.31 ENCOUNTER FOR MAMMOGRAM TO ESTABLISH BASELINE MAMMOGRAM: ICD-10-CM

## 2023-12-29 PROCEDURE — 77067 SCR MAMMO BI INCL CAD: CPT

## 2024-04-05 ENCOUNTER — TRANSCRIBE ORDERS (OUTPATIENT)
Facility: HOSPITAL | Age: 54
End: 2024-04-05

## 2024-04-05 DIAGNOSIS — R11.0 NAUSEA: Primary | ICD-10-CM

## 2024-04-10 ENCOUNTER — HOSPITAL ENCOUNTER (OUTPATIENT)
Facility: HOSPITAL | Age: 54
Discharge: HOME | End: 2024-04-13
Payer: COMMERCIAL

## 2024-04-10 DIAGNOSIS — R11.0 NAUSEA: ICD-10-CM

## 2024-04-10 PROCEDURE — A9541 TC99M SULFUR COLLOID: HCPCS | Performed by: PHYSICIAN ASSISTANT

## 2024-04-10 PROCEDURE — 78264 GASTRIC EMPTYING IMG STUDY: CPT

## 2024-04-10 PROCEDURE — 3430000000 HC RX DIAGNOSTIC RADIOPHARMACEUTICAL: Performed by: PHYSICIAN ASSISTANT

## 2024-04-10 RX ORDER — TECHNETIUM TC 99M SULFUR COLLOID 2 MG
1 KIT MISCELLANEOUS ONCE
Status: COMPLETED | OUTPATIENT
Start: 2024-04-10 | End: 2024-04-10

## 2024-04-10 RX ADMIN — TECHNETIUM TC 99M SULFUR COLLOID 1 MILLICURIE: KIT at 09:24

## 2024-05-10 ENCOUNTER — HOSPITAL ENCOUNTER (EMERGENCY)
Facility: HOSPITAL | Age: 54
Discharge: HOME | End: 2024-05-11
Attending: EMERGENCY MEDICINE
Payer: COMMERCIAL

## 2024-05-10 DIAGNOSIS — S50.312A ABRASION OF LEFT ELBOW, INITIAL ENCOUNTER: Primary | ICD-10-CM

## 2024-05-10 DIAGNOSIS — S80.12XA CONTUSION OF MULTIPLE SITES OF LEFT LOWER EXTREMITY, INITIAL ENCOUNTER: ICD-10-CM

## 2024-05-10 DIAGNOSIS — S70.02XA CONTUSION OF LEFT HIP, INITIAL ENCOUNTER: ICD-10-CM

## 2024-05-10 DIAGNOSIS — S80.11XA CONTUSION OF MULTIPLE SITES OF RIGHT LOWER EXTREMITY, INITIAL ENCOUNTER: ICD-10-CM
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
## 2024-05-10 PROCEDURE — 97597 DBRDMT OPN WND 1ST 20 CM/<: CPT

## 2024-05-10 PROCEDURE — 99284 EMERGENCY DEPT VISIT MOD MDM: CPT

## 2024-05-10 PROCEDURE — 96372 THER/PROPH/DIAG INJ SC/IM: CPT

## 2024-05-11 ENCOUNTER — APPOINTMENT (OUTPATIENT)
Facility: HOSPITAL | Age: 54
End: 2024-05-11
Payer: COMMERCIAL

## 2024-05-11 VITALS
TEMPERATURE: 98.1 F | WEIGHT: 208 LBS | HEART RATE: 85 BPM | OXYGEN SATURATION: 98 % | HEIGHT: 66 IN | SYSTOLIC BLOOD PRESSURE: 138 MMHG | RESPIRATION RATE: 17 BRPM | DIASTOLIC BLOOD PRESSURE: 84 MMHG | BODY MASS INDEX: 33.43 KG/M2

## 2024-05-11 LAB
ALBUMIN SERPL-MCNC: 3.7 G/DL (ref 3.5–5)
ALBUMIN/GLOB SERPL: 0.9 (ref 1.1–2.2)
ALP SERPL-CCNC: 97 U/L (ref 45–117)
ALT SERPL-CCNC: 29 U/L (ref 12–78)
ANION GAP SERPL CALC-SCNC: 12 MMOL/L (ref 5–15)
AST SERPL-CCNC: 28 U/L (ref 15–37)
BASOPHILS # BLD: 0 K/UL (ref 0–0.1)
BASOPHILS NFR BLD: 0 % (ref 0–1)
BILIRUB SERPL-MCNC: 0.4 MG/DL (ref 0.2–1)
BUN SERPL-MCNC: 9 MG/DL (ref 6–20)
BUN/CREAT SERPL: 10 (ref 12–20)
CALCIUM SERPL-MCNC: 9.3 MG/DL (ref 8.5–10.1)
CHLORIDE SERPL-SCNC: 101 MMOL/L (ref 97–108)
CO2 SERPL-SCNC: 27 MMOL/L (ref 21–32)
CREAT SERPL-MCNC: 0.86 MG/DL (ref 0.55–1.02)
CRP SERPL-MCNC: 0.36 MG/DL (ref 0–0.3)
DIFFERENTIAL METHOD BLD: NORMAL
EOSINOPHIL # BLD: 0.1 K/UL (ref 0–0.4)
EOSINOPHIL NFR BLD: 1 % (ref 0–7)
ERYTHROCYTE [DISTWIDTH] IN BLOOD BY AUTOMATED COUNT: 12.5 % (ref 11.5–14.5)
GLOBULIN SER CALC-MCNC: 4.3 G/DL (ref 2–4)
GLUCOSE SERPL-MCNC: 90 MG/DL (ref 65–100)
HCT VFR BLD AUTO: 41.7 % (ref 35–47)
HGB BLD-MCNC: 13.3 G/DL (ref 11.5–16)
IMM GRANULOCYTES # BLD AUTO: 0 K/UL (ref 0–0.04)
IMM GRANULOCYTES NFR BLD AUTO: 0 % (ref 0–0.5)
LYMPHOCYTES # BLD: 2.6 K/UL (ref 0.8–3.5)
LYMPHOCYTES NFR BLD: 25 % (ref 12–49)
MCH RBC QN AUTO: 30.6 PG (ref 26–34)
MCHC RBC AUTO-ENTMCNC: 31.9 G/DL (ref 30–36.5)
MCV RBC AUTO: 95.9 FL (ref 80–99)
MONOCYTES # BLD: 0.8 K/UL (ref 0–1)
MONOCYTES NFR BLD: 8 % (ref 5–13)
NEUTS SEG # BLD: 6.8 K/UL (ref 1.8–8)
NEUTS SEG NFR BLD: 66 % (ref 32–75)
NRBC # BLD: 0 K/UL (ref 0–0.01)
NRBC BLD-RTO: 0 PER 100 WBC
PLATELET # BLD AUTO: 271 K/UL (ref 150–400)
PMV BLD AUTO: 10 FL (ref 8.9–12.9)
POTASSIUM SERPL-SCNC: 3.2 MMOL/L (ref 3.5–5.1)
PROT SERPL-MCNC: 8 G/DL (ref 6.4–8.2)
RBC # BLD AUTO: 4.35 M/UL (ref 3.8–5.2)
SODIUM SERPL-SCNC: 140 MMOL/L (ref 136–145)
WBC # BLD AUTO: 10.2 K/UL (ref 3.6–11)

## 2024-05-11 PROCEDURE — 85025 COMPLETE CBC W/AUTO DIFF WBC: CPT

## 2024-05-11 PROCEDURE — 2500000003 HC RX 250 WO HCPCS: Performed by: EMERGENCY MEDICINE

## 2024-05-11 PROCEDURE — 6370000000 HC RX 637 (ALT 250 FOR IP): Performed by: EMERGENCY MEDICINE

## 2024-05-11 PROCEDURE — 86140 C-REACTIVE PROTEIN: CPT

## 2024-05-11 PROCEDURE — 80053 COMPREHEN METABOLIC PANEL: CPT

## 2024-05-11 PROCEDURE — 36415 COLL VENOUS BLD VENIPUNCTURE: CPT

## 2024-05-11 PROCEDURE — 6360000002 HC RX W HCPCS: Performed by: EMERGENCY MEDICINE

## 2024-05-11 RX ORDER — KETOROLAC TROMETHAMINE 30 MG/ML
30 INJECTION, SOLUTION INTRAMUSCULAR; INTRAVENOUS ONCE
Status: COMPLETED | OUTPATIENT
Start: 2024-05-11 | End: 2024-05-11

## 2024-05-11 RX ORDER — CYCLOBENZAPRINE HCL 10 MG
10 TABLET ORAL 3 TIMES DAILY PRN
Qty: 21 TABLET | Refills: 0 | Status: SHIPPED | OUTPATIENT
Start: 2024-05-11 | End: 2024-05-11

## 2024-05-11 RX ORDER — ONDANSETRON 2 MG/ML
4 INJECTION INTRAMUSCULAR; INTRAVENOUS ONCE
Status: COMPLETED | OUTPATIENT
Start: 2024-05-11 | End: 2024-05-11

## 2024-05-11 RX ORDER — ERGOCALCIFEROL 1.25 MG/1
50000 CAPSULE ORAL WEEKLY
COMMUNITY
Start: 2022-01-02

## 2024-05-11 RX ORDER — CYCLOBENZAPRINE HCL 10 MG
10 TABLET ORAL 3 TIMES DAILY PRN
Qty: 21 TABLET | Refills: 0 | Status: SHIPPED | OUTPATIENT
Start: 2024-05-11 | End: 2024-05-21

## 2024-05-11 RX ORDER — FERROUS SULFATE 220 (44)/5
ELIXIR ORAL
COMMUNITY
Start: 2023-12-14

## 2024-05-11 RX ORDER — CIPROFLOXACIN 500 MG/5ML
500 KIT ORAL 2 TIMES DAILY
COMMUNITY
Start: 2024-05-08

## 2024-05-11 RX ADMIN — LIDOCAINE HYDROCHLORIDE 1000 MG: 10 INJECTION, SOLUTION EPIDURAL; INFILTRATION; INTRACAUDAL; PERINEURAL at 02:09

## 2024-05-11 RX ADMIN — Medication 3 ML: at 01:42

## 2024-05-11 RX ADMIN — KETOROLAC TROMETHAMINE 30 MG: 30 INJECTION, SOLUTION INTRAMUSCULAR at 01:23

## 2024-05-11 RX ADMIN — ONDANSETRON 4 MG: 2 INJECTION INTRAMUSCULAR; INTRAVENOUS at 02:53

## 2024-05-11 ASSESSMENT — PAIN DESCRIPTION - DESCRIPTORS: DESCRIPTORS: ACHING

## 2024-05-11 ASSESSMENT — PAIN SCALES - GENERAL: PAINLEVEL_OUTOF10: 10

## 2024-05-11 ASSESSMENT — PAIN DESCRIPTION - PAIN TYPE: TYPE: ACUTE PAIN

## 2024-05-11 ASSESSMENT — PAIN DESCRIPTION - ORIENTATION: ORIENTATION: LEFT

## 2024-05-11 ASSESSMENT — PAIN DESCRIPTION - LOCATION: LOCATION: KNEE;ELBOW

## 2024-05-11 ASSESSMENT — PAIN - FUNCTIONAL ASSESSMENT: PAIN_FUNCTIONAL_ASSESSMENT: 0-10

## 2024-05-11 ASSESSMENT — PAIN DESCRIPTION - FREQUENCY: FREQUENCY: CONTINUOUS

## 2024-05-11 NOTE — ED TRIAGE NOTES
Patient reports has been recently treated for colitis and abdominal cellul[facility]. Reports Hx of lymphedema and septic shock 3 times in 2023. Reports she had a fall tonight, landed on her left elbow and left knee. Patient is concerned getting infections from abrasion and wants amblotic with xrays.

## 2024-05-11 NOTE — ED NOTES
[facility] Nursing Note        Patient Name: Yuki Bustillos      : 1970             MRN: [id number]      Chief Complaint: Knee Pain, Abrasion, and Elbow Pain      Admit Diagnosis: No admission diagnoses are documented for this encounter.      Surgery: * No surgery found *            MD/RN reviewed discharge instructions and options with patient. Patient verbalized understanding. RN reviewed discharge instructions using teach back method. Patient ambulatory to exit without difficulty and no acute signs of distress. No complaints or needs expressed at this time. Patient counseled on medications prescribed at discharge (If prescribed). Vital signs stable. Patient to follow up with PCP/Specialist on the next business day for appointment. All questions answered by ER RN.          Lines:        Vitals: Patient Vitals for the past 12 hrs:   Temp Pulse Resp BP SpO2   24 0300 98.1 °F (36.7 °C) 85 17 138/84 98 %   24 0010 -- -- -- -- 97 %   24 0007 -- -- -- -- 100 %   24 0006 -- -- -- -- 99 %   24 0005 -- -- -- -- 99 %   24 0003 98.4 °F (36.9 °C) 88 15 (!) 149/95 96 %         Signed by: Ashok Ruelas RN, SUSANA, BSN, CMSRN                                              2024 at 3:15 AM
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
## 2024-10-07 ENCOUNTER — TRANSCRIBE ORDERS (OUTPATIENT)
Facility: HOSPITAL | Age: 54
End: 2024-10-07

## 2024-10-07 DIAGNOSIS — Z12.31 VISIT FOR SCREENING MAMMOGRAM: Primary | ICD-10-CM

## 2025-01-13 ENCOUNTER — HOSPITAL ENCOUNTER (EMERGENCY)
Facility: HOSPITAL | Age: 55
Discharge: HOME | End: 2025-01-13
Attending: EMERGENCY MEDICINE
Payer: COMMERCIAL

## 2025-01-13 ENCOUNTER — APPOINTMENT (OUTPATIENT)
Facility: HOSPITAL | Age: 55
End: 2025-01-13
Payer: COMMERCIAL

## 2025-01-13 VITALS
DIASTOLIC BLOOD PRESSURE: 82 MMHG | SYSTOLIC BLOOD PRESSURE: 105 MMHG | TEMPERATURE: 98.1 F | RESPIRATION RATE: 18 BRPM | BODY MASS INDEX: 33.43 KG/M2 | OXYGEN SATURATION: 99 % | WEIGHT: 208 LBS | HEART RATE: 97 BPM | HEIGHT: 66 IN

## 2025-01-13 DIAGNOSIS — R11.2 NAUSEA AND VOMITING, UNSPECIFIED VOMITING TYPE: Primary | ICD-10-CM

## 2025-01-13 DIAGNOSIS — R10.13 ABDOMINAL PAIN, EPIGASTRIC: ICD-10-CM

## 2025-01-13 LAB
ALBUMIN SERPL-MCNC: 3.8 G/DL (ref 3.5–5)
ALBUMIN/GLOB SERPL: 0.8 (ref 1.1–2.2)
ALP SERPL-CCNC: 120 U/L (ref 45–117)
ALT SERPL-CCNC: 33 U/L (ref 12–78)
ANION GAP SERPL CALC-SCNC: 10 MMOL/L (ref 2–12)
APPEARANCE UR: CLEAR
AST SERPL-CCNC: 36 U/L (ref 15–37)
BACTERIA URNS QL MICRO: NEGATIVE /HPF
BASOPHILS # BLD: 0.03 K/UL (ref 0–0.1)
BASOPHILS NFR BLD: 0.4 % (ref 0–1)
BILIRUB SERPL-MCNC: 0.5 MG/DL (ref 0.2–1)
BILIRUB UR QL: NEGATIVE
BUN SERPL-MCNC: 16 MG/DL (ref 6–20)
BUN/CREAT SERPL: 16 (ref 12–20)
CALCIUM SERPL-MCNC: 9.4 MG/DL (ref 8.5–10.1)
CHLORIDE SERPL-SCNC: 100 MMOL/L (ref 97–108)
CO2 SERPL-SCNC: 25 MMOL/L (ref 21–32)
COLOR UR: ABNORMAL
COMMENT:: NORMAL
CREAT SERPL-MCNC: 0.97 MG/DL (ref 0.55–1.02)
DIFFERENTIAL METHOD BLD: NORMAL
EOSINOPHIL # BLD: 0.02 K/UL (ref 0–0.4)
EOSINOPHIL NFR BLD: 0.3 % (ref 0–7)
EPITH CASTS URNS QL MICRO: ABNORMAL /LPF
ERYTHROCYTE [DISTWIDTH] IN BLOOD BY AUTOMATED COUNT: 11.9 % (ref 11.5–14.5)
GLOBULIN SER CALC-MCNC: 4.5 G/DL (ref 2–4)
GLUCOSE SERPL-MCNC: 80 MG/DL (ref 65–100)
GLUCOSE UR STRIP.AUTO-MCNC: NEGATIVE MG/DL
HCT VFR BLD AUTO: 37.6 % (ref 35–47)
HGB BLD-MCNC: 12.6 G/DL (ref 11.5–16)
HGB UR QL STRIP: NEGATIVE
HYALINE CASTS URNS QL MICRO: ABNORMAL /LPF (ref 0–2)
IMM GRANULOCYTES # BLD AUTO: 0.02 K/UL (ref 0–0.04)
IMM GRANULOCYTES NFR BLD AUTO: 0.3 % (ref 0–0.5)
KETONES UR QL STRIP.AUTO: 80 MG/DL
LEUKOCYTE ESTERASE UR QL STRIP.AUTO: NEGATIVE
LIPASE SERPL-CCNC: 36 U/L (ref 13–75)
LYMPHOCYTES # BLD: 2.31 K/UL (ref 0.8–3.5)
LYMPHOCYTES NFR BLD: 32.6 % (ref 12–49)
MCH RBC QN AUTO: 30.7 PG (ref 26–34)
MCHC RBC AUTO-ENTMCNC: 33.5 G/DL (ref 30–36.5)
MCV RBC AUTO: 91.5 FL (ref 80–99)
MONOCYTES # BLD: 0.61 K/UL (ref 0–1)
MONOCYTES NFR BLD: 8.6 % (ref 5–13)
NEUTS SEG # BLD: 4.09 K/UL (ref 1.8–8)
NEUTS SEG NFR BLD: 57.8 % (ref 32–75)
NITRITE UR QL STRIP.AUTO: NEGATIVE
NRBC # BLD: 0 K/UL (ref 0–0.01)
NRBC BLD-RTO: 0 PER 100 WBC
PH UR STRIP: 5.5 (ref 5–8)
PHOSPHATE SERPL-MCNC: 2.9 MG/DL (ref 2.6–4.7)
PLATELET # BLD AUTO: 266 K/UL (ref 150–400)
PMV BLD AUTO: 10.5 FL (ref 8.9–12.9)
POTASSIUM SERPL-SCNC: 2.9 MMOL/L (ref 3.5–5.1)
PROT SERPL-MCNC: 8.3 G/DL (ref 6.4–8.2)
PROT UR STRIP-MCNC: ABNORMAL MG/DL
RBC # BLD AUTO: 4.11 M/UL (ref 3.8–5.2)
RBC #/AREA URNS HPF: ABNORMAL /HPF (ref 0–5)
SODIUM SERPL-SCNC: 135 MMOL/L (ref 136–145)
SP GR UR REFRACTOMETRY: 1.02 (ref 1–1.03)
SPECIMEN HOLD: NORMAL
UROBILINOGEN UR QL STRIP.AUTO: 0.2 EU/DL (ref 0.2–1)
WBC # BLD AUTO: 7.1 K/UL (ref 3.6–11)
WBC URNS QL MICRO: ABNORMAL /HPF (ref 0–4)

## 2025-01-13 PROCEDURE — 6360000004 HC RX CONTRAST MEDICATION: Performed by: EMERGENCY MEDICINE

## 2025-01-13 PROCEDURE — 83690 ASSAY OF LIPASE: CPT

## 2025-01-13 PROCEDURE — 6370000000 HC RX 637 (ALT 250 FOR IP): Performed by: EMERGENCY MEDICINE

## 2025-01-13 PROCEDURE — 74177 CT ABD & PELVIS W/CONTRAST: CPT

## 2025-01-13 PROCEDURE — 96361 HYDRATE IV INFUSION ADD-ON: CPT

## 2025-01-13 PROCEDURE — 99285 EMERGENCY DEPT VISIT HI MDM: CPT

## 2025-01-13 PROCEDURE — 2580000003 HC RX 258: Performed by: EMERGENCY MEDICINE

## 2025-01-13 PROCEDURE — 80053 COMPREHEN METABOLIC PANEL: CPT

## 2025-01-13 PROCEDURE — 85025 COMPLETE CBC W/AUTO DIFF WBC: CPT

## 2025-01-13 PROCEDURE — 96375 TX/PRO/DX INJ NEW DRUG ADDON: CPT

## 2025-01-13 PROCEDURE — 36415 COLL VENOUS BLD VENIPUNCTURE: CPT

## 2025-01-13 PROCEDURE — 96374 THER/PROPH/DIAG INJ IV PUSH: CPT

## 2025-01-13 PROCEDURE — 81001 URINALYSIS AUTO W/SCOPE: CPT

## 2025-01-13 PROCEDURE — 6360000002 HC RX W HCPCS: Performed by: EMERGENCY MEDICINE

## 2025-01-13 PROCEDURE — 84100 ASSAY OF PHOSPHORUS: CPT

## 2025-01-13 RX ORDER — 0.9 % SODIUM CHLORIDE 0.9 %
1000 INTRAVENOUS SOLUTION INTRAVENOUS ONCE
Status: COMPLETED | OUTPATIENT
Start: 2025-01-13 | End: 2025-01-13

## 2025-01-13 RX ORDER — KETOROLAC TROMETHAMINE 15 MG/ML
15 INJECTION, SOLUTION INTRAMUSCULAR; INTRAVENOUS
Status: COMPLETED | OUTPATIENT
Start: 2025-01-13 | End: 2025-01-13

## 2025-01-13 RX ORDER — LORAZEPAM 2 MG/ML
1 INJECTION INTRAMUSCULAR ONCE
Status: COMPLETED | OUTPATIENT
Start: 2025-01-13 | End: 2025-01-13

## 2025-01-13 RX ORDER — IOPAMIDOL 755 MG/ML
100 INJECTION, SOLUTION INTRAVASCULAR
Status: COMPLETED | OUTPATIENT
Start: 2025-01-13 | End: 2025-01-13

## 2025-01-13 RX ORDER — PROMETHAZINE HYDROCHLORIDE 25 MG/1
25 TABLET ORAL
Status: COMPLETED | OUTPATIENT
Start: 2025-01-13 | End: 2025-01-13

## 2025-01-13 RX ORDER — POTASSIUM CHLORIDE 750 MG/1
40 TABLET, EXTENDED RELEASE ORAL ONCE
Status: DISCONTINUED | OUTPATIENT
Start: 2025-01-13 | End: 2025-01-13 | Stop reason: HOSPADM

## 2025-01-13 RX ADMIN — IOPAMIDOL 100 ML: 755 INJECTION, SOLUTION INTRAVENOUS at 18:33

## 2025-01-13 RX ADMIN — SODIUM CHLORIDE 1000 ML: 9 INJECTION, SOLUTION INTRAVENOUS at 17:00

## 2025-01-13 RX ADMIN — KETOROLAC TROMETHAMINE 15 MG: 15 INJECTION, SOLUTION INTRAMUSCULAR; INTRAVENOUS at 20:04

## 2025-01-13 RX ADMIN — PROMETHAZINE HYDROCHLORIDE 25 MG: 25 TABLET ORAL at 20:03

## 2025-01-13 RX ADMIN — LORAZEPAM 1 MG: 2 INJECTION INTRAMUSCULAR; INTRAVENOUS at 17:53

## 2025-01-13 ASSESSMENT — PAIN DESCRIPTION - ORIENTATION: ORIENTATION: LOWER

## 2025-01-13 ASSESSMENT — PAIN DESCRIPTION - DESCRIPTORS: DESCRIPTORS: BURNING;GNAWING

## 2025-01-13 ASSESSMENT — PAIN - FUNCTIONAL ASSESSMENT: PAIN_FUNCTIONAL_ASSESSMENT: 0-10

## 2025-01-13 ASSESSMENT — ENCOUNTER SYMPTOMS
SHORTNESS OF BREATH: 0
ABDOMINAL PAIN: 1

## 2025-01-13 ASSESSMENT — PAIN DESCRIPTION - LOCATION: LOCATION: ABDOMEN

## 2025-01-13 ASSESSMENT — LIFESTYLE VARIABLES
HOW MANY STANDARD DRINKS CONTAINING ALCOHOL DO YOU HAVE ON A TYPICAL DAY: PATIENT DECLINED
HOW OFTEN DO YOU HAVE A DRINK CONTAINING ALCOHOL: PATIENT DECLINED

## 2025-01-13 ASSESSMENT — PAIN SCALES - GENERAL: PAINLEVEL_OUTOF10: 8

## 2025-01-13 NOTE — ED TRIAGE NOTES
Patient to ER triage via wheelchair for complaints of vomiting starting Friday 01/10 and oliguria starting yesterday.     Reports last urination was yesterday.     Reports abdominal pain.    Hx of extensive abdominal surgeries/obstructions and infections    Dr. Nino in triage to assess patient.

## 2025-01-13 NOTE — ED PROVIDER NOTES
[facility] EMERGENCY DEPARTMENT  EMERGENCY DEPARTMENT ENCOUNTER      Pt Name: Yuki Bustillos  MRN: [id number]  Birthdate 1970  Date of evaluation: 1/13/2025  Provider: Tristin Nino MD      HISTORY OF PRESENT ILLNESS      54-year-old female with history of anxiety, bowel obstruction presenting to the ER for abdominal pain.  Patient states she is worried she could be septic or have a bowel obstruction.  So she went to [facility] a couple days ago and was evaluated for possible sepsis and discharged home because they did not find any evidence of sepsis.  Reports that she is having pain all over her abdomen.  Also reports that she has a red rash to her lower abdomen and buttocks area.  She is concerned this could be a yeast infection.  States that her doctors called her in Diflucan but she has not picked it up yet.              Nursing Notes were reviewed.    REVIEW OF SYSTEMS         Review of Systems   Respiratory:  Negative for shortness of breath.    Cardiovascular:  Negative for chest pain.   Gastrointestinal:  Positive for abdominal pain.   Psychiatric/Behavioral:  The patient is nervous/anxious.            PAST MEDICAL HISTORY     Past Medical History:   Diagnosis Date    Anxiety     Bowel obstruction (HCC)     Fatty liver     Gall stones     GERD (gastroesophageal reflux disease)     Hematoma     abdomen on the right overy    Hernia of unspecified site of abdominal cavity without mention of obstruction or gangrene     History of vascular access device 07/22/2021    4.5 fr mIDLINE RIGHT BASILIC 13 CM ACCESS    Hx of thromboembolism of vein     right upper brachiel vein    Kidney stones     Menopause     Obesity     WEST?    PUD (peptic ulcer disease) 2016    stomach and colon ulcers?    Seizures (HCC)     me dside effect? last sz 2013         SURGICAL HISTORY       Past Surgical History:   Procedure Laterality Date    APPENDECTOMY  2009    BREAST BIOPSY Left 1999    negative pathology

## 2025-01-14 NOTE — ED NOTES
Discharge instructions reviewed with patient who verbalized understanding. Opportunity for questions provided. Patient wheeled out of ED by RN and placed into car.

## 2025-01-15 ENCOUNTER — APPOINTMENT (OUTPATIENT)
Facility: HOSPITAL | Age: 55
End: 2025-01-15
Payer: COMMERCIAL

## 2025-01-15 ENCOUNTER — HOSPITAL ENCOUNTER (OUTPATIENT)
Facility: HOSPITAL | Age: 55
Setting detail: OBSERVATION
Discharge: HOME | End: 2025-01-18
Attending: EMERGENCY MEDICINE | Admitting: INTERNAL MEDICINE
Payer: COMMERCIAL

## 2025-01-15 DIAGNOSIS — R10.84 GENERALIZED ABDOMINAL PAIN: ICD-10-CM

## 2025-01-15 DIAGNOSIS — R19.7 DIARRHEA, UNSPECIFIED TYPE: ICD-10-CM

## 2025-01-15 DIAGNOSIS — R11.2 NAUSEA AND VOMITING, UNSPECIFIED VOMITING TYPE: Primary | ICD-10-CM

## 2025-01-15 PROBLEM — A49.9 ESBL (EXTENDED SPECTRUM BETA-LACTAMASE) PRODUCING BACTERIA INFECTION: Status: ACTIVE | Noted: 2025-01-15

## 2025-01-15 PROBLEM — Z16.12 ESBL (EXTENDED SPECTRUM BETA-LACTAMASE) PRODUCING BACTERIA INFECTION: Status: ACTIVE | Noted: 2025-01-15

## 2025-01-15 LAB
ALBUMIN SERPL-MCNC: 3.8 G/DL (ref 3.5–5)
ALBUMIN/GLOB SERPL: 0.9 (ref 1.1–2.2)
ALP SERPL-CCNC: 110 U/L (ref 45–117)
ALT SERPL-CCNC: 31 U/L (ref 12–78)
ANION GAP SERPL CALC-SCNC: 5 MMOL/L (ref 2–12)
APPEARANCE UR: CLEAR
AST SERPL-CCNC: 27 U/L (ref 15–37)
BACTERIA URNS QL MICRO: NEGATIVE /HPF
BASOPHILS # BLD: 0.03 K/UL (ref 0–0.1)
BASOPHILS NFR BLD: 0.4 % (ref 0–1)
BILIRUB SERPL-MCNC: 0.4 MG/DL (ref 0.2–1)
BILIRUB UR QL CFM: POSITIVE
BUN SERPL-MCNC: 9 MG/DL (ref 6–20)
BUN/CREAT SERPL: 9 (ref 12–20)
CALCIUM SERPL-MCNC: 9.4 MG/DL (ref 8.5–10.1)
CHLORIDE SERPL-SCNC: 103 MMOL/L (ref 97–108)
CO2 SERPL-SCNC: 27 MMOL/L (ref 21–32)
COLOR UR: ABNORMAL
COMMENT:: NORMAL
CREAT SERPL-MCNC: 1.01 MG/DL (ref 0.55–1.02)
DIFFERENTIAL METHOD BLD: NORMAL
EOSINOPHIL # BLD: 0.05 K/UL (ref 0–0.4)
EOSINOPHIL NFR BLD: 0.6 % (ref 0–7)
EPITH CASTS URNS QL MICRO: ABNORMAL /LPF
ERYTHROCYTE [DISTWIDTH] IN BLOOD BY AUTOMATED COUNT: 12 % (ref 11.5–14.5)
GLOBULIN SER CALC-MCNC: 4.3 G/DL (ref 2–4)
GLUCOSE SERPL-MCNC: 100 MG/DL (ref 65–100)
GLUCOSE UR STRIP.AUTO-MCNC: NEGATIVE MG/DL
HCT VFR BLD AUTO: 36.2 % (ref 35–47)
HGB BLD-MCNC: 12.3 G/DL (ref 11.5–16)
HGB UR QL STRIP: NEGATIVE
IMM GRANULOCYTES # BLD AUTO: 0.02 K/UL (ref 0–0.04)
IMM GRANULOCYTES NFR BLD AUTO: 0.3 % (ref 0–0.5)
KETONES UR QL STRIP.AUTO: NEGATIVE MG/DL
LACTATE SERPL-SCNC: 0.6 MMOL/L (ref 0.4–2)
LEUKOCYTE ESTERASE UR QL STRIP.AUTO: ABNORMAL
LYMPHOCYTES # BLD: 2.1 K/UL (ref 0.8–3.5)
LYMPHOCYTES NFR BLD: 27.1 % (ref 12–49)
MCH RBC QN AUTO: 30.6 PG (ref 26–34)
MCHC RBC AUTO-ENTMCNC: 34 G/DL (ref 30–36.5)
MCV RBC AUTO: 90 FL (ref 80–99)
MONOCYTES # BLD: 0.76 K/UL (ref 0–1)
MONOCYTES NFR BLD: 9.8 % (ref 5–13)
NEUTS SEG # BLD: 4.8 K/UL (ref 1.8–8)
NEUTS SEG NFR BLD: 61.8 % (ref 32–75)
NITRITE UR QL STRIP.AUTO: POSITIVE
NRBC # BLD: 0 K/UL (ref 0–0.01)
NRBC BLD-RTO: 0 PER 100 WBC
PH UR STRIP: 5 (ref 5–8)
PLATELET # BLD AUTO: 263 K/UL (ref 150–400)
PMV BLD AUTO: 10.5 FL (ref 8.9–12.9)
POTASSIUM SERPL-SCNC: 3 MMOL/L (ref 3.5–5.1)
PROT SERPL-MCNC: 8.1 G/DL (ref 6.4–8.2)
PROT UR STRIP-MCNC: NEGATIVE MG/DL
RBC # BLD AUTO: 4.02 M/UL (ref 3.8–5.2)
RBC #/AREA URNS HPF: ABNORMAL /HPF (ref 0–5)
SODIUM SERPL-SCNC: 135 MMOL/L (ref 136–145)
SP GR UR REFRACTOMETRY: 1.01 (ref 1–1.03)
SPECIMEN HOLD: NORMAL
URINE CULTURE IF INDICATED: ABNORMAL
UROBILINOGEN UR QL STRIP.AUTO: 1 EU/DL (ref 0.2–1)
WBC # BLD AUTO: 7.8 K/UL (ref 3.6–11)
WBC URNS QL MICRO: ABNORMAL /HPF (ref 0–4)

## 2025-01-15 PROCEDURE — 96366 THER/PROPH/DIAG IV INF ADDON: CPT

## 2025-01-15 PROCEDURE — 96375 TX/PRO/DX INJ NEW DRUG ADDON: CPT

## 2025-01-15 PROCEDURE — 87077 CULTURE AEROBIC IDENTIFY: CPT

## 2025-01-15 PROCEDURE — 96361 HYDRATE IV INFUSION ADD-ON: CPT

## 2025-01-15 PROCEDURE — G0378 HOSPITAL OBSERVATION PER HR: HCPCS

## 2025-01-15 PROCEDURE — 6370000000 HC RX 637 (ALT 250 FOR IP): Performed by: INTERNAL MEDICINE

## 2025-01-15 PROCEDURE — 96376 TX/PRO/DX INJ SAME DRUG ADON: CPT

## 2025-01-15 PROCEDURE — 99285 EMERGENCY DEPT VISIT HI MDM: CPT

## 2025-01-15 PROCEDURE — 85025 COMPLETE CBC W/AUTO DIFF WBC: CPT

## 2025-01-15 PROCEDURE — 6360000002 HC RX W HCPCS: Performed by: EMERGENCY MEDICINE

## 2025-01-15 PROCEDURE — 2580000003 HC RX 258: Performed by: EMERGENCY MEDICINE

## 2025-01-15 PROCEDURE — 6360000002 HC RX W HCPCS: Performed by: INTERNAL MEDICINE

## 2025-01-15 PROCEDURE — 83605 ASSAY OF LACTIC ACID: CPT

## 2025-01-15 PROCEDURE — 96365 THER/PROPH/DIAG IV INF INIT: CPT

## 2025-01-15 PROCEDURE — 81001 URINALYSIS AUTO W/SCOPE: CPT

## 2025-01-15 PROCEDURE — 87086 URINE CULTURE/COLONY COUNT: CPT

## 2025-01-15 PROCEDURE — 36415 COLL VENOUS BLD VENIPUNCTURE: CPT

## 2025-01-15 PROCEDURE — 6360000004 HC RX CONTRAST MEDICATION: Performed by: EMERGENCY MEDICINE

## 2025-01-15 PROCEDURE — 2580000003 HC RX 258: Performed by: INTERNAL MEDICINE

## 2025-01-15 PROCEDURE — 74177 CT ABD & PELVIS W/CONTRAST: CPT

## 2025-01-15 PROCEDURE — 80053 COMPREHEN METABOLIC PANEL: CPT

## 2025-01-15 PROCEDURE — 87186 SC STD MICRODIL/AGAR DIL: CPT

## 2025-01-15 RX ORDER — PROMETHAZINE HYDROCHLORIDE 25 MG/1
25 TABLET ORAL
Status: DISCONTINUED | OUTPATIENT
Start: 2025-01-15 | End: 2025-01-15

## 2025-01-15 RX ORDER — POTASSIUM CHLORIDE 7.45 MG/ML
10 INJECTION INTRAVENOUS PRN
Status: DISCONTINUED | OUTPATIENT
Start: 2025-01-15 | End: 2025-01-18 | Stop reason: HOSPADM

## 2025-01-15 RX ORDER — MICONAZOLE NITRATE 20 MG/G
CREAM TOPICAL 2 TIMES DAILY
Status: DISCONTINUED | OUTPATIENT
Start: 2025-01-15 | End: 2025-01-18 | Stop reason: HOSPADM

## 2025-01-15 RX ORDER — ATORVASTATIN CALCIUM 20 MG/1
40 TABLET, FILM COATED ORAL DAILY
Status: DISCONTINUED | OUTPATIENT
Start: 2025-01-16 | End: 2025-01-18 | Stop reason: HOSPADM

## 2025-01-15 RX ORDER — ONDANSETRON 4 MG/1
4 TABLET, ORALLY DISINTEGRATING ORAL EVERY 8 HOURS PRN
Status: DISCONTINUED | OUTPATIENT
Start: 2025-01-15 | End: 2025-01-18 | Stop reason: HOSPADM

## 2025-01-15 RX ORDER — POLYETHYLENE GLYCOL 3350 17 G/17G
17 POWDER, FOR SOLUTION ORAL DAILY PRN
Status: DISCONTINUED | OUTPATIENT
Start: 2025-01-15 | End: 2025-01-18 | Stop reason: HOSPADM

## 2025-01-15 RX ORDER — ACETAMINOPHEN 650 MG/1
650 SUPPOSITORY RECTAL EVERY 6 HOURS PRN
Status: DISCONTINUED | OUTPATIENT
Start: 2025-01-15 | End: 2025-01-18 | Stop reason: HOSPADM

## 2025-01-15 RX ORDER — 0.9 % SODIUM CHLORIDE 0.9 %
1000 INTRAVENOUS SOLUTION INTRAVENOUS ONCE
Status: COMPLETED | OUTPATIENT
Start: 2025-01-15 | End: 2025-01-15

## 2025-01-15 RX ORDER — POTASSIUM CHLORIDE 750 MG/1
40 TABLET, EXTENDED RELEASE ORAL ONCE
Status: DISCONTINUED | OUTPATIENT
Start: 2025-01-15 | End: 2025-01-15

## 2025-01-15 RX ORDER — ALPRAZOLAM 0.5 MG
0.5 TABLET ORAL 3 TIMES DAILY PRN
Status: DISCONTINUED | OUTPATIENT
Start: 2025-01-15 | End: 2025-01-15

## 2025-01-15 RX ORDER — ONDANSETRON 2 MG/ML
4 INJECTION INTRAMUSCULAR; INTRAVENOUS EVERY 6 HOURS PRN
Status: DISCONTINUED | OUTPATIENT
Start: 2025-01-15 | End: 2025-01-18 | Stop reason: HOSPADM

## 2025-01-15 RX ORDER — SODIUM CHLORIDE 0.9 % (FLUSH) 0.9 %
5-40 SYRINGE (ML) INJECTION PRN
Status: DISCONTINUED | OUTPATIENT
Start: 2025-01-15 | End: 2025-01-18 | Stop reason: HOSPADM

## 2025-01-15 RX ORDER — SODIUM CHLORIDE 9 MG/ML
INJECTION, SOLUTION INTRAVENOUS PRN
Status: DISCONTINUED | OUTPATIENT
Start: 2025-01-15 | End: 2025-01-18 | Stop reason: HOSPADM

## 2025-01-15 RX ORDER — SODIUM CHLORIDE 0.9 % (FLUSH) 0.9 %
5-40 SYRINGE (ML) INJECTION EVERY 12 HOURS SCHEDULED
Status: DISCONTINUED | OUTPATIENT
Start: 2025-01-15 | End: 2025-01-18 | Stop reason: HOSPADM

## 2025-01-15 RX ORDER — POTASSIUM CHLORIDE 750 MG/1
40 TABLET, EXTENDED RELEASE ORAL PRN
Status: DISCONTINUED | OUTPATIENT
Start: 2025-01-15 | End: 2025-01-18 | Stop reason: HOSPADM

## 2025-01-15 RX ORDER — ACETAMINOPHEN 325 MG/1
650 TABLET ORAL EVERY 6 HOURS PRN
Status: DISCONTINUED | OUTPATIENT
Start: 2025-01-15 | End: 2025-01-18 | Stop reason: HOSPADM

## 2025-01-15 RX ORDER — POTASSIUM CHLORIDE 7.45 MG/ML
10 INJECTION INTRAVENOUS ONCE
Status: COMPLETED | OUTPATIENT
Start: 2025-01-15 | End: 2025-01-15

## 2025-01-15 RX ORDER — POTASSIUM CHLORIDE 750 MG/1
40 TABLET, EXTENDED RELEASE ORAL
Status: DISCONTINUED | OUTPATIENT
Start: 2025-01-16 | End: 2025-01-15

## 2025-01-15 RX ORDER — METOCLOPRAMIDE HYDROCHLORIDE 5 MG/ML
10 INJECTION INTRAMUSCULAR; INTRAVENOUS ONCE
Status: COMPLETED | OUTPATIENT
Start: 2025-01-15 | End: 2025-01-15

## 2025-01-15 RX ORDER — IOPAMIDOL 755 MG/ML
100 INJECTION, SOLUTION INTRAVASCULAR
Status: COMPLETED | OUTPATIENT
Start: 2025-01-15 | End: 2025-01-15

## 2025-01-15 RX ORDER — OXYCODONE HYDROCHLORIDE 15 MG/1
15 TABLET ORAL EVERY 6 HOURS PRN
COMMUNITY

## 2025-01-15 RX ORDER — CHOLESTYRAMINE LIGHT 4 G/5.7G
4 POWDER, FOR SUSPENSION ORAL 2 TIMES DAILY
Status: DISCONTINUED | OUTPATIENT
Start: 2025-01-16 | End: 2025-01-18 | Stop reason: HOSPADM

## 2025-01-15 RX ORDER — MAGNESIUM SULFATE IN WATER 40 MG/ML
2000 INJECTION, SOLUTION INTRAVENOUS PRN
Status: DISCONTINUED | OUTPATIENT
Start: 2025-01-15 | End: 2025-01-18 | Stop reason: HOSPADM

## 2025-01-15 RX ORDER — ONDANSETRON 2 MG/ML
4 INJECTION INTRAMUSCULAR; INTRAVENOUS ONCE
Status: COMPLETED | OUTPATIENT
Start: 2025-01-15 | End: 2025-01-15

## 2025-01-15 RX ORDER — ALPRAZOLAM 0.5 MG
0.5 TABLET ORAL 4 TIMES DAILY PRN
Status: DISCONTINUED | OUTPATIENT
Start: 2025-01-15 | End: 2025-01-18 | Stop reason: HOSPADM

## 2025-01-15 RX ORDER — ENOXAPARIN SODIUM 100 MG/ML
40 INJECTION SUBCUTANEOUS EVERY 24 HOURS
Status: DISCONTINUED | OUTPATIENT
Start: 2025-01-15 | End: 2025-01-18 | Stop reason: HOSPADM

## 2025-01-15 RX ORDER — LORAZEPAM 2 MG/ML
1 INJECTION INTRAMUSCULAR EVERY 6 HOURS PRN
Status: DISCONTINUED | OUTPATIENT
Start: 2025-01-15 | End: 2025-01-15

## 2025-01-15 RX ORDER — ALPRAZOLAM 0.5 MG
0.5 TABLET ORAL 4 TIMES DAILY PRN
COMMUNITY
Start: 2024-12-25

## 2025-01-15 RX ORDER — ESTRADIOL 1 MG/1
1 TABLET ORAL NIGHTLY
Status: DISCONTINUED | OUTPATIENT
Start: 2025-01-15 | End: 2025-01-15

## 2025-01-15 RX ADMIN — ALPRAZOLAM 0.5 MG: 0.5 TABLET ORAL at 21:43

## 2025-01-15 RX ADMIN — MEROPENEM 1000 MG: 1 INJECTION INTRAVENOUS at 21:25

## 2025-01-15 RX ADMIN — ONDANSETRON 4 MG: 2 INJECTION, SOLUTION INTRAMUSCULAR; INTRAVENOUS at 16:43

## 2025-01-15 RX ADMIN — LORAZEPAM 1 MG: 2 INJECTION INTRAMUSCULAR; INTRAVENOUS at 18:48

## 2025-01-15 RX ADMIN — SODIUM CHLORIDE 1000 ML: 9 INJECTION, SOLUTION INTRAVENOUS at 16:43

## 2025-01-15 RX ADMIN — IOPAMIDOL 100 ML: 755 INJECTION, SOLUTION INTRAVENOUS at 17:49

## 2025-01-15 RX ADMIN — METOCLOPRAMIDE 10 MG: 5 INJECTION, SOLUTION INTRAMUSCULAR; INTRAVENOUS at 18:49

## 2025-01-15 RX ADMIN — POTASSIUM CHLORIDE 10 MEQ: 7.46 INJECTION, SOLUTION INTRAVENOUS at 18:56

## 2025-01-15 RX ADMIN — OXYCODONE HYDROCHLORIDE 15 MG: 10 TABLET ORAL at 21:21

## 2025-01-15 ASSESSMENT — ENCOUNTER SYMPTOMS
VOMITING: 0
BACK PAIN: 0
EYE PAIN: 0
EYE DISCHARGE: 0
COUGH: 0
NAUSEA: 1
DIARRHEA: 0
FACIAL SWELLING: 0
ABDOMINAL PAIN: 0
CHEST TIGHTNESS: 0
SORE THROAT: 0
SHORTNESS OF BREATH: 0

## 2025-01-15 ASSESSMENT — LIFESTYLE VARIABLES
HOW OFTEN DO YOU HAVE A DRINK CONTAINING ALCOHOL: NEVER
HOW MANY STANDARD DRINKS CONTAINING ALCOHOL DO YOU HAVE ON A TYPICAL DAY: PATIENT DOES NOT DRINK

## 2025-01-15 ASSESSMENT — PAIN SCALES - GENERAL
PAINLEVEL_OUTOF10: 8
PAINLEVEL_OUTOF10: 7
PAINLEVEL_OUTOF10: 7

## 2025-01-15 ASSESSMENT — PAIN - FUNCTIONAL ASSESSMENT: PAIN_FUNCTIONAL_ASSESSMENT: 0-10

## 2025-01-15 ASSESSMENT — PAIN DESCRIPTION - PAIN TYPE: TYPE: ACUTE PAIN

## 2025-01-15 ASSESSMENT — PAIN DESCRIPTION - LOCATION
LOCATION: ABDOMEN
LOCATION: ABDOMEN;FLANK

## 2025-01-15 ASSESSMENT — PAIN DESCRIPTION - DESCRIPTORS
DESCRIPTORS: ACHING
DESCRIPTORS: PRESSURE

## 2025-01-15 NOTE — ED NOTES
Went to get patient from waiting room to move to OF patient was sitting in chair patient reports \"feeling like she is going to pass out\" pt moved into wheelchair and taken back to ED room and placed on stretcher with side rails up, pt anxious at this time.

## 2025-01-15 NOTE — ED PROVIDER NOTES
Negative for difficulty urinating, dysuria, frequency, hematuria, urgency and vaginal discharge.   Musculoskeletal:  Negative for arthralgias, back pain, joint swelling and myalgias.   Skin:  Negative for rash and wound.   Neurological:  Negative for dizziness, syncope, facial asymmetry and headaches.   Psychiatric/Behavioral:  Negative for agitation, behavioral problems and confusion.    All other systems reviewed and are negative.      Except as noted above the remainder of the review of systems was reviewed and negative.       PAST MEDICAL HISTORY     Past Medical History:   Diagnosis Date    Anxiety     Bowel obstruction (HCC)     Fatty liver     Gall stones     GERD (gastroesophageal reflux disease)     Hematoma     abdomen on the right overy    Hernia of unspecified site of abdominal cavity without mention of obstruction or gangrene     History of vascular access device 07/22/2021    4.5 fr mIDLINE RIGHT BASILIC 13 CM ACCESS    Hx of thromboembolism of vein     right upper brachiel vein    Kidney stones     Menopause     Obesity     WEST?    PUD (peptic ulcer disease) 2016    stomach and colon ulcers?    Seizures (HCC)     me dside effect? last sz 2013         SURGICAL HISTORY       Past Surgical History:   Procedure Laterality Date    APPENDECTOMY  2009    BREAST BIOPSY Left 1999    negative pathology    BREAST BIOPSY Right 2007    negative pathology    COLONOSCOPY N/A 3/24/2017    COLONOSCOPY performed by Stevie Zaragoza MD at [facility] ENDOSCOPY    COLONOSCOPY FLX DX W/COLLJ SPEC WHEN PFRMD  1/14/2011         HERNIA REPAIR  9/2011    laparoscopic incisional hernia repair    HERNIA REPAIR  2010    umbilical hernia repair    HYSTERECTOMY (CERVIX STATUS UNKNOWN)  2009    partial hysterectomy (right ovary removed)    OTHER SURGICAL HISTORY  2009    ileocectomy, bowel resection,     OVARY REMOVAL  2009    removed post op hematoma and right oopherectomy    OVARY REMOVAL  3/2016    mass removed and left oopherectomy    IL  time spent exclusive of procedures:  60 minutes.       FINAL IMPRESSION      1. Nausea and vomiting, unspecified vomiting type    2. Diarrhea, unspecified type    3. Generalized abdominal pain          DISPOSITION/PLAN   DISPOSITION Decision To Admit 01/15/2025 07:51:17 PM      PATIENT REFERRED TO:  No follow-up provider specified.    DISCHARGE MEDICATIONS:  New Prescriptions    No medications on file         (Please note that portions of this note were completed with a voice recognition program.  Efforts were made to edit the dictations but occasionally words are mis-transcribed.)    Kendall Gonzalez MD (electronically signed)  Emergency Attending Physician / Physician Assistant / Nurse Practitioner             Kendall Gonzalez MD  01/15/25 1959

## 2025-01-15 NOTE — ED TRIAGE NOTES
Pt.  was seen here on Monday for nausea vomiting flank pain. Pt.  her urine culture came back positive for klebsiella pneumoniae from 1/10 was notfied yesterday and started on cipro and pyridium. Pt.  is not able to hold down fluids or abx.  has not had BM x1 week as well.

## 2025-01-16 PROBLEM — Z88.2 ALLERGY TO SULFA DRUGS: Status: ACTIVE | Noted: 2025-01-16

## 2025-01-16 PROBLEM — Z22.358: Status: ACTIVE | Noted: 2025-01-16

## 2025-01-16 LAB
ANION GAP SERPL CALC-SCNC: 4 MMOL/L (ref 2–12)
BASOPHILS # BLD: 0.03 K/UL (ref 0–0.1)
BASOPHILS NFR BLD: 0.6 % (ref 0–1)
BUN SERPL-MCNC: 6 MG/DL (ref 6–20)
BUN/CREAT SERPL: 8 (ref 12–20)
CALCIUM SERPL-MCNC: 8.5 MG/DL (ref 8.5–10.1)
CHLORIDE SERPL-SCNC: 108 MMOL/L (ref 97–108)
CO2 SERPL-SCNC: 26 MMOL/L (ref 21–32)
CREAT SERPL-MCNC: 0.77 MG/DL (ref 0.55–1.02)
DIFFERENTIAL METHOD BLD: ABNORMAL
EOSINOPHIL # BLD: 0.12 K/UL (ref 0–0.4)
EOSINOPHIL NFR BLD: 2.5 % (ref 0–7)
ERYTHROCYTE [DISTWIDTH] IN BLOOD BY AUTOMATED COUNT: 12.3 % (ref 11.5–14.5)
GLUCOSE BLD STRIP.AUTO-MCNC: 92 MG/DL (ref 65–117)
GLUCOSE BLD STRIP.AUTO-MCNC: 98 MG/DL (ref 65–117)
GLUCOSE SERPL-MCNC: 100 MG/DL (ref 65–100)
HCT VFR BLD AUTO: 31 % (ref 35–47)
HGB BLD-MCNC: 10.2 G/DL (ref 11.5–16)
IMM GRANULOCYTES # BLD AUTO: 0.02 K/UL (ref 0–0.04)
IMM GRANULOCYTES NFR BLD AUTO: 0.4 % (ref 0–0.5)
LYMPHOCYTES # BLD: 1.99 K/UL (ref 0.8–3.5)
LYMPHOCYTES NFR BLD: 42.2 % (ref 12–49)
MCH RBC QN AUTO: 30 PG (ref 26–34)
MCHC RBC AUTO-ENTMCNC: 32.9 G/DL (ref 30–36.5)
MCV RBC AUTO: 91.2 FL (ref 80–99)
MONOCYTES # BLD: 0.46 K/UL (ref 0–1)
MONOCYTES NFR BLD: 9.7 % (ref 5–13)
NEUTS SEG # BLD: 2.1 K/UL (ref 1.8–8)
NEUTS SEG NFR BLD: 44.6 % (ref 32–75)
NRBC # BLD: 0 K/UL (ref 0–0.01)
NRBC BLD-RTO: 0 PER 100 WBC
PHOSPHATE SERPL-MCNC: 3.1 MG/DL (ref 2.6–4.7)
PLATELET # BLD AUTO: 180 K/UL (ref 150–400)
PMV BLD AUTO: 10.6 FL (ref 8.9–12.9)
POTASSIUM SERPL-SCNC: 2.9 MMOL/L (ref 3.5–5.1)
RBC # BLD AUTO: 3.4 M/UL (ref 3.8–5.2)
SERVICE CMNT-IMP: NORMAL
SERVICE CMNT-IMP: NORMAL
SODIUM SERPL-SCNC: 138 MMOL/L (ref 136–145)
WBC # BLD AUTO: 4.7 K/UL (ref 3.6–11)

## 2025-01-16 PROCEDURE — 2500000003 HC RX 250 WO HCPCS: Performed by: INTERNAL MEDICINE

## 2025-01-16 PROCEDURE — 82962 GLUCOSE BLOOD TEST: CPT

## 2025-01-16 PROCEDURE — 6370000000 HC RX 637 (ALT 250 FOR IP): Performed by: INTERNAL MEDICINE

## 2025-01-16 PROCEDURE — 6360000002 HC RX W HCPCS: Performed by: INTERNAL MEDICINE

## 2025-01-16 PROCEDURE — 2580000003 HC RX 258: Performed by: STUDENT IN AN ORGANIZED HEALTH CARE EDUCATION/TRAINING PROGRAM

## 2025-01-16 PROCEDURE — 84100 ASSAY OF PHOSPHORUS: CPT

## 2025-01-16 PROCEDURE — 2580000003 HC RX 258: Performed by: INTERNAL MEDICINE

## 2025-01-16 PROCEDURE — 6370000000 HC RX 637 (ALT 250 FOR IP): Performed by: STUDENT IN AN ORGANIZED HEALTH CARE EDUCATION/TRAINING PROGRAM

## 2025-01-16 PROCEDURE — G0378 HOSPITAL OBSERVATION PER HR: HCPCS

## 2025-01-16 PROCEDURE — 85025 COMPLETE CBC W/AUTO DIFF WBC: CPT

## 2025-01-16 PROCEDURE — 99222 1ST HOSP IP/OBS MODERATE 55: CPT | Performed by: INTERNAL MEDICINE

## 2025-01-16 PROCEDURE — 80048 BASIC METABOLIC PNL TOTAL CA: CPT

## 2025-01-16 PROCEDURE — 96366 THER/PROPH/DIAG IV INF ADDON: CPT

## 2025-01-16 PROCEDURE — 96361 HYDRATE IV INFUSION ADD-ON: CPT

## 2025-01-16 PROCEDURE — 94761 N-INVAS EAR/PLS OXIMETRY MLT: CPT

## 2025-01-16 RX ORDER — DOCUSATE SODIUM 100 MG/1
100 CAPSULE, LIQUID FILLED ORAL 2 TIMES DAILY
Status: DISCONTINUED | OUTPATIENT
Start: 2025-01-16 | End: 2025-01-18 | Stop reason: HOSPADM

## 2025-01-16 RX ORDER — POLYETHYLENE GLYCOL 3350 17 G/17G
17 POWDER, FOR SOLUTION ORAL DAILY
Status: DISCONTINUED | OUTPATIENT
Start: 2025-01-16 | End: 2025-01-18 | Stop reason: HOSPADM

## 2025-01-16 RX ORDER — SODIUM CHLORIDE, SODIUM LACTATE, POTASSIUM CHLORIDE, CALCIUM CHLORIDE 600; 310; 30; 20 MG/100ML; MG/100ML; MG/100ML; MG/100ML
INJECTION, SOLUTION INTRAVENOUS CONTINUOUS
Status: DISPENSED | OUTPATIENT
Start: 2025-01-16 | End: 2025-01-16

## 2025-01-16 RX ORDER — PROMETHAZINE HYDROCHLORIDE 25 MG/1
25 TABLET ORAL EVERY 6 HOURS PRN
Status: DISCONTINUED | OUTPATIENT
Start: 2025-01-16 | End: 2025-01-18 | Stop reason: HOSPADM

## 2025-01-16 RX ADMIN — OXYCODONE HYDROCHLORIDE 15 MG: 10 TABLET ORAL at 15:50

## 2025-01-16 RX ADMIN — ALPRAZOLAM 0.5 MG: 0.5 TABLET ORAL at 15:51

## 2025-01-16 RX ADMIN — PROMETHAZINE HYDROCHLORIDE 25 MG: 25 TABLET ORAL at 21:01

## 2025-01-16 RX ADMIN — SODIUM CHLORIDE, PRESERVATIVE FREE 10 ML: 5 INJECTION INTRAVENOUS at 21:10

## 2025-01-16 RX ADMIN — ACETAMINOPHEN 650 MG: 325 TABLET ORAL at 00:51

## 2025-01-16 RX ADMIN — POTASSIUM BICARBONATE 40 MEQ: 391 TABLET, EFFERVESCENT ORAL at 09:39

## 2025-01-16 RX ADMIN — ALPRAZOLAM 0.5 MG: 0.5 TABLET ORAL at 09:42

## 2025-01-16 RX ADMIN — PROMETHAZINE HYDROCHLORIDE 25 MG: 25 TABLET ORAL at 16:25

## 2025-01-16 RX ADMIN — CHOLESTYRAMINE 4 G: 4 POWDER, FOR SUSPENSION ORAL at 11:17

## 2025-01-16 RX ADMIN — OXYCODONE HYDROCHLORIDE 15 MG: 10 TABLET ORAL at 09:41

## 2025-01-16 RX ADMIN — ALPRAZOLAM 0.5 MG: 0.5 TABLET ORAL at 03:47

## 2025-01-16 RX ADMIN — ATORVASTATIN CALCIUM 40 MG: 20 TABLET, FILM COATED ORAL at 09:42

## 2025-01-16 RX ADMIN — MICONAZOLE NITRATE: 20 CREAM TOPICAL at 09:43

## 2025-01-16 RX ADMIN — MEROPENEM 1000 MG: 1 INJECTION INTRAVENOUS at 05:37

## 2025-01-16 RX ADMIN — MEROPENEM 1000 MG: 1 INJECTION INTRAVENOUS at 12:58

## 2025-01-16 RX ADMIN — PROMETHAZINE HYDROCHLORIDE 25 MG: 25 TABLET ORAL at 09:48

## 2025-01-16 RX ADMIN — OXYCODONE HYDROCHLORIDE 15 MG: 10 TABLET ORAL at 03:46

## 2025-01-16 RX ADMIN — MICONAZOLE NITRATE: 20 CREAM TOPICAL at 21:11

## 2025-01-16 RX ADMIN — SODIUM CHLORIDE, POTASSIUM CHLORIDE, SODIUM LACTATE AND CALCIUM CHLORIDE: 600; 310; 30; 20 INJECTION, SOLUTION INTRAVENOUS at 12:57

## 2025-01-16 RX ADMIN — OXYCODONE HYDROCHLORIDE 15 MG: 10 TABLET ORAL at 21:01

## 2025-01-16 RX ADMIN — SODIUM CHLORIDE, PRESERVATIVE FREE 10 ML: 5 INJECTION INTRAVENOUS at 09:43

## 2025-01-16 RX ADMIN — MEROPENEM 1000 MG: 1 INJECTION INTRAVENOUS at 21:09

## 2025-01-16 RX ADMIN — POTASSIUM BICARBONATE 40 MEQ: 391 TABLET, EFFERVESCENT ORAL at 21:00

## 2025-01-16 RX ADMIN — ALPRAZOLAM 0.5 MG: 0.5 TABLET ORAL at 21:01

## 2025-01-16 RX ADMIN — MICONAZOLE NITRATE: 20 CREAM TOPICAL at 03:52

## 2025-01-16 ASSESSMENT — PAIN DESCRIPTION - DESCRIPTORS
DESCRIPTORS: ACHING
DESCRIPTORS: SORE
DESCRIPTORS: ACHING

## 2025-01-16 ASSESSMENT — PAIN DESCRIPTION - LOCATION
LOCATION: KNEE
LOCATION: KNEE
LOCATION: ABDOMEN;BACK;KNEE
LOCATION: KNEE
LOCATION: KNEE

## 2025-01-16 ASSESSMENT — PAIN SCALES - GENERAL
PAINLEVEL_OUTOF10: 8
PAINLEVEL_OUTOF10: 8
PAINLEVEL_OUTOF10: 6
PAINLEVEL_OUTOF10: 8
PAINLEVEL_OUTOF10: 8
PAINLEVEL_OUTOF10: 7
PAINLEVEL_OUTOF10: 6
PAINLEVEL_OUTOF10: 0
PAINLEVEL_OUTOF10: 6

## 2025-01-16 ASSESSMENT — PAIN DESCRIPTION - ORIENTATION: ORIENTATION: LEFT

## 2025-01-16 ASSESSMENT — PAIN DESCRIPTION - PAIN TYPE: TYPE: CHRONIC PAIN

## 2025-01-16 NOTE — H&P
Electronically signed by AISHA SANDERS           ECG/ECHO:  EKG: normal EKG, normal sinus rhythm.       Notes reviewed from all clinical/nonclinical/nursing services involved in patient's clinical care. Care coordination discussions were held with appropriate clinical/nonclinical/ nursing providers based on care coordination needs.     Assessment:   Given the patient's current clinical presentation, there is a high level of concern for decompensation if discharged from the emergency department. Complex decision making was performed, which includes reviewing the patient's available past medical records, laboratory results, and imaging studies.    Principal Problem:    ESBL (extended spectrum beta-lactamase) producing bacteria infection  Resolved Problems:    * No resolved hospital problems. *      Plan:     Yuki Bustillos is a very pleasant 54 y.o. female with a past medical history of anxiety, chronic knee pain 2/2 osteoarthritis, GERD, prior bowel obstructions, multiple abdominal surgeries, who presents to the emergency room due to ESBL UTI.    Complicated UTI 2/2:  ESBL Klebsiella UTI, POA  -- Urinalysis with epithelials, but still significant for positive WBCs, nitrates, leukocyte esterase  -- Unable to tolerate p.o. antibiotics due to intractable nausea and vomiting, failed Bactrim outpatient  -- Blood and urine cultures pending  -- Start clear liquid diet as tolerated  - Start meropenem  - Consult ID  -- Vital signs as per unit routine    Intractable nausea and vomiting  - Ongoing since November  - Worse with UTI?  - Zofran ordered  - Clear liquid diet, advance as tolerated  - Getting IV antibiotics as above    History of multiple abdominal surgeries  History of recurrent small bowel obstruction  - CT abdomen pelvis here negative  - No acute issues    Constipation  - Likely due to opiate prescriptions at home for knee  - Unable to tolerate p.o. at this time, may consider lactulose if tolerated    Chronic

## 2025-01-16 NOTE — PLAN OF CARE
Problem: Discharge Planning  Goal: Discharge to home or other facility with appropriate resources  1/16/2025 0410 by Sheree Cote RN  Outcome: Progressing  Flowsheets (Taken 1/15/2025 9837)  Discharge to home or other facility with appropriate resources:   Identify barriers to discharge with patient and caregiver   Arrange for needed discharge resources and transportation as appropriate   Identify discharge learning needs (meds, wound care, etc)   Refer to discharge planning if patient needs post-hospital services based on physician order or complex needs related to functional status, cognitive ability or social support system     Problem: Pain  Goal: Verbalizes/displays adequate comfort level or baseline comfort level  Outcome: Progressing

## 2025-01-16 NOTE — CONSULTS
Infectious Disease Consult    Impression/Plan   Positive urine culture for ESBL Klebsiella pneumoniae.  [facility] was bland.  Urinalysis done at this admission on 1/15/2025 was also bland with 5-10 WBCs per high-power field.  This appears to be a contaminated sample with many epithelial cells.  Culture was not indicated by UA results but nevertheless was sent and is pending.  I have a low suspicion for active urinary tract infection or any other infection..  Patient afebrile with a normal white count.  CT of the abdomen pelvis unrevealing.  Will continue meropenem for now but anticipate stopping antibiotics if urine culture returns negative  Obesity.  BMI is 32  Sulfa allergy.  This caused hives as a child    Anti-infectives:   Meropenem    Subjective:   Date of Consultation:  January 16, 2025  Date of Admission: 1/15/2025   Referring Physician:     Patient is a 54 y.o. female with a past medical history significant for bowel obstruction requiring multiple abdominal surgeries, gastroesophageal reflux disease, and renal calculi who is being seen for positive urine culture.    Patient states that she was treated for a Enterococcus faecalis UTI sometime in November by her PCP.  She was seen again by her PCP on January 10 where she was diagnosed with a UTI.  It appears she received IM ceftriaxone followed by TMP-SMX.  She continues to experience nausea and abdominal pain and antibiotics were changed to ciprofloxacin.  [facility] where a UA was done which was bland.      She presented to [facility] due to ongoing nausea and vomiting and inability to tolerate p.o. antibiotic.        Patient is afebrile with a normal white blood cell count at the time of admission.  UA is bland.  CT of the abdomen pelvis with IV contrast is negative for any acute infectious process.  She has been started on meropenem.  The infectious  nightly    Automatic Reconciliation, Ar   potassium chloride 20 MEQ/15ML (10%) oral solution Take 15 mLs by mouth daily 21   Automatic Reconciliation, Ar     Allergies   Allergen Reactions    Duloxetine Hcl Other (See Comments), Nausea And Vomiting and Seizure     seizure    Sulfa Antibiotics Hives     Childhood per parent    Toradol [Ketorolac Tromethamine] Hives     Not allergic    Buspirone Other (See Comments)    Duloxetine Seizure    Gabapentin Hives    Hydrocodone Hives and Other (See Comments)    Tramadol Seizure    Venlafaxine Seizure    Hydrocodone-Acetaminophen Rash    Meperidine Hcl Nausea Only    Morphine Hives and Nausea And Vomiting    Prochlorperazine Rash        Review of Systems:  A comprehensive review of systems was negative except for that written in the History of Present Illness.    Objective:   Blood pressure 115/69, pulse 71, temperature 97.5 °F (36.4 °C), temperature source Oral, resp. rate 18, height 1.676 m (5' 6\"), weight 91.6 kg (202 lb), SpO2 96%.  Temp (24hrs), Av.6 °F (36.4 °C), Min:97.3 °F (36.3 °C), Max:97.7 °F (36.5 °C)       Exam:    General:  Alert, cooperative, well noursished, well developed, appears stated age   Eyes:  Sclera anicteric.   Mouth/Throat: Mucous membranes normal   Neck: Supple   Lungs:   No distress   CV:     Abdomen:   Nondistended   Extremities: Moves all   Skin: No acute rash on exposed skin   Lymph nodes:    Musculoskeletal:    Lines/Devices:  Peripheral           Data Review:   Recent Results (from the past 24 hour(s))   Lactic Acid    Collection Time: 01/15/25  4:47 PM   Result Value Ref Range    Lactic Acid, Plasma 0.6 0.4 - 2.0 MMOL/L   Phosphorus    Collection Time: 25  4:20 AM   Result Value Ref Range    Phosphorus 3.1 2.6 - 4.7 MG/DL   CBC with Auto Differential    Collection Time: 25  4:20 AM   Result Value Ref Range    WBC 4.7 3.6 - 11.0 K/uL    RBC 3.40 (L) 3.80 - 5.20 M/uL    Hemoglobin 10.2 (L) 11.5 - 16.0 g/dL    Hematocrit

## 2025-01-16 NOTE — PROGRESS NOTES
Hospitalist Progress Note      NAME:  Yuki Bustillos   :  1970  MRM:  [id number]    Date/Time: 2025  7:59 AM           Assessment / Plan:     Yuki Bustillos is a very pleasant 54 y.o. female with a past medical history of anxiety, chronic knee pain 2/2 osteoarthritis, GERD, prior bowel obstructions, multiple abdominal surgeries, who presented for ESBL UTI. [facility] and given bactrim. Symptoms persisted and she was switched to ciprofloxacin which she was unable to tolerate to due N/V. On her mychart, culture results which demonstrate greater than 100,000 colonies of ESBL Klebsiella pneumonia, with sensitivity to Bactrim, intermediate to Cipro, and sensitive to carbapenems     Urinary tract infection   ESBL Klebsiella UTI, POA  - CT abd/pelvis with no acute abnormality. Not septic.   - On IV meropenem   - ID consulted   - Called lab, they will run urine culture      Intractable nausea and vomiting  - Ongoing since November, exacerbated by UTI.   - CT abd/pelvis with no acute abnormality.   - Plan to advance diet today and monitor. Will continue IVF  - Antiemetics PRN   - If symptoms persist despite UTI treatment, will re-image and consult GI, bernadine in light of her extensive surgical history      History of multiple abdominal surgeries &   History of recurrent small bowel obstruction  History of cholecystectomy  Bile acid malabsorption    - No acute issues  - Continue home cholestyramine BID    Hypokalemia   - Likely due to poor absorption in setting of multiple prior abdominal surgeries + acute GI losses as above   - Replete and monitor   - Resume home K on DC     HLD  - Continue home statin      Constipation  - Likely due to opiate prescriptions at home for knee. Also minimal PO intake.   - Reports she is passing gas. CT abd with no intestinal dilatation or wall thickening   - Colace BID, Miralax QD     Chronic knee pain  - Continue home oxycodone

## 2025-01-16 NOTE — ED NOTES
TRANSFER - OUT REPORT:    Verbal report given to Eduardo on Yuki Bustillos  being transferred to [facility] for routine progression of patient care       Report consisted of patient's Situation, Background, Assessment and   Recommendations(SBAR).     Information from the following report(s) Nurse Handoff Report was reviewed with the receiving nurse.    Angie Fall Assessment:    Presents to emergency department  because of falls (Syncope, seizure, or loss of consciousness): No  Age > 70: No  Altered Mental Status, Intoxication with alcohol or substance confusion (Disorientation, impaired judgment, poor safety awaremess, or inability to follow instructions): No  Impaired Mobility: Ambulates or transfers with assistive devices or assistance; Unable to ambulate or transer.: No  Nursing Judgement: No          Lines:   Peripheral IV 01/15/25 Right Antecubital (Active)   Site Assessment Clean, dry & intact 01/15/25 1426   Line Status Blood return noted 01/15/25 1426   Phlebitis Assessment No symptoms 01/15/25 1426   Infiltration Assessment 0 01/15/25 1426        Opportunity for questions and clarification was provided.      Patient transported with:  Monitor

## 2025-01-17 LAB
ANION GAP SERPL CALC-SCNC: 2 MMOL/L (ref 2–12)
BASOPHILS # BLD: 0.02 K/UL (ref 0–0.1)
BASOPHILS NFR BLD: 0.5 % (ref 0–1)
BUN SERPL-MCNC: 5 MG/DL (ref 6–20)
BUN/CREAT SERPL: 7 (ref 12–20)
CALCIUM SERPL-MCNC: 8.6 MG/DL (ref 8.5–10.1)
CHLORIDE SERPL-SCNC: 108 MMOL/L (ref 97–108)
CO2 SERPL-SCNC: 29 MMOL/L (ref 21–32)
CREAT SERPL-MCNC: 0.72 MG/DL (ref 0.55–1.02)
DIFFERENTIAL METHOD BLD: ABNORMAL
EOSINOPHIL # BLD: 0.09 K/UL (ref 0–0.4)
EOSINOPHIL NFR BLD: 2.4 % (ref 0–7)
ERYTHROCYTE [DISTWIDTH] IN BLOOD BY AUTOMATED COUNT: 12.5 % (ref 11.5–14.5)
GLUCOSE SERPL-MCNC: 97 MG/DL (ref 65–100)
HCT VFR BLD AUTO: 33.2 % (ref 35–47)
HGB BLD-MCNC: 11 G/DL (ref 11.5–16)
IMM GRANULOCYTES # BLD AUTO: 0.01 K/UL (ref 0–0.04)
IMM GRANULOCYTES NFR BLD AUTO: 0.3 % (ref 0–0.5)
LYMPHOCYTES # BLD: 1.78 K/UL (ref 0.8–3.5)
LYMPHOCYTES NFR BLD: 47.2 % (ref 12–49)
MAGNESIUM SERPL-MCNC: 1.8 MG/DL (ref 1.6–2.4)
MCH RBC QN AUTO: 30.1 PG (ref 26–34)
MCHC RBC AUTO-ENTMCNC: 33.1 G/DL (ref 30–36.5)
MCV RBC AUTO: 91 FL (ref 80–99)
MONOCYTES # BLD: 0.35 K/UL (ref 0–1)
MONOCYTES NFR BLD: 9.3 % (ref 5–13)
NEUTS SEG # BLD: 1.52 K/UL (ref 1.8–8)
NEUTS SEG NFR BLD: 40.3 % (ref 32–75)
NRBC # BLD: 0 K/UL (ref 0–0.01)
NRBC BLD-RTO: 0 PER 100 WBC
PLATELET # BLD AUTO: 198 K/UL (ref 150–400)
PMV BLD AUTO: 10.7 FL (ref 8.9–12.9)
POTASSIUM SERPL-SCNC: 3.1 MMOL/L (ref 3.5–5.1)
RBC # BLD AUTO: 3.65 M/UL (ref 3.8–5.2)
SODIUM SERPL-SCNC: 139 MMOL/L (ref 136–145)
WBC # BLD AUTO: 3.8 K/UL (ref 3.6–11)

## 2025-01-17 PROCEDURE — 96368 THER/DIAG CONCURRENT INF: CPT

## 2025-01-17 PROCEDURE — 83735 ASSAY OF MAGNESIUM: CPT

## 2025-01-17 PROCEDURE — 6370000000 HC RX 637 (ALT 250 FOR IP): Performed by: INTERNAL MEDICINE

## 2025-01-17 PROCEDURE — 36415 COLL VENOUS BLD VENIPUNCTURE: CPT

## 2025-01-17 PROCEDURE — 96375 TX/PRO/DX INJ NEW DRUG ADDON: CPT

## 2025-01-17 PROCEDURE — 85025 COMPLETE CBC W/AUTO DIFF WBC: CPT

## 2025-01-17 PROCEDURE — 80048 BASIC METABOLIC PNL TOTAL CA: CPT

## 2025-01-17 PROCEDURE — 6370000000 HC RX 637 (ALT 250 FOR IP): Performed by: STUDENT IN AN ORGANIZED HEALTH CARE EDUCATION/TRAINING PROGRAM

## 2025-01-17 PROCEDURE — 6360000002 HC RX W HCPCS: Performed by: INTERNAL MEDICINE

## 2025-01-17 PROCEDURE — 96361 HYDRATE IV INFUSION ADD-ON: CPT

## 2025-01-17 PROCEDURE — 2500000003 HC RX 250 WO HCPCS: Performed by: INTERNAL MEDICINE

## 2025-01-17 PROCEDURE — 96367 TX/PROPH/DG ADDL SEQ IV INF: CPT

## 2025-01-17 PROCEDURE — 96366 THER/PROPH/DIAG IV INF ADDON: CPT

## 2025-01-17 PROCEDURE — 2580000003 HC RX 258: Performed by: INTERNAL MEDICINE

## 2025-01-17 PROCEDURE — G0378 HOSPITAL OBSERVATION PER HR: HCPCS

## 2025-01-17 PROCEDURE — 6360000002 HC RX W HCPCS: Performed by: STUDENT IN AN ORGANIZED HEALTH CARE EDUCATION/TRAINING PROGRAM

## 2025-01-17 RX ORDER — GUAIFENESIN 600 MG/1
600 TABLET, EXTENDED RELEASE ORAL 2 TIMES DAILY
Status: DISCONTINUED | OUTPATIENT
Start: 2025-01-17 | End: 2025-01-18 | Stop reason: HOSPADM

## 2025-01-17 RX ORDER — DIPHENHYDRAMINE HCL 25 MG
25 CAPSULE ORAL ONCE
Status: DISCONTINUED | OUTPATIENT
Start: 2025-01-17 | End: 2025-01-17

## 2025-01-17 RX ORDER — POTASSIUM CHLORIDE 7.45 MG/ML
10 INJECTION INTRAVENOUS
Status: DISPENSED | OUTPATIENT
Start: 2025-01-17 | End: 2025-01-17

## 2025-01-17 RX ORDER — ACYCLOVIR 800 MG/1
400 TABLET ORAL 3 TIMES DAILY
Status: DISCONTINUED | OUTPATIENT
Start: 2025-01-17 | End: 2025-01-18 | Stop reason: HOSPADM

## 2025-01-17 RX ORDER — POTASSIUM CHLORIDE 7.45 MG/ML
10 INJECTION INTRAVENOUS
Status: DISCONTINUED | OUTPATIENT
Start: 2025-01-17 | End: 2025-01-17

## 2025-01-17 RX ADMIN — ALPRAZOLAM 0.5 MG: 0.5 TABLET ORAL at 15:18

## 2025-01-17 RX ADMIN — OXYCODONE HYDROCHLORIDE 15 MG: 10 TABLET ORAL at 23:27

## 2025-01-17 RX ADMIN — MICONAZOLE NITRATE: 20 CREAM TOPICAL at 09:16

## 2025-01-17 RX ADMIN — CHOLESTYRAMINE 4 G: 4 POWDER, FOR SUSPENSION ORAL at 08:09

## 2025-01-17 RX ADMIN — SALINE NASAL SPRAY 2 SPRAY: 1.5 SOLUTION NASAL at 18:51

## 2025-01-17 RX ADMIN — PROMETHAZINE HYDROCHLORIDE 25 MG: 25 TABLET ORAL at 09:14

## 2025-01-17 RX ADMIN — CHOLESTYRAMINE 4 G: 4 POWDER, FOR SUSPENSION ORAL at 11:35

## 2025-01-17 RX ADMIN — ALPRAZOLAM 0.5 MG: 0.5 TABLET ORAL at 21:33

## 2025-01-17 RX ADMIN — ACETAMINOPHEN 650 MG: 325 TABLET ORAL at 17:59

## 2025-01-17 RX ADMIN — OXYCODONE HYDROCHLORIDE 15 MG: 10 TABLET ORAL at 19:22

## 2025-01-17 RX ADMIN — GUAIFENESIN 600 MG: 600 TABLET ORAL at 21:33

## 2025-01-17 RX ADMIN — HYDROMORPHONE HYDROCHLORIDE 0.5 MG: 1 INJECTION, SOLUTION INTRAMUSCULAR; INTRAVENOUS; SUBCUTANEOUS at 14:27

## 2025-01-17 RX ADMIN — SALINE NASAL SPRAY 2 SPRAY: 1.5 SOLUTION NASAL at 23:30

## 2025-01-17 RX ADMIN — MEROPENEM 1000 MG: 1 INJECTION INTRAVENOUS at 22:41

## 2025-01-17 RX ADMIN — OXYCODONE HYDROCHLORIDE 15 MG: 10 TABLET ORAL at 03:03

## 2025-01-17 RX ADMIN — POTASSIUM BICARBONATE 40 MEQ: 782 TABLET, EFFERVESCENT ORAL at 11:35

## 2025-01-17 RX ADMIN — OXYCODONE HYDROCHLORIDE 15 MG: 10 TABLET ORAL at 09:14

## 2025-01-17 RX ADMIN — ATORVASTATIN CALCIUM 40 MG: 20 TABLET, FILM COATED ORAL at 08:08

## 2025-01-17 RX ADMIN — ACYCLOVIR 400 MG: 800 TABLET ORAL at 21:32

## 2025-01-17 RX ADMIN — PROMETHAZINE HYDROCHLORIDE 25 MG: 25 TABLET ORAL at 21:33

## 2025-01-17 RX ADMIN — MICONAZOLE NITRATE: 20 CREAM TOPICAL at 21:34

## 2025-01-17 RX ADMIN — SODIUM CHLORIDE, PRESERVATIVE FREE 10 ML: 5 INJECTION INTRAVENOUS at 21:33

## 2025-01-17 RX ADMIN — MEROPENEM 1000 MG: 1 INJECTION INTRAVENOUS at 14:31

## 2025-01-17 RX ADMIN — OXYCODONE HYDROCHLORIDE 15 MG: 10 TABLET ORAL at 15:17

## 2025-01-17 RX ADMIN — ACYCLOVIR 400 MG: 800 TABLET ORAL at 14:27

## 2025-01-17 RX ADMIN — MEROPENEM 1000 MG: 1 INJECTION INTRAVENOUS at 06:23

## 2025-01-17 RX ADMIN — ALPRAZOLAM 0.5 MG: 0.5 TABLET ORAL at 03:03

## 2025-01-17 RX ADMIN — SODIUM CHLORIDE, PRESERVATIVE FREE 10 ML: 5 INJECTION INTRAVENOUS at 08:09

## 2025-01-17 RX ADMIN — PROMETHAZINE HYDROCHLORIDE 25 MG: 25 TABLET ORAL at 15:17

## 2025-01-17 RX ADMIN — POTASSIUM CHLORIDE 10 MEQ: 7.46 INJECTION, SOLUTION INTRAVENOUS at 18:50

## 2025-01-17 RX ADMIN — POTASSIUM CHLORIDE 10 MEQ: 7.46 INJECTION, SOLUTION INTRAVENOUS at 20:55

## 2025-01-17 RX ADMIN — PROMETHAZINE HYDROCHLORIDE 25 MG: 25 TABLET ORAL at 03:03

## 2025-01-17 RX ADMIN — DOCUSATE SODIUM 100 MG: 100 CAPSULE, LIQUID FILLED ORAL at 21:33

## 2025-01-17 RX ADMIN — ALPRAZOLAM 0.5 MG: 0.5 TABLET ORAL at 09:14

## 2025-01-17 RX ADMIN — ACETAMINOPHEN 650 MG: 325 TABLET ORAL at 00:25

## 2025-01-17 ASSESSMENT — PAIN DESCRIPTION - DESCRIPTORS
DESCRIPTORS: ACHING
DESCRIPTORS: STABBING;ACHING
DESCRIPTORS: THROBBING
DESCRIPTORS: STABBING;SHARP
DESCRIPTORS: ACHING
DESCRIPTORS: STABBING
DESCRIPTORS: STABBING

## 2025-01-17 ASSESSMENT — PAIN DESCRIPTION - ORIENTATION
ORIENTATION: RIGHT;LEFT;POSTERIOR
ORIENTATION: RIGHT;LEFT;LOWER
ORIENTATION: RIGHT;LEFT

## 2025-01-17 ASSESSMENT — PAIN SCALES - GENERAL
PAINLEVEL_OUTOF10: 9
PAINLEVEL_OUTOF10: 8
PAINLEVEL_OUTOF10: 9
PAINLEVEL_OUTOF10: 6
PAINLEVEL_OUTOF10: 6
PAINLEVEL_OUTOF10: 9
PAINLEVEL_OUTOF10: 5
PAINLEVEL_OUTOF10: 6
PAINLEVEL_OUTOF10: 0
PAINLEVEL_OUTOF10: 7
PAINLEVEL_OUTOF10: 9
PAINLEVEL_OUTOF10: 10

## 2025-01-17 ASSESSMENT — PAIN - FUNCTIONAL ASSESSMENT
PAIN_FUNCTIONAL_ASSESSMENT: ACTIVITIES ARE NOT PREVENTED
PAIN_FUNCTIONAL_ASSESSMENT: PREVENTS OR INTERFERES SOME ACTIVE ACTIVITIES AND ADLS

## 2025-01-17 ASSESSMENT — PAIN DESCRIPTION - LOCATION
LOCATION: ABDOMEN;BACK
LOCATION: ABDOMEN
LOCATION: ABDOMEN
LOCATION: ABDOMEN;BACK
LOCATION: KNEE;BACK
LOCATION: BACK;FLANK;ABDOMEN
LOCATION: ABDOMEN;BACK
LOCATION: HEAD

## 2025-01-17 NOTE — PROGRESS NOTES
Hospitalist Progress Note      NAME:  Yuki Bustillos   :  1970  MRM:  [id number]    Date/Time: 2025  7:36 AM           Assessment / Plan:     Yuki Bustillos is a very pleasant 54 y.o. female with a past medical history of anxiety, chronic knee pain 2/2 osteoarthritis, GERD, prior bowel obstructions, multiple abdominal surgeries, who presented for ESBL UTI. [facility] and given bactrim. Symptoms persisted and she was switched to ciprofloxacin which she was unable to tolerate to due N/V. On her mychart, culture results which demonstrate greater than 100,000 colonies of ESBL Klebsiella pneumonia, with sensitivity to Bactrim, intermediate to Cipro, and sensitive to carbapenems     Urinary tract infection   ESBL Klebsiella UTI, POA  - CT abd/pelvis with no acute abnormality. Not septic. UA bland.   - Urine culture 3K GNR, speciation pending  - ID following, will continue IV meropenem until  to complete 3 day course. Then can DC to home.      Intractable nausea and vomiting  - Ongoing since November, exacerbated by UTI.   - CT abd/pelvis with no acute abnormality.   - Tolerating regular diet   - Antiemetics PRN      History of multiple abdominal surgeries &   History of recurrent small bowel obstruction  History of cholecystectomy  Bile acid malabsorption    - No acute issues  - Continue home cholestyramine BID    Hypokalemia   - Likely due to poor absorption in setting of multiple prior abdominal surgeries + acute GI losses as above   - Replete and monitor   - Resume home K on DC     HLD  - Continue home statin      Constipation  - Likely due to opiate prescriptions at home for knee. Also minimal PO intake.   - Reports she is passing gas. CT abd with no intestinal dilatation or wall thickening   - Bowel regimen      Chronic knee pain  - Continue home oxycodone      Chronic anxiety  - Continue home xanax PRN    Oral HSV  - Acyclovir TID x 10 days

## 2025-01-17 NOTE — DISCHARGE INSTRUCTIONS
HOSPITALIST DISCHARGE INSTRUCTIONS  NAME:  Yuki Bustillos   :  1970   MRN:  [id number]     Date/Time:  2025 8:57 AM    ADMIT DATE: 1/15/2025     DISCHARGE DATE: 2025     DISCHARGE DIAGNOSIS:  Urinary tract infection    DISCHARGE INSTRUCTIONS:  Thank you for allowing us to participate in your care. Your discharging Hospitalist is Ricky philip MD. You were admitted for evaluation and treatment of the above.     You were seen by the infectious disease doctor. You received treatment with IV meropenem for the UTI. Please follow up with your primary care doctor in about 1-2 weeks.     Please continue acyclovir three times per day to help with the oral herpes flare.     I have provided you a referral to the rheumatologist for your sweats. Please call to set up an appointment.     Please follow up with the OB doctor, you can find any in your insurance network.     I work note has been provided on MyChart      MEDICATIONS:    It is important that you take the medication exactly as they are prescribed.   Keep your medication in the bottles provided by the pharmacist and keep a list of the medication names, dosages, and times to be taken in your wallet.   Do not take other medications without consulting your doctor.             If you experience any of the following symptoms then please call your primary care physician or return to the emergency room if you cannot get hold of your doctor:  Fever, chills, nausea, vomiting, diarrhea, change in mentation, falling, bleeding, shortness of breath    Follow Up:  Please call the below provider to arrange hospital follow up appointment      Veto Dumont MD  [address] [facility] [address] [phone]    Follow up in 1 week(s)      [facility]  [address] [place] [address] [phone]  Follow up in 1 month(s)        For questions regarding your Hospitalization or to contact the Hospital Medicine team, please call [phone].      Information obtained by :  I understand that if any problems occur once I am at home I am to contact my physician.    I understand and acknowledge receipt of the instructions indicated above.                                                                                                                                           Physician's or R.N.'s Signature                                                                  Date/Time                                                                                                                                              Patient or Representative Signature                                                          Date/Time

## 2025-01-17 NOTE — CARE COORDINATION
Care Management Initial Assessment  1/17/2025 4:40 PM  If patient is discharged prior to next notation, then this note serves as note for discharge by case management.    Reason for Admission:   Generalized abdominal pain [R10.84]  ESBL (extended spectrum beta-lactamase) producing bacteria infection [A49.9, Z16.12]  Diarrhea, unspecified type [R19.7]  Nausea and vomiting, unspecified vomiting type [R11.2]         Patient Admission Status: Observation  Date Admitted to INP:  NA  RUR: No data recorded  Hospitalization in the last 30 days (Readmission):  No        Advance Care Planning:  Code Status: Full Code  Primary Healthcare Decision Maker:     Advance Directive:      __________________________________________________________________________  Assessment:      01/17/25 1550   Service Assessment   Patient Orientation Alert and Oriented;Person;Place;Situation;Self   Cognition Alert   History Provided By Patient   Primary Caregiver Self   Support Systems Parent   PCP Verified by CM Yes  (Dr. Fuad Villasenor)   Last Visit to PCP Within last 3 months   Prior Functional Level Independent in ADLs/IADLs   Current Functional Level Assistance with the following:;Bathing;Dressing;Toileting;Cooking;Housework;Shopping;Mobility   Can patient return to prior living arrangement Unknown at present   Ability to make needs known: Good   Family able to assist with home care needs:   (Parents are elderly)   Financial Resources Other (Comment)  (BCBS)   Community Resources None   Social/Functional History   Lives With Alone   Type of Home Apartment   Home Layout One level   Bathroom Accessibility Accessible   Home Equipment Cane;Walker - Rolling   Receives Help From Family   Prior Level of Assist for ADLs Independent   Prior Level of Assist for Homemaking Independent   Ambulation Assistance Needs assistance   Prior Level of Assist for Transfers Independent   Active  Yes   Mode of Transportation Car   Occupation Full time

## 2025-01-17 NOTE — PROGRESS NOTES
Pt unable to tolerate potassium IV replacement-received verbal order for replacement to be switched to po supplement.

## 2025-01-17 NOTE — PROGRESS NOTES
Ultrasound IV by  Layne Mitchell RN:  Procedure Note    Ultrasound IV education provided to patient. Opportunities for questions given.     Ultrasound used for PIV placement:  20 gauge  AccuCath Ace 5.7cm  left basilic location.  1 X Attempt(s).    Vigorous blood return present and saline flushes with ease.     Procedure tolerated well. Primary RN aware of IV placement and added to LDA.      Huong Sarmiento, RN

## 2025-01-17 NOTE — PLAN OF CARE
Problem: Discharge Planning  Goal: Discharge to home or other facility with appropriate resources  Outcome: Progressing     Problem: Pain  Goal: Verbalizes/displays adequate comfort level or baseline comfort level  1/16/2025 2242 by Deana Harrison, RN  Outcome: Progressing  1/16/2025 1032 by Francisco J Blair, RN  Outcome: Progressing

## 2025-01-18 VITALS
HEIGHT: 66 IN | SYSTOLIC BLOOD PRESSURE: 117 MMHG | WEIGHT: 202 LBS | OXYGEN SATURATION: 97 % | DIASTOLIC BLOOD PRESSURE: 72 MMHG | RESPIRATION RATE: 18 BRPM | HEART RATE: 72 BPM | BODY MASS INDEX: 32.47 KG/M2 | TEMPERATURE: 97.7 F

## 2025-01-18 LAB
ANION GAP SERPL CALC-SCNC: 2 MMOL/L (ref 2–12)
BUN SERPL-MCNC: 6 MG/DL (ref 6–20)
BUN/CREAT SERPL: 8 (ref 12–20)
CALCIUM SERPL-MCNC: 9.1 MG/DL (ref 8.5–10.1)
CHLORIDE SERPL-SCNC: 109 MMOL/L (ref 97–108)
CO2 SERPL-SCNC: 28 MMOL/L (ref 21–32)
CREAT SERPL-MCNC: 0.77 MG/DL (ref 0.55–1.02)
GLUCOSE SERPL-MCNC: 86 MG/DL (ref 65–100)
POTASSIUM SERPL-SCNC: 3.9 MMOL/L (ref 3.5–5.1)
SODIUM SERPL-SCNC: 139 MMOL/L (ref 136–145)

## 2025-01-18 PROCEDURE — 6370000000 HC RX 637 (ALT 250 FOR IP): Performed by: INTERNAL MEDICINE

## 2025-01-18 PROCEDURE — 96368 THER/DIAG CONCURRENT INF: CPT

## 2025-01-18 PROCEDURE — G0378 HOSPITAL OBSERVATION PER HR: HCPCS

## 2025-01-18 PROCEDURE — 2580000003 HC RX 258: Performed by: INTERNAL MEDICINE

## 2025-01-18 PROCEDURE — 6370000000 HC RX 637 (ALT 250 FOR IP): Performed by: STUDENT IN AN ORGANIZED HEALTH CARE EDUCATION/TRAINING PROGRAM

## 2025-01-18 PROCEDURE — 36415 COLL VENOUS BLD VENIPUNCTURE: CPT

## 2025-01-18 PROCEDURE — 80048 BASIC METABOLIC PNL TOTAL CA: CPT

## 2025-01-18 PROCEDURE — 96366 THER/PROPH/DIAG IV INF ADDON: CPT

## 2025-01-18 PROCEDURE — 94761 N-INVAS EAR/PLS OXIMETRY MLT: CPT

## 2025-01-18 PROCEDURE — 2500000003 HC RX 250 WO HCPCS: Performed by: INTERNAL MEDICINE

## 2025-01-18 PROCEDURE — 6360000002 HC RX W HCPCS: Performed by: INTERNAL MEDICINE

## 2025-01-18 RX ORDER — ACYCLOVIR 400 MG/1
400 TABLET ORAL 3 TIMES DAILY
Qty: 27 TABLET | Refills: 0 | Status: SHIPPED | OUTPATIENT
Start: 2025-01-18 | End: 2025-01-27

## 2025-01-18 RX ADMIN — PROMETHAZINE HYDROCHLORIDE 25 MG: 25 TABLET ORAL at 09:40

## 2025-01-18 RX ADMIN — ACYCLOVIR 400 MG: 800 TABLET ORAL at 08:06

## 2025-01-18 RX ADMIN — ATORVASTATIN CALCIUM 40 MG: 20 TABLET, FILM COATED ORAL at 08:04

## 2025-01-18 RX ADMIN — MEROPENEM 1000 MG: 1 INJECTION INTRAVENOUS at 06:17

## 2025-01-18 RX ADMIN — CHOLESTYRAMINE 4 G: 4 POWDER, FOR SUSPENSION ORAL at 08:07

## 2025-01-18 RX ADMIN — ALPRAZOLAM 0.5 MG: 0.5 TABLET ORAL at 09:40

## 2025-01-18 RX ADMIN — ACETAMINOPHEN 650 MG: 325 TABLET ORAL at 06:23

## 2025-01-18 RX ADMIN — DOCUSATE SODIUM 100 MG: 100 CAPSULE, LIQUID FILLED ORAL at 08:05

## 2025-01-18 RX ADMIN — SALINE NASAL SPRAY 2 SPRAY: 1.5 SOLUTION NASAL at 06:11

## 2025-01-18 RX ADMIN — SODIUM CHLORIDE 10 ML/HR: 9 INJECTION, SOLUTION INTRAVENOUS at 06:17

## 2025-01-18 RX ADMIN — GUAIFENESIN 600 MG: 600 TABLET ORAL at 08:04

## 2025-01-18 RX ADMIN — OXYCODONE HYDROCHLORIDE 15 MG: 10 TABLET ORAL at 03:32

## 2025-01-18 RX ADMIN — SODIUM CHLORIDE, PRESERVATIVE FREE 10 ML: 5 INJECTION INTRAVENOUS at 08:09

## 2025-01-18 RX ADMIN — MICONAZOLE NITRATE: 20 CREAM TOPICAL at 08:06

## 2025-01-18 RX ADMIN — ALPRAZOLAM 0.5 MG: 0.5 TABLET ORAL at 03:32

## 2025-01-18 RX ADMIN — ACETAMINOPHEN 650 MG: 325 TABLET ORAL at 00:50

## 2025-01-18 RX ADMIN — OXYCODONE HYDROCHLORIDE 15 MG: 10 TABLET ORAL at 08:04

## 2025-01-18 RX ADMIN — PROMETHAZINE HYDROCHLORIDE 25 MG: 25 TABLET ORAL at 03:32

## 2025-01-18 ASSESSMENT — PAIN SCALES - GENERAL
PAINLEVEL_OUTOF10: 6
PAINLEVEL_OUTOF10: 8
PAINLEVEL_OUTOF10: 7
PAINLEVEL_OUTOF10: 4
PAINLEVEL_OUTOF10: 9

## 2025-01-18 ASSESSMENT — PAIN DESCRIPTION - LOCATION
LOCATION: KNEE
LOCATION: ABDOMEN;BACK;KNEE
LOCATION: ABDOMEN;BACK

## 2025-01-18 ASSESSMENT — PAIN DESCRIPTION - ORIENTATION
ORIENTATION: RIGHT
ORIENTATION: LEFT

## 2025-01-18 ASSESSMENT — PAIN DESCRIPTION - DESCRIPTORS
DESCRIPTORS: ACHING

## 2025-01-18 NOTE — PLAN OF CARE
Problem: Discharge Planning  Goal: Discharge to home or other facility with appropriate resources  Outcome: Adequate for Discharge     Problem: Pain  Goal: Verbalizes/displays adequate comfort level or baseline comfort level  1/18/2025 1131 by Ijeoma Gongora RN  Outcome: Adequate for Discharge  1/17/2025 2344 by Deana Harrison RN  Outcome: Progressing     Problem: Safety - Adult  Goal: Free from fall injury  1/18/2025 1131 by Ijeoma Gongora RN  Outcome: Adequate for Discharge  1/17/2025 2344 by Deana Harrison RN  Outcome: Progressing

## 2025-01-18 NOTE — DISCHARGE SUMMARY
Hospitalist Discharge Summary     Patient ID:  Yuki Bustillos  [id number]  54 y.o.  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 *         Hospital Course:   Yuki Bustillos is a very pleasant 54 y.o. female with a past medical history of anxiety, chronic knee pain 2/2 osteoarthritis, GERD, prior bowel obstructions, multiple abdominal surgeries, who presented for ESBL UTI. She was recently seen (January 10) at [facility] for UTI and given bactrim. Symptoms persisted and she was switched to ciprofloxacin which she was unable to tolerate to due N/V. On her mychart, culture results which demonstrate greater than 100,000 colonies of ESBL Klebsiella pneumonia, with sensitivity to Bactrim, intermediate to Cipro, and sensitive to carbapenems      Urinary tract infection   ESBL Klebsiella UTI, POA  - CT abd/pelvis with no acute abnormality. Not septic. Afebrile. Normal WBC. UA bland.   - Urine culture 3K GNR  - ID followed care, patient completed IV meropenem x 3 days to complete abx course. No further abx needed, bernadine in light of negative culture.      Intractable nausea and vomiting  - Chronic issues due to gastroparesis and prior abdominal surgeries. Exacerbated by UTI.   - CT abd/pelvis with no acute abnormality.   - Tolerating regular diet prior to DC     Hot flashes   - Have been going on for several months. Afebrile.   - Family history of  Conjunctiva/sclera: Conjunctivae normal.   Cardiovascular:      Rate and Rhythm: Normal rate and regular rhythm.      Heart sounds: Normal heart sounds. No murmur heard.  Pulmonary:      Effort: Pulmonary effort is normal.      Breath sounds: No wheezing or rales.   Abdominal:      General: Abdomen is flat. Bowel sounds are normal. There is no distension.      Palpations: Abdomen is soft.      Comments: Normal BS in all quadrants. Soft and non distended.    Musculoskeletal:      Right lower leg: No edema.      Left lower leg: No edema.   Skin:     General: Skin is warm and dry.      Comments: Prior abdominal scars, healed   Neurological:      General: No focal deficit present.   Psychiatric:         Mood and Affect: Mood normal.                 Disposition: home    Patient Instructions:      Medication List        START taking these medications      acyclovir 400 MG tablet  Commonly known as: ZOVIRAX  Take 1 tablet by mouth 3 times daily for 27 doses            CONTINUE taking these medications      ALPRAZolam 0.5 MG tablet  Commonly known as: XANAX     atorvastatin 40 MG tablet  Commonly known as: LIPITOR     cholestyramine light 4 GM/DOSE powder  Commonly known as: PREVALITE     Cyanocobalamin 1000 MCG/ML Liqd     estradiol 1 MG tablet  Commonly known as: ESTRACE     ferrous sulfate 220 (44 Fe) MG/5ML Soln     oxyCODONE 15 MG immediate release tablet  Commonly known as: OXY-IR     potassium chloride 20 MEQ/15ML (10%) oral solution     promethazine 25 MG tablet  Commonly known as: PHENERGAN     vitamin D 1.25 MG (16636 UT) Caps capsule  Commonly known as: ERGOCALCIFEROL            STOP taking these medications      bumetanide 1 MG tablet  Commonly known as: BUMEX     Cipro 500 MG/5ML (10%) suspension  Generic drug: ciprofloxacin               Where to Get Your Medications        These medications were sent to [facility]/pharmacy #[id number] - [place], [place] - [address] [place] [address] - P [phone] - F [phone]

## 2025-01-19 ENCOUNTER — APPOINTMENT (OUTPATIENT)
Facility: HOSPITAL | Age: 55
End: 2025-01-19
Payer: COMMERCIAL

## 2025-01-19 ENCOUNTER — HOSPITAL ENCOUNTER (EMERGENCY)
Facility: HOSPITAL | Age: 55
Discharge: HOME | End: 2025-01-19
Attending: EMERGENCY MEDICINE
Payer: COMMERCIAL

## 2025-01-19 VITALS
SYSTOLIC BLOOD PRESSURE: 140 MMHG | RESPIRATION RATE: 18 BRPM | HEART RATE: 84 BPM | TEMPERATURE: 98.8 F | DIASTOLIC BLOOD PRESSURE: 92 MMHG | OXYGEN SATURATION: 100 %

## 2025-01-19 DIAGNOSIS — R10.9 ABDOMINAL PAIN, UNSPECIFIED ABDOMINAL LOCATION: Primary | ICD-10-CM

## 2025-01-19 LAB
ALBUMIN SERPL-MCNC: 3.4 G/DL (ref 3.5–5)
ALBUMIN/GLOB SERPL: 0.8 (ref 1.1–2.2)
ALP SERPL-CCNC: 97 U/L (ref 45–117)
ALT SERPL-CCNC: 25 U/L (ref 12–78)
ANION GAP SERPL CALC-SCNC: 7 MMOL/L (ref 2–12)
APPEARANCE UR: CLEAR
AST SERPL-CCNC: 21 U/L (ref 15–37)
BACTERIA SPEC CULT: ABNORMAL
BACTERIA URNS QL MICRO: NEGATIVE /HPF
BASOPHILS # BLD: 0.02 K/UL (ref 0–0.1)
BASOPHILS NFR BLD: 0.4 % (ref 0–1)
BILIRUB SERPL-MCNC: 0.4 MG/DL (ref 0.2–1)
BILIRUB UR QL: NEGATIVE
BUN SERPL-MCNC: 6 MG/DL (ref 6–20)
BUN/CREAT SERPL: 8 (ref 12–20)
CALCIUM SERPL-MCNC: 9.3 MG/DL (ref 8.5–10.1)
CC UR VC: ABNORMAL
CHLORIDE SERPL-SCNC: 105 MMOL/L (ref 97–108)
CO2 SERPL-SCNC: 25 MMOL/L (ref 21–32)
COLOR UR: ABNORMAL
CREAT SERPL-MCNC: 0.79 MG/DL (ref 0.55–1.02)
DIFFERENTIAL METHOD BLD: NORMAL
EOSINOPHIL # BLD: 0.06 K/UL (ref 0–0.4)
EOSINOPHIL NFR BLD: 1.3 % (ref 0–7)
EPITH CASTS URNS QL MICRO: ABNORMAL /LPF
ERYTHROCYTE [DISTWIDTH] IN BLOOD BY AUTOMATED COUNT: 12.2 % (ref 11.5–14.5)
FLUAV RNA SPEC QL NAA+PROBE: NOT DETECTED
FLUBV RNA SPEC QL NAA+PROBE: NOT DETECTED
GLOBULIN SER CALC-MCNC: 4.1 G/DL (ref 2–4)
GLUCOSE SERPL-MCNC: 87 MG/DL (ref 65–100)
GLUCOSE UR STRIP.AUTO-MCNC: NEGATIVE MG/DL
HCT VFR BLD AUTO: 39.4 % (ref 35–47)
HGB BLD-MCNC: 12.7 G/DL (ref 11.5–16)
HGB UR QL STRIP: NEGATIVE
HYALINE CASTS URNS QL MICRO: ABNORMAL /LPF (ref 0–2)
IMM GRANULOCYTES # BLD AUTO: 0.01 K/UL (ref 0–0.04)
IMM GRANULOCYTES NFR BLD AUTO: 0.2 % (ref 0–0.5)
KETONES UR QL STRIP.AUTO: ABNORMAL MG/DL
LACTATE SERPL-SCNC: 0.8 MMOL/L (ref 0.4–2)
LEUKOCYTE ESTERASE UR QL STRIP.AUTO: NEGATIVE
LYMPHOCYTES # BLD: 1.52 K/UL (ref 0.8–3.5)
LYMPHOCYTES NFR BLD: 33.1 % (ref 12–49)
MAGNESIUM SERPL-MCNC: 2 MG/DL (ref 1.6–2.4)
MCH RBC QN AUTO: 30.1 PG (ref 26–34)
MCHC RBC AUTO-ENTMCNC: 32.2 G/DL (ref 30–36.5)
MCV RBC AUTO: 93.4 FL (ref 80–99)
MONOCYTES # BLD: 0.34 K/UL (ref 0–1)
MONOCYTES NFR BLD: 7.4 % (ref 5–13)
NEUTS SEG # BLD: 2.64 K/UL (ref 1.8–8)
NEUTS SEG NFR BLD: 57.6 % (ref 32–75)
NITRITE UR QL STRIP.AUTO: NEGATIVE
NRBC # BLD: 0 K/UL (ref 0–0.01)
NRBC BLD-RTO: 0 PER 100 WBC
PH UR STRIP: 7 (ref 5–8)
PLATELET # BLD AUTO: 227 K/UL (ref 150–400)
PMV BLD AUTO: 10.5 FL (ref 8.9–12.9)
POTASSIUM SERPL-SCNC: 3.5 MMOL/L (ref 3.5–5.1)
PROCALCITONIN SERPL-MCNC: <0.05 NG/ML
PROT SERPL-MCNC: 7.5 G/DL (ref 6.4–8.2)
PROT UR STRIP-MCNC: NEGATIVE MG/DL
RBC # BLD AUTO: 4.22 M/UL (ref 3.8–5.2)
RBC #/AREA URNS HPF: ABNORMAL /HPF (ref 0–5)
SARS-COV-2 RNA RESP QL NAA+PROBE: NOT DETECTED
SERVICE CMNT-IMP: ABNORMAL
SODIUM SERPL-SCNC: 137 MMOL/L (ref 136–145)
SOURCE: NORMAL
SP GR UR REFRACTOMETRY: 1.02 (ref 1–1.03)
SPECIMEN HOLD: NORMAL
TROPONIN I SERPL HS-MCNC: 4 NG/L (ref 0–51)
UROBILINOGEN UR QL STRIP.AUTO: 0.2 EU/DL (ref 0.2–1)
WBC # BLD AUTO: 4.6 K/UL (ref 3.6–11)
WBC URNS QL MICRO: ABNORMAL /HPF (ref 0–4)

## 2025-01-19 PROCEDURE — 87040 BLOOD CULTURE FOR BACTERIA: CPT

## 2025-01-19 PROCEDURE — 74177 CT ABD & PELVIS W/CONTRAST: CPT

## 2025-01-19 PROCEDURE — 6360000004 HC RX CONTRAST MEDICATION: Performed by: EMERGENCY MEDICINE

## 2025-01-19 PROCEDURE — 85025 COMPLETE CBC W/AUTO DIFF WBC: CPT

## 2025-01-19 PROCEDURE — 83605 ASSAY OF LACTIC ACID: CPT

## 2025-01-19 PROCEDURE — 83735 ASSAY OF MAGNESIUM: CPT

## 2025-01-19 PROCEDURE — 93005 ELECTROCARDIOGRAM TRACING: CPT | Performed by: EMERGENCY MEDICINE

## 2025-01-19 PROCEDURE — 87086 URINE CULTURE/COLONY COUNT: CPT

## 2025-01-19 PROCEDURE — 94761 N-INVAS EAR/PLS OXIMETRY MLT: CPT

## 2025-01-19 PROCEDURE — 36415 COLL VENOUS BLD VENIPUNCTURE: CPT

## 2025-01-19 PROCEDURE — 80053 COMPREHEN METABOLIC PANEL: CPT

## 2025-01-19 PROCEDURE — 84145 PROCALCITONIN (PCT): CPT

## 2025-01-19 PROCEDURE — 99285 EMERGENCY DEPT VISIT HI MDM: CPT

## 2025-01-19 PROCEDURE — 84484 ASSAY OF TROPONIN QUANT: CPT

## 2025-01-19 PROCEDURE — 87636 SARSCOV2 & INF A&B AMP PRB: CPT

## 2025-01-19 PROCEDURE — 81001 URINALYSIS AUTO W/SCOPE: CPT

## 2025-01-19 RX ORDER — IOPAMIDOL 755 MG/ML
100 INJECTION, SOLUTION INTRAVASCULAR
Status: COMPLETED | OUTPATIENT
Start: 2025-01-19 | End: 2025-01-19

## 2025-01-19 RX ADMIN — IOPAMIDOL 100 ML: 755 INJECTION, SOLUTION INTRAVENOUS at 14:54

## 2025-01-19 ASSESSMENT — PAIN - FUNCTIONAL ASSESSMENT: PAIN_FUNCTIONAL_ASSESSMENT: NONE - DENIES PAIN

## 2025-01-19 NOTE — ED NOTES
Dr. Sandy discussed with patient that she does not meet criteria for admission. Dr Saini wrote pt a work note to return on Tuesday to give time for pt to follow up with PCP. Pt is refusing discharge and stating she needs to again speak with Dr Saini for him to extend the time of absence from work.

## 2025-01-19 NOTE — CONSULTS
[facility]  [address] [place] [address] [phone]    [facility]      Hospitalist Consult    Primary Care Provider: Veto Dumont MD    History:     Yuki Bustillos is a 54 y.o. female who presents with multiple complaints.  Patient was recently admitted to this hospital for possible UTI and was treated with IV antibiotics.  Patient has been treated for UTI multiple times.  She does complain of nausea and vomiting.  She did not have any vomiting episodes while she was in the ER.  Her urine looks negative.  Her WBC is negative.  CT scan of abdomen and pelvis was done which is negative as well.  I had an extensive conversation with the patient.  [facility] also [facility] to the ED which were reviewed with the patient.  There is no acute issue noted while she was in the ED.  When she arrived to the ED had a blood pressure was 140/92, pulse 84, respiratory rate 18, temperature 98.5.  Blood work showed a sodium 137, potassium 3.5, chloride 105, creatinine 0.7.  WBC 4.6, hemoglobin 12.7, platelet count 227.  IMPRESSION:  Fluid-filled colon, evidence for rapid bowel transit.      Review of Systems:    A comprehensive review of systems was negative except for that written in the History of Present Illness.     Past Medical History:   Diagnosis Date    Anxiety     Bowel obstruction (HCC)     Fatty liver     Gall stones     GERD (gastroesophageal reflux disease)     Hematoma     abdomen on the right overy    Hernia of unspecified site of abdominal cavity without mention of obstruction or gangrene     History of vascular access device 07/22/2021    4.5 fr mIDLINE RIGHT BASILIC 13 CM ACCESS    Hx of thromboembolism of vein     right upper brachiel vein    Kidney stones     Menopause     Obesity     WEST?    PUD (peptic ulcer disease) 2016    stomach and colon ulcers?    Seizures (HCC)     me dside effect? last

## 2025-01-19 NOTE — ED NOTES
Pt spoke at length with Dr. Saini about lab and urine results showing no signs of sepsis or admission criteria. Pt unwilling to be discharged. Dr. Saini to consult Dr. Sandy to see if he is willing to admit pt.

## 2025-01-19 NOTE — ED NOTES
Pt provided with work note to return 1/22 and instructed to follow up with PCP. Also instructed to return to ED if symptoms worsen or new symptoms arise.

## 2025-01-19 NOTE — ED TRIAGE NOTES
Pt sts she was seen here at the ER Monday and then came back Wednesday because she had a positive culture of [place] bacteria in her urine.  PT sts she is unable to keep anything down   Pt sts she has had a fever    PT was discharged yesterday and pt sts her PCP sts she needs to still be in the hospital until her culture is negative     PT sts she has history of Sepsis, lymphadema

## 2025-01-19 NOTE — ED PROVIDER NOTES
Making  Patient presents with abdominal pain, nausea, vomiting and concern for sepsis.  Chart review indicates the patient was recently admitted for course of meropenem for urinary tract infection with ESBL.  Differentials include but not limited to sepsis, acute cystitis, bowel obstruction among other differentials.  Here she has normal vital signs and is afebrile.  UA shows no evidence of infection.  Urine culture pending.  Blood cultures pending.  White blood cell count, hemoglobin, liver and renal function all essentially normal.  Procalcitonin, lactic acid are normal.  COVID and flu are negative.  CT abdomen pelvis shows fluid-filled colon without obstruction or evidence of infection.  Discussed patient's results with her extensively.  She states that she feels that she needs to be admitted for IV antibiotics and that she normally does not have lab abnormalities when she is septic.  I explained to her in detail that she did not require admission at this time.  Consulted hospital medicine who evaluated the patient.  They to did not feel that she required admission.  Patient is quite manipulative and I do suspect some degree of underlying psychiatric illness, possibly conversion disorder.     Amount and/or Complexity of Data Reviewed  Labs: ordered.  Radiology: ordered.  ECG/medicine tests: ordered.    Risk  Prescription drug management.         EKG: All EKG's are interpreted by the Emergency Department Physician who either signs or Co-signs this chart in the absence of a cardiologist.    ED Course as of 01/19/25 1653   Sun Jan 19, 2025   1435 EKG shows sinus rhythm at a rate of 82, normal intervals, normal axis with no STEMI [RD]      ED Course User Index  [RD] Thaddeus Saini MD       CRITICAL CARE TIME   Total Critical Care time was 0 minutes, excluding separately reportable procedures.  There was a high probability of clinically significant/life threatening deterioration in the patient's condition which

## 2025-01-20 LAB
BACTERIA SPEC CULT: NORMAL
EKG ATRIAL RATE: 82 BPM
EKG DIAGNOSIS: NORMAL
EKG P AXIS: 36 DEGREES
EKG P-R INTERVAL: 118 MS
EKG Q-T INTERVAL: 392 MS
EKG QRS DURATION: 82 MS
EKG QTC CALCULATION (BAZETT): 457 MS
EKG R AXIS: -7 DEGREES
EKG T AXIS: 5 DEGREES
EKG VENTRICULAR RATE: 82 BPM
SERVICE CMNT-IMP: NORMAL

## 2025-01-20 PROCEDURE — 93010 ELECTROCARDIOGRAM REPORT: CPT | Performed by: STUDENT IN AN ORGANIZED HEALTH CARE EDUCATION/TRAINING PROGRAM

## 2025-01-25 LAB
BACTERIA SPEC CULT: NORMAL
BACTERIA SPEC CULT: NORMAL
SERVICE CMNT-IMP: NORMAL
SERVICE CMNT-IMP: NORMAL

## 2025-01-27 ENCOUNTER — HOSPITAL ENCOUNTER (INPATIENT)
Facility: HOSPITAL | Age: 55
LOS: 3 days | Discharge: HOME | DRG: 696 | End: 2025-02-05
Attending: STUDENT IN AN ORGANIZED HEALTH CARE EDUCATION/TRAINING PROGRAM | Admitting: FAMILY MEDICINE
Payer: COMMERCIAL

## 2025-01-27 DIAGNOSIS — N39.0 URINARY TRACT INFECTION WITHOUT HEMATURIA, SITE UNSPECIFIED: Primary | ICD-10-CM

## 2025-01-27 DIAGNOSIS — R11.10 INTRACTABLE VOMITING: ICD-10-CM

## 2025-01-27 PROBLEM — R11.2 INTRACTABLE NAUSEA AND VOMITING: Status: ACTIVE | Noted: 2025-01-27

## 2025-01-27 LAB
ALBUMIN SERPL-MCNC: 3.3 G/DL (ref 3.5–5)
ALBUMIN/GLOB SERPL: 0.8 (ref 1.1–2.2)
ALP SERPL-CCNC: 104 U/L (ref 45–117)
ALT SERPL-CCNC: 24 U/L (ref 12–78)
ANION GAP SERPL CALC-SCNC: 3 MMOL/L (ref 2–12)
APPEARANCE UR: CLEAR
AST SERPL-CCNC: 29 U/L (ref 15–37)
BACTERIA URNS QL MICRO: NEGATIVE /HPF
BASOPHILS # BLD: 0.03 K/UL (ref 0–0.1)
BASOPHILS NFR BLD: 0.5 % (ref 0–1)
BILIRUB SERPL-MCNC: 0.4 MG/DL (ref 0.2–1)
BILIRUB UR QL: NEGATIVE
BUN SERPL-MCNC: 17 MG/DL (ref 6–20)
BUN/CREAT SERPL: 20 (ref 12–20)
CALCIUM SERPL-MCNC: 9.3 MG/DL (ref 8.5–10.1)
CHLORIDE SERPL-SCNC: 104 MMOL/L (ref 97–108)
CO2 SERPL-SCNC: 31 MMOL/L (ref 21–32)
COLOR UR: ABNORMAL
COMMENT:: NORMAL
CREAT SERPL-MCNC: 0.83 MG/DL (ref 0.55–1.02)
DIFFERENTIAL METHOD BLD: NORMAL
EOSINOPHIL # BLD: 0.07 K/UL (ref 0–0.4)
EOSINOPHIL NFR BLD: 1.2 % (ref 0–7)
EPITH CASTS URNS QL MICRO: ABNORMAL /LPF
ERYTHROCYTE [DISTWIDTH] IN BLOOD BY AUTOMATED COUNT: 12.4 % (ref 11.5–14.5)
GLOBULIN SER CALC-MCNC: 4.1 G/DL (ref 2–4)
GLUCOSE SERPL-MCNC: 104 MG/DL (ref 65–100)
GLUCOSE UR STRIP.AUTO-MCNC: NEGATIVE MG/DL
HCT VFR BLD AUTO: 38 % (ref 35–47)
HGB BLD-MCNC: 12.1 G/DL (ref 11.5–16)
HGB UR QL STRIP: NEGATIVE
IMM GRANULOCYTES # BLD AUTO: 0.02 K/UL (ref 0–0.04)
IMM GRANULOCYTES NFR BLD AUTO: 0.4 % (ref 0–0.5)
KETONES UR QL STRIP.AUTO: ABNORMAL MG/DL
LACTATE BLD-SCNC: 1.11 MMOL/L (ref 0.4–2)
LEUKOCYTE ESTERASE UR QL STRIP.AUTO: NEGATIVE
LYMPHOCYTES # BLD: 1.96 K/UL (ref 0.8–3.5)
LYMPHOCYTES NFR BLD: 34.8 % (ref 12–49)
MCH RBC QN AUTO: 30 PG (ref 26–34)
MCHC RBC AUTO-ENTMCNC: 31.8 G/DL (ref 30–36.5)
MCV RBC AUTO: 94.3 FL (ref 80–99)
MONOCYTES # BLD: 0.45 K/UL (ref 0–1)
MONOCYTES NFR BLD: 8 % (ref 5–13)
MUCOUS THREADS URNS QL MICRO: ABNORMAL /LPF
NEUTS SEG # BLD: 3.1 K/UL (ref 1.8–8)
NEUTS SEG NFR BLD: 55.1 % (ref 32–75)
NITRITE UR QL STRIP.AUTO: NEGATIVE
NRBC # BLD: 0 K/UL (ref 0–0.01)
NRBC BLD-RTO: 0 PER 100 WBC
PH UR STRIP: 6 (ref 5–8)
PLATELET # BLD AUTO: 240 K/UL (ref 150–400)
PMV BLD AUTO: 10.4 FL (ref 8.9–12.9)
POTASSIUM SERPL-SCNC: 3.3 MMOL/L (ref 3.5–5.1)
PROT SERPL-MCNC: 7.4 G/DL (ref 6.4–8.2)
PROT UR STRIP-MCNC: NEGATIVE MG/DL
RBC # BLD AUTO: 4.03 M/UL (ref 3.8–5.2)
RBC #/AREA URNS HPF: ABNORMAL /HPF (ref 0–5)
SODIUM SERPL-SCNC: 138 MMOL/L (ref 136–145)
SP GR UR REFRACTOMETRY: 1.02 (ref 1–1.03)
SPECIMEN HOLD: NORMAL
UROBILINOGEN UR QL STRIP.AUTO: 0.2 EU/DL (ref 0.2–1)
WBC # BLD AUTO: 5.6 K/UL (ref 3.6–11)
WBC URNS QL MICRO: ABNORMAL /HPF (ref 0–4)

## 2025-01-27 PROCEDURE — 96376 TX/PRO/DX INJ SAME DRUG ADON: CPT

## 2025-01-27 PROCEDURE — 80053 COMPREHEN METABOLIC PANEL: CPT

## 2025-01-27 PROCEDURE — 99285 EMERGENCY DEPT VISIT HI MDM: CPT

## 2025-01-27 PROCEDURE — 36415 COLL VENOUS BLD VENIPUNCTURE: CPT

## 2025-01-27 PROCEDURE — 2500000003 HC RX 250 WO HCPCS: Performed by: FAMILY MEDICINE

## 2025-01-27 PROCEDURE — 96361 HYDRATE IV INFUSION ADD-ON: CPT

## 2025-01-27 PROCEDURE — 96375 TX/PRO/DX INJ NEW DRUG ADDON: CPT

## 2025-01-27 PROCEDURE — 87040 BLOOD CULTURE FOR BACTERIA: CPT

## 2025-01-27 PROCEDURE — 83605 ASSAY OF LACTIC ACID: CPT

## 2025-01-27 PROCEDURE — G0378 HOSPITAL OBSERVATION PER HR: HCPCS

## 2025-01-27 PROCEDURE — 96374 THER/PROPH/DIAG INJ IV PUSH: CPT

## 2025-01-27 PROCEDURE — 85025 COMPLETE CBC W/AUTO DIFF WBC: CPT

## 2025-01-27 PROCEDURE — 2580000003 HC RX 258: Performed by: FAMILY MEDICINE

## 2025-01-27 PROCEDURE — 6360000002 HC RX W HCPCS: Performed by: STUDENT IN AN ORGANIZED HEALTH CARE EDUCATION/TRAINING PROGRAM

## 2025-01-27 PROCEDURE — 6360000002 HC RX W HCPCS: Performed by: FAMILY MEDICINE

## 2025-01-27 PROCEDURE — 2580000003 HC RX 258: Performed by: STUDENT IN AN ORGANIZED HEALTH CARE EDUCATION/TRAINING PROGRAM

## 2025-01-27 PROCEDURE — 81001 URINALYSIS AUTO W/SCOPE: CPT

## 2025-01-27 RX ORDER — HYDROMORPHONE HYDROCHLORIDE 1 MG/ML
1 INJECTION, SOLUTION INTRAMUSCULAR; INTRAVENOUS; SUBCUTANEOUS
Status: DISCONTINUED | OUTPATIENT
Start: 2025-01-27 | End: 2025-01-31

## 2025-01-27 RX ORDER — METRONIDAZOLE 7.5 MG/G
GEL VAGINAL EVERY EVENING
Status: DISCONTINUED | OUTPATIENT
Start: 2025-01-27 | End: 2025-02-05 | Stop reason: HOSPADM

## 2025-01-27 RX ORDER — ONDANSETRON 2 MG/ML
4 INJECTION INTRAMUSCULAR; INTRAVENOUS
Status: COMPLETED | OUTPATIENT
Start: 2025-01-27 | End: 2025-01-27

## 2025-01-27 RX ORDER — LACTOBACILLUS RHAMNOSUS GG 10B CELL
1 CAPSULE ORAL
Status: DISCONTINUED | OUTPATIENT
Start: 2025-01-28 | End: 2025-02-05

## 2025-01-27 RX ORDER — ONDANSETRON 2 MG/ML
4 INJECTION INTRAMUSCULAR; INTRAVENOUS EVERY 6 HOURS PRN
Status: DISCONTINUED | OUTPATIENT
Start: 2025-01-27 | End: 2025-01-27

## 2025-01-27 RX ORDER — ATORVASTATIN CALCIUM 20 MG/1
40 TABLET, FILM COATED ORAL DAILY
Status: DISCONTINUED | OUTPATIENT
Start: 2025-01-28 | End: 2025-02-05 | Stop reason: HOSPADM

## 2025-01-27 RX ORDER — PROMETHAZINE HYDROCHLORIDE 25 MG/1
25 TABLET ORAL
Status: DISCONTINUED | OUTPATIENT
Start: 2025-01-27 | End: 2025-01-27

## 2025-01-27 RX ORDER — 0.9 % SODIUM CHLORIDE 0.9 %
1000 INTRAVENOUS SOLUTION INTRAVENOUS ONCE
Status: COMPLETED | OUTPATIENT
Start: 2025-01-27 | End: 2025-01-27

## 2025-01-27 RX ORDER — DIPHENHYDRAMINE HYDROCHLORIDE 50 MG/ML
50 INJECTION INTRAMUSCULAR; INTRAVENOUS EVERY 6 HOURS PRN
Status: DISCONTINUED | OUTPATIENT
Start: 2025-01-27 | End: 2025-02-05 | Stop reason: HOSPADM

## 2025-01-27 RX ORDER — ONDANSETRON 4 MG/1
4 TABLET, ORALLY DISINTEGRATING ORAL EVERY 8 HOURS PRN
Status: DISCONTINUED | OUTPATIENT
Start: 2025-01-27 | End: 2025-02-05 | Stop reason: HOSPADM

## 2025-01-27 RX ORDER — POTASSIUM CHLORIDE 7.45 MG/ML
10 INJECTION INTRAVENOUS PRN
Status: DISCONTINUED | OUTPATIENT
Start: 2025-01-27 | End: 2025-02-05 | Stop reason: HOSPADM

## 2025-01-27 RX ORDER — ONDANSETRON 2 MG/ML
8 INJECTION INTRAMUSCULAR; INTRAVENOUS EVERY 8 HOURS PRN
Status: DISCONTINUED | OUTPATIENT
Start: 2025-01-27 | End: 2025-02-05 | Stop reason: HOSPADM

## 2025-01-27 RX ORDER — ACYCLOVIR 800 MG/1
400 TABLET ORAL 3 TIMES DAILY
Status: COMPLETED | OUTPATIENT
Start: 2025-01-27 | End: 2025-01-29

## 2025-01-27 RX ORDER — SODIUM CHLORIDE 0.9 % (FLUSH) 0.9 %
5-40 SYRINGE (ML) INJECTION EVERY 12 HOURS SCHEDULED
Status: DISCONTINUED | OUTPATIENT
Start: 2025-01-27 | End: 2025-02-05 | Stop reason: HOSPADM

## 2025-01-27 RX ORDER — MAGNESIUM SULFATE IN WATER 40 MG/ML
2000 INJECTION, SOLUTION INTRAVENOUS PRN
Status: DISCONTINUED | OUTPATIENT
Start: 2025-01-27 | End: 2025-02-05 | Stop reason: HOSPADM

## 2025-01-27 RX ORDER — DEXTROSE MONOHYDRATE 100 MG/ML
INJECTION, SOLUTION INTRAVENOUS CONTINUOUS PRN
Status: DISCONTINUED | OUTPATIENT
Start: 2025-01-27 | End: 2025-02-05 | Stop reason: HOSPADM

## 2025-01-27 RX ORDER — SODIUM CHLORIDE 9 MG/ML
INJECTION, SOLUTION INTRAVENOUS PRN
Status: DISCONTINUED | OUTPATIENT
Start: 2025-01-27 | End: 2025-02-05 | Stop reason: HOSPADM

## 2025-01-27 RX ORDER — ACETAMINOPHEN 325 MG/1
650 TABLET ORAL EVERY 6 HOURS PRN
Status: DISCONTINUED | OUTPATIENT
Start: 2025-01-27 | End: 2025-01-29

## 2025-01-27 RX ORDER — LORAZEPAM 2 MG/ML
0.5 INJECTION INTRAMUSCULAR EVERY 8 HOURS PRN
Status: DISCONTINUED | OUTPATIENT
Start: 2025-01-27 | End: 2025-01-29

## 2025-01-27 RX ORDER — ACETAMINOPHEN 650 MG/1
650 SUPPOSITORY RECTAL EVERY 6 HOURS PRN
Status: DISCONTINUED | OUTPATIENT
Start: 2025-01-27 | End: 2025-01-29

## 2025-01-27 RX ORDER — METOCLOPRAMIDE HYDROCHLORIDE 5 MG/ML
5 INJECTION INTRAMUSCULAR; INTRAVENOUS ONCE
Status: DISCONTINUED | OUTPATIENT
Start: 2025-01-27 | End: 2025-01-27

## 2025-01-27 RX ORDER — ONDANSETRON 2 MG/ML
4 INJECTION INTRAMUSCULAR; INTRAVENOUS
Status: DISCONTINUED | OUTPATIENT
Start: 2025-01-27 | End: 2025-01-27

## 2025-01-27 RX ORDER — POTASSIUM CHLORIDE 750 MG/1
40 TABLET, EXTENDED RELEASE ORAL PRN
Status: DISCONTINUED | OUTPATIENT
Start: 2025-01-27 | End: 2025-02-05 | Stop reason: HOSPADM

## 2025-01-27 RX ORDER — SODIUM CHLORIDE 0.9 % (FLUSH) 0.9 %
5-40 SYRINGE (ML) INJECTION PRN
Status: DISCONTINUED | OUTPATIENT
Start: 2025-01-27 | End: 2025-02-05 | Stop reason: HOSPADM

## 2025-01-27 RX ADMIN — MEROPENEM 1000 MG: 1 INJECTION INTRAVENOUS at 22:38

## 2025-01-27 RX ADMIN — SODIUM CHLORIDE, PRESERVATIVE FREE 10 ML: 5 INJECTION INTRAVENOUS at 23:00

## 2025-01-27 RX ADMIN — HYDROMORPHONE HYDROCHLORIDE 0.5 MG: 1 INJECTION, SOLUTION INTRAMUSCULAR; INTRAVENOUS; SUBCUTANEOUS at 22:57

## 2025-01-27 RX ADMIN — SODIUM CHLORIDE 1000 ML: 9 INJECTION, SOLUTION INTRAVENOUS at 21:08

## 2025-01-27 RX ADMIN — ONDANSETRON 4 MG: 2 INJECTION, SOLUTION INTRAMUSCULAR; INTRAVENOUS at 21:53

## 2025-01-27 ASSESSMENT — LIFESTYLE VARIABLES
HOW MANY STANDARD DRINKS CONTAINING ALCOHOL DO YOU HAVE ON A TYPICAL DAY: PATIENT DOES NOT DRINK
HOW OFTEN DO YOU HAVE A DRINK CONTAINING ALCOHOL: NEVER

## 2025-01-27 ASSESSMENT — PAIN SCALES - GENERAL
PAINLEVEL_OUTOF10: 10
PAINLEVEL_OUTOF10: 4
PAINLEVEL_OUTOF10: 8

## 2025-01-27 ASSESSMENT — PAIN DESCRIPTION - LOCATION
LOCATION: KNEE;FLANK
LOCATION: FLANK

## 2025-01-27 ASSESSMENT — PAIN DESCRIPTION - ORIENTATION
ORIENTATION: RIGHT
ORIENTATION: RIGHT

## 2025-01-27 NOTE — ED TRIAGE NOTES
Patient arrives via wheelchair for complaints of a urinary tract infection which has been ongoing for several months. Recent admission and discharge; patient has been on several antibiotics and was told to come back to the ED since \" they aren't working\" . Referred by her PCP after a follow up urinalysis.     Endorses right flank pain and night sweats.

## 2025-01-27 NOTE — ED PROVIDER NOTES
[facility] EMERGENCY DEPARTMENT  EMERGENCY DEPARTMENT ENCOUNTER      Pt Name: Yuki Bustillos  MRN: [id number]  Birthdate 1970  Date of evaluation: 1/27/2025  Provider: Ivan Santoro MD    CHIEF COMPLAINT       Chief Complaint   Patient presents with    Flank Pain       HISTORY OF PRESENT ILLNESS    HPI    Nursing notes reviewed.    REVIEW OF SYSTEMS     Review of Systems  Unless otherwise stated, a complete review of systems was asked of the patient. Pertinent positives are noted in the HPI section.    PAST MEDICAL HISTORY     Past Medical History:   Diagnosis Date    Anxiety     Bowel obstruction (HCC)     Fatty liver     Gall stones     GERD (gastroesophageal reflux disease)     Hematoma     abdomen on the right overy    Hernia of unspecified site of abdominal cavity without mention of obstruction or gangrene     History of vascular access device 07/22/2021    4.5 fr mIDLINE RIGHT BASILIC 13 CM ACCESS    Hx of thromboembolism of vein     right upper brachiel vein    Kidney stones     Menopause     Obesity     WEST?    PUD (peptic ulcer disease) 2016    stomach and colon ulcers?    Seizures (HCC)     me dside effect? last sz 2013       SURGICAL HISTORY       Past Surgical History:   Procedure Laterality Date    APPENDECTOMY  2009    BREAST BIOPSY Left 1999    negative pathology    BREAST BIOPSY Right 2007    negative pathology    COLONOSCOPY N/A 3/24/2017    COLONOSCOPY performed by Stevie Zaragoza MD at [facility] ENDOSCOPY    COLONOSCOPY FLX DX W/COLLJ SPEC WHEN PFRMD  1/14/2011         HERNIA REPAIR  9/2011    laparoscopic incisional hernia repair    HERNIA REPAIR  2010    umbilical hernia repair    HYSTERECTOMY (CERVIX STATUS UNKNOWN)  2009    partial hysterectomy (right ovary removed)    OTHER SURGICAL HISTORY  2009    ileocectomy, bowel resection,     OVARY REMOVAL  2009    removed post op hematoma and right oopherectomy    OVARY REMOVAL  3/2016    mass removed and left oopherectomy    [place]  From: Deborah Maurer  To: ANTONETTE Garzon - CNP  Sent: 8/18/2021 11:42 AM EDT  Subject: Prescription Question    Sumanth Haile,    Do you need me to come into the office, to refill the butabital? I am coming in on August 30 for yearly exam. Do you need me to come in before then?

## 2025-01-28 LAB
ANION GAP SERPL CALC-SCNC: 4 MMOL/L (ref 2–12)
BUN SERPL-MCNC: 15 MG/DL (ref 6–20)
BUN/CREAT SERPL: 22 (ref 12–20)
CALCIUM SERPL-MCNC: 8.9 MG/DL (ref 8.5–10.1)
CHLORIDE SERPL-SCNC: 107 MMOL/L (ref 97–108)
CO2 SERPL-SCNC: 28 MMOL/L (ref 21–32)
CREAT SERPL-MCNC: 0.69 MG/DL (ref 0.55–1.02)
ERYTHROCYTE [DISTWIDTH] IN BLOOD BY AUTOMATED COUNT: 12.5 % (ref 11.5–14.5)
GLUCOSE SERPL-MCNC: 93 MG/DL (ref 65–100)
HCT VFR BLD AUTO: 31.8 % (ref 35–47)
HGB BLD-MCNC: 9.9 G/DL (ref 11.5–16)
MAGNESIUM SERPL-MCNC: 2.1 MG/DL (ref 1.6–2.4)
MCH RBC QN AUTO: 29.4 PG (ref 26–34)
MCHC RBC AUTO-ENTMCNC: 31.1 G/DL (ref 30–36.5)
MCV RBC AUTO: 94.4 FL (ref 80–99)
NRBC # BLD: 0 K/UL (ref 0–0.01)
NRBC BLD-RTO: 0 PER 100 WBC
PLATELET # BLD AUTO: 184 K/UL (ref 150–400)
PMV BLD AUTO: 11 FL (ref 8.9–12.9)
POTASSIUM SERPL-SCNC: 3.4 MMOL/L (ref 3.5–5.1)
RBC # BLD AUTO: 3.37 M/UL (ref 3.8–5.2)
SODIUM SERPL-SCNC: 139 MMOL/L (ref 136–145)
WBC # BLD AUTO: 4.2 K/UL (ref 3.6–11)

## 2025-01-28 PROCEDURE — 6370000000 HC RX 637 (ALT 250 FOR IP): Performed by: FAMILY MEDICINE

## 2025-01-28 PROCEDURE — 2580000003 HC RX 258: Performed by: FAMILY MEDICINE

## 2025-01-28 PROCEDURE — 96375 TX/PRO/DX INJ NEW DRUG ADDON: CPT

## 2025-01-28 PROCEDURE — 80048 BASIC METABOLIC PNL TOTAL CA: CPT

## 2025-01-28 PROCEDURE — G0378 HOSPITAL OBSERVATION PER HR: HCPCS

## 2025-01-28 PROCEDURE — 96366 THER/PROPH/DIAG IV INF ADDON: CPT

## 2025-01-28 PROCEDURE — 96376 TX/PRO/DX INJ SAME DRUG ADON: CPT

## 2025-01-28 PROCEDURE — 83735 ASSAY OF MAGNESIUM: CPT

## 2025-01-28 PROCEDURE — 96365 THER/PROPH/DIAG IV INF INIT: CPT

## 2025-01-28 PROCEDURE — 36415 COLL VENOUS BLD VENIPUNCTURE: CPT

## 2025-01-28 PROCEDURE — 6360000002 HC RX W HCPCS: Performed by: FAMILY MEDICINE

## 2025-01-28 PROCEDURE — 94761 N-INVAS EAR/PLS OXIMETRY MLT: CPT

## 2025-01-28 PROCEDURE — 85027 COMPLETE CBC AUTOMATED: CPT

## 2025-01-28 RX ORDER — ALPRAZOLAM 0.5 MG
0.5 TABLET ORAL 4 TIMES DAILY PRN
Status: DISCONTINUED | OUTPATIENT
Start: 2025-01-28 | End: 2025-02-05 | Stop reason: HOSPADM

## 2025-01-28 RX ORDER — CHOLESTYRAMINE LIGHT 4 G/5.7G
4 POWDER, FOR SUSPENSION ORAL 3 TIMES DAILY
Status: DISCONTINUED | OUTPATIENT
Start: 2025-01-28 | End: 2025-02-05 | Stop reason: HOSPADM

## 2025-01-28 RX ADMIN — SODIUM CHLORIDE 40 MG: 9 INJECTION INTRAMUSCULAR; INTRAVENOUS; SUBCUTANEOUS at 09:24

## 2025-01-28 RX ADMIN — ACYCLOVIR 400 MG: 800 TABLET ORAL at 09:24

## 2025-01-28 RX ADMIN — ACYCLOVIR 400 MG: 800 TABLET ORAL at 00:25

## 2025-01-28 RX ADMIN — HYDROMORPHONE HYDROCHLORIDE 1 MG: 1 INJECTION, SOLUTION INTRAMUSCULAR; INTRAVENOUS; SUBCUTANEOUS at 03:13

## 2025-01-28 RX ADMIN — MEROPENEM 1000 MG: 1 INJECTION INTRAVENOUS at 21:31

## 2025-01-28 RX ADMIN — ONDANSETRON 8 MG: 2 INJECTION INTRAMUSCULAR; INTRAVENOUS at 10:04

## 2025-01-28 RX ADMIN — ACYCLOVIR 400 MG: 800 TABLET ORAL at 14:31

## 2025-01-28 RX ADMIN — METRONIDAZOLE: 7.5 GEL VAGINAL at 17:32

## 2025-01-28 RX ADMIN — Medication 1 CAPSULE: at 14:31

## 2025-01-28 RX ADMIN — ONDANSETRON 8 MG: 2 INJECTION INTRAMUSCULAR; INTRAVENOUS at 21:21

## 2025-01-28 RX ADMIN — HYDROMORPHONE HYDROCHLORIDE 1 MG: 1 INJECTION, SOLUTION INTRAMUSCULAR; INTRAVENOUS; SUBCUTANEOUS at 17:43

## 2025-01-28 RX ADMIN — HYDROMORPHONE HYDROCHLORIDE 0.5 MG: 1 INJECTION, SOLUTION INTRAMUSCULAR; INTRAVENOUS; SUBCUTANEOUS at 10:05

## 2025-01-28 RX ADMIN — METRONIDAZOLE: 7.5 GEL VAGINAL at 00:29

## 2025-01-28 RX ADMIN — HYDROMORPHONE HYDROCHLORIDE 1 MG: 1 INJECTION, SOLUTION INTRAMUSCULAR; INTRAVENOUS; SUBCUTANEOUS at 21:21

## 2025-01-28 RX ADMIN — LORAZEPAM 0.5 MG: 2 INJECTION INTRAMUSCULAR; INTRAVENOUS at 17:41

## 2025-01-28 RX ADMIN — CHOLESTYRAMINE 4 G: 4 POWDER, FOR SUSPENSION ORAL at 17:31

## 2025-01-28 RX ADMIN — HYDROMORPHONE HYDROCHLORIDE 1 MG: 1 INJECTION, SOLUTION INTRAMUSCULAR; INTRAVENOUS; SUBCUTANEOUS at 14:20

## 2025-01-28 RX ADMIN — Medication 1 CAPSULE: at 09:27

## 2025-01-28 RX ADMIN — ATORVASTATIN CALCIUM 40 MG: 20 TABLET, FILM COATED ORAL at 09:24

## 2025-01-28 RX ADMIN — ACYCLOVIR 400 MG: 800 TABLET ORAL at 21:29

## 2025-01-28 RX ADMIN — MEROPENEM 1000 MG: 1 INJECTION INTRAVENOUS at 14:21

## 2025-01-28 RX ADMIN — HYDROMORPHONE HYDROCHLORIDE 0.5 MG: 1 INJECTION, SOLUTION INTRAMUSCULAR; INTRAVENOUS; SUBCUTANEOUS at 00:05

## 2025-01-28 RX ADMIN — Medication 1 CAPSULE: at 17:31

## 2025-01-28 RX ADMIN — LORAZEPAM 0.5 MG: 2 INJECTION INTRAMUSCULAR; INTRAVENOUS at 00:05

## 2025-01-28 RX ADMIN — HYDROMORPHONE HYDROCHLORIDE 1 MG: 1 INJECTION, SOLUTION INTRAMUSCULAR; INTRAVENOUS; SUBCUTANEOUS at 06:35

## 2025-01-28 RX ADMIN — POTASSIUM CHLORIDE 40 MEQ: 750 TABLET, EXTENDED RELEASE ORAL at 06:05

## 2025-01-28 RX ADMIN — MEROPENEM 1000 MG: 1 INJECTION INTRAVENOUS at 05:54

## 2025-01-28 RX ADMIN — LORAZEPAM 0.5 MG: 2 INJECTION INTRAMUSCULAR; INTRAVENOUS at 09:27

## 2025-01-28 ASSESSMENT — PAIN DESCRIPTION - ORIENTATION: ORIENTATION: RIGHT;LEFT

## 2025-01-28 ASSESSMENT — PAIN SCALES - GENERAL
PAINLEVEL_OUTOF10: 10
PAINLEVEL_OUTOF10: 9
PAINLEVEL_OUTOF10: 10
PAINLEVEL_OUTOF10: 9
PAINLEVEL_OUTOF10: 9
PAINLEVEL_OUTOF10: 4
PAINLEVEL_OUTOF10: 9
PAINLEVEL_OUTOF10: 7
PAINLEVEL_OUTOF10: 6
PAINLEVEL_OUTOF10: 3

## 2025-01-28 ASSESSMENT — PAIN SCALES - WONG BAKER: WONGBAKER_NUMERICALRESPONSE: NO HURT

## 2025-01-28 ASSESSMENT — PAIN - FUNCTIONAL ASSESSMENT: PAIN_FUNCTIONAL_ASSESSMENT: 0-10

## 2025-01-28 ASSESSMENT — PAIN DESCRIPTION - LOCATION
LOCATION: GENERALIZED
LOCATION: BACK;KNEE
LOCATION: FLANK
LOCATION: GENERALIZED
LOCATION: FLANK

## 2025-01-28 ASSESSMENT — PAIN DESCRIPTION - DESCRIPTORS: DESCRIPTORS: ACHING

## 2025-01-28 NOTE — ED NOTES
Verbal shift change report given to PACO Lora (oncoming nurse) by PACO Kim (offgoing nurse). Report included the following information Nurse Handoff Report, Index, ED SBAR, MAR, and Recent Results.

## 2025-01-28 NOTE — ED NOTES
Pt requesting pain nausea and anxiety medication. Pt informed room was available and transport would take her upstairs shortly. This was explained to the pt and she verbalized upset at having to wait for pain medication. Rn attempted to educate pt that unsafe to medicate so close to transport. Pt again because upset and asked to speak to charge nurse

## 2025-01-28 NOTE — H&P
Relation Age of Onset    Cancer Maternal Grandmother         colon ca    Breast Cancer Maternal Grandmother     Breast Cancer Cousin         multiple; both sides    Breast Cancer Paternal Grandmother         Social History:     Social History     Tobacco Use    Smoking status: Never    Smokeless tobacco: Never   Substance Use Topics    Alcohol use: Yes     Alcohol/week: 1.0 standard drink of alcohol        Physical Exam:     Vitals:    01/27/25 1943   BP: 123/74   Pulse:    Resp:    Temp:    SpO2:      SpO2 Readings from Last 6 Encounters:   01/27/25 100%   01/19/25 100%   01/18/25 97%   01/13/25 99%   05/11/24 98%   09/18/23 100%        No intake or output data in the 24 hours ending 01/27/25 2138     Physical Exam  Constitutional:       General: She is awake. She is not in acute distress.     Appearance: She is obese. She is not ill-appearing or diaphoretic.   HENT:      Head: Normocephalic and atraumatic.   Eyes:      General: No scleral icterus.  Cardiovascular:      Rate and Rhythm: Normal rate and regular rhythm.      Heart sounds: No murmur heard.  Pulmonary:      Effort: Pulmonary effort is normal. No tachypnea or respiratory distress.      Breath sounds: Normal breath sounds. No decreased breath sounds, wheezing or rhonchi.   Abdominal:      General: Abdomen is flat. Bowel sounds are normal.      Tenderness: There is abdominal tenderness in the suprapubic area. There is right CVA tenderness.   Musculoskeletal:      Right lower leg: Edema present.      Left lower leg: Edema present.   Skin:     General: Skin is warm and dry.      Coloration: Skin is not jaundiced.   Neurological:      Mental Status: She is alert. Mental status is at baseline.   Psychiatric:         Mood and Affect: Mood is anxious.         Behavior: Behavior is cooperative.      Comments: +tearful         Recent Results & Studies:     I have reviewed the following pertinent result(s):     Labs:  Labs Reviewed   COMPREHENSIVE METABOLIC PANEL

## 2025-01-28 NOTE — ED NOTES
Pt informed RN that she was able to ambulate to the restroom without staff assistance. Pt requesting pain and nausea medication for lower back pain. Pt informed that it was not time for medication

## 2025-01-28 NOTE — CARE COORDINATION
1/28/2025  4:30 PM      Care Management Initial Assessment  1/28/2025 4:30 PM  If patient is discharged prior to next notation, then this note serves as note for discharge by case management.    Reason for Admission:   Intractable vomiting [R11.10]  Intractable nausea and vomiting [R11.2]  Urinary tract infection without hematuria, site unspecified [N39.0]         Patient Admission Status: Observation  Date Admitted to INP:   RUR: No data recorded  Hospitalization in the last 30 days (Readmission):  Yes        Advance Care Planning:  Code Status: Full Code  Primary Healthcare Decision Maker: Legal Next of Kin (Megangeovanna Stern  (THOMAS) [phone])   Advance Directive: has NO advanced directive  - add't info requested. Referral to : yes     __________________________________________________________________________  Assessment:      01/28/25 1625   Service Assessment   Patient Orientation Alert and Oriented   Cognition Alert   History Provided By Patient   Primary Caregiver Self   Support Systems Parent   Patient's Healthcare Decision Maker is: Legal Next of Kin  (Megan Stern  (THOMAS) [phone])   PCP Verified by CM Yes  (Fuad Villasenor DO)   Last Visit to PCP Within last 3 months   Prior Functional Level Independent in ADLs/IADLs   Current Functional Level Assistance with the following:;Bathing;Toileting;Dressing;Cooking;Housework;Shopping;Mobility   Can patient return to prior living arrangement Unknown at present   Ability to make needs known: Good   Family able to assist with home care needs: Yes   Financial Resources Other (Comment)  (BCBS)   Community Resources None   Social/Functional History   Lives With Alone   Type of Home Apartment   Home Layout One level   Bathroom Accessibility Accessible   Home Equipment Cane;Walker - Rolling   Receives Help From Family   Prior Level of Assist for ADLs Independent   Prior Level of Assist for Homemaking Independent   Ambulation Assistance Needs assistance   Prior Level of Assist

## 2025-01-28 NOTE — ED NOTES
Pt requesting ativan, pain medicine and nausea medication. Pt informed that it was not time for medication

## 2025-01-28 NOTE — PLAN OF CARE
Problem: Discharge Planning  Goal: Discharge to home or other facility with appropriate resources  Outcome: Progressing     Problem: Safety - Adult  Goal: Free from fall injury  Outcome: Progressing     Problem: Pain  Goal: Verbalizes/displays adequate comfort level or baseline comfort level  Outcome: Progressing      No

## 2025-01-28 NOTE — ED NOTES
Patient medicated for pain and anxiety per MD order. Patient placed in hospital gown in a position of comfort. Po fluids given. Patient's nausea has improved. Patient updated on plan of care

## 2025-01-29 PROBLEM — Z88.1 ALLERGY TO MULTIPLE ANTIBIOTICS: Status: ACTIVE | Noted: 2025-01-29

## 2025-01-29 PROBLEM — Z86.19 HISTORY OF ESBL KLEBSIELLA PNEUMONIAE INFECTION: Status: ACTIVE | Noted: 2025-01-29

## 2025-01-29 PROBLEM — N39.0 URINARY TRACT INFECTION WITHOUT HEMATURIA: Status: ACTIVE | Noted: 2025-01-29

## 2025-01-29 LAB
ANION GAP SERPL CALC-SCNC: 6 MMOL/L (ref 2–12)
BASOPHILS # BLD: 0.01 K/UL (ref 0–0.1)
BASOPHILS NFR BLD: 0.2 % (ref 0–1)
BUN SERPL-MCNC: 8 MG/DL (ref 6–20)
BUN/CREAT SERPL: 9 (ref 12–20)
CALCIUM SERPL-MCNC: 8.9 MG/DL (ref 8.5–10.1)
CHLORIDE SERPL-SCNC: 110 MMOL/L (ref 97–108)
CO2 SERPL-SCNC: 26 MMOL/L (ref 21–32)
COMMENT:: NORMAL
CREAT SERPL-MCNC: 0.87 MG/DL (ref 0.55–1.02)
DIFFERENTIAL METHOD BLD: ABNORMAL
EOSINOPHIL # BLD: 0.08 K/UL (ref 0–0.4)
EOSINOPHIL NFR BLD: 1.9 % (ref 0–7)
ERYTHROCYTE [DISTWIDTH] IN BLOOD BY AUTOMATED COUNT: 12.4 % (ref 11.5–14.5)
GLUCOSE SERPL-MCNC: 110 MG/DL (ref 65–100)
HCT VFR BLD AUTO: 34.7 % (ref 35–47)
HGB BLD-MCNC: 10.9 G/DL (ref 11.5–16)
IMM GRANULOCYTES # BLD AUTO: 0 K/UL (ref 0–0.04)
IMM GRANULOCYTES NFR BLD AUTO: 0 % (ref 0–0.5)
LYMPHOCYTES # BLD: 1.61 K/UL (ref 0.8–3.5)
LYMPHOCYTES NFR BLD: 37.7 % (ref 12–49)
MCH RBC QN AUTO: 29.9 PG (ref 26–34)
MCHC RBC AUTO-ENTMCNC: 31.4 G/DL (ref 30–36.5)
MCV RBC AUTO: 95.3 FL (ref 80–99)
MONOCYTES # BLD: 0.37 K/UL (ref 0–1)
MONOCYTES NFR BLD: 8.7 % (ref 5–13)
NEUTS SEG # BLD: 2.2 K/UL (ref 1.8–8)
NEUTS SEG NFR BLD: 51.5 % (ref 32–75)
NRBC # BLD: 0 K/UL (ref 0–0.01)
NRBC BLD-RTO: 0 PER 100 WBC
PLATELET # BLD AUTO: 205 K/UL (ref 150–400)
PMV BLD AUTO: 10.7 FL (ref 8.9–12.9)
POTASSIUM SERPL-SCNC: 3.5 MMOL/L (ref 3.5–5.1)
RBC # BLD AUTO: 3.64 M/UL (ref 3.8–5.2)
SODIUM SERPL-SCNC: 142 MMOL/L (ref 136–145)
SPECIMEN HOLD: NORMAL
WBC # BLD AUTO: 4.3 K/UL (ref 3.6–11)

## 2025-01-29 PROCEDURE — 6370000000 HC RX 637 (ALT 250 FOR IP): Performed by: FAMILY MEDICINE

## 2025-01-29 PROCEDURE — 85025 COMPLETE CBC W/AUTO DIFF WBC: CPT

## 2025-01-29 PROCEDURE — 6360000002 HC RX W HCPCS: Performed by: FAMILY MEDICINE

## 2025-01-29 PROCEDURE — 96376 TX/PRO/DX INJ SAME DRUG ADON: CPT

## 2025-01-29 PROCEDURE — 94761 N-INVAS EAR/PLS OXIMETRY MLT: CPT

## 2025-01-29 PROCEDURE — 2580000003 HC RX 258: Performed by: FAMILY MEDICINE

## 2025-01-29 PROCEDURE — 96367 TX/PROPH/DG ADDL SEQ IV INF: CPT

## 2025-01-29 PROCEDURE — 99223 1ST HOSP IP/OBS HIGH 75: CPT | Performed by: INTERNAL MEDICINE

## 2025-01-29 PROCEDURE — 80048 BASIC METABOLIC PNL TOTAL CA: CPT

## 2025-01-29 PROCEDURE — 96366 THER/PROPH/DIAG IV INF ADDON: CPT

## 2025-01-29 PROCEDURE — G0378 HOSPITAL OBSERVATION PER HR: HCPCS

## 2025-01-29 PROCEDURE — 2500000003 HC RX 250 WO HCPCS: Performed by: FAMILY MEDICINE

## 2025-01-29 RX ORDER — ACETAMINOPHEN 500 MG
1000 TABLET ORAL EVERY 6 HOURS PRN
Status: DISCONTINUED | OUTPATIENT
Start: 2025-01-29 | End: 2025-02-05 | Stop reason: HOSPADM

## 2025-01-29 RX ORDER — ACETAMINOPHEN 650 MG/1
650 SUPPOSITORY RECTAL EVERY 6 HOURS PRN
Status: DISCONTINUED | OUTPATIENT
Start: 2025-01-29 | End: 2025-02-05 | Stop reason: HOSPADM

## 2025-01-29 RX ORDER — FERROUS SULFATE 220 (44)/5
5 ELIXIR ORAL DAILY
Status: DISCONTINUED | OUTPATIENT
Start: 2025-01-29 | End: 2025-01-29

## 2025-01-29 RX ADMIN — ATORVASTATIN CALCIUM 40 MG: 20 TABLET, FILM COATED ORAL at 08:11

## 2025-01-29 RX ADMIN — ALPRAZOLAM 0.5 MG: 0.5 TABLET ORAL at 02:01

## 2025-01-29 RX ADMIN — HYDROMORPHONE HYDROCHLORIDE 1 MG: 1 INJECTION, SOLUTION INTRAMUSCULAR; INTRAVENOUS; SUBCUTANEOUS at 11:20

## 2025-01-29 RX ADMIN — CHOLESTYRAMINE 4 G: 4 POWDER, FOR SUSPENSION ORAL at 08:11

## 2025-01-29 RX ADMIN — HYDROMORPHONE HYDROCHLORIDE 1 MG: 1 INJECTION, SOLUTION INTRAMUSCULAR; INTRAVENOUS; SUBCUTANEOUS at 17:41

## 2025-01-29 RX ADMIN — ALPRAZOLAM 0.5 MG: 0.5 TABLET ORAL at 14:38

## 2025-01-29 RX ADMIN — HYDROMORPHONE HYDROCHLORIDE 1 MG: 1 INJECTION, SOLUTION INTRAMUSCULAR; INTRAVENOUS; SUBCUTANEOUS at 08:10

## 2025-01-29 RX ADMIN — ACYCLOVIR 400 MG: 800 TABLET ORAL at 08:11

## 2025-01-29 RX ADMIN — Medication 1 CAPSULE: at 08:10

## 2025-01-29 RX ADMIN — ACETAMINOPHEN 1000 MG: 500 TABLET ORAL at 14:37

## 2025-01-29 RX ADMIN — PROMETHAZINE HYDROCHLORIDE 25 MG: 25 INJECTION INTRAMUSCULAR; INTRAVENOUS at 18:38

## 2025-01-29 RX ADMIN — HYDROMORPHONE HYDROCHLORIDE 1 MG: 1 INJECTION, SOLUTION INTRAMUSCULAR; INTRAVENOUS; SUBCUTANEOUS at 14:38

## 2025-01-29 RX ADMIN — SODIUM CHLORIDE, PRESERVATIVE FREE 10 ML: 5 INJECTION INTRAVENOUS at 08:11

## 2025-01-29 RX ADMIN — MEROPENEM 1000 MG: 1 INJECTION INTRAVENOUS at 05:44

## 2025-01-29 RX ADMIN — ONDANSETRON 8 MG: 2 INJECTION INTRAMUSCULAR; INTRAVENOUS at 05:27

## 2025-01-29 RX ADMIN — ALPRAZOLAM 0.5 MG: 0.5 TABLET ORAL at 20:52

## 2025-01-29 RX ADMIN — METRONIDAZOLE: 7.5 GEL VAGINAL at 17:44

## 2025-01-29 RX ADMIN — HYDROMORPHONE HYDROCHLORIDE 1 MG: 1 INJECTION, SOLUTION INTRAMUSCULAR; INTRAVENOUS; SUBCUTANEOUS at 00:53

## 2025-01-29 RX ADMIN — ONDANSETRON 8 MG: 2 INJECTION INTRAMUSCULAR; INTRAVENOUS at 13:26

## 2025-01-29 RX ADMIN — MEROPENEM 1000 MG: 1 INJECTION INTRAVENOUS at 21:33

## 2025-01-29 RX ADMIN — PROMETHAZINE HYDROCHLORIDE 25 MG: 25 INJECTION INTRAMUSCULAR; INTRAVENOUS at 10:41

## 2025-01-29 RX ADMIN — Medication 1 CAPSULE: at 11:20

## 2025-01-29 RX ADMIN — HYDROMORPHONE HYDROCHLORIDE 1 MG: 1 INJECTION, SOLUTION INTRAMUSCULAR; INTRAVENOUS; SUBCUTANEOUS at 20:52

## 2025-01-29 RX ADMIN — HYDROMORPHONE HYDROCHLORIDE 1 MG: 1 INJECTION, SOLUTION INTRAMUSCULAR; INTRAVENOUS; SUBCUTANEOUS at 05:26

## 2025-01-29 RX ADMIN — PROMETHAZINE HYDROCHLORIDE 25 MG: 25 INJECTION INTRAMUSCULAR; INTRAVENOUS at 01:59

## 2025-01-29 RX ADMIN — Medication 1 CAPSULE: at 17:41

## 2025-01-29 RX ADMIN — SODIUM CHLORIDE 300 MG: 9 INJECTION, SOLUTION INTRAVENOUS at 13:31

## 2025-01-29 RX ADMIN — ALPRAZOLAM 0.5 MG: 0.5 TABLET ORAL at 08:11

## 2025-01-29 RX ADMIN — SODIUM CHLORIDE 40 MG: 9 INJECTION INTRAMUSCULAR; INTRAVENOUS; SUBCUTANEOUS at 08:10

## 2025-01-29 RX ADMIN — MEROPENEM 1000 MG: 1 INJECTION INTRAVENOUS at 15:23

## 2025-01-29 ASSESSMENT — PAIN DESCRIPTION - ORIENTATION
ORIENTATION: RIGHT;LEFT
ORIENTATION: LEFT
ORIENTATION: RIGHT;LEFT
ORIENTATION: LEFT

## 2025-01-29 ASSESSMENT — PAIN DESCRIPTION - DESCRIPTORS
DESCRIPTORS: THROBBING
DESCRIPTORS: THROBBING
DESCRIPTORS: CRAMPING
DESCRIPTORS: CRAMPING;DISCOMFORT

## 2025-01-29 ASSESSMENT — PAIN SCALES - GENERAL
PAINLEVEL_OUTOF10: 0
PAINLEVEL_OUTOF10: 8
PAINLEVEL_OUTOF10: 10

## 2025-01-29 ASSESSMENT — PAIN DESCRIPTION - LOCATION
LOCATION: ABDOMEN;KNEE
LOCATION: KNEE
LOCATION: ABDOMEN
LOCATION: KNEE
LOCATION: FLANK
LOCATION: FLANK;ABDOMEN

## 2025-01-29 NOTE — CONSULTS
Infectious Disease Consult    Impression/Plan   Recent positive urine culture for an ESBL producing Klebsiella pneumonia.  UA at this admission bland.  She is afebrile with a normal white count.  CT scanning of the abdomen pelvis from 1/13, 1/15, and 1/19 2025 all negative for any acute infectious process.  Urine culture from 1/19 with no growth at final reading.  Very low suspicion for ongoing UTI at this time.  Repeat urine culture today.  Further recommendations to follow  Bacterial vaginosis.  Continue topical MetroGel  Sulfa allergy  Obesity.  BMI is 36    Anti-infectives:   Meropenem    Subjective:   Date of Consultation:  January 29, 2025  Date of Admission: 1/27/2025   Referring Physician:     Patient is a 54 y.o. female with past medical history significant for obesity, anxiety, multiple abdominal surgeries, and ulcerative colitis who is being seen for abnormal urine culture    Patient complains of recurrent UTIs ongoing since November of last year.      Per chart records from last admission: \"She was recently seen (January 10) at [facility] for UTI and given bactrim. Symptoms persisted and she was switched to ciprofloxacin which she was unable to tolerate to due N/V. On her mychart, culture results which demonstrate greater than 100,000 colonies of ESBL Klebsiella pneumonia, with sensitivity to Bactrim, intermediate to Cipro, and sensitive to carbapenems\"    She was hospitalized from 1/15 through 1/18 at [facility] where  urine cultures grew 3000 colonies of and ESBL producing Klebsiella pneumoniae.  UA taken at the time of culture was bland with 5-10 WBCs per high-power field.  CT of the abdomen and pelvis from 1/15 was negative for any acute abnormality.   She was afebrile with a normal white count throughout that admission..  Patient completed a 3-day course of treatment with meropenem and was discharged with no further antibiotics    Urine culture done 9/19 revealed no significant

## 2025-01-29 NOTE — PLAN OF CARE
Problem: Discharge Planning  Goal: Discharge to home or other facility with appropriate resources  1/29/2025 0548 by Lucia Morgan RN  Outcome: Progressing     Problem: Safety - Adult  Goal: Free from fall injury  1/29/2025 0548 by Lucia Morgan RN  Outcome: Progressing     Problem: Pain  Goal: Verbalizes/displays adequate comfort level or baseline comfort level  1/29/2025 0548 by Lucia Morgan RN  Outcome: Progressing     Problem: Gastrointestinal - Adult  Goal: Minimal or absence of nausea and vomiting  Outcome: Progressing

## 2025-01-29 NOTE — ACP (ADVANCE CARE PLANNING)
Advance Care Planning     Advance Care Planning Inpatient Note  [facility] Department    Today's Date: 1/29/2025  Unit: [facility] 2    Received request from IDT Member.  Upon review of chart and communication with care team, patient's decision making abilities are not in question.. Patient was/were present in the room during visit.    Goals of ACP Conversation:  Discuss advance care planning documents    Health Care Decision Makers:     No healthcare decision makers have been documented.    Summary:  No Decision Maker named by patient at this time    Advance Care Planning Documents (Patient Wishes):  None     Assessment:   visit for the patient with an Advance Medical Directive (AMD) consult. Reviewed pt's chart and spoke with pt's nurse. Reviewed AMD. Pt offered she is familiar with the document and does not wish to complete an AMD at this time. Left the AMD with a note sharing how to contact a  if/when she decides to complete the form or desires more information.     Interventions:  Provided education on documents for clarity and greater understanding  Reviewed but did not complete ACP document    Care Preferences Communicated:   No    Outcomes/Plan:  ACP Discussion: Completed    Electronically signed by KAREN Hoover on 1/29/2025 at 10:15 AM              1-2 drinks

## 2025-01-29 NOTE — CARE COORDINATION
Care Management Progress Note      Reason for Admission:   Intractable vomiting [R11.10]  Intractable nausea and vomiting [R11.2]  Urinary tract infection without hematuria, site unspecified [N39.0]         Patient Admission Status: Observation  RUR: n/a  Hospitalization in the last 30 days (Readmission):  Yes          Transition of care plan:  Patient was discussed in IDR and continues to be medically managed. ID has been consulted.     Dispo: Patient has been staying with her parents since discharge on 1/18 due to weakness. Family has been assisting patient with ADL's. Patient has a pass on her egress, therapy is not ordered. CM is following for ID recommendations.     Discharge plan communicated with patient and/or discharge caregiver: Yes.      Date 1st IMM letter given: N/a. Patient has a commercial insurance plan.     Outpatient follow-up.    Transport at discharge: family.        ___________________________________________   Yolanda Howell RN Case Manager  1/29/2025   3:00 PM

## 2025-01-29 NOTE — PLAN OF CARE
Problem: Discharge Planning  Goal: Discharge to home or other facility with appropriate resources  1/29/2025 1052 by Kristina Garcia RN  Outcome: Progressing  1/29/2025 0548 by Lucia Morgan RN  Outcome: Progressing     Problem: Safety - Adult  Goal: Free from fall injury  1/29/2025 1052 by Kristina Garcia RN  Outcome: Progressing  1/29/2025 0548 by Lucia Morgan RN  Outcome: Progressing     Problem: Pain  Goal: Verbalizes/displays adequate comfort level or baseline comfort level  1/29/2025 1052 by Kristina Garcia RN  Outcome: Progressing  1/29/2025 0548 by Lucia Morgan RN  Outcome: Progressing     Problem: Gastrointestinal - Adult  Goal: Minimal or absence of nausea and vomiting  1/29/2025 1052 by Kristina Garcia RN  Outcome: Progressing  1/29/2025 0548 by Lucia Morgan RN  Outcome: Progressing

## 2025-01-30 PROCEDURE — G0378 HOSPITAL OBSERVATION PER HR: HCPCS

## 2025-01-30 PROCEDURE — 87186 SC STD MICRODIL/AGAR DIL: CPT

## 2025-01-30 PROCEDURE — 6370000000 HC RX 637 (ALT 250 FOR IP): Performed by: FAMILY MEDICINE

## 2025-01-30 PROCEDURE — 87086 URINE CULTURE/COLONY COUNT: CPT

## 2025-01-30 PROCEDURE — 6360000002 HC RX W HCPCS: Performed by: FAMILY MEDICINE

## 2025-01-30 PROCEDURE — 2500000003 HC RX 250 WO HCPCS: Performed by: FAMILY MEDICINE

## 2025-01-30 PROCEDURE — 87088 URINE BACTERIA CULTURE: CPT

## 2025-01-30 PROCEDURE — 2580000003 HC RX 258: Performed by: FAMILY MEDICINE

## 2025-01-30 PROCEDURE — 96376 TX/PRO/DX INJ SAME DRUG ADON: CPT

## 2025-01-30 PROCEDURE — 94761 N-INVAS EAR/PLS OXIMETRY MLT: CPT

## 2025-01-30 PROCEDURE — 96366 THER/PROPH/DIAG IV INF ADDON: CPT

## 2025-01-30 RX ADMIN — PROMETHAZINE HYDROCHLORIDE 25 MG: 25 INJECTION INTRAMUSCULAR; INTRAVENOUS at 23:11

## 2025-01-30 RX ADMIN — HYDROMORPHONE HYDROCHLORIDE 1 MG: 1 INJECTION, SOLUTION INTRAMUSCULAR; INTRAVENOUS; SUBCUTANEOUS at 14:50

## 2025-01-30 RX ADMIN — HYDROMORPHONE HYDROCHLORIDE 1 MG: 1 INJECTION, SOLUTION INTRAMUSCULAR; INTRAVENOUS; SUBCUTANEOUS at 11:41

## 2025-01-30 RX ADMIN — METRONIDAZOLE: 7.5 GEL VAGINAL at 16:54

## 2025-01-30 RX ADMIN — MEROPENEM 1000 MG: 1 INJECTION INTRAVENOUS at 05:28

## 2025-01-30 RX ADMIN — SODIUM CHLORIDE, PRESERVATIVE FREE 10 ML: 5 INJECTION INTRAVENOUS at 08:41

## 2025-01-30 RX ADMIN — ALPRAZOLAM 0.5 MG: 0.5 TABLET ORAL at 14:50

## 2025-01-30 RX ADMIN — HYDROMORPHONE HYDROCHLORIDE 1 MG: 1 INJECTION, SOLUTION INTRAMUSCULAR; INTRAVENOUS; SUBCUTANEOUS at 23:53

## 2025-01-30 RX ADMIN — CHOLESTYRAMINE 4 G: 4 POWDER, FOR SUSPENSION ORAL at 08:33

## 2025-01-30 RX ADMIN — HYDROMORPHONE HYDROCHLORIDE 1 MG: 1 INJECTION, SOLUTION INTRAMUSCULAR; INTRAVENOUS; SUBCUTANEOUS at 00:13

## 2025-01-30 RX ADMIN — HYDROMORPHONE HYDROCHLORIDE 1 MG: 1 INJECTION, SOLUTION INTRAMUSCULAR; INTRAVENOUS; SUBCUTANEOUS at 20:28

## 2025-01-30 RX ADMIN — PROMETHAZINE HYDROCHLORIDE 25 MG: 25 INJECTION INTRAMUSCULAR; INTRAVENOUS at 14:55

## 2025-01-30 RX ADMIN — ONDANSETRON 8 MG: 2 INJECTION INTRAMUSCULAR; INTRAVENOUS at 16:50

## 2025-01-30 RX ADMIN — MEROPENEM 1000 MG: 1 INJECTION INTRAVENOUS at 23:57

## 2025-01-30 RX ADMIN — OXYCODONE HYDROCHLORIDE 15 MG: 10 TABLET ORAL at 19:13

## 2025-01-30 RX ADMIN — SODIUM CHLORIDE, PRESERVATIVE FREE 10 ML: 5 INJECTION INTRAVENOUS at 20:28

## 2025-01-30 RX ADMIN — ALPRAZOLAM 0.5 MG: 0.5 TABLET ORAL at 02:24

## 2025-01-30 RX ADMIN — ONDANSETRON 8 MG: 2 INJECTION INTRAMUSCULAR; INTRAVENOUS at 00:13

## 2025-01-30 RX ADMIN — ONDANSETRON 8 MG: 2 INJECTION INTRAMUSCULAR; INTRAVENOUS at 08:33

## 2025-01-30 RX ADMIN — Medication 1 CAPSULE: at 08:34

## 2025-01-30 RX ADMIN — ATORVASTATIN CALCIUM 40 MG: 20 TABLET, FILM COATED ORAL at 08:34

## 2025-01-30 RX ADMIN — ALPRAZOLAM 0.5 MG: 0.5 TABLET ORAL at 20:29

## 2025-01-30 RX ADMIN — CHOLESTYRAMINE 4 G: 4 POWDER, FOR SUSPENSION ORAL at 20:30

## 2025-01-30 RX ADMIN — MEROPENEM 1000 MG: 1 INJECTION INTRAVENOUS at 15:23

## 2025-01-30 RX ADMIN — PROMETHAZINE HYDROCHLORIDE 25 MG: 25 INJECTION INTRAMUSCULAR; INTRAVENOUS at 06:58

## 2025-01-30 RX ADMIN — HYDROMORPHONE HYDROCHLORIDE 1 MG: 1 INJECTION, SOLUTION INTRAMUSCULAR; INTRAVENOUS; SUBCUTANEOUS at 05:12

## 2025-01-30 RX ADMIN — HYDROMORPHONE HYDROCHLORIDE 1 MG: 1 INJECTION, SOLUTION INTRAMUSCULAR; INTRAVENOUS; SUBCUTANEOUS at 17:22

## 2025-01-30 RX ADMIN — Medication 1 CAPSULE: at 11:44

## 2025-01-30 RX ADMIN — HYDROMORPHONE HYDROCHLORIDE 1 MG: 1 INJECTION, SOLUTION INTRAMUSCULAR; INTRAVENOUS; SUBCUTANEOUS at 08:41

## 2025-01-30 RX ADMIN — ALPRAZOLAM 0.5 MG: 0.5 TABLET ORAL at 08:33

## 2025-01-30 ASSESSMENT — PAIN DESCRIPTION - DESCRIPTORS
DESCRIPTORS: THROBBING
DESCRIPTORS: ACHING
DESCRIPTORS: THROBBING

## 2025-01-30 ASSESSMENT — PAIN DESCRIPTION - LOCATION
LOCATION: FLANK
LOCATION: GENERALIZED
LOCATION: KNEE
LOCATION: KNEE
LOCATION: KNEE;BACK
LOCATION: FLANK
LOCATION: ABDOMEN;KNEE;BACK
LOCATION: FLANK

## 2025-01-30 ASSESSMENT — PAIN DESCRIPTION - ORIENTATION
ORIENTATION: RIGHT;LEFT
ORIENTATION: LEFT
ORIENTATION: LEFT;RIGHT
ORIENTATION: RIGHT;LEFT
ORIENTATION: LEFT
ORIENTATION: RIGHT;LEFT

## 2025-01-30 ASSESSMENT — PAIN SCALES - GENERAL
PAINLEVEL_OUTOF10: 10
PAINLEVEL_OUTOF10: 9
PAINLEVEL_OUTOF10: 9
PAINLEVEL_OUTOF10: 8
PAINLEVEL_OUTOF10: 10
PAINLEVEL_OUTOF10: 9

## 2025-01-30 NOTE — PLAN OF CARE
Problem: Discharge Planning  Goal: Discharge to home or other facility with appropriate resources  1/30/2025 0524 by Lucia Morgan RN  Outcome: Progressing    Problem: Safety - Adult  Goal: Free from fall injury  1/30/2025 0524 by Lucia Morgan RN  Outcome: Progressing    Problem: Pain  Goal: Verbalizes/displays adequate comfort level or baseline comfort level  1/30/2025 0524 by Lucia Morgan RN  Outcome: Progressing     Problem: Gastrointestinal - Adult  Goal: Minimal or absence of nausea and vomiting  1/30/2025 0524 by Lucia Morgan RN  Outcome: Progressing

## 2025-01-30 NOTE — CARE COORDINATION
Care Management Progress Note      Reason for Admission:   Intractable vomiting [R11.10]  Intractable nausea and vomiting [R11.2]  Urinary tract infection without hematuria, site unspecified [N39.0]         Patient Admission Status: Observation  RUR: n/a  Hospitalization in the last 30 days (Readmission):  Yes        Transition of care plan:  Patient was discussed in IDR and continues to be medically managed. ID has been consulted. Patient has urine cultures pending. Patient is receiving IV abx and IV pain medication.     Dispo: Patient has been staying with her parents since discharge on 1/18 due to weakness. Family has been assisting patient with ADL's. Patient has a fail on her egress, therapy is not ordered. CM is following for ID recommendations.     Discharge plan communicated with patient and/or discharge caregiver: Yes .    Date 1st IMM letter given: N/a. Patient has a commercial insurance plan.     Outpatient follow-up.    Transport at discharge: family.         ___________________________________________   Yolanda Howell RN Case Manager  1/30/2025   5:13 PM

## 2025-01-30 NOTE — PLAN OF CARE
Problem: Discharge Planning  Goal: Discharge to home or other facility with appropriate resources  1/30/2025 1039 by Kristina Garcia RN  Outcome: Progressing  1/30/2025 0524 by Lucia Morgan RN  Outcome: Progressing  1/30/2025 0022 by Lucia Morgan RN  Outcome: Progressing     Problem: Safety - Adult  Goal: Free from fall injury  1/30/2025 1039 by Kristina Garcia RN  Outcome: Progressing  1/30/2025 0524 by Lucia Morgan RN  Outcome: Progressing  1/30/2025 0022 by Lucia Morgan RN  Outcome: Progressing     Problem: Pain  Goal: Verbalizes/displays adequate comfort level or baseline comfort level  1/30/2025 1039 by Kristina Garcia RN  Outcome: Progressing  1/30/2025 0524 by Lucia Morgan RN  Outcome: Progressing  1/30/2025 0022 by Lucia Morgan RN  Outcome: Progressing     Problem: Gastrointestinal - Adult  Goal: Minimal or absence of nausea and vomiting  1/30/2025 1039 by Kristina Garcia RN  Outcome: Progressing  1/30/2025 0524 by Lucia Morgan RN  Outcome: Progressing  1/30/2025 0022 by Lucia Morgan RN  Outcome: Progressing

## 2025-01-31 LAB
BASOPHILS # BLD: 0.02 K/UL (ref 0–0.1)
BASOPHILS NFR BLD: 0.5 % (ref 0–1)
DIFFERENTIAL METHOD BLD: ABNORMAL
EOSINOPHIL # BLD: 0.11 K/UL (ref 0–0.4)
EOSINOPHIL NFR BLD: 2.8 % (ref 0–7)
ERYTHROCYTE [DISTWIDTH] IN BLOOD BY AUTOMATED COUNT: 12.3 % (ref 11.5–14.5)
HCT VFR BLD AUTO: 34.3 % (ref 35–47)
HGB BLD-MCNC: 10.9 G/DL (ref 11.5–16)
IMM GRANULOCYTES # BLD AUTO: 0.01 K/UL (ref 0–0.04)
IMM GRANULOCYTES NFR BLD AUTO: 0.3 % (ref 0–0.5)
LYMPHOCYTES # BLD: 1.72 K/UL (ref 0.8–3.5)
LYMPHOCYTES NFR BLD: 43.5 % (ref 12–49)
MCH RBC QN AUTO: 30.7 PG (ref 26–34)
MCHC RBC AUTO-ENTMCNC: 31.8 G/DL (ref 30–36.5)
MCV RBC AUTO: 96.6 FL (ref 80–99)
MONOCYTES # BLD: 0.41 K/UL (ref 0–1)
MONOCYTES NFR BLD: 10.4 % (ref 5–13)
NEUTS SEG # BLD: 1.68 K/UL (ref 1.8–8)
NEUTS SEG NFR BLD: 42.5 % (ref 32–75)
NRBC # BLD: 0 K/UL (ref 0–0.01)
NRBC BLD-RTO: 0 PER 100 WBC
PLATELET # BLD AUTO: 173 K/UL (ref 150–400)
PMV BLD AUTO: 10.6 FL (ref 8.9–12.9)
RBC # BLD AUTO: 3.55 M/UL (ref 3.8–5.2)
WBC # BLD AUTO: 4 K/UL (ref 3.6–11)

## 2025-01-31 PROCEDURE — 6370000000 HC RX 637 (ALT 250 FOR IP): Performed by: FAMILY MEDICINE

## 2025-01-31 PROCEDURE — 2500000003 HC RX 250 WO HCPCS

## 2025-01-31 PROCEDURE — 96375 TX/PRO/DX INJ NEW DRUG ADDON: CPT

## 2025-01-31 PROCEDURE — G0378 HOSPITAL OBSERVATION PER HR: HCPCS

## 2025-01-31 PROCEDURE — 6360000002 HC RX W HCPCS: Performed by: FAMILY MEDICINE

## 2025-01-31 PROCEDURE — 2500000003 HC RX 250 WO HCPCS: Performed by: FAMILY MEDICINE

## 2025-01-31 PROCEDURE — 94761 N-INVAS EAR/PLS OXIMETRY MLT: CPT

## 2025-01-31 PROCEDURE — 2580000003 HC RX 258

## 2025-01-31 PROCEDURE — 85025 COMPLETE CBC W/AUTO DIFF WBC: CPT

## 2025-01-31 PROCEDURE — 2580000003 HC RX 258: Performed by: FAMILY MEDICINE

## 2025-01-31 PROCEDURE — 96376 TX/PRO/DX INJ SAME DRUG ADON: CPT

## 2025-01-31 PROCEDURE — 96366 THER/PROPH/DIAG IV INF ADDON: CPT

## 2025-01-31 RX ADMIN — METRONIDAZOLE: 7.5 GEL VAGINAL at 16:56

## 2025-01-31 RX ADMIN — ONDANSETRON 8 MG: 2 INJECTION INTRAMUSCULAR; INTRAVENOUS at 11:03

## 2025-01-31 RX ADMIN — MEROPENEM 1000 MG: 1 INJECTION INTRAVENOUS at 14:09

## 2025-01-31 RX ADMIN — OXYCODONE HYDROCHLORIDE 15 MG: 10 TABLET ORAL at 06:57

## 2025-01-31 RX ADMIN — ONDANSETRON 8 MG: 2 INJECTION INTRAMUSCULAR; INTRAVENOUS at 01:15

## 2025-01-31 RX ADMIN — ALPRAZOLAM 0.5 MG: 0.5 TABLET ORAL at 17:53

## 2025-01-31 RX ADMIN — OXYCODONE HYDROCHLORIDE 15 MG: 10 TABLET ORAL at 21:03

## 2025-01-31 RX ADMIN — ALPRAZOLAM 0.5 MG: 0.5 TABLET ORAL at 11:07

## 2025-01-31 RX ADMIN — Medication 1 CAPSULE: at 13:34

## 2025-01-31 RX ADMIN — ALPRAZOLAM 0.5 MG: 0.5 TABLET ORAL at 02:39

## 2025-01-31 RX ADMIN — PROMETHAZINE HYDROCHLORIDE 25 MG: 25 INJECTION INTRAMUSCULAR; INTRAVENOUS at 06:57

## 2025-01-31 RX ADMIN — SODIUM CHLORIDE, PRESERVATIVE FREE 20 MG: 5 INJECTION INTRAVENOUS at 01:16

## 2025-01-31 RX ADMIN — Medication 1 CAPSULE: at 16:55

## 2025-01-31 RX ADMIN — ATORVASTATIN CALCIUM 40 MG: 20 TABLET, FILM COATED ORAL at 09:33

## 2025-01-31 RX ADMIN — CHOLESTYRAMINE 4 G: 4 POWDER, FOR SUSPENSION ORAL at 09:33

## 2025-01-31 RX ADMIN — HYDROMORPHONE HYDROCHLORIDE 0.5 MG: 1 INJECTION, SOLUTION INTRAMUSCULAR; INTRAVENOUS; SUBCUTANEOUS at 11:00

## 2025-01-31 RX ADMIN — MEROPENEM 1000 MG: 1 INJECTION INTRAVENOUS at 09:33

## 2025-01-31 RX ADMIN — HYDROMORPHONE HYDROCHLORIDE 0.25 MG: 1 INJECTION, SOLUTION INTRAMUSCULAR; INTRAVENOUS; SUBCUTANEOUS at 14:15

## 2025-01-31 RX ADMIN — MEROPENEM 1000 MG: 1 INJECTION INTRAVENOUS at 21:14

## 2025-01-31 RX ADMIN — HYDROMORPHONE HYDROCHLORIDE 0.25 MG: 1 INJECTION, SOLUTION INTRAMUSCULAR; INTRAVENOUS; SUBCUTANEOUS at 22:46

## 2025-01-31 RX ADMIN — ONDANSETRON 8 MG: 2 INJECTION INTRAMUSCULAR; INTRAVENOUS at 21:03

## 2025-01-31 RX ADMIN — OXYCODONE HYDROCHLORIDE 15 MG: 10 TABLET ORAL at 01:13

## 2025-01-31 RX ADMIN — CHOLESTYRAMINE 4 G: 4 POWDER, FOR SUSPENSION ORAL at 13:34

## 2025-01-31 RX ADMIN — HYDROMORPHONE HYDROCHLORIDE 1 MG: 1 INJECTION, SOLUTION INTRAMUSCULAR; INTRAVENOUS; SUBCUTANEOUS at 02:39

## 2025-01-31 RX ADMIN — Medication 1 CAPSULE: at 09:33

## 2025-01-31 RX ADMIN — PROMETHAZINE HYDROCHLORIDE 25 MG: 25 INJECTION INTRAMUSCULAR; INTRAVENOUS at 15:51

## 2025-01-31 RX ADMIN — SODIUM CHLORIDE, PRESERVATIVE FREE 10 ML: 5 INJECTION INTRAVENOUS at 09:33

## 2025-01-31 RX ADMIN — HYDROMORPHONE HYDROCHLORIDE 1 MG: 1 INJECTION, SOLUTION INTRAMUSCULAR; INTRAVENOUS; SUBCUTANEOUS at 05:39

## 2025-01-31 RX ADMIN — HYDROMORPHONE HYDROCHLORIDE 0.25 MG: 1 INJECTION, SOLUTION INTRAMUSCULAR; INTRAVENOUS; SUBCUTANEOUS at 17:37

## 2025-01-31 RX ADMIN — ACETAMINOPHEN 1000 MG: 500 TABLET ORAL at 05:39

## 2025-01-31 ASSESSMENT — PAIN DESCRIPTION - LOCATION
LOCATION: GENERALIZED
LOCATION: ABDOMEN;BACK;KNEE
LOCATION: GENERALIZED
LOCATION: GENERALIZED
LOCATION: ABDOMEN;BACK;KNEE

## 2025-01-31 ASSESSMENT — PAIN DESCRIPTION - DESCRIPTORS
DESCRIPTORS: ACHING

## 2025-01-31 ASSESSMENT — PAIN SCALES - GENERAL
PAINLEVEL_OUTOF10: 8
PAINLEVEL_OUTOF10: 9
PAINLEVEL_OUTOF10: 10
PAINLEVEL_OUTOF10: 9
PAINLEVEL_OUTOF10: 9
PAINLEVEL_OUTOF10: 10
PAINLEVEL_OUTOF10: 8
PAINLEVEL_OUTOF10: 8
PAINLEVEL_OUTOF10: 9
PAINLEVEL_OUTOF10: 10
PAINLEVEL_OUTOF10: 10

## 2025-01-31 ASSESSMENT — PAIN DESCRIPTION - ORIENTATION
ORIENTATION: RIGHT;ANTERIOR;LOWER
ORIENTATION: ANTERIOR;RIGHT;LEFT

## 2025-01-31 ASSESSMENT — PAIN SCALES - WONG BAKER: WONGBAKER_NUMERICALRESPONSE: NO HURT

## 2025-01-31 NOTE — PLAN OF CARE
Problem: Safety - Adult  Goal: Free from fall injury  Outcome: Progressing  Flowsheets (Taken 1/31/2025 1144)  Free From Fall Injury: Instruct family/caregiver on patient safety

## 2025-02-01 PROCEDURE — 6370000000 HC RX 637 (ALT 250 FOR IP): Performed by: FAMILY MEDICINE

## 2025-02-01 PROCEDURE — 6360000002 HC RX W HCPCS: Performed by: FAMILY MEDICINE

## 2025-02-01 PROCEDURE — 2500000003 HC RX 250 WO HCPCS: Performed by: FAMILY MEDICINE

## 2025-02-01 PROCEDURE — 2580000003 HC RX 258: Performed by: FAMILY MEDICINE

## 2025-02-01 PROCEDURE — G0378 HOSPITAL OBSERVATION PER HR: HCPCS

## 2025-02-01 PROCEDURE — 96366 THER/PROPH/DIAG IV INF ADDON: CPT

## 2025-02-01 PROCEDURE — 94761 N-INVAS EAR/PLS OXIMETRY MLT: CPT

## 2025-02-01 PROCEDURE — 96376 TX/PRO/DX INJ SAME DRUG ADON: CPT

## 2025-02-01 RX ORDER — HYDROMORPHONE HYDROCHLORIDE 1 MG/ML
1 INJECTION, SOLUTION INTRAMUSCULAR; INTRAVENOUS; SUBCUTANEOUS
Status: DISCONTINUED | OUTPATIENT
Start: 2025-02-01 | End: 2025-02-03

## 2025-02-01 RX ORDER — METOCLOPRAMIDE 10 MG/1
10 TABLET ORAL 4 TIMES DAILY PRN
Status: DISCONTINUED | OUTPATIENT
Start: 2025-02-01 | End: 2025-02-05 | Stop reason: HOSPADM

## 2025-02-01 RX ADMIN — METRONIDAZOLE: 7.5 GEL VAGINAL at 17:29

## 2025-02-01 RX ADMIN — ALPRAZOLAM 0.5 MG: 0.5 TABLET ORAL at 20:15

## 2025-02-01 RX ADMIN — ALPRAZOLAM 0.5 MG: 0.5 TABLET ORAL at 13:21

## 2025-02-01 RX ADMIN — HYDROMORPHONE HYDROCHLORIDE 1 MG: 1 INJECTION, SOLUTION INTRAMUSCULAR; INTRAVENOUS; SUBCUTANEOUS at 18:43

## 2025-02-01 RX ADMIN — SODIUM CHLORIDE, PRESERVATIVE FREE 10 ML: 5 INJECTION INTRAVENOUS at 20:20

## 2025-02-01 RX ADMIN — HYDROMORPHONE HYDROCHLORIDE 0.25 MG: 1 INJECTION, SOLUTION INTRAMUSCULAR; INTRAVENOUS; SUBCUTANEOUS at 05:11

## 2025-02-01 RX ADMIN — HYDROMORPHONE HYDROCHLORIDE 1 MG: 1 INJECTION, SOLUTION INTRAMUSCULAR; INTRAVENOUS; SUBCUTANEOUS at 12:16

## 2025-02-01 RX ADMIN — ONDANSETRON 8 MG: 2 INJECTION INTRAMUSCULAR; INTRAVENOUS at 13:22

## 2025-02-01 RX ADMIN — Medication 1 CAPSULE: at 12:15

## 2025-02-01 RX ADMIN — OXYCODONE HYDROCHLORIDE 15 MG: 10 TABLET ORAL at 10:12

## 2025-02-01 RX ADMIN — HYDROMORPHONE HYDROCHLORIDE 1 MG: 1 INJECTION, SOLUTION INTRAMUSCULAR; INTRAVENOUS; SUBCUTANEOUS at 21:49

## 2025-02-01 RX ADMIN — HYDROMORPHONE HYDROCHLORIDE 1 MG: 1 INJECTION, SOLUTION INTRAMUSCULAR; INTRAVENOUS; SUBCUTANEOUS at 15:17

## 2025-02-01 RX ADMIN — MEROPENEM 1000 MG: 1 INJECTION INTRAVENOUS at 13:28

## 2025-02-01 RX ADMIN — CHOLESTYRAMINE 4 G: 4 POWDER, FOR SUSPENSION ORAL at 08:26

## 2025-02-01 RX ADMIN — MEROPENEM 1000 MG: 1 INJECTION INTRAVENOUS at 05:18

## 2025-02-01 RX ADMIN — METOCLOPRAMIDE 10 MG: 10 TABLET ORAL at 13:46

## 2025-02-01 RX ADMIN — Medication 1 CAPSULE: at 17:29

## 2025-02-01 RX ADMIN — ALPRAZOLAM 0.5 MG: 0.5 TABLET ORAL at 06:33

## 2025-02-01 RX ADMIN — PROMETHAZINE HYDROCHLORIDE 25 MG: 25 INJECTION INTRAMUSCULAR; INTRAVENOUS at 00:16

## 2025-02-01 RX ADMIN — ONDANSETRON 8 MG: 2 INJECTION INTRAMUSCULAR; INTRAVENOUS at 21:49

## 2025-02-01 RX ADMIN — ALPRAZOLAM 0.5 MG: 0.5 TABLET ORAL at 00:07

## 2025-02-01 RX ADMIN — PROMETHAZINE HYDROCHLORIDE 25 MG: 25 INJECTION INTRAMUSCULAR; INTRAVENOUS at 08:29

## 2025-02-01 RX ADMIN — Medication 1 CAPSULE: at 08:26

## 2025-02-01 RX ADMIN — ONDANSETRON 8 MG: 2 INJECTION INTRAMUSCULAR; INTRAVENOUS at 05:11

## 2025-02-01 RX ADMIN — HYDROMORPHONE HYDROCHLORIDE 0.25 MG: 1 INJECTION, SOLUTION INTRAMUSCULAR; INTRAVENOUS; SUBCUTANEOUS at 02:06

## 2025-02-01 RX ADMIN — MEROPENEM 1000 MG: 1 INJECTION INTRAVENOUS at 21:51

## 2025-02-01 RX ADMIN — HYDROMORPHONE HYDROCHLORIDE 0.25 MG: 1 INJECTION, SOLUTION INTRAMUSCULAR; INTRAVENOUS; SUBCUTANEOUS at 08:27

## 2025-02-01 RX ADMIN — ATORVASTATIN CALCIUM 40 MG: 20 TABLET, FILM COATED ORAL at 08:26

## 2025-02-01 RX ADMIN — SODIUM CHLORIDE, PRESERVATIVE FREE 10 ML: 5 INJECTION INTRAVENOUS at 08:29

## 2025-02-01 RX ADMIN — OXYCODONE HYDROCHLORIDE 15 MG: 10 TABLET ORAL at 03:38

## 2025-02-01 RX ADMIN — PROMETHAZINE HYDROCHLORIDE 25 MG: 25 INJECTION INTRAMUSCULAR; INTRAVENOUS at 17:25

## 2025-02-01 ASSESSMENT — PAIN SCALES - GENERAL
PAINLEVEL_OUTOF10: 8
PAINLEVEL_OUTOF10: 10
PAINLEVEL_OUTOF10: 9
PAINLEVEL_OUTOF10: 10
PAINLEVEL_OUTOF10: 8
PAINLEVEL_OUTOF10: 7
PAINLEVEL_OUTOF10: 9
PAINLEVEL_OUTOF10: 10
PAINLEVEL_OUTOF10: 8
PAINLEVEL_OUTOF10: 10
PAINLEVEL_OUTOF10: 10
PAINLEVEL_OUTOF10: 8
PAINLEVEL_OUTOF10: 9
PAINLEVEL_OUTOF10: 9

## 2025-02-01 ASSESSMENT — PAIN DESCRIPTION - DESCRIPTORS
DESCRIPTORS: ACHING
DESCRIPTORS: ACHING

## 2025-02-01 ASSESSMENT — PAIN DESCRIPTION - LOCATION
LOCATION: GENERALIZED
LOCATION: FLANK;BACK
LOCATION: BACK

## 2025-02-01 ASSESSMENT — PAIN DESCRIPTION - ORIENTATION
ORIENTATION: RIGHT;LEFT
ORIENTATION: RIGHT;LEFT

## 2025-02-01 NOTE — PLAN OF CARE
Problem: Discharge Planning  Goal: Discharge to home or other facility with appropriate resources  Outcome: Progressing     Problem: Safety - Adult  Goal: Free from fall injury  Outcome: Progressing     Problem: Pain  Goal: Verbalizes/displays adequate comfort level or baseline comfort level  Outcome: Progressing     Problem: Gastrointestinal - Adult  Goal: Minimal or absence of nausea and vomiting  Outcome: Progressing

## 2025-02-01 NOTE — PLAN OF CARE
Problem: Discharge Planning  Goal: Discharge to home or other facility with appropriate resources  2/1/2025 1500 by Guadalupe Ruff RN  Outcome: Not Progressing  2/1/2025 0246 by Deborah London RN  Outcome: Progressing     Problem: Safety - Adult  Goal: Free from fall injury  2/1/2025 1500 by Guadalupe Ruff RN  Outcome: Not Progressing  2/1/2025 0246 by Deborah London RN  Outcome: Progressing     Problem: Pain  Goal: Verbalizes/displays adequate comfort level or baseline comfort level  2/1/2025 1500 by Guadalupe Ruff RN  Outcome: Not Progressing  2/1/2025 0246 by Deborah London RN  Outcome: Progressing     Problem: Gastrointestinal - Adult  Goal: Minimal or absence of nausea and vomiting  2/1/2025 1500 by Guadalupe Ruff RN  Outcome: Not Progressing  2/1/2025 0246 by Deborah London RN  Outcome: Progressing

## 2025-02-02 ENCOUNTER — APPOINTMENT (OUTPATIENT)
Facility: HOSPITAL | Age: 55
DRG: 696 | End: 2025-02-02
Payer: COMMERCIAL

## 2025-02-02 LAB
BACTERIA SPEC CULT: NORMAL
SERVICE CMNT-IMP: NORMAL

## 2025-02-02 PROCEDURE — G0378 HOSPITAL OBSERVATION PER HR: HCPCS

## 2025-02-02 PROCEDURE — 96376 TX/PRO/DX INJ SAME DRUG ADON: CPT

## 2025-02-02 PROCEDURE — 6370000000 HC RX 637 (ALT 250 FOR IP): Performed by: INTERNAL MEDICINE

## 2025-02-02 PROCEDURE — 6360000002 HC RX W HCPCS: Performed by: FAMILY MEDICINE

## 2025-02-02 PROCEDURE — 2500000003 HC RX 250 WO HCPCS: Performed by: FAMILY MEDICINE

## 2025-02-02 PROCEDURE — 6370000000 HC RX 637 (ALT 250 FOR IP): Performed by: FAMILY MEDICINE

## 2025-02-02 PROCEDURE — 76700 US EXAM ABDOM COMPLETE: CPT

## 2025-02-02 PROCEDURE — 2580000003 HC RX 258: Performed by: FAMILY MEDICINE

## 2025-02-02 PROCEDURE — 96366 THER/PROPH/DIAG IV INF ADDON: CPT

## 2025-02-02 PROCEDURE — 94761 N-INVAS EAR/PLS OXIMETRY MLT: CPT

## 2025-02-02 PROCEDURE — 1100000000 HC RM PRIVATE

## 2025-02-02 RX ADMIN — ONDANSETRON 8 MG: 2 INJECTION INTRAMUSCULAR; INTRAVENOUS at 09:36

## 2025-02-02 RX ADMIN — HYDROMORPHONE HYDROCHLORIDE 1 MG: 1 INJECTION, SOLUTION INTRAMUSCULAR; INTRAVENOUS; SUBCUTANEOUS at 06:36

## 2025-02-02 RX ADMIN — OXYCODONE HYDROCHLORIDE 15 MG: 10 TABLET ORAL at 21:00

## 2025-02-02 RX ADMIN — SODIUM CHLORIDE, PRESERVATIVE FREE 10 ML: 5 INJECTION INTRAVENOUS at 21:12

## 2025-02-02 RX ADMIN — SODIUM CHLORIDE, PRESERVATIVE FREE 10 ML: 5 INJECTION INTRAVENOUS at 09:05

## 2025-02-02 RX ADMIN — ALPRAZOLAM 0.5 MG: 0.5 TABLET ORAL at 02:07

## 2025-02-02 RX ADMIN — PROMETHAZINE HYDROCHLORIDE 25 MG: 25 INJECTION INTRAMUSCULAR; INTRAVENOUS at 11:49

## 2025-02-02 RX ADMIN — ONDANSETRON 8 MG: 2 INJECTION INTRAMUSCULAR; INTRAVENOUS at 17:41

## 2025-02-02 RX ADMIN — ALPRAZOLAM 0.5 MG: 0.5 TABLET ORAL at 15:13

## 2025-02-02 RX ADMIN — CHOLESTYRAMINE 4 G: 4 POWDER, FOR SUSPENSION ORAL at 09:05

## 2025-02-02 RX ADMIN — ALPRAZOLAM 0.5 MG: 0.5 TABLET ORAL at 09:04

## 2025-02-02 RX ADMIN — ACETAMINOPHEN 1000 MG: 500 TABLET ORAL at 07:02

## 2025-02-02 RX ADMIN — HYDROMORPHONE HYDROCHLORIDE 1 MG: 1 INJECTION, SOLUTION INTRAMUSCULAR; INTRAVENOUS; SUBCUTANEOUS at 12:37

## 2025-02-02 RX ADMIN — PROMETHAZINE HYDROCHLORIDE 25 MG: 25 INJECTION INTRAMUSCULAR; INTRAVENOUS at 20:52

## 2025-02-02 RX ADMIN — HYDROMORPHONE HYDROCHLORIDE 1 MG: 1 INJECTION, SOLUTION INTRAMUSCULAR; INTRAVENOUS; SUBCUTANEOUS at 09:36

## 2025-02-02 RX ADMIN — MEROPENEM 1000 MG: 1 INJECTION INTRAVENOUS at 06:30

## 2025-02-02 RX ADMIN — HYDROMORPHONE HYDROCHLORIDE 1 MG: 1 INJECTION, SOLUTION INTRAMUSCULAR; INTRAVENOUS; SUBCUTANEOUS at 03:46

## 2025-02-02 RX ADMIN — ATORVASTATIN CALCIUM 40 MG: 20 TABLET, FILM COATED ORAL at 09:05

## 2025-02-02 RX ADMIN — MEROPENEM 1000 MG: 1 INJECTION INTRAVENOUS at 15:15

## 2025-02-02 RX ADMIN — Medication 1 CAPSULE: at 12:37

## 2025-02-02 RX ADMIN — Medication 1 CAPSULE: at 09:04

## 2025-02-02 RX ADMIN — PROMETHAZINE HYDROCHLORIDE 25 MG: 25 INJECTION INTRAMUSCULAR; INTRAVENOUS at 03:42

## 2025-02-02 RX ADMIN — HYDROMORPHONE HYDROCHLORIDE 1 MG: 1 INJECTION, SOLUTION INTRAMUSCULAR; INTRAVENOUS; SUBCUTANEOUS at 21:51

## 2025-02-02 RX ADMIN — HYDROMORPHONE HYDROCHLORIDE 1 MG: 1 INJECTION, SOLUTION INTRAMUSCULAR; INTRAVENOUS; SUBCUTANEOUS at 18:40

## 2025-02-02 RX ADMIN — METRONIDAZOLE: 7.5 GEL VAGINAL at 18:40

## 2025-02-02 RX ADMIN — ALPRAZOLAM 0.5 MG: 0.5 TABLET ORAL at 21:09

## 2025-02-02 RX ADMIN — HYDROMORPHONE HYDROCHLORIDE 1 MG: 1 INJECTION, SOLUTION INTRAMUSCULAR; INTRAVENOUS; SUBCUTANEOUS at 00:35

## 2025-02-02 RX ADMIN — HYDROMORPHONE HYDROCHLORIDE 1 MG: 1 INJECTION, SOLUTION INTRAMUSCULAR; INTRAVENOUS; SUBCUTANEOUS at 15:39

## 2025-02-02 RX ADMIN — MEROPENEM 1000 MG: 1 INJECTION INTRAVENOUS at 21:35

## 2025-02-02 ASSESSMENT — PAIN SCALES - GENERAL
PAINLEVEL_OUTOF10: 5
PAINLEVEL_OUTOF10: 9
PAINLEVEL_OUTOF10: 9
PAINLEVEL_OUTOF10: 10
PAINLEVEL_OUTOF10: 5
PAINLEVEL_OUTOF10: 9
PAINLEVEL_OUTOF10: 9
PAINLEVEL_OUTOF10: 0
PAINLEVEL_OUTOF10: 8
PAINLEVEL_OUTOF10: 9
PAINLEVEL_OUTOF10: 10
PAINLEVEL_OUTOF10: 5
PAINLEVEL_OUTOF10: 9
PAINLEVEL_OUTOF10: 9

## 2025-02-02 ASSESSMENT — PAIN DESCRIPTION - LOCATION
LOCATION: ABDOMEN;KNEE
LOCATION: FLANK
LOCATION: FLANK;KNEE
LOCATION: BACK
LOCATION: KNEE;FLANK
LOCATION: BACK
LOCATION: HEAD

## 2025-02-02 ASSESSMENT — PAIN DESCRIPTION - DESCRIPTORS
DESCRIPTORS: SQUEEZING
DESCRIPTORS: DULL;STABBING
DESCRIPTORS: ACHING;SHARP

## 2025-02-02 ASSESSMENT — PAIN DESCRIPTION - ORIENTATION
ORIENTATION: RIGHT;LEFT;LOWER
ORIENTATION: RIGHT;LEFT
ORIENTATION: RIGHT;LEFT
ORIENTATION: LEFT
ORIENTATION: RIGHT;LEFT

## 2025-02-02 NOTE — PLAN OF CARE
Problem: Pain  Goal: Verbalizes/displays adequate comfort level or baseline comfort level  2/1/2025 2303 by May Saavedra RN  Outcome: Progressing  2/1/2025 1500 by Guadalupe Ruff RN  Outcome: Not Progressing     Problem: Discharge Planning  Goal: Discharge to home or other facility with appropriate resources  2/1/2025 1500 by Guadalupe Ruff RN  Outcome: Not Progressing     Problem: Safety - Adult  Goal: Free from fall injury  2/1/2025 1500 by Guadalupe Ruff RN  Outcome: Not Progressing     Problem: Pain  Goal: Verbalizes/displays adequate comfort level or baseline comfort level  2/1/2025 2303 by May Saavedra RN  Outcome: Progressing  2/1/2025 1500 by Guadalupe Ruff RN  Outcome: Not Progressing     Problem: Gastrointestinal - Adult  Goal: Minimal or absence of nausea and vomiting  2/1/2025 1500 by Guadalupe Ruff RN  Outcome: Not Progressing

## 2025-02-03 LAB
ANION GAP SERPL CALC-SCNC: 3 MMOL/L (ref 2–12)
BASOPHILS # BLD: 0.01 K/UL (ref 0–0.1)
BASOPHILS NFR BLD: 0.2 % (ref 0–1)
BUN SERPL-MCNC: 8 MG/DL (ref 6–20)
BUN/CREAT SERPL: 11 (ref 12–20)
CALCIUM SERPL-MCNC: 9 MG/DL (ref 8.5–10.1)
CHLORIDE SERPL-SCNC: 109 MMOL/L (ref 97–108)
CO2 SERPL-SCNC: 27 MMOL/L (ref 21–32)
CREAT SERPL-MCNC: 0.74 MG/DL (ref 0.55–1.02)
DIFFERENTIAL METHOD BLD: ABNORMAL
EOSINOPHIL # BLD: 0.13 K/UL (ref 0–0.4)
EOSINOPHIL NFR BLD: 2.6 % (ref 0–7)
ERYTHROCYTE [DISTWIDTH] IN BLOOD BY AUTOMATED COUNT: 12.7 % (ref 11.5–14.5)
GLUCOSE SERPL-MCNC: 106 MG/DL (ref 65–100)
HCT VFR BLD AUTO: 34.3 % (ref 35–47)
HGB BLD-MCNC: 10.7 G/DL (ref 11.5–16)
IMM GRANULOCYTES # BLD AUTO: 0.01 K/UL (ref 0–0.04)
IMM GRANULOCYTES NFR BLD AUTO: 0.2 % (ref 0–0.5)
LYMPHOCYTES # BLD: 2.12 K/UL (ref 0.8–3.5)
LYMPHOCYTES NFR BLD: 42.8 % (ref 12–49)
MCH RBC QN AUTO: 29.5 PG (ref 26–34)
MCHC RBC AUTO-ENTMCNC: 31.2 G/DL (ref 30–36.5)
MCV RBC AUTO: 94.5 FL (ref 80–99)
MONOCYTES # BLD: 0.45 K/UL (ref 0–1)
MONOCYTES NFR BLD: 9.1 % (ref 5–13)
NEUTS SEG # BLD: 2.23 K/UL (ref 1.8–8)
NEUTS SEG NFR BLD: 45.1 % (ref 32–75)
NRBC # BLD: 0 K/UL (ref 0–0.01)
NRBC BLD-RTO: 0 PER 100 WBC
PLATELET # BLD AUTO: 212 K/UL (ref 150–400)
PMV BLD AUTO: 10.4 FL (ref 8.9–12.9)
POTASSIUM SERPL-SCNC: 3.6 MMOL/L (ref 3.5–5.1)
RBC # BLD AUTO: 3.63 M/UL (ref 3.8–5.2)
SODIUM SERPL-SCNC: 139 MMOL/L (ref 136–145)
TSH SERPL DL<=0.05 MIU/L-ACNC: 1.71 UIU/ML (ref 0.36–3.74)
WBC # BLD AUTO: 5 K/UL (ref 3.6–11)

## 2025-02-03 PROCEDURE — 6360000002 HC RX W HCPCS: Performed by: INTERNAL MEDICINE

## 2025-02-03 PROCEDURE — 6370000000 HC RX 637 (ALT 250 FOR IP): Performed by: INTERNAL MEDICINE

## 2025-02-03 PROCEDURE — 6370000000 HC RX 637 (ALT 250 FOR IP): Performed by: FAMILY MEDICINE

## 2025-02-03 PROCEDURE — 6360000002 HC RX W HCPCS: Performed by: FAMILY MEDICINE

## 2025-02-03 PROCEDURE — 2500000003 HC RX 250 WO HCPCS: Performed by: FAMILY MEDICINE

## 2025-02-03 PROCEDURE — 1100000000 HC RM PRIVATE

## 2025-02-03 PROCEDURE — 80048 BASIC METABOLIC PNL TOTAL CA: CPT

## 2025-02-03 PROCEDURE — 36415 COLL VENOUS BLD VENIPUNCTURE: CPT

## 2025-02-03 PROCEDURE — 94761 N-INVAS EAR/PLS OXIMETRY MLT: CPT

## 2025-02-03 PROCEDURE — 84443 ASSAY THYROID STIM HORMONE: CPT

## 2025-02-03 PROCEDURE — 85025 COMPLETE CBC W/AUTO DIFF WBC: CPT

## 2025-02-03 PROCEDURE — 2580000003 HC RX 258: Performed by: FAMILY MEDICINE

## 2025-02-03 RX ORDER — HYDROMORPHONE HYDROCHLORIDE 1 MG/ML
1 INJECTION, SOLUTION INTRAMUSCULAR; INTRAVENOUS; SUBCUTANEOUS EVERY 4 HOURS PRN
Status: DISCONTINUED | OUTPATIENT
Start: 2025-02-03 | End: 2025-02-04

## 2025-02-03 RX ADMIN — ONDANSETRON 8 MG: 2 INJECTION INTRAMUSCULAR; INTRAVENOUS at 21:21

## 2025-02-03 RX ADMIN — HYDROMORPHONE HYDROCHLORIDE 1 MG: 1 INJECTION, SOLUTION INTRAMUSCULAR; INTRAVENOUS; SUBCUTANEOUS at 17:12

## 2025-02-03 RX ADMIN — CHOLESTYRAMINE 4 G: 4 POWDER, FOR SUSPENSION ORAL at 21:24

## 2025-02-03 RX ADMIN — HYDROMORPHONE HYDROCHLORIDE 1 MG: 1 INJECTION, SOLUTION INTRAMUSCULAR; INTRAVENOUS; SUBCUTANEOUS at 04:00

## 2025-02-03 RX ADMIN — MEROPENEM 1000 MG: 1 INJECTION INTRAVENOUS at 15:20

## 2025-02-03 RX ADMIN — ALPRAZOLAM 0.5 MG: 0.5 TABLET ORAL at 15:13

## 2025-02-03 RX ADMIN — OXYCODONE HYDROCHLORIDE 15 MG: 10 TABLET ORAL at 11:06

## 2025-02-03 RX ADMIN — OXYCODONE HYDROCHLORIDE 15 MG: 10 TABLET ORAL at 18:11

## 2025-02-03 RX ADMIN — HYDROMORPHONE HYDROCHLORIDE 1 MG: 1 INJECTION, SOLUTION INTRAMUSCULAR; INTRAVENOUS; SUBCUTANEOUS at 10:12

## 2025-02-03 RX ADMIN — HYDROMORPHONE HYDROCHLORIDE 1 MG: 1 INJECTION, SOLUTION INTRAMUSCULAR; INTRAVENOUS; SUBCUTANEOUS at 00:55

## 2025-02-03 RX ADMIN — ALPRAZOLAM 0.5 MG: 0.5 TABLET ORAL at 06:36

## 2025-02-03 RX ADMIN — HYDROMORPHONE HYDROCHLORIDE 1 MG: 1 INJECTION, SOLUTION INTRAMUSCULAR; INTRAVENOUS; SUBCUTANEOUS at 21:21

## 2025-02-03 RX ADMIN — ALPRAZOLAM 0.5 MG: 0.5 TABLET ORAL at 21:21

## 2025-02-03 RX ADMIN — ONDANSETRON 8 MG: 2 INJECTION INTRAMUSCULAR; INTRAVENOUS at 10:13

## 2025-02-03 RX ADMIN — SODIUM CHLORIDE, PRESERVATIVE FREE 10 ML: 5 INJECTION INTRAVENOUS at 21:24

## 2025-02-03 RX ADMIN — METRONIDAZOLE: 7.5 GEL VAGINAL at 21:24

## 2025-02-03 RX ADMIN — ACETAMINOPHEN 1000 MG: 500 TABLET ORAL at 01:01

## 2025-02-03 RX ADMIN — HYDROMORPHONE HYDROCHLORIDE 1 MG: 1 INJECTION, SOLUTION INTRAMUSCULAR; INTRAVENOUS; SUBCUTANEOUS at 12:58

## 2025-02-03 RX ADMIN — ACETAMINOPHEN 1000 MG: 500 TABLET ORAL at 18:11

## 2025-02-03 RX ADMIN — MEROPENEM 1000 MG: 1 INJECTION INTRAVENOUS at 06:23

## 2025-02-03 RX ADMIN — PROMETHAZINE HYDROCHLORIDE 25 MG: 25 INJECTION INTRAMUSCULAR; INTRAVENOUS at 17:26

## 2025-02-03 RX ADMIN — MEROPENEM 1000 MG: 1 INJECTION INTRAVENOUS at 21:20

## 2025-02-03 RX ADMIN — HYDROMORPHONE HYDROCHLORIDE 1 MG: 1 INJECTION, SOLUTION INTRAMUSCULAR; INTRAVENOUS; SUBCUTANEOUS at 06:58

## 2025-02-03 ASSESSMENT — PAIN DESCRIPTION - DESCRIPTORS
DESCRIPTORS: ACHING
DESCRIPTORS: ACHING;CRAMPING
DESCRIPTORS: ACHING;THROBBING
DESCRIPTORS: ACHING
DESCRIPTORS: ACHING
DESCRIPTORS: CRAMPING;THROBBING

## 2025-02-03 ASSESSMENT — PAIN SCALES - GENERAL
PAINLEVEL_OUTOF10: 9
PAINLEVEL_OUTOF10: 8
PAINLEVEL_OUTOF10: 8
PAINLEVEL_OUTOF10: 7
PAINLEVEL_OUTOF10: 9
PAINLEVEL_OUTOF10: 8
PAINLEVEL_OUTOF10: 9
PAINLEVEL_OUTOF10: 8
PAINLEVEL_OUTOF10: 9

## 2025-02-03 ASSESSMENT — PAIN - FUNCTIONAL ASSESSMENT
PAIN_FUNCTIONAL_ASSESSMENT: ACTIVITIES ARE NOT PREVENTED

## 2025-02-03 ASSESSMENT — PAIN DESCRIPTION - ORIENTATION
ORIENTATION: LEFT;RIGHT
ORIENTATION: RIGHT;LEFT
ORIENTATION: RIGHT
ORIENTATION: RIGHT;LEFT

## 2025-02-03 ASSESSMENT — PAIN DESCRIPTION - PAIN TYPE: TYPE: ACUTE PAIN

## 2025-02-03 ASSESSMENT — PAIN DESCRIPTION - LOCATION
LOCATION: ABDOMEN;HIP;FLANK
LOCATION: ABDOMEN;HIP;KNEE
LOCATION: ABDOMEN
LOCATION: ABDOMEN
LOCATION: BACK

## 2025-02-03 NOTE — CARE COORDINATION
Care Management Progress Note        Reason for Admission:   Intractable vomiting [R11.10]  Intractable nausea and vomiting [R11.2]  Urinary tract infection without hematuria, site unspecified [N39.0]         Patient Admission Status: Inpatient  RUR: 8%  Hospitalization in the last 30 days (Readmission):  Yes        Transition of care plan:  Patient was discussed in IDR and continues to be medically managed. ID is following. Patient is receiving IV abx and IV pain medication.     Dispo:  Patient had been staying with her parents since discharge on 1/18 due to weakness. Family has been assisting patient with ADL's. Patient has a fail on her egress and a 6 on the [facility], therapy has been ordered.     Discharge plan communicated with patient and/or discharge caregiver: Yes.      Date 1st IMM letter given: N/a. Patient has a commercial insurance plan.     Outpatient follow-up.    Transport at discharge: family.        ___________________________________________   Yolanda Howell RN Case Manager  2/3/2025   2:58 PM

## 2025-02-04 LAB
ANION GAP SERPL CALC-SCNC: 3 MMOL/L (ref 2–12)
BASOPHILS # BLD: 0.03 K/UL (ref 0–0.1)
BASOPHILS NFR BLD: 0.8 % (ref 0–1)
BUN SERPL-MCNC: 9 MG/DL (ref 6–20)
BUN/CREAT SERPL: 13 (ref 12–20)
CALCIUM SERPL-MCNC: 9 MG/DL (ref 8.5–10.1)
CHLORIDE SERPL-SCNC: 108 MMOL/L (ref 97–108)
CO2 SERPL-SCNC: 29 MMOL/L (ref 21–32)
CREAT SERPL-MCNC: 0.68 MG/DL (ref 0.55–1.02)
DIFFERENTIAL METHOD BLD: ABNORMAL
EOSINOPHIL # BLD: 0.1 K/UL (ref 0–0.4)
EOSINOPHIL NFR BLD: 2.6 % (ref 0–7)
ERYTHROCYTE [DISTWIDTH] IN BLOOD BY AUTOMATED COUNT: 12.7 % (ref 11.5–14.5)
GLUCOSE SERPL-MCNC: 100 MG/DL (ref 65–100)
HCT VFR BLD AUTO: 32.2 % (ref 35–47)
HGB BLD-MCNC: 10.1 G/DL (ref 11.5–16)
IMM GRANULOCYTES # BLD AUTO: 0.01 K/UL (ref 0–0.04)
IMM GRANULOCYTES NFR BLD AUTO: 0.3 % (ref 0–0.5)
LYMPHOCYTES # BLD: 1.68 K/UL (ref 0.8–3.5)
LYMPHOCYTES NFR BLD: 42.9 % (ref 12–49)
MCH RBC QN AUTO: 29.9 PG (ref 26–34)
MCHC RBC AUTO-ENTMCNC: 31.4 G/DL (ref 30–36.5)
MCV RBC AUTO: 95.3 FL (ref 80–99)
MONOCYTES # BLD: 0.42 K/UL (ref 0–1)
MONOCYTES NFR BLD: 10.7 % (ref 5–13)
NEUTS SEG # BLD: 1.68 K/UL (ref 1.8–8)
NEUTS SEG NFR BLD: 42.7 % (ref 32–75)
NRBC # BLD: 0 K/UL (ref 0–0.01)
NRBC BLD-RTO: 0 PER 100 WBC
PLATELET # BLD AUTO: 196 K/UL (ref 150–400)
PMV BLD AUTO: 10.3 FL (ref 8.9–12.9)
POTASSIUM SERPL-SCNC: 3.6 MMOL/L (ref 3.5–5.1)
RBC # BLD AUTO: 3.38 M/UL (ref 3.8–5.2)
SODIUM SERPL-SCNC: 140 MMOL/L (ref 136–145)
WBC # BLD AUTO: 3.9 K/UL (ref 3.6–11)

## 2025-02-04 PROCEDURE — 80048 BASIC METABOLIC PNL TOTAL CA: CPT

## 2025-02-04 PROCEDURE — 6370000000 HC RX 637 (ALT 250 FOR IP): Performed by: FAMILY MEDICINE

## 2025-02-04 PROCEDURE — 94761 N-INVAS EAR/PLS OXIMETRY MLT: CPT

## 2025-02-04 PROCEDURE — 1100000000 HC RM PRIVATE

## 2025-02-04 PROCEDURE — 6370000000 HC RX 637 (ALT 250 FOR IP): Performed by: INTERNAL MEDICINE

## 2025-02-04 PROCEDURE — 6360000002 HC RX W HCPCS: Performed by: FAMILY MEDICINE

## 2025-02-04 PROCEDURE — 6360000002 HC RX W HCPCS: Performed by: INTERNAL MEDICINE

## 2025-02-04 PROCEDURE — 2500000003 HC RX 250 WO HCPCS: Performed by: FAMILY MEDICINE

## 2025-02-04 PROCEDURE — 2580000003 HC RX 258: Performed by: FAMILY MEDICINE

## 2025-02-04 PROCEDURE — 85025 COMPLETE CBC W/AUTO DIFF WBC: CPT

## 2025-02-04 RX ORDER — HYDROMORPHONE HYDROCHLORIDE 1 MG/ML
1 INJECTION, SOLUTION INTRAMUSCULAR; INTRAVENOUS; SUBCUTANEOUS EVERY 6 HOURS PRN
Status: DISCONTINUED | OUTPATIENT
Start: 2025-02-04 | End: 2025-02-05

## 2025-02-04 RX ORDER — AMOXICILLIN 250 MG/1
500 CAPSULE ORAL EVERY 8 HOURS SCHEDULED
Status: DISCONTINUED | OUTPATIENT
Start: 2025-02-04 | End: 2025-02-05

## 2025-02-04 RX ADMIN — PROMETHAZINE HYDROCHLORIDE 25 MG: 25 INJECTION INTRAMUSCULAR; INTRAVENOUS at 17:06

## 2025-02-04 RX ADMIN — ALPRAZOLAM 0.5 MG: 0.5 TABLET ORAL at 15:30

## 2025-02-04 RX ADMIN — CHOLESTYRAMINE 4 G: 4 POWDER, FOR SUSPENSION ORAL at 09:16

## 2025-02-04 RX ADMIN — CHOLESTYRAMINE 4 G: 4 POWDER, FOR SUSPENSION ORAL at 20:50

## 2025-02-04 RX ADMIN — CHOLESTYRAMINE 4 G: 4 POWDER, FOR SUSPENSION ORAL at 12:23

## 2025-02-04 RX ADMIN — ALPRAZOLAM 0.5 MG: 0.5 TABLET ORAL at 04:15

## 2025-02-04 RX ADMIN — AMOXICILLIN 500 MG: 250 CAPSULE ORAL at 21:42

## 2025-02-04 RX ADMIN — PROMETHAZINE HYDROCHLORIDE 25 MG: 25 INJECTION INTRAMUSCULAR; INTRAVENOUS at 09:16

## 2025-02-04 RX ADMIN — HYDROMORPHONE HYDROCHLORIDE 1 MG: 1 INJECTION, SOLUTION INTRAMUSCULAR; INTRAVENOUS; SUBCUTANEOUS at 01:18

## 2025-02-04 RX ADMIN — METRONIDAZOLE: 7.5 GEL VAGINAL at 20:49

## 2025-02-04 RX ADMIN — ACETAMINOPHEN 1000 MG: 500 TABLET ORAL at 00:18

## 2025-02-04 RX ADMIN — HYDROMORPHONE HYDROCHLORIDE 1 MG: 1 INJECTION, SOLUTION INTRAMUSCULAR; INTRAVENOUS; SUBCUTANEOUS at 05:14

## 2025-02-04 RX ADMIN — ACETAMINOPHEN 1000 MG: 500 TABLET ORAL at 16:12

## 2025-02-04 RX ADMIN — ALPRAZOLAM 0.5 MG: 0.5 TABLET ORAL at 10:10

## 2025-02-04 RX ADMIN — HYDROMORPHONE HYDROCHLORIDE 1 MG: 1 INJECTION, SOLUTION INTRAMUSCULAR; INTRAVENOUS; SUBCUTANEOUS at 13:21

## 2025-02-04 RX ADMIN — ONDANSETRON 8 MG: 2 INJECTION INTRAMUSCULAR; INTRAVENOUS at 13:21

## 2025-02-04 RX ADMIN — SODIUM CHLORIDE, PRESERVATIVE FREE 10 ML: 5 INJECTION INTRAVENOUS at 09:17

## 2025-02-04 RX ADMIN — HYDROMORPHONE HYDROCHLORIDE 1 MG: 1 INJECTION, SOLUTION INTRAMUSCULAR; INTRAVENOUS; SUBCUTANEOUS at 09:12

## 2025-02-04 RX ADMIN — OXYCODONE HYDROCHLORIDE 15 MG: 10 TABLET ORAL at 14:46

## 2025-02-04 RX ADMIN — OXYCODONE HYDROCHLORIDE 15 MG: 10 TABLET ORAL at 20:50

## 2025-02-04 RX ADMIN — ONDANSETRON 8 MG: 2 INJECTION INTRAMUSCULAR; INTRAVENOUS at 05:14

## 2025-02-04 RX ADMIN — HYDROMORPHONE HYDROCHLORIDE 1 MG: 1 INJECTION, SOLUTION INTRAMUSCULAR; INTRAVENOUS; SUBCUTANEOUS at 19:42

## 2025-02-04 RX ADMIN — SODIUM CHLORIDE, PRESERVATIVE FREE 10 ML: 5 INJECTION INTRAVENOUS at 19:43

## 2025-02-04 RX ADMIN — OXYCODONE HYDROCHLORIDE 15 MG: 10 TABLET ORAL at 06:18

## 2025-02-04 RX ADMIN — PROMETHAZINE HYDROCHLORIDE 25 MG: 25 INJECTION INTRAMUSCULAR; INTRAVENOUS at 01:15

## 2025-02-04 RX ADMIN — OXYCODONE HYDROCHLORIDE 15 MG: 10 TABLET ORAL at 00:18

## 2025-02-04 RX ADMIN — ATORVASTATIN CALCIUM 40 MG: 20 TABLET, FILM COATED ORAL at 09:16

## 2025-02-04 RX ADMIN — ALPRAZOLAM 0.5 MG: 0.5 TABLET ORAL at 21:42

## 2025-02-04 RX ADMIN — ACETAMINOPHEN 1000 MG: 500 TABLET ORAL at 06:18

## 2025-02-04 RX ADMIN — ONDANSETRON 8 MG: 2 INJECTION INTRAMUSCULAR; INTRAVENOUS at 21:42

## 2025-02-04 ASSESSMENT — PAIN SCALES - GENERAL
PAINLEVEL_OUTOF10: 9
PAINLEVEL_OUTOF10: 8
PAINLEVEL_OUTOF10: 7
PAINLEVEL_OUTOF10: 9
PAINLEVEL_OUTOF10: 10
PAINLEVEL_OUTOF10: 7
PAINLEVEL_OUTOF10: 8
PAINLEVEL_OUTOF10: 10
PAINLEVEL_OUTOF10: 6
PAINLEVEL_OUTOF10: 9
PAINLEVEL_OUTOF10: 8
PAINLEVEL_OUTOF10: 8

## 2025-02-04 ASSESSMENT — PAIN DESCRIPTION - DESCRIPTORS
DESCRIPTORS: ACHING;CRAMPING
DESCRIPTORS: ACHING;SORE
DESCRIPTORS: CRAMPING
DESCRIPTORS: ACHING;CRAMPING
DESCRIPTORS: ACHING;THROBBING

## 2025-02-04 ASSESSMENT — PAIN DESCRIPTION - LOCATION
LOCATION: FLANK;KNEE
LOCATION: KNEE;FLANK
LOCATION: KNEE
LOCATION: FLANK;KNEE
LOCATION: ABDOMEN;HIP;KNEE
LOCATION: ABDOMEN;KNEE
LOCATION: ABDOMEN;HIP
LOCATION: KNEE;FLANK
LOCATION: FLANK;KNEE
LOCATION: ABDOMEN;KNEE

## 2025-02-04 ASSESSMENT — PAIN - FUNCTIONAL ASSESSMENT
PAIN_FUNCTIONAL_ASSESSMENT: ACTIVITIES ARE NOT PREVENTED

## 2025-02-04 ASSESSMENT — PAIN DESCRIPTION - ORIENTATION
ORIENTATION: LEFT
ORIENTATION: RIGHT;LEFT
ORIENTATION: LEFT
ORIENTATION: RIGHT;ANTERIOR
ORIENTATION: LEFT
ORIENTATION: RIGHT;LEFT
ORIENTATION: RIGHT;LEFT
ORIENTATION: LEFT

## 2025-02-04 ASSESSMENT — PAIN DESCRIPTION - PAIN TYPE
TYPE: ACUTE PAIN

## 2025-02-04 NOTE — CARE COORDINATION
Care Management Progress Note      Reason for Admission:   Intractable vomiting [R11.10]  Intractable nausea and vomiting [R11.2]  Urinary tract infection without hematuria, site unspecified [N39.0]         Patient Admission Status: Inpatient  RUR: 9%  Hospitalization in the last 30 days (Readmission):  Yes        Transition of care plan:  Patient was discussed in IDR and continues to be medically managed. ID is following.     Dispo:  Patient had been staying with her parents since discharge on 1/18 due to weakness. Family has been assisting patient with ADL's. Today patient has a pass on her [employer] and a 6 on the [employer]. Therapy has evaluated and signed off. No therapy needs post discharge.      CM is following for ID recommendations if patient needs IV abx at home at dc. Otherwise CM needs are not anticipated at this time, please consult CM if needs arise.,      Discharge plan communicated with patient and/or discharge caregiver: Yes.       Date 1st IMM letter given: N/a. Patient has a commercial insurance plan.     Outpatient follow-up.    Transport at discharge: family.        ___________________________________________   Yolanda Howell RN Case Manager  2/4/2025   3:25 PM

## 2025-02-04 NOTE — PLAN OF CARE
Problem: Discharge Planning  Goal: Discharge to home or other facility with appropriate resources  2/4/2025 1028 by Kristina Garcia RN  Outcome: Progressing  2/3/2025 2257 by Kenia Rajan RN  Outcome: Progressing     Problem: Safety - Adult  Goal: Free from fall injury  2/4/2025 1028 by Kristina Garcia RN  Outcome: Progressing  2/3/2025 2257 by Kenia Rajan RN  Outcome: Progressing     Problem: Pain  Goal: Verbalizes/displays adequate comfort level or baseline comfort level  2/4/2025 1028 by Kristina Garcia RN  Outcome: Progressing  2/3/2025 2257 by Kenia Rajan RN  Outcome: Progressing     Problem: Gastrointestinal - Adult  Goal: Minimal or absence of nausea and vomiting  2/4/2025 1028 by Kristina Garcia RN  Outcome: Progressing  2/3/2025 2257 by Kenia Rajan RN  Outcome: Progressing

## 2025-02-05 VITALS
DIASTOLIC BLOOD PRESSURE: 65 MMHG | HEIGHT: 62 IN | BODY MASS INDEX: 37.17 KG/M2 | WEIGHT: 202 LBS | SYSTOLIC BLOOD PRESSURE: 111 MMHG | OXYGEN SATURATION: 95 % | TEMPERATURE: 97.5 F | HEART RATE: 63 BPM | RESPIRATION RATE: 18 BRPM

## 2025-02-05 LAB
ANION GAP SERPL CALC-SCNC: 5 MMOL/L (ref 2–12)
BASOPHILS # BLD: 0.02 K/UL (ref 0–0.1)
BASOPHILS NFR BLD: 0.5 % (ref 0–1)
BUN SERPL-MCNC: 9 MG/DL (ref 6–20)
BUN/CREAT SERPL: 13 (ref 12–20)
CALCIUM SERPL-MCNC: 8.6 MG/DL (ref 8.5–10.1)
CHLORIDE SERPL-SCNC: 109 MMOL/L (ref 97–108)
CO2 SERPL-SCNC: 29 MMOL/L (ref 21–32)
CREAT SERPL-MCNC: 0.71 MG/DL (ref 0.55–1.02)
DIFFERENTIAL METHOD BLD: ABNORMAL
EOSINOPHIL # BLD: 0.09 K/UL (ref 0–0.4)
EOSINOPHIL NFR BLD: 2.3 % (ref 0–7)
ERYTHROCYTE [DISTWIDTH] IN BLOOD BY AUTOMATED COUNT: 12.5 % (ref 11.5–14.5)
GLUCOSE SERPL-MCNC: 107 MG/DL (ref 65–100)
HCT VFR BLD AUTO: 32.1 % (ref 35–47)
HGB BLD-MCNC: 10.2 G/DL (ref 11.5–16)
IMM GRANULOCYTES # BLD AUTO: 0.02 K/UL (ref 0–0.04)
IMM GRANULOCYTES NFR BLD AUTO: 0.5 % (ref 0–0.5)
LYMPHOCYTES # BLD: 1.76 K/UL (ref 0.8–3.5)
LYMPHOCYTES NFR BLD: 44 % (ref 12–49)
MCH RBC QN AUTO: 30.3 PG (ref 26–34)
MCHC RBC AUTO-ENTMCNC: 31.8 G/DL (ref 30–36.5)
MCV RBC AUTO: 95.3 FL (ref 80–99)
MONOCYTES # BLD: 0.42 K/UL (ref 0–1)
MONOCYTES NFR BLD: 10.5 % (ref 5–13)
NEUTS SEG # BLD: 1.69 K/UL (ref 1.8–8)
NEUTS SEG NFR BLD: 42.2 % (ref 32–75)
NRBC # BLD: 0 K/UL (ref 0–0.01)
NRBC BLD-RTO: 0 PER 100 WBC
PLATELET # BLD AUTO: 198 K/UL (ref 150–400)
PMV BLD AUTO: 10.3 FL (ref 8.9–12.9)
POTASSIUM SERPL-SCNC: 3.5 MMOL/L (ref 3.5–5.1)
RBC # BLD AUTO: 3.37 M/UL (ref 3.8–5.2)
SODIUM SERPL-SCNC: 143 MMOL/L (ref 136–145)
WBC # BLD AUTO: 4 K/UL (ref 3.6–11)

## 2025-02-05 PROCEDURE — 6370000000 HC RX 637 (ALT 250 FOR IP): Performed by: INTERNAL MEDICINE

## 2025-02-05 PROCEDURE — 2580000003 HC RX 258: Performed by: INTERNAL MEDICINE

## 2025-02-05 PROCEDURE — 80048 BASIC METABOLIC PNL TOTAL CA: CPT

## 2025-02-05 PROCEDURE — 2580000003 HC RX 258: Performed by: FAMILY MEDICINE

## 2025-02-05 PROCEDURE — 85025 COMPLETE CBC W/AUTO DIFF WBC: CPT

## 2025-02-05 PROCEDURE — 6360000002 HC RX W HCPCS: Performed by: INTERNAL MEDICINE

## 2025-02-05 PROCEDURE — 2500000003 HC RX 250 WO HCPCS: Performed by: FAMILY MEDICINE

## 2025-02-05 PROCEDURE — 6370000000 HC RX 637 (ALT 250 FOR IP): Performed by: FAMILY MEDICINE

## 2025-02-05 PROCEDURE — 6360000002 HC RX W HCPCS: Performed by: FAMILY MEDICINE

## 2025-02-05 RX ORDER — HYDROMORPHONE HYDROCHLORIDE 1 MG/ML
1 INJECTION, SOLUTION INTRAMUSCULAR; INTRAVENOUS; SUBCUTANEOUS
Status: DISCONTINUED | OUTPATIENT
Start: 2025-02-05 | End: 2025-02-05 | Stop reason: HOSPADM

## 2025-02-05 RX ORDER — AMOXICILLIN 250 MG/5ML
500 POWDER, FOR SUSPENSION ORAL EVERY 8 HOURS SCHEDULED
Status: DISCONTINUED | OUTPATIENT
Start: 2025-02-05 | End: 2025-02-05 | Stop reason: HOSPADM

## 2025-02-05 RX ORDER — AMOXICILLIN 250 MG/5ML
500 POWDER, FOR SUSPENSION ORAL EVERY 8 HOURS SCHEDULED
Qty: 300 ML | Refills: 0 | Status: SHIPPED | OUTPATIENT
Start: 2025-02-05 | End: 2025-02-15

## 2025-02-05 RX ORDER — PENICILLIN G PROCAINE 600000 [IU]/ML
600000 INJECTION, SUSPENSION INTRAMUSCULAR DAILY
Status: DISCONTINUED | OUTPATIENT
Start: 2025-02-05 | End: 2025-02-05

## 2025-02-05 RX ADMIN — HYDROMORPHONE HYDROCHLORIDE 1 MG: 1 INJECTION, SOLUTION INTRAMUSCULAR; INTRAVENOUS; SUBCUTANEOUS at 14:05

## 2025-02-05 RX ADMIN — CHOLESTYRAMINE 4 G: 4 POWDER, FOR SUSPENSION ORAL at 07:37

## 2025-02-05 RX ADMIN — AMOXICILLIN 500 MG: 250 CAPSULE ORAL at 06:00

## 2025-02-05 RX ADMIN — ACETAMINOPHEN 1000 MG: 500 TABLET ORAL at 06:00

## 2025-02-05 RX ADMIN — HYDROMORPHONE HYDROCHLORIDE 1 MG: 1 INJECTION, SOLUTION INTRAMUSCULAR; INTRAVENOUS; SUBCUTANEOUS at 10:59

## 2025-02-05 RX ADMIN — PROMETHAZINE HYDROCHLORIDE 25 MG: 25 INJECTION INTRAMUSCULAR; INTRAVENOUS at 09:41

## 2025-02-05 RX ADMIN — ATORVASTATIN CALCIUM 40 MG: 20 TABLET, FILM COATED ORAL at 07:37

## 2025-02-05 RX ADMIN — ALPRAZOLAM 0.5 MG: 0.5 TABLET ORAL at 04:16

## 2025-02-05 RX ADMIN — HYDROMORPHONE HYDROCHLORIDE 1 MG: 1 INJECTION, SOLUTION INTRAMUSCULAR; INTRAVENOUS; SUBCUTANEOUS at 01:50

## 2025-02-05 RX ADMIN — SODIUM CHLORIDE, PRESERVATIVE FREE 10 ML: 5 INJECTION INTRAVENOUS at 07:37

## 2025-02-05 RX ADMIN — OXYCODONE HYDROCHLORIDE 15 MG: 10 TABLET ORAL at 04:15

## 2025-02-05 RX ADMIN — PIPERACILLIN AND TAZOBACTAM 4500 MG: 4; .5 INJECTION, POWDER, FOR SOLUTION INTRAVENOUS at 13:14

## 2025-02-05 RX ADMIN — PROMETHAZINE HYDROCHLORIDE 25 MG: 25 INJECTION INTRAMUSCULAR; INTRAVENOUS at 01:50

## 2025-02-05 RX ADMIN — ONDANSETRON 8 MG: 2 INJECTION INTRAMUSCULAR; INTRAVENOUS at 06:00

## 2025-02-05 RX ADMIN — HYDROMORPHONE HYDROCHLORIDE 1 MG: 1 INJECTION, SOLUTION INTRAMUSCULAR; INTRAVENOUS; SUBCUTANEOUS at 07:37

## 2025-02-05 ASSESSMENT — PAIN DESCRIPTION - LOCATION
LOCATION: BACK;KNEE;FLANK
LOCATION: FLANK;KNEE
LOCATION: FLANK;KNEE
LOCATION: ABDOMEN;HIP;KNEE
LOCATION: FLANK;KNEE

## 2025-02-05 ASSESSMENT — PAIN SCALES - GENERAL
PAINLEVEL_OUTOF10: 8
PAINLEVEL_OUTOF10: 6
PAINLEVEL_OUTOF10: 10
PAINLEVEL_OUTOF10: 9
PAINLEVEL_OUTOF10: 7
PAINLEVEL_OUTOF10: 7
PAINLEVEL_OUTOF10: 10
PAINLEVEL_OUTOF10: 8
PAINLEVEL_OUTOF10: 8

## 2025-02-05 ASSESSMENT — PAIN DESCRIPTION - DESCRIPTORS
DESCRIPTORS: DISCOMFORT
DESCRIPTORS: THROBBING
DESCRIPTORS: CRAMPING;ACHING
DESCRIPTORS: ACHING

## 2025-02-05 ASSESSMENT — PAIN DESCRIPTION - ORIENTATION
ORIENTATION: LEFT;RIGHT
ORIENTATION: LEFT
ORIENTATION: RIGHT;LEFT

## 2025-02-05 NOTE — DISCHARGE SUMMARY
Hospitalist Discharge Summary     Patient ID:  Yuki Bustillos  [id number]  54 y.o.  1970    Admit date: 1/27/2025    Discharge date and time: 2/5/2025    Admission Diagnoses: Intractable vomiting [R11.10]  Intractable nausea and vomiting [R11.2]  Urinary tract infection without hematuria, site unspecified [N39.0]    Discharge Diagnoses:    Pyuria  Untreated Bipolar disorder/Borderline PD  Chronic Pain       Hospital Course:   Yuki is a 54 y.o. female  with a medical history significant for multiple abdominal surgeries, prior SBO, prior ESBL UTI, ELIZA, ulcerative colitis, OA of left knee, chronic back pain, anxiety, who presents to the ER with intractable nausea and vomiting.  Associated with persistent right flank pain, decreased urination, generalized weakness, fatigue, dizziness. She was admitted for further evaluation and treatment of the following:      #Pyuria: she had has had persistent UTI since November. Urine cx grew ESBL Klebsiella 1/10/2025, 1/15/2025.  She was admitted 2 weeks ago for persistent UTI.  Received meropenem x 3 days.  During this admission, UA in ER is bland, but ER physician notes patient was adamantly requesting admission so HM was consulted for such. Urine culture from PCP showed just 3k CFU K pneumonia. Repeat culture here on 1/30 (obtained per pt request) grew just 7,000 CFU E faecium and 2,000 CFU lactobacillus. Patient demanded treatment and would not accept that these likely represent non-clinically significant growth. ID confirmed this likely represents either contamination or colonization. I had extensive discussion with patient regarding treatment as well as her recurrences (she notes that she has loose stools often and not infrequently wears a diaper). Ultimately, she will complete a course of liquid amoxicillin as she says she does not appropriately absorb antibiotics. I strongly recommend against treating any urine culture under 100k CFUs.    #Untreated Bipolar

## 2025-02-05 NOTE — PLAN OF CARE
Problem: Discharge Planning  Goal: Discharge to home or other facility with appropriate resources  2/4/2025 2206 by Kenia Rajan RN  Outcome: Progressing  2/4/2025 1028 by Kristina Garcia RN  Outcome: Progressing     Problem: Safety - Adult  Goal: Free from fall injury  2/4/2025 2206 by Kenia Rajan RN  Outcome: Progressing  2/4/2025 1028 by Kristina Garcia RN  Outcome: Progressing     Problem: Pain  Goal: Verbalizes/displays adequate comfort level or baseline comfort level  2/4/2025 2206 by Kenia Rajan RN  Outcome: Progressing  2/4/2025 1028 by Kristina Garcia RN  Outcome: Progressing     Problem: Gastrointestinal - Adult  Goal: Minimal or absence of nausea and vomiting  2/4/2025 2206 by Kenia Rajan RN  Outcome: Progressing  2/4/2025 1028 by Kristina Garcia RN  Outcome: Progressing     Problem: Nutrition Deficit:  Goal: Optimize nutritional status  Outcome: Progressing

## 2025-02-05 NOTE — DISCHARGE INSTRUCTIONS
HOSPITALIST DISCHARGE INSTRUCTIONS  NAME:  Yuki Bustillos   :  1970   MRN:  [id number]     Date/Time:  2025 12:34 PM    ADMIT DATE: 2025     DISCHARGE DATE: 2025     DISCHARGE DIAGNOSIS:  Urinary Tract Infection    DISCHARGE INSTRUCTIONS:  Thank you for allowing us to participate in your care. You were admitted for evaluation and treatment of the above. You will need to complete 10 more days of antibiotics.       MEDICATIONS:    It is important that you take the medication exactly as they are prescribed.   Keep your medication in the bottles provided by the pharmacist and keep a list of the medication names, dosages, and times to be taken in your wallet.   Do not take other medications without consulting your doctor.             If you experience any of the following symptoms then please call your primary care physician or return to the emergency room if you cannot get hold of your doctor:  Fever, chills, nausea, vomiting, diarrhea, change in mentation, falling, bleeding, shortness of breath    Follow Up:  Please call the below provider to arrange hospital follow up appointment      Fuad Villasenor,   [facility] [id number] [phone]    Follow up  Walk in appointments only, please see with in two weeks of discharge.      For questions regarding your Hospitalization or to contact the Hospital Medicine team, please call [phone].      Information obtained by :  I understand that if any problems occur once I am at home I am to contact my physician.    I understand and acknowledge receipt of the instructions indicated above.                                                                                                                                           Physician's or R.N.'s Signature                                                                  Date/Time

## 2025-02-05 NOTE — PLAN OF CARE
Patient discharged home, PIV removed. VSS. PRN dilaudid given as well as x1 dose of Zosyn. Discharge paperwork reviewed with the patient in detail. Questions and concerns addressed with patient. Work note x5 printed out and given to patient. Patient wheeled out safely with staff members.     Problem: Discharge Planning  Goal: Discharge to home or other facility with appropriate resources  2/5/2025 0931 by Kristina Garcia RN  Outcome: Progressing  2/4/2025 2206 by Kenia Rajan RN  Outcome: Progressing     Problem: Safety - Adult  Goal: Free from fall injury  2/5/2025 0931 by Kristina Garcia RN  Outcome: Progressing  2/4/2025 2206 by Kenia Rajan RN  Outcome: Progressing     Problem: Pain  Goal: Verbalizes/displays adequate comfort level or baseline comfort level  2/5/2025 0931 by Kristina Garcia RN  Outcome: Progressing  2/4/2025 2206 by Kenia Rajan RN  Outcome: Progressing     Problem: Gastrointestinal - Adult  Goal: Minimal or absence of nausea and vomiting  2/5/2025 0931 by Kristina Garcia RN  Outcome: Progressing  2/4/2025 2206 by Kenia Rajan RN  Outcome: Progressing     Problem: Nutrition Deficit:  Goal: Optimize nutritional status  2/5/2025 0931 by Kristina Garcia RN  Outcome: Progressing  2/4/2025 2206 by Kenia Rajan RN  Outcome: Progressing

## 2025-02-05 NOTE — PROGRESS NOTES
2/5/2025      RE: Yuki Bustillos (YOB: 1970)    To Whom it May Concern:    This is to certify that Yuki Bustillos was admitted to [facility] from 1/27/2025 to 2/5/2025. She is advised to rest for a few days and may return to work on 2/17/2025 assuming she does not develop new symptoms requiring further care.   Please feel free to contact my office if you have any questions or concerns.  Thank you for your assistance in this matter.          Fuad Stiles MD  [facility] Medicine Attending  P: [phone]  F: [phone]      
1855:  Patient ambulates to nursing station independently angry regarding having not spoken to Dr. Forrest yet.  Requests night time hospitalist be paged. Will pass on to PACO Pena, night shift.  Patient stating she wants to speak to MD \"to reconcile her medications from her last admission\".    1848:  Patient calling repeatedly regarding decrease in Dilaudid. RN Informed patient Dr. Forrest notified via secure message and plans to call her this evening however no plans to increase Dilaudid dose at this time, per Dr. Forrest.  Patient requesting night time hospitalist be contacted.    1705: Patient called for Dilaudid 1621 (due 1715).  RN entered room at 1706 to find patient sleeping comfortably. Resp even/unlabored. Did not awaken while RN in room. Will continue to reassess.    1424:Lethargic throughout day awakening only to loud voice or noises. Dr. Forrest made aware this a.m.  Requests Oxycodone and Dilaudid simultaneously.   Offered Oxycodone at 1415 upon request for pain medication, patient refused requesting Dilaudid stating pain \"well over a 10\" while sitting in bed consuming cookie.  States \"I wish they would bring at least two cookies\".  Proceeds to inquire regarding 1500 due time for Phenergan.  Educated regarding narcotic pain medications and importance of spaced timing between doses to avoid over medicating/sedation.  Discussed nausea medications and indications for use.      
1940 Patient on call light repeatedly asking about medication reconciliation. This nurse informed patient oxycodone and dilaudid are on MAR. Patient stated this is not how it was on last admission and would like the same pain regimen.Nurse informed patient this admission was distinct from last and we are to follow doctor's orders.           2100 Patient complaints of nausea and pain. Patient stated that a doctor called her and clarified her MAR and she is fine with this if she is leaving home soon due to her cultures showing no growth.\"If I am not going home there there will need to be changed to by medications.\"        2300 Patient comes to nurses station and complaints about waiting an hour for nurse to bring ice.This nurse stated that she is waiting for her phenergan to come up with pharmacy and that she will be there with all items. Patient stated that that is too long to wait. This nurse educates patient about the importance of clustering care.           0100 Patient complaints about this nurse not giving dilaudid. This nurse stated that based on documentation the medication is not due until approximately 0200. Patient angrily accuses nurses of delaying care and states that the whiteboard provided in the room is not accurate. This nurse educated patient on the importance of going based on documentation to prevent medication errors.    0200 Patient called lab directly and stated that her urine sample was read too early and that it was needing to be read after 48 hours.Patient stating that she needs to talk to charge and ask someone to make sure that lab did not throw away urine sample after collection.       0240 Patient called ED asking to see if Dr. Naqvi was available.                
[facility]  [address] [place] [address] [phone]    [facility], located within [facility] Adult  Hospitalist Group                                                                                          Hospitalist Progress Note  Danuta Martinez DO        Date of Service:  2025  NAME:  Yuki Bustillos  :  1970  MRN:  [id number]      Interval history / Subjective:     Fu multidrug resistant UTI.  Patient states she is continuing to experience sweats overnight.  She wants to make sure that she is not discharged too early.  She is concerned about prior abnormal ultrasound findings of her liver and is requesting abdominal ultrasound; ultrasound tech came into the room while was still seeing patient and ultrasound was changed from retroperitoneal to complete abdominal ultrasound.     Assessment & Plan:     Multidrug-resistant UTI  - Recent positive urine culture for an ESBL producing Klebsiella pneumonia  -UA at this admission low sf for infection   -  urine cx with no growth   - follow repeat urine cx from    -CT scanning of the abdomen pelvis from , 1/15, and 2025 all negative for any acute infectious process; renal US today with no acute concerns   -On IV Meropenem   - ID following     Anemia  Mild  - Sp Venofer  per patient request since she takes iron infusions outpatient; discussed with patient no further IV iron at this time due to infection work up as above   -Recommend continued outpatient follow-up with [facility]    Echogenic focus on US  - may represent a hemangioma   Fu op     Excessive sweating  -Patient states this has been going on for some time for several months  - Follow up with PCP.     Bacterial vaginosis  -Diagnosed by PCP outpatient  -Continue MetroGel and probiotics  - fu op     Hypokalemia  -resolved     History of multiple abdominal surgeries  History of recurrent SBO  -Recent CT abdomen and pelvis here negative    Chronic knee 
[facility]  [address] [place] [address] [phone]    [facility] Adult  Hospitalist Group                                                                                          Hospitalist Progress Note  Danuta DO Juan        Date of Service:  2/3/2025  NAME:  Yuki Bustillos  :  1970  MRN:  [id number]      Interval history / Subjective:     Fu multidrug resistant UTI.  Continues to have some sweats. Has chronic swelling of legs      Assessment & Plan:     Multidrug-resistant UTI  - Recent positive urine culture for an ESBL producing Klebsiella pneumonia  -UA at this admission low sf for infection   -  urine cx with no growth   - follow repeat urine cx from  - still pending; discussed with ID awaiting speciation of organisms   -CT scanning of the abdomen pelvis from , 1/15, and 2025 all negative for any acute infectious process; renal US today with no acute concerns   -On IV Meropenem   -ID following     Anemia  Mild  - Sp Venofer  per patient request since she takes iron infusions outpatient; discussed with patient no further IV iron at this time due to infection work up as above   -Recommend continued outpatient follow-up with [facility]    Echogenic focus on US  - may represent a hemangioma   Fu op     Excessive sweating  -Patient states this has been going on for some time for several months  - Follow up with PCP  - also fu endo and rheum op     Bacterial vaginosis  -Diagnosed by PCP outpatient  -Continue MetroGel and probiotics  - fu op     Hypokalemia  -resolved     History of multiple abdominal surgeries  History of recurrent SBO  -Recent CT abdomen and pelvis here negative    Chronic knee pain  -on chronic narcotics outpatient  -Oxycodone prn   - IV dilaudid prn; will start weaning   - PT if patient now willing to participate     Chronic anxiety  -Continue home medications    Outisde Records, prior notes, labs, radiology, 
[facility]  [address] [place], [place] [id number] [phone]    [facility] Adult  Hospitalist Group                                                                                          Hospitalist Progress Note  Mercedes Forrest MD        Date of Service:  2025  NAME:  Yuki Bustillos  :  1970  MRN:  [id number]      Interval history / Subjective:   Reports feeling better today.  Still slightly nauseous     Assessment & Plan:     Multidrug-resistant UTI  -Patient has showing 3000 colonies of outpatient cultures from [facility] MDR Klebsiella  -Continue with meropenem  -Consult ID     Bacterial vaginosis  -Diagnosed by PCP outpatient  -Continue MetroGel and probiotics    Hypokalemia  -Replete per electrolyte protocol    History of multiple abdominal surgeries  History of recurrent SBO  -Recent CT abdomen and pelvis here negative    Chronic knee pain  -Continue outpatient pain regimen    Chronic anxiety  -Medications      Outisde Records, prior notes, labs, radiology, and medications reviewed     Code status: Full code  DVT prophylaxis: [facility] Problems             Last Modified POA    * (Principal) Intractable nausea and vomiting 2025 Yes          Review of Systems:   Pertinent items are noted in HPI.       Vital Signs:    Last 24hrs VS reviewed since prior progress note. Most recent are:  Vitals:    25 1445   BP: 122/72   Pulse: 64   Resp: 16   Temp: 98.1 °F (36.7 °C)   SpO2: 100%         Intake/Output Summary (Last 24 hours) at 2025 1558  Last data filed at 2025 2300  Gross per 24 hour   Intake 7 ml   Output --   Net 7 ml        Physical Examination:             Constitutional:  No acute distress, cooperative, pleasant    ENT:  Oral mucosa moist, oropharynx benign.    Resp:  CTA bilaterally. No wheezing/rhonchi/rales. No accessory muscle use   CV:  Regular rhythm, normal rate, no murmurs, gallops, rubs    GI:  
Patient sees Doctor at [facility], they dont take appointment and only take walk ins.  
Physical Therapy Note    PT orders received and acknowledged. Chart reviewed. Patient declines the need for acte PT evaluation. She is mobilizing around her room Adlib while managing IV pole. She reports she will D/C to her parents house for a couple of days before returning home. Patient education is provided on requesting new PT consult in the event she feels she may need placement or increased assistance. If patient is only requesting HH PT, she does not require a PT evaluation. HH may be set up by CM. Patient does not have stairs to enter her apartment.     Will complete PT orders at this time.   
[facility]  [address] [place] [address]  [phone]    [facility] Adult  Hospitalist Group                                                                                          Hospitalist Progress Note  Mercedes Forrest MD        Date of Service:  2025  NAME:  Yuki Bustillos  :  1970  MRN:  [id number]      Interval history / Subjective:   No new complaints from yesterday.  Still with persistent back pain and leg pain     Assessment & Plan:     Multidrug-resistant UTI  -Patient has showing 3000 colonies of outpatient cultures from [facility] MDR Klebsiella  -Continue with meropenem.  Will repeat urine culture tomorrow per patient request  -ID consult pending    Bacterial vaginosis  -Diagnosed by PCP outpatient  -Continue MetroGel and probiotics    Hypokalemia  -Replete per electrolyte protocol    History of multiple abdominal surgeries  History of recurrent SBO  -Recent CT abdomen and pelvis here negative    Chronic knee pain  -Continue outpatient pain regimen    Chronic anxiety  -Medications      Outisde Records, prior notes, labs, radiology, and medications reviewed     Code status: Full code  DVT prophylaxis: [facility] Problems             Last Modified POA    * (Principal) Intractable nausea and vomiting 2025 Yes          Review of Systems:   Pertinent items are noted in HPI.       Vital Signs:    Last 24hrs VS reviewed since prior progress note. Most recent are:  Vitals:    25 1122   BP: 103/64   Pulse: 79   Resp: 18   Temp: 98.2 °F (36.8 °C)   SpO2: 100%       No intake or output data in the 24 hours ending 25 1315       Physical Examination:             Constitutional:  No acute distress, cooperative, pleasant    ENT:  Oral mucosa moist, oropharynx benign.    Resp:  CTA bilaterally. No wheezing/rhonchi/rales. No accessory muscle use   CV:  Regular rhythm, normal rate, no murmurs, gallops, rubs    GI:  
Spiritual Health History and Assessment/Progress Note  [facility]    Spiritual/Emotional Needs,  ,  ,      Name: Yuki Bustillos MRN: [id number]    Age: 54 y.o.     Sex: female   Language: English   Zoroastrian: Voodoo   Intractable nausea and vomiting     Date: 1/30/2025            Total Time Calculated: 70 min              Spiritual Assessment continued in [facility] 2        Referral/Consult From: Patient   Encounter Overview/Reason: Spiritual/Emotional Needs  Service Provided For: Patient    Jocelin, Belief, Meaning:   Patient identifies as spiritual, is connected with a jocelin tradition or spiritual practice, and has beliefs or practices that help with coping during difficult times  Family/Friends No family/friends present      Importance and Influence:  Patient has spiritual/personal beliefs that influence decisions regarding their health  Family/Friends No family/friends present    Community:  Patient is connected with a spiritual community, expresses feelings of isolation: disconnected from family/friends, feeling there is no one to turn to for help, and feeling no one understands, and Other: Pt shared she is a member of [employer]  Family/Friends No family/friends present    Assessment and Plan of Care:     Patient Interventions include: Facilitated expression of thoughts and feelings, Explored spiritual coping/struggle/distress, Engaged in theological reflection, and Affirmed coping skills/support systems  Family/Friends Interventions include: No family/friends present    Patient Plan of Care: Spiritual Care available upon further referral  Family/Friends Plan of Care: No family/friends present    Electronically signed by KAREN Hoover on 1/30/2025 at 11:20 AM    
Spoke with  Lorie Patel about patient concern that the urine growth should be read after 48 hours per her conversation with Dr Forrest, Lorie states she will run the urine culture again and update the report if any changes. Patient also updated on information received.   
[facility]  [address] [place], [place] [id number] [phone]    [facility] Adult  Hospitalist Group                                                                                          Hospitalist Progress Note  Mercedes Forrest MD        Date of Service:  2025  NAME:  Yuki Bustillos  :  1970  MRN:  [id number]      Interval history / Subjective:   Patient is drowsy this morning.  Reports some issues regarding pain medications overnight and not being able to get much sleep last night as well.     Assessment & Plan:     Multidrug-resistant UTI  -Patient has showing 3000 colonies of outpatient cultures from [facility] MDR Klebsiella  -Continue with meropenem.   -ID evaluated, there is low suspicion of ongoing UTI at this time and recommends repeating urine culture.    Anemia  -Mild  -Given a dose of Venofer  per patient request since she takes iron infusions outpatient  -Recommend continued outpatient iron infusions and follow-up with [facility].  There is no need for further iron infusions or related blood tests in the hospital setting  -Patient upset that daily labs are not being done, advised her lab work has remained stable and continued testing is not necessary and would not change outcomes    Excessive sweating  -Patient states this has been going on for some time  -Unlikely related to issues above  -Consider disorder such as hyperhidrosis. She can talk to her PCP about further working this up    Bacterial vaginosis  -Diagnosed by PCP outpatient  -Continue MetroGel and probiotics    Hypokalemia  -Repleted per electrolyte protocol    History of multiple abdominal surgeries  History of recurrent SBO  -Recent CT abdomen and pelvis here negative    Chronic knee pain  -Continue outpatient pain regimen    Chronic anxiety  -Continue home medications      Outisde Records, prior notes, labs, radiology, and medications reviewed     Code status: Full 
[facility]  [address] [place] [address] [phone]    [facility] Adult  Hospitalist Group                                                                                          Hospitalist Progress Note  Danuta DO Juan        Date of Service:  2025  NAME:  Yuki Bustillos  :  1970  MRN:  [id number]      Interval history / Subjective:     Fu multidrug resistant UTI.  Continues to have some sweats. Has chronic swelling of legs due to lymphedema. Discussed with patient urine cx results. Patient requesting further urine cx including straight cath; discussed with her that no further urine cx indicated. Discussed risk of narcotics and plan to wean her Iv narcotics.        Assessment & Plan:     Multidrug-resistant UTI  - Recent positive urine culture for an ESBL producing Klebsiella pneumonia  -UA at this admission low sf for infection   -  urine cx with no growth   -  urine cx from  w/ enterococcus faecalis and lactobacillus < 100K colonies do not think try UTI; patient asking for additional urine cx - do not feel this is indicated and ID agrees   -CT scanning of the abdomen pelvis from , 1/15, and 2025 all negative for any acute infectious process; renal US today with no acute concerns   -Sp IV Meropenem; ID has ordered additional amoxicillin - will defer further abx to ID    -ID following     Anemia  Mild  - Sp Venofer  per patient request since she takes iron infusions outpatient; discussed with patient no further IV iron at this time   -Recommend continued outpatient follow-up with [facility]    Echogenic focus on US  - may represent a hemangioma   -Fu op     Excessive sweating  -Patient states this has been going on for some time for several months  - Follow up with PCP  - also fu endo and rheum op     Bacterial vaginosis  -Diagnosed by PCP outpatient  -Continue MetroGel and probiotics  - fu op     Hypokalemia  -resolved 
Prolonged care.        Hospital Problems             Last Modified POA    * (Principal) Intractable nausea and vomiting 1/27/2025 Yes    Urinary tract infection without hematuria 1/29/2025 Yes    History of ESBL Klebsiella pneumoniae infection 1/29/2025 Yes    Allergy to multiple antibiotics 1/29/2025 Yes          Review of Systems:   Pertinent items are noted in HPI.       Vital Signs:    Last 24hrs VS reviewed since prior progress note. Most recent are:  Vitals:    02/01/25 1216   BP:    Pulse:    Resp: 16   Temp:    SpO2:        No intake or output data in the 24 hours ending 02/01/25 1445         Physical Examination:             Constitutional:  No acute distress, cooperative, pleasant    ENT:  Oral mucosa moist,    Resp:  CTA bilaterally. No wheezing/rhonchi/rales. No accessory muscle use   CV:  Regular rhythm, normal rate, no murmurs, gallops, rubs    GI:  Soft, non distended, lower abdominal tenderness, R>L back pain in kidney area.  Difficult to assess whether pain is muscular or internal in nature.     Musculoskeletal:  No edema, warm,    Neurologic:  Moves all extremities.  AAOx3, CN II-XII reviewed     Psych:  Good insight, Not anxious nor agitated.       Data Review:    Review and/or order of clinical lab test      Labs:     Recent Labs     01/31/25  0424   WBC 4.0   HGB 10.9*   HCT 34.3*        No results for input(s): \"NA\", \"K\", \"CL\", \"CO2\", \"BUN\", \"GLU\", \"MG\", \"PHOS\" in the last 72 hours.    Invalid input(s): \"CREA\", \"CA\", \"URICA\"    Lab Results   Component Value Date/Time    ALT 24 01/27/2025 05:07 PM    ALT 25 01/19/2025 02:02 PM    ALT 31 01/15/2025 02:27 PM    GLOB 4.1 01/27/2025 05:07 PM    GLOB 4.1 01/19/2025 02:02 PM    GLOB 4.3 01/15/2025 02:27 PM     No results found for: \"INR\", \"APTT\"   No results found for: \"IRON\", \"TIBC\"   No results found for: \"RBCF\"   No results for input(s): \"PH\", \"PCO2\", \"PO2\" in the last 72 hours.  No results found for: \"CPK\"  No results found for: \"CHOL\", 
member of [facility].    Electronically signed by KAREN Hoover on 1/29/2025 at 10:20 AM    
sugar,potatoes, hot tea with splenda and honey,pancakes with sugar free syrup  DIET ONE TIME MESSAGE;    Anthropometric Measures:  Height: 157.5 cm (5' 2.01\")  Ideal Body Weight (IBW): 110 lbs (50 kg)    Admission Body Weight: 91.6 kg (202 lb)  Current Body Weight: 91.6 kg (202 lb), 183.6 % IBW. Weight Source: Stated  Current BMI (kg/m2): 36.9  Usual Body Weight: 158.8 kg (350 lb)     % Weight Change (Calculated): -42.3  Weight Adjustment For: No Adjustment                 BMI Categories: Obese Class 2 (BMI 35.0 -39.9)    Estimated Daily Nutrient Needs:  Energy Requirements Based On: Formula  Weight Used for Energy Requirements: Current  Energy (kcal/day): 1905 (MSJx1.3)  Weight Used for Protein Requirements: Current  Protein (g/day): 73-92 (0.8-1.0 gPRO/kg)  Method Used for Fluid Requirements: 1 ml/kcal  Fluid (ml/day): 1905    Nutrition Diagnosis:   Inadequate protein-energy intake related to  (caloric restriction) as evidenced by weight loss    Nutrition Interventions:   Food and/or Nutrient Delivery: Continue Current Diet  Nutrition Education/Counseling: No recommendation at this time  Coordination of Nutrition Care: Continue to monitor while inpatient, Interdisciplinary Rounds  Plan of Care discussed with: RD    Goals:  Goals: Maintain adequate nutrition status, by next RD assessment  Type of Goal: New goal     Nutrition Monitoring and Evaluation:   Behavioral-Environmental Outcomes: None Identified  Food/Nutrient Intake Outcomes: Food and Nutrient Intake  Physical Signs/Symptoms Outcomes: Biochemical Data, Weight, Fluid Status or Edema    Discharge Planning:    No discharge needs at this time     Thanh Hanks Dietetic Intern   RD Office, ext: [phone]  
PRN    Or    HYDROmorphone HCl PF (DILAUDID) injection 1 mg  1 mg IntraVENous Q3H PRN     ______________________________________________________________________  EXPECTED LENGTH OF STAY:    ACTUAL LENGTH OF STAY:          0                 Mercedes Forrest MD

## 2025-02-06 LAB
BACTERIA SPEC CULT: ABNORMAL
BACTERIA SPEC CULT: ABNORMAL
CC UR VC: ABNORMAL
SERVICE CMNT-IMP: ABNORMAL

## 2025-02-12 ENCOUNTER — APPOINTMENT (OUTPATIENT)
Facility: HOSPITAL | Age: 55
End: 2025-02-12
Payer: COMMERCIAL

## 2025-02-12 ENCOUNTER — HOSPITAL ENCOUNTER (EMERGENCY)
Facility: HOSPITAL | Age: 55
Discharge: HOME | End: 2025-02-12
Attending: STUDENT IN AN ORGANIZED HEALTH CARE EDUCATION/TRAINING PROGRAM
Payer: COMMERCIAL

## 2025-02-12 VITALS
WEIGHT: 202 LBS | TEMPERATURE: 98.1 F | OXYGEN SATURATION: 98 % | HEART RATE: 90 BPM | RESPIRATION RATE: 18 BRPM | DIASTOLIC BLOOD PRESSURE: 76 MMHG | BODY MASS INDEX: 37.17 KG/M2 | HEIGHT: 62 IN | SYSTOLIC BLOOD PRESSURE: 133 MMHG

## 2025-02-12 DIAGNOSIS — N39.0 URINARY TRACT INFECTION WITHOUT HEMATURIA, SITE UNSPECIFIED: Primary | ICD-10-CM

## 2025-02-12 LAB
ALBUMIN SERPL-MCNC: 3.4 G/DL (ref 3.5–5)
ALBUMIN/GLOB SERPL: 0.9 (ref 1.1–2.2)
ALP SERPL-CCNC: 92 U/L (ref 45–117)
ALT SERPL-CCNC: 22 U/L (ref 12–78)
ANION GAP SERPL CALC-SCNC: 7 MMOL/L (ref 2–12)
AST SERPL-CCNC: 21 U/L (ref 15–37)
BASOPHILS # BLD: 0.02 K/UL (ref 0–0.1)
BASOPHILS NFR BLD: 0.4 % (ref 0–1)
BILIRUB SERPL-MCNC: 0.4 MG/DL (ref 0.2–1)
BUN SERPL-MCNC: 10 MG/DL (ref 6–20)
BUN/CREAT SERPL: 13 (ref 12–20)
CALCIUM SERPL-MCNC: 9.5 MG/DL (ref 8.5–10.1)
CHLORIDE SERPL-SCNC: 107 MMOL/L (ref 97–108)
CO2 SERPL-SCNC: 26 MMOL/L (ref 21–32)
CREAT SERPL-MCNC: 0.79 MG/DL (ref 0.55–1.02)
DIFFERENTIAL METHOD BLD: NORMAL
EOSINOPHIL # BLD: 0.02 K/UL (ref 0–0.4)
EOSINOPHIL NFR BLD: 0.4 % (ref 0–7)
ERYTHROCYTE [DISTWIDTH] IN BLOOD BY AUTOMATED COUNT: 12.2 % (ref 11.5–14.5)
GLOBULIN SER CALC-MCNC: 4 G/DL (ref 2–4)
GLUCOSE SERPL-MCNC: 84 MG/DL (ref 65–100)
HCT VFR BLD AUTO: 36.3 % (ref 35–47)
HGB BLD-MCNC: 11.8 G/DL (ref 11.5–16)
IMM GRANULOCYTES # BLD AUTO: 0.02 K/UL (ref 0–0.04)
IMM GRANULOCYTES NFR BLD AUTO: 0.4 % (ref 0–0.5)
LACTATE SERPL-SCNC: 1.4 MMOL/L (ref 0.4–2)
LIPASE SERPL-CCNC: 25 U/L (ref 13–75)
LYMPHOCYTES # BLD: 1.52 K/UL (ref 0.8–3.5)
LYMPHOCYTES NFR BLD: 30.1 % (ref 12–49)
MAGNESIUM SERPL-MCNC: 2.1 MG/DL (ref 1.6–2.4)
MCH RBC QN AUTO: 29.9 PG (ref 26–34)
MCHC RBC AUTO-ENTMCNC: 32.5 G/DL (ref 30–36.5)
MCV RBC AUTO: 91.9 FL (ref 80–99)
MONOCYTES # BLD: 0.38 K/UL (ref 0–1)
MONOCYTES NFR BLD: 7.5 % (ref 5–13)
NEUTS SEG # BLD: 3.09 K/UL (ref 1.8–8)
NEUTS SEG NFR BLD: 61.2 % (ref 32–75)
NRBC # BLD: 0 K/UL (ref 0–0.01)
NRBC BLD-RTO: 0 PER 100 WBC
PLATELET # BLD AUTO: 237 K/UL (ref 150–400)
PMV BLD AUTO: 10.4 FL (ref 8.9–12.9)
POTASSIUM SERPL-SCNC: 3.8 MMOL/L (ref 3.5–5.1)
PROT SERPL-MCNC: 7.4 G/DL (ref 6.4–8.2)
RBC # BLD AUTO: 3.95 M/UL (ref 3.8–5.2)
SODIUM SERPL-SCNC: 140 MMOL/L (ref 136–145)
WBC # BLD AUTO: 5.1 K/UL (ref 3.6–11)

## 2025-02-12 PROCEDURE — 74177 CT ABD & PELVIS W/CONTRAST: CPT

## 2025-02-12 PROCEDURE — 87040 BLOOD CULTURE FOR BACTERIA: CPT

## 2025-02-12 PROCEDURE — 85025 COMPLETE CBC W/AUTO DIFF WBC: CPT

## 2025-02-12 PROCEDURE — 80053 COMPREHEN METABOLIC PANEL: CPT

## 2025-02-12 PROCEDURE — 99285 EMERGENCY DEPT VISIT HI MDM: CPT

## 2025-02-12 PROCEDURE — 83735 ASSAY OF MAGNESIUM: CPT

## 2025-02-12 PROCEDURE — 6360000002 HC RX W HCPCS: Performed by: STUDENT IN AN ORGANIZED HEALTH CARE EDUCATION/TRAINING PROGRAM

## 2025-02-12 PROCEDURE — 6360000004 HC RX CONTRAST MEDICATION: Performed by: STUDENT IN AN ORGANIZED HEALTH CARE EDUCATION/TRAINING PROGRAM

## 2025-02-12 PROCEDURE — 83690 ASSAY OF LIPASE: CPT

## 2025-02-12 PROCEDURE — 36415 COLL VENOUS BLD VENIPUNCTURE: CPT

## 2025-02-12 PROCEDURE — 96374 THER/PROPH/DIAG INJ IV PUSH: CPT

## 2025-02-12 PROCEDURE — 83605 ASSAY OF LACTIC ACID: CPT

## 2025-02-12 RX ORDER — NITROFURANTOIN 25; 75 MG/1; MG/1
100 CAPSULE ORAL 2 TIMES DAILY
Qty: 20 CAPSULE | Refills: 0 | Status: SHIPPED | OUTPATIENT
Start: 2025-02-12 | End: 2025-02-12

## 2025-02-12 RX ORDER — IOPAMIDOL 755 MG/ML
100 INJECTION, SOLUTION INTRAVASCULAR
Status: COMPLETED | OUTPATIENT
Start: 2025-02-12 | End: 2025-02-12

## 2025-02-12 RX ORDER — NITROFURANTOIN 25; 75 MG/1; MG/1
100 CAPSULE ORAL 2 TIMES DAILY
Qty: 20 CAPSULE | Refills: 0 | Status: SHIPPED | OUTPATIENT
Start: 2025-02-12 | End: 2025-02-22

## 2025-02-12 RX ORDER — ONDANSETRON 2 MG/ML
4 INJECTION INTRAMUSCULAR; INTRAVENOUS ONCE
Status: COMPLETED | OUTPATIENT
Start: 2025-02-12 | End: 2025-02-12

## 2025-02-12 RX ADMIN — IOPAMIDOL 100 ML: 755 INJECTION, SOLUTION INTRAVENOUS at 14:44

## 2025-02-12 RX ADMIN — ONDANSETRON 4 MG: 2 INJECTION, SOLUTION INTRAMUSCULAR; INTRAVENOUS at 15:30

## 2025-02-12 ASSESSMENT — ENCOUNTER SYMPTOMS
SHORTNESS OF BREATH: 0
ABDOMINAL PAIN: 1

## 2025-02-12 NOTE — ED PROVIDER NOTES
[facility] EMERGENCY DEPARTMENT  EMERGENCY DEPARTMENT ENCOUNTER      Pt Name: Yuki Bustillos  MRN: [id number]  Birthdate 1970  Date of evaluation: 2/12/2025  Provider: Maurice Curry DO    CHIEF COMPLAINT     No chief complaint on file.        HISTORY OF PRESENT ILLNESS   (Location/Symptom, Timing/Onset, Context/Setting, Quality, Duration, Modifying Factors, Severity)  Note limiting factors.   This is a 54-year-old female with past medical history of recent hospitalization for UTI.  Patient has complicated UTIs with ESBL and was recently discharged on amoxicillin.  Patient states that she had positive cultures and sensitivities was directed come back to the ED for admission.  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            Review of External Medical Records:     Nursing Notes were reviewed.    REVIEW OF SYSTEMS    (2-9 systems for level 4, 10 or more for level 5)     Review of Systems   Respiratory:  Negative for shortness of breath.    Cardiovascular:  Negative for chest pain.   Gastrointestinal:  Positive for abdominal pain.       Except as noted above the remainder of the review of systems was reviewed and negative.       PAST MEDICAL HISTORY     Past Medical History:   Diagnosis Date    Anxiety     Bowel obstruction (HCC)     Fatty liver     Gall stones     GERD (gastroesophageal reflux disease)     Hematoma     abdomen on the right overy    Hernia of unspecified site of abdominal cavity without mention of obstruction or gangrene     History of vascular access device 07/22/2021    4.5 fr mIDLINE RIGHT BASILIC 13 CM ACCESS    Hx of thromboembolism of vein     right upper brachiel vein    Kidney stones     Menopause     Obesity     WEST?    PUD (peptic ulcer disease) 2016    stomach and colon ulcers?    Seizures (HCC)     me dside effect? last sz 2013         SURGICAL HISTORY       Past

## 2025-02-12 NOTE — ED NOTES
Received signout on patient.  CT negative, labs benign.  Reviewed susceptibilities,, both positive cultures from earlier this month have some susceptibility to Macrobid.  Will trial that and have her follow-up closely with her primary care doctor.  Hospitalist has been involved by Dr. Curry and they declined admission based on patient's current workup and presentation.  MD Kyaw Chapa Joseph, MD  02/12/25 [id number]

## 2025-02-12 NOTE — ED TRIAGE NOTES
Pt arrives to ED via EMS after \"receiving abnormal lab results of bacteria in urine\".  Pt reports 10/10 pain in bilateral kidneys \"since Jing\".  Pt reports multiple admissions over the last few months.  Pt reports nausea and reports \"she is worried the bacterial will come up her nose from her bladder and enter her respiratory tract\".

## 2025-02-16 LAB
BACTERIA SPEC CULT: NORMAL
SERVICE CMNT-IMP: NORMAL

## 2025-02-18 LAB
BACTERIA SPEC CULT: NORMAL
SERVICE CMNT-IMP: NORMAL

## (undated) DEVICE — SYR 5ML 1/5 GRAD LL NSAF LF --

## (undated) DEVICE — TUBING ADMIN SET INTRAV ARTERI -- CONVERT TO ITEM 340436

## (undated) DEVICE — 1200 GUARD II KIT W/5MM TUBE W/O VAC TUBE: Brand: GUARDIAN

## (undated) DEVICE — BAG SPEC BIOHZRD 10 X 10 IN --

## (undated) DEVICE — NDL FLTR TIP 5 MIC 18GX1.5IN --

## (undated) DEVICE — CANN NASAL O2 CAPNOGRAPHY AD -- FILTERLINE

## (undated) DEVICE — SOLIDIFIER MEDC 1200ML -- CONVERT TO 356117

## (undated) DEVICE — SET ADMIN 16ML TBNG L100IN 2 Y INJ SITE IV PIGGY BK DISP

## (undated) DEVICE — Device

## (undated) DEVICE — KENDALL RADIOLUCENT FOAM MONITORING ELECTRODE -RECTANGULAR SHAPE: Brand: KENDALL

## (undated) DEVICE — FORCEPS BX L240CM JAW DIA2.8MM L CAP W/ NDL MIC MESH TOOTH

## (undated) DEVICE — KIT COLON W/ 1.1OZ LUB AND 2 END

## (undated) DEVICE — SYR 3ML LL TIP 1/10ML GRAD --

## (undated) DEVICE — CONTAINER SPEC 20 ML LID NEUT BUFF FORMALIN 10 % POLYPR STS

## (undated) DEVICE — NDL PRT INJ NSAF BLNT 18GX1.5 --